# Patient Record
Sex: MALE | Employment: FULL TIME | URBAN - METROPOLITAN AREA
[De-identification: names, ages, dates, MRNs, and addresses within clinical notes are randomized per-mention and may not be internally consistent; named-entity substitution may affect disease eponyms.]

---

## 2017-01-05 ENCOUNTER — TELEPHONE (OUTPATIENT)
Dept: CARDIOLOGY CLINIC | Age: 47
End: 2017-01-05

## 2017-01-05 RX ORDER — FUROSEMIDE 20 MG/1
20 TABLET ORAL DAILY PRN
COMMUNITY
End: 2018-09-14 | Stop reason: SDUPTHER

## 2017-01-05 RX ORDER — METOPROLOL SUCCINATE 50 MG/1
50 TABLET, EXTENDED RELEASE ORAL DAILY
COMMUNITY
End: 2019-04-09 | Stop reason: SDUPTHER

## 2017-01-24 ENCOUNTER — OFFICE VISIT (OUTPATIENT)
Dept: CARDIOLOGY CLINIC | Age: 47
End: 2017-01-24

## 2017-01-24 VITALS
BODY MASS INDEX: 39.34 KG/M2 | HEART RATE: 70 BPM | OXYGEN SATURATION: 98 % | WEIGHT: 281 LBS | DIASTOLIC BLOOD PRESSURE: 74 MMHG | HEIGHT: 71 IN | SYSTOLIC BLOOD PRESSURE: 142 MMHG

## 2017-01-24 DIAGNOSIS — R53.83 OTHER FATIGUE: ICD-10-CM

## 2017-01-24 DIAGNOSIS — I10 ESSENTIAL HYPERTENSION: ICD-10-CM

## 2017-01-24 DIAGNOSIS — R07.9 CHEST PAIN, UNSPECIFIED TYPE: Primary | ICD-10-CM

## 2017-01-24 DIAGNOSIS — E78.5 HYPERLIPIDEMIA LDL GOAL <70: ICD-10-CM

## 2017-01-24 DIAGNOSIS — Z95.1 S/P CABG X 4: ICD-10-CM

## 2017-01-24 DIAGNOSIS — R06.09 DOE (DYSPNEA ON EXERTION): ICD-10-CM

## 2017-01-24 DIAGNOSIS — R60.0 LOCALIZED EDEMA: ICD-10-CM

## 2017-01-24 PROCEDURE — 93000 ELECTROCARDIOGRAM COMPLETE: CPT | Performed by: INTERNAL MEDICINE

## 2017-01-24 PROCEDURE — 99214 OFFICE O/P EST MOD 30 MIN: CPT | Performed by: INTERNAL MEDICINE

## 2017-01-24 RX ORDER — VITAMIN B COMPLEX
1 CAPSULE ORAL DAILY
COMMUNITY

## 2017-01-24 RX ORDER — INSULIN GLARGINE 100 [IU]/ML
118 INJECTION, SOLUTION SUBCUTANEOUS NIGHTLY
COMMUNITY
End: 2017-04-19

## 2017-02-24 ENCOUNTER — TELEPHONE (OUTPATIENT)
Dept: CARDIOLOGY CLINIC | Age: 47
End: 2017-02-24

## 2017-02-27 RX ORDER — ATORVASTATIN CALCIUM 40 MG/1
20 TABLET, FILM COATED ORAL
COMMUNITY
End: 2017-07-24 | Stop reason: ALTCHOICE

## 2017-03-29 ENCOUNTER — OFFICE VISIT (OUTPATIENT)
Dept: INFECTIOUS DISEASES | Age: 47
End: 2017-03-29

## 2017-03-29 VITALS
BODY MASS INDEX: 38.87 KG/M2 | OXYGEN SATURATION: 98 % | DIASTOLIC BLOOD PRESSURE: 80 MMHG | HEART RATE: 72 BPM | WEIGHT: 287 LBS | SYSTOLIC BLOOD PRESSURE: 138 MMHG | TEMPERATURE: 97.7 F | HEIGHT: 72 IN

## 2017-03-29 DIAGNOSIS — R53.83 FATIGUE, UNSPECIFIED TYPE: Primary | ICD-10-CM

## 2017-03-29 PROCEDURE — 99213 OFFICE O/P EST LOW 20 MIN: CPT | Performed by: INTERNAL MEDICINE

## 2017-03-30 ENCOUNTER — TELEPHONE (OUTPATIENT)
Dept: ENDOCRINOLOGY | Age: 47
End: 2017-03-30

## 2017-03-31 ENCOUNTER — HOSPITAL ENCOUNTER (OUTPATIENT)
Dept: OTHER | Age: 47
Discharge: HOME | End: 2017-03-31
Attending: INTERNAL MEDICINE | Admitting: INTERNAL MEDICINE

## 2017-03-31 ENCOUNTER — TELEPHONE (OUTPATIENT)
Dept: ENDOCRINOLOGY | Age: 47
End: 2017-03-31

## 2017-03-31 DIAGNOSIS — R53.83 FATIGUE, UNSPECIFIED TYPE: ICD-10-CM

## 2017-04-01 ENCOUNTER — HOSPITAL ENCOUNTER (OUTPATIENT)
Dept: OTHER | Age: 47
Discharge: HOME | End: 2017-04-01
Attending: INTERNAL MEDICINE | Admitting: INTERNAL MEDICINE

## 2017-04-01 LAB
ANION GAP SERPL CALCULATED.3IONS-SCNC: 17 MMOL/L (ref 3–16)
BASOPHILS ABSOLUTE: 0.1 K/UL (ref 0–0.2)
BASOPHILS RELATIVE PERCENT: 1.1 %
BUN BLDV-MCNC: 15 MG/DL (ref 7–20)
CALCIUM SERPL-MCNC: 9.6 MG/DL (ref 8.3–10.6)
CHLORIDE BLD-SCNC: 103 MMOL/L (ref 99–110)
CO2: 25 MMOL/L (ref 21–32)
CREAT SERPL-MCNC: 0.7 MG/DL (ref 0.9–1.3)
EOSINOPHILS ABSOLUTE: 0.1 K/UL (ref 0–0.6)
EOSINOPHILS RELATIVE PERCENT: 2.7 %
GFR AFRICAN AMERICAN: >60
GFR NON-AFRICAN AMERICAN: >60
GLUCOSE BLD-MCNC: 133 MG/DL (ref 70–99)
HCT VFR BLD CALC: 43.8 % (ref 40.5–52.5)
HEMOGLOBIN: 14.5 G/DL (ref 13.5–17.5)
LYMPHOCYTES ABSOLUTE: 1.8 K/UL (ref 1–5.1)
LYMPHOCYTES RELATIVE PERCENT: 34.2 %
MAGNESIUM: 1.6 MG/DL (ref 1.8–2.4)
MCH RBC QN AUTO: 28 PG (ref 26–34)
MCHC RBC AUTO-ENTMCNC: 33 G/DL (ref 31–36)
MCV RBC AUTO: 84.9 FL (ref 80–100)
MONOCYTES ABSOLUTE: 0.4 K/UL (ref 0–1.3)
MONOCYTES RELATIVE PERCENT: 8.4 %
NEUTROPHILS ABSOLUTE: 2.8 K/UL (ref 1.7–7.7)
NEUTROPHILS RELATIVE PERCENT: 53.6 %
PDW BLD-RTO: 14 % (ref 12.4–15.4)
PLATELET # BLD: 210 K/UL (ref 135–450)
PMV BLD AUTO: 8.4 FL (ref 5–10.5)
POTASSIUM SERPL-SCNC: 4.6 MMOL/L (ref 3.5–5.1)
RBC # BLD: 5.16 M/UL (ref 4.2–5.9)
SODIUM BLD-SCNC: 145 MMOL/L (ref 136–145)
TSH REFLEX: 1.54 UIU/ML (ref 0.27–4.2)
WBC # BLD: 5.3 K/UL (ref 4–11)

## 2017-04-13 ENCOUNTER — TELEPHONE (OUTPATIENT)
Dept: PHARMACY | Facility: CLINIC | Age: 47
End: 2017-04-13

## 2017-04-14 ENCOUNTER — TELEPHONE (OUTPATIENT)
Dept: INFECTIOUS DISEASES | Age: 47
End: 2017-04-14

## 2017-04-14 DIAGNOSIS — L03.313 CELLULITIS OF CHEST WALL: Primary | ICD-10-CM

## 2017-04-19 ENCOUNTER — HOSPITAL ENCOUNTER (OUTPATIENT)
Dept: OTHER | Age: 47
Discharge: HOME | End: 2017-04-19
Attending: NURSE PRACTITIONER | Admitting: NURSE PRACTITIONER

## 2017-04-19 ENCOUNTER — OFFICE VISIT (OUTPATIENT)
Dept: ENDOCRINOLOGY | Age: 47
End: 2017-04-19

## 2017-04-19 ENCOUNTER — TELEPHONE (OUTPATIENT)
Dept: ENDOCRINOLOGY | Age: 47
End: 2017-04-19

## 2017-04-19 ENCOUNTER — HOSPITAL ENCOUNTER (OUTPATIENT)
Dept: OTHER | Age: 47
Discharge: HOME | End: 2017-04-19
Attending: INTERNAL MEDICINE | Admitting: INTERNAL MEDICINE

## 2017-04-19 VITALS
DIASTOLIC BLOOD PRESSURE: 88 MMHG | OXYGEN SATURATION: 97 % | HEART RATE: 71 BPM | HEIGHT: 72 IN | SYSTOLIC BLOOD PRESSURE: 138 MMHG | WEIGHT: 289 LBS | BODY MASS INDEX: 39.14 KG/M2

## 2017-04-19 DIAGNOSIS — G63 POLYNEUROPATHY ASSOCIATED WITH UNDERLYING DISEASE (HCC): ICD-10-CM

## 2017-04-19 DIAGNOSIS — E83.42 HYPOMAGNESEMIA: ICD-10-CM

## 2017-04-19 DIAGNOSIS — E78.2 MIXED HYPERLIPIDEMIA: ICD-10-CM

## 2017-04-19 DIAGNOSIS — I25.10 CORONARY ARTERY DISEASE INVOLVING NATIVE CORONARY ARTERY OF NATIVE HEART WITHOUT ANGINA PECTORIS: ICD-10-CM

## 2017-04-19 DIAGNOSIS — E55.9 VITAMIN D DEFICIENCY: ICD-10-CM

## 2017-04-19 DIAGNOSIS — E66.9 OBESITY (BMI 30-39.9): ICD-10-CM

## 2017-04-19 LAB
A/G RATIO: 1.3 (ref 1.1–2.2)
ALBUMIN SERPL-MCNC: 4.4 G/DL (ref 3.4–5)
ALP BLD-CCNC: 61 U/L (ref 40–129)
ALT SERPL-CCNC: 25 U/L (ref 10–40)
ANION GAP SERPL CALCULATED.3IONS-SCNC: 17 MMOL/L (ref 3–16)
AST SERPL-CCNC: 18 U/L (ref 15–37)
BILIRUB SERPL-MCNC: 0.4 MG/DL (ref 0–1)
BUN BLDV-MCNC: 25 MG/DL (ref 7–20)
CALCIUM SERPL-MCNC: 10 MG/DL (ref 8.3–10.6)
CHLORIDE BLD-SCNC: 100 MMOL/L (ref 99–110)
CHOLESTEROL, TOTAL: 162 MG/DL (ref 0–199)
CO2: 22 MMOL/L (ref 21–32)
CORTISOL - AM: 8.8 UG/DL (ref 4.3–22.4)
CREAT SERPL-MCNC: 0.8 MG/DL (ref 0.9–1.3)
CREATININE URINE: 156.4 MG/DL (ref 39–259)
ESTIMATED AVERAGE GLUCOSE: 194.4 MG/DL
ESTRADIOL LEVEL: 19 PG/ML
FOLATE: 18.47 NG/ML (ref 4.78–24.2)
GFR AFRICAN AMERICAN: >60
GFR NON-AFRICAN AMERICAN: >60
GLOBULIN: 3.3 G/DL
GLUCOSE BLD-MCNC: 188 MG/DL (ref 70–99)
HBA1C MFR BLD: 8.4 %
HDLC SERPL-MCNC: 30 MG/DL (ref 40–60)
HOMOCYSTEINE: 10 UMOL/L (ref 0–10)
LDL CHOLESTEROL CALCULATED: ABNORMAL MG/DL
LDL CHOLESTEROL DIRECT: 56 MG/DL
MICROALBUMIN UR-MCNC: 4 MG/DL
MICROALBUMIN/CREAT UR-RTO: 25.6 MG/G (ref 0–30)
POTASSIUM SERPL-SCNC: 5.1 MMOL/L (ref 3.5–5.1)
SODIUM BLD-SCNC: 139 MMOL/L (ref 136–145)
TOTAL PROTEIN: 7.7 G/DL (ref 6.4–8.2)
TRIGL SERPL-MCNC: 611 MG/DL (ref 0–150)
VITAMIN B-12: 695 PG/ML (ref 211–911)
VITAMIN D 25-HYDROXY: 14.6 NG/ML
VLDLC SERPL CALC-MCNC: ABNORMAL MG/DL

## 2017-04-19 PROCEDURE — 99205 OFFICE O/P NEW HI 60 MIN: CPT | Performed by: NURSE PRACTITIONER

## 2017-04-19 RX ORDER — ICOSAPENT ETHYL 1000 MG/1
2 CAPSULE ORAL 2 TIMES DAILY
Qty: 360 CAPSULE | Refills: 1 | Status: SHIPPED | OUTPATIENT
Start: 2017-04-19 | End: 2017-05-23 | Stop reason: SDUPTHER

## 2017-04-19 RX ORDER — ERGOCALCIFEROL (VITAMIN D2) 1250 MCG
CAPSULE ORAL
Qty: 14 CAPSULE | Refills: 2 | Status: SHIPPED | OUTPATIENT
Start: 2017-04-19 | End: 2017-05-24 | Stop reason: SDUPTHER

## 2017-04-19 RX ORDER — MAGNESIUM OXIDE 400 MG/1
400 TABLET ORAL DAILY
Qty: 60 TABLET | Refills: 5 | Status: SHIPPED | OUTPATIENT
Start: 2017-04-19 | End: 2018-01-19 | Stop reason: ALTCHOICE

## 2017-04-20 ENCOUNTER — TELEPHONE (OUTPATIENT)
Dept: INFECTIOUS DISEASES | Age: 47
End: 2017-04-20

## 2017-04-20 DIAGNOSIS — Z22.322 MRSA (METHICILLIN RESISTANT STAPHYLOCOCCUS AUREUS) CARRIER: Primary | ICD-10-CM

## 2017-04-20 LAB
ADRENOCORTICOTROPIC HORMONE: 30 PG/ML (ref 7–69)
C-PEPTIDE: 3.1 NG/ML (ref 0.8–3.5)
METHYLMALONIC ACID: 0.15 UMOL/L (ref 0–0.4)
MRSA CULTURE ONLY: ABNORMAL
ORGANISM: ABNORMAL
SEX HORMONE BINDING GLOBULIN: 18 NMOL/L (ref 11–80)
TESTOSTERONE FREE PERCENT: 2.3 % (ref 1.6–2.9)
TESTOSTERONE FREE, CALC: 43 PG/ML (ref 47–244)
TESTOSTERONE TOTAL-MALE: 188 NG/DL (ref 300–890)

## 2017-04-21 LAB
INSULIN A: <0.4 U/ML (ref 0–0.4)
ISLET CELL ANTIBODY: NORMAL

## 2017-04-24 LAB — GLUTAMIC ACID DECARB AB: <5 IU/ML (ref 0–5)

## 2017-05-12 ENCOUNTER — EMPLOYEE WELLNESS (OUTPATIENT)
Dept: OTHER | Age: 47
End: 2017-05-12

## 2017-05-12 LAB
CHOLESTEROL, TOTAL: 204 MG/DL (ref 0–199)
GLUCOSE BLD-MCNC: 134 MG/DL (ref 70–99)
HDLC SERPL-MCNC: 22 MG/DL (ref 40–60)
LDL CHOLESTEROL CALCULATED: ABNORMAL MG/DL
LDL CHOLESTEROL DIRECT: 59 MG/DL
TRIGL SERPL-MCNC: 1332 MG/DL (ref 0–150)

## 2017-05-13 LAB
ESTIMATED AVERAGE GLUCOSE: 185.8 MG/DL
HBA1C MFR BLD: 8.1 %

## 2017-05-23 ENCOUNTER — OFFICE VISIT (OUTPATIENT)
Dept: ENDOCRINOLOGY | Age: 47
End: 2017-05-23

## 2017-05-23 VITALS
HEART RATE: 64 BPM | BODY MASS INDEX: 40.12 KG/M2 | DIASTOLIC BLOOD PRESSURE: 84 MMHG | HEIGHT: 72 IN | SYSTOLIC BLOOD PRESSURE: 122 MMHG | WEIGHT: 296.2 LBS | OXYGEN SATURATION: 98 %

## 2017-05-23 DIAGNOSIS — E55.9 VITAMIN D DEFICIENCY: ICD-10-CM

## 2017-05-23 DIAGNOSIS — E83.42 HYPOMAGNESEMIA: ICD-10-CM

## 2017-05-23 DIAGNOSIS — G63 POLYNEUROPATHY ASSOCIATED WITH UNDERLYING DISEASE (HCC): ICD-10-CM

## 2017-05-23 DIAGNOSIS — E78.2 MIXED HYPERLIPIDEMIA: ICD-10-CM

## 2017-05-23 PROCEDURE — 99214 OFFICE O/P EST MOD 30 MIN: CPT | Performed by: NURSE PRACTITIONER

## 2017-05-23 RX ORDER — FENOFIBRATE 145 MG/1
145 TABLET, COATED ORAL DAILY
Qty: 90 TABLET | Refills: 1 | Status: SHIPPED | OUTPATIENT
Start: 2017-05-23 | End: 2017-05-23 | Stop reason: SDUPTHER

## 2017-05-23 RX ORDER — ICOSAPENT ETHYL 1000 MG/1
2 CAPSULE ORAL 2 TIMES DAILY
Qty: 360 CAPSULE | Refills: 1 | Status: SHIPPED | OUTPATIENT
Start: 2017-05-23 | End: 2018-10-25 | Stop reason: SDUPTHER

## 2017-05-23 RX ORDER — FENOFIBRATE 145 MG/1
145 TABLET, COATED ORAL DAILY
Qty: 90 TABLET | Refills: 1 | Status: SHIPPED | OUTPATIENT
Start: 2017-05-23 | End: 2021-01-07

## 2017-05-24 ENCOUNTER — HOSPITAL ENCOUNTER (OUTPATIENT)
Dept: OTHER | Age: 47
Discharge: HOME | End: 2017-05-24
Attending: NURSE PRACTITIONER | Admitting: NURSE PRACTITIONER

## 2017-05-24 LAB
MAGNESIUM: 1.8 MG/DL (ref 1.8–2.4)
VITAMIN D 25-HYDROXY: 15.8 NG/ML

## 2017-05-24 RX ORDER — ERGOCALCIFEROL (VITAMIN D2) 1250 MCG
CAPSULE ORAL
Qty: 24 CAPSULE | Refills: 0 | Status: SHIPPED | OUTPATIENT
Start: 2017-05-24 | End: 2018-11-02 | Stop reason: SDUPTHER

## 2017-05-27 LAB — CHROMOGRANIN A: 103 NG/ML (ref 0–95)

## 2017-06-03 LAB — GLUCAGON LVL: 231 NG/L

## 2017-06-05 ENCOUNTER — TELEPHONE (OUTPATIENT)
Dept: ENDOCRINOLOGY | Age: 47
End: 2017-06-05

## 2017-06-05 DIAGNOSIS — E16.3: Primary | ICD-10-CM

## 2017-06-12 ENCOUNTER — HOSPITAL ENCOUNTER (OUTPATIENT)
Dept: MRI IMAGING | Age: 47
Discharge: HOME | End: 2017-06-12
Attending: INTERNAL MEDICINE | Admitting: INTERNAL MEDICINE

## 2017-06-12 DIAGNOSIS — M54.42 LOW BACK PAIN WITH LEFT-SIDED SCIATICA: ICD-10-CM

## 2017-06-12 DIAGNOSIS — M54.41 ACUTE BACK PAIN WITH SCIATICA, RIGHT: ICD-10-CM

## 2017-06-15 ENCOUNTER — HOSPITAL ENCOUNTER (OUTPATIENT)
Dept: VASCULAR LAB | Age: 47
Discharge: HOME | End: 2017-06-15
Attending: INTERNAL MEDICINE | Admitting: INTERNAL MEDICINE

## 2017-06-15 DIAGNOSIS — M54.42 LOW BACK PAIN WITH LEFT-SIDED SCIATICA: ICD-10-CM

## 2017-06-15 DIAGNOSIS — I73.9 CLAUDICATION (HCC): Primary | ICD-10-CM

## 2017-06-19 ENCOUNTER — HOSPITAL ENCOUNTER (OUTPATIENT)
Dept: NUCLEAR MEDICINE | Age: 47
Discharge: HOME | End: 2017-06-21
Attending: INTERNAL MEDICINE | Admitting: INTERNAL MEDICINE

## 2017-06-19 ENCOUNTER — PATIENT MESSAGE (OUTPATIENT)
Dept: ENDOCRINOLOGY | Age: 47
End: 2017-06-19

## 2017-06-19 ENCOUNTER — HOSPITAL ENCOUNTER (OUTPATIENT)
Dept: CT IMAGING | Age: 47
Discharge: HOME | End: 2017-06-19
Attending: INTERNAL MEDICINE | Admitting: INTERNAL MEDICINE

## 2017-06-19 DIAGNOSIS — D3A.8 NEUROENDOCRINE TUMOR: ICD-10-CM

## 2017-06-19 DIAGNOSIS — D48.9 NEOPLASM OF UNCERTAIN BEHAVIOR, SITE UNSPECIFIED: ICD-10-CM

## 2017-06-19 DIAGNOSIS — D48.9 NEOPLASM OF UNCERTAIN BEHAVIOR: ICD-10-CM

## 2017-06-20 ENCOUNTER — HOSPITAL ENCOUNTER (OUTPATIENT)
Dept: NUCLEAR MEDICINE | Age: 47
Discharge: HOME | End: 2017-06-20
Admitting: INTERNAL MEDICINE

## 2017-06-20 DIAGNOSIS — D48.9 NEOPLASM OF UNCERTAIN BEHAVIOR: ICD-10-CM

## 2017-06-21 ENCOUNTER — TELEPHONE (OUTPATIENT)
Dept: CARDIOLOGY CLINIC | Age: 47
End: 2017-06-21

## 2017-06-21 ENCOUNTER — HOSPITAL ENCOUNTER (OUTPATIENT)
Dept: NUCLEAR MEDICINE | Age: 47
Discharge: HOME | End: 2017-06-21
Admitting: INTERNAL MEDICINE

## 2017-06-21 DIAGNOSIS — D48.9 NEOPLASM OF UNCERTAIN BEHAVIOR: ICD-10-CM

## 2017-07-11 ENCOUNTER — TELEPHONE (OUTPATIENT)
Dept: BARIATRICS/WEIGHT MGMT | Age: 47
End: 2017-07-11

## 2017-07-12 ENCOUNTER — HOSPITAL ENCOUNTER (OUTPATIENT)
Dept: OTHER | Age: 47
Discharge: HOME | End: 2017-07-12
Attending: NEUROLOGICAL SURGERY | Admitting: NEUROLOGICAL SURGERY

## 2017-07-12 DIAGNOSIS — R52 PAIN: ICD-10-CM

## 2017-07-24 ENCOUNTER — OFFICE VISIT (OUTPATIENT)
Dept: CARDIOLOGY CLINIC | Age: 47
End: 2017-07-24

## 2017-07-24 VITALS
HEIGHT: 71 IN | DIASTOLIC BLOOD PRESSURE: 76 MMHG | HEART RATE: 84 BPM | SYSTOLIC BLOOD PRESSURE: 138 MMHG | WEIGHT: 288 LBS | BODY MASS INDEX: 40.32 KG/M2 | OXYGEN SATURATION: 98 %

## 2017-07-24 DIAGNOSIS — R53.83 FATIGUE, UNSPECIFIED TYPE: ICD-10-CM

## 2017-07-24 DIAGNOSIS — R07.2 PRECORDIAL PAIN: ICD-10-CM

## 2017-07-24 DIAGNOSIS — R60.0 LOCALIZED EDEMA: ICD-10-CM

## 2017-07-24 DIAGNOSIS — Z01.810 PREOP CARDIOVASCULAR EXAM: Primary | ICD-10-CM

## 2017-07-24 DIAGNOSIS — E78.2 MIXED HYPERLIPIDEMIA: ICD-10-CM

## 2017-07-24 DIAGNOSIS — I25.119 CORONARY ARTERY DISEASE INVOLVING NATIVE CORONARY ARTERY OF NATIVE HEART WITH ANGINA PECTORIS (HCC): ICD-10-CM

## 2017-07-24 DIAGNOSIS — I10 ESSENTIAL HYPERTENSION: ICD-10-CM

## 2017-07-24 PROCEDURE — 99214 OFFICE O/P EST MOD 30 MIN: CPT | Performed by: INTERNAL MEDICINE

## 2017-07-24 PROCEDURE — 93000 ELECTROCARDIOGRAM COMPLETE: CPT | Performed by: INTERNAL MEDICINE

## 2017-07-24 RX ORDER — ROSUVASTATIN CALCIUM 5 MG/1
5 TABLET, COATED ORAL NIGHTLY
Qty: 30 TABLET | Refills: 11 | Status: SHIPPED | OUTPATIENT
Start: 2017-07-24 | End: 2018-01-19

## 2017-07-24 RX ORDER — LISINOPRIL 20 MG/1
20 TABLET ORAL 2 TIMES DAILY
COMMUNITY
End: 2020-01-20 | Stop reason: SDUPTHER

## 2017-07-31 ENCOUNTER — HOSPITAL ENCOUNTER (OUTPATIENT)
Dept: PREADMISSION TESTING | Age: 47
Discharge: HOME | End: 2017-07-31
Attending: NEUROLOGICAL SURGERY | Admitting: NEUROLOGICAL SURGERY

## 2017-07-31 VITALS — WEIGHT: 278 LBS | BODY MASS INDEX: 38.92 KG/M2 | HEIGHT: 71 IN

## 2017-07-31 LAB
A/G RATIO: 1.6 (ref 1.1–2.2)
ALBUMIN SERPL-MCNC: 4.9 G/DL (ref 3.4–5)
ALP BLD-CCNC: 45 U/L (ref 40–129)
ALT SERPL-CCNC: 24 U/L (ref 10–40)
ANION GAP SERPL CALCULATED.3IONS-SCNC: 13 MMOL/L (ref 3–16)
APTT: 28.9 SEC (ref 24.1–34.9)
AST SERPL-CCNC: 25 U/L (ref 15–37)
BILIRUB SERPL-MCNC: 0.3 MG/DL (ref 0–1)
BUN BLDV-MCNC: 25 MG/DL (ref 7–20)
CALCIUM SERPL-MCNC: 9.2 MG/DL (ref 8.3–10.6)
CHLORIDE BLD-SCNC: 102 MMOL/L (ref 99–110)
CO2: 23 MMOL/L (ref 21–32)
CREAT SERPL-MCNC: 0.9 MG/DL (ref 0.9–1.3)
GFR AFRICAN AMERICAN: >60
GFR NON-AFRICAN AMERICAN: >60
GLOBULIN: 3.1 G/DL
GLUCOSE BLD-MCNC: 211 MG/DL (ref 70–99)
HCT VFR BLD CALC: 45.5 % (ref 40.5–52.5)
HEMOGLOBIN: 15 G/DL (ref 13.5–17.5)
INR BLD: 0.99 (ref 0.85–1.15)
MCH RBC QN AUTO: 27.1 PG (ref 26–34)
MCHC RBC AUTO-ENTMCNC: 33 G/DL (ref 31–36)
MCV RBC AUTO: 82.2 FL (ref 80–100)
PDW BLD-RTO: 13.3 % (ref 12.4–15.4)
PLATELET # BLD: 220 K/UL (ref 135–450)
PMV BLD AUTO: 8.1 FL (ref 5–10.5)
POTASSIUM SERPL-SCNC: 4.9 MMOL/L (ref 3.5–5.1)
PROTHROMBIN TIME: 11.2 SEC (ref 9.6–13)
RBC # BLD: 5.54 M/UL (ref 4.2–5.9)
SODIUM BLD-SCNC: 138 MMOL/L (ref 136–145)
TOTAL PROTEIN: 8 G/DL (ref 6.4–8.2)
WBC # BLD: 5.1 K/UL (ref 4–11)

## 2017-07-31 RX ORDER — LIDOCAINE HYDROCHLORIDE 10 MG/ML
0.5 INJECTION, SOLUTION EPIDURAL; INFILTRATION; INTRACAUDAL; PERINEURAL ONCE
Status: CANCELLED | OUTPATIENT
Start: 2017-08-10

## 2017-07-31 RX ORDER — ALLOPURINOL 100 MG/1
100 TABLET ORAL DAILY
COMMUNITY
End: 2017-10-19 | Stop reason: ALTCHOICE

## 2017-07-31 RX ORDER — CLINDAMYCIN PHOSPHATE 900 MG/50ML
900 INJECTION INTRAVENOUS
Status: CANCELLED | OUTPATIENT
Start: 2017-08-10 | End: 2017-08-10

## 2017-07-31 RX ORDER — SODIUM CHLORIDE 9 MG/ML
INJECTION, SOLUTION INTRAVENOUS CONTINUOUS
Status: CANCELLED | OUTPATIENT
Start: 2017-08-10

## 2017-07-31 ASSESSMENT — ENCOUNTER SYMPTOMS: SHORTNESS OF BREATH: 0

## 2017-08-02 LAB — MRSA CULTURE ONLY: NORMAL

## 2017-08-10 ENCOUNTER — HOSPITAL ENCOUNTER (OUTPATIENT)
Dept: SURGERY | Age: 47
Discharge: HOME | End: 2017-08-10
Attending: NEUROLOGICAL SURGERY | Admitting: NEUROLOGICAL SURGERY

## 2017-08-10 VITALS
HEART RATE: 59 BPM | SYSTOLIC BLOOD PRESSURE: 139 MMHG | BODY MASS INDEX: 38.92 KG/M2 | WEIGHT: 278 LBS | HEIGHT: 71 IN | DIASTOLIC BLOOD PRESSURE: 83 MMHG | OXYGEN SATURATION: 94 % | TEMPERATURE: 96.8 F | RESPIRATION RATE: 14 BRPM

## 2017-08-10 DIAGNOSIS — R52 PAIN: ICD-10-CM

## 2017-08-10 LAB
ANION GAP SERPL CALCULATED.3IONS-SCNC: 14 MMOL/L (ref 3–16)
BUN BLDV-MCNC: 27 MG/DL (ref 7–20)
CALCIUM SERPL-MCNC: 9.7 MG/DL (ref 8.3–10.6)
CHLORIDE BLD-SCNC: 104 MMOL/L (ref 99–110)
CO2: 23 MMOL/L (ref 21–32)
CREAT SERPL-MCNC: 1 MG/DL (ref 0.9–1.3)
GFR AFRICAN AMERICAN: >60
GFR NON-AFRICAN AMERICAN: >60
GLUCOSE BLD-MCNC: 172 MG/DL (ref 70–99)
GLUCOSE BLD-MCNC: 172 MG/DL (ref 70–99)
GLUCOSE BLD-MCNC: 178 MG/DL (ref 70–99)
GLUCOSE BLD-MCNC: 208 MG/DL (ref 70–99)
PERFORMED ON: ABNORMAL
POTASSIUM SERPL-SCNC: 4.8 MMOL/L (ref 3.5–5.1)
SODIUM BLD-SCNC: 141 MMOL/L (ref 136–145)

## 2017-08-10 RX ORDER — SODIUM CHLORIDE 0.9 % (FLUSH) 0.9 %
10 SYRINGE (ML) INJECTION EVERY 12 HOURS SCHEDULED
Status: DISCONTINUED | OUTPATIENT
Start: 2017-08-10 | End: 2017-08-11 | Stop reason: HOSPADM

## 2017-08-10 RX ORDER — FENTANYL CITRATE 50 UG/ML
50 INJECTION, SOLUTION INTRAMUSCULAR; INTRAVENOUS EVERY 5 MIN PRN
Status: DISCONTINUED | OUTPATIENT
Start: 2017-08-10 | End: 2017-08-11 | Stop reason: HOSPADM

## 2017-08-10 RX ORDER — OXYCODONE HYDROCHLORIDE AND ACETAMINOPHEN 5; 325 MG/1; MG/1
1-2 TABLET ORAL EVERY 6 HOURS PRN
Qty: 60 TABLET | Refills: 0 | Status: SHIPPED | OUTPATIENT
Start: 2017-08-10 | End: 2017-08-17

## 2017-08-10 RX ORDER — SODIUM CHLORIDE 0.9 % (FLUSH) 0.9 %
10 SYRINGE (ML) INJECTION PRN
Status: DISCONTINUED | OUTPATIENT
Start: 2017-08-10 | End: 2017-08-11 | Stop reason: HOSPADM

## 2017-08-10 RX ORDER — HYDROMORPHONE HCL 110MG/55ML
0.25 PATIENT CONTROLLED ANALGESIA SYRINGE INTRAVENOUS EVERY 5 MIN PRN
Status: DISCONTINUED | OUTPATIENT
Start: 2017-08-10 | End: 2017-08-11 | Stop reason: HOSPADM

## 2017-08-10 RX ORDER — ONDANSETRON 2 MG/ML
4 INJECTION INTRAMUSCULAR; INTRAVENOUS
Status: COMPLETED | OUTPATIENT
Start: 2017-08-10 | End: 2017-08-10

## 2017-08-10 RX ORDER — HYDROMORPHONE HCL 110MG/55ML
0.5 PATIENT CONTROLLED ANALGESIA SYRINGE INTRAVENOUS EVERY 5 MIN PRN
Status: DISCONTINUED | OUTPATIENT
Start: 2017-08-10 | End: 2017-08-11 | Stop reason: HOSPADM

## 2017-08-10 RX ORDER — FENTANYL CITRATE 50 UG/ML
25 INJECTION, SOLUTION INTRAMUSCULAR; INTRAVENOUS EVERY 5 MIN PRN
Status: DISCONTINUED | OUTPATIENT
Start: 2017-08-10 | End: 2017-08-11 | Stop reason: HOSPADM

## 2017-08-10 RX ORDER — ACETAMINOPHEN 10 MG/ML
1000 INJECTION, SOLUTION INTRAVENOUS ONCE
Status: COMPLETED | OUTPATIENT
Start: 2017-08-10 | End: 2017-08-10

## 2017-08-10 RX ORDER — LIDOCAINE HYDROCHLORIDE 10 MG/ML
0.5 INJECTION, SOLUTION EPIDURAL; INFILTRATION; INTRACAUDAL; PERINEURAL ONCE
Status: DISCONTINUED | OUTPATIENT
Start: 2017-08-10 | End: 2017-08-11 | Stop reason: HOSPADM

## 2017-08-10 RX ORDER — SODIUM CHLORIDE 9 MG/ML
INJECTION, SOLUTION INTRAVENOUS CONTINUOUS
Status: DISCONTINUED | OUTPATIENT
Start: 2017-08-10 | End: 2017-08-10 | Stop reason: SDUPTHER

## 2017-08-10 RX ORDER — OXYCODONE HYDROCHLORIDE 5 MG/1
10 TABLET ORAL PRN
Status: COMPLETED | OUTPATIENT
Start: 2017-08-10 | End: 2017-08-10

## 2017-08-10 RX ORDER — ACETAMINOPHEN 10 MG/ML
INJECTION, SOLUTION INTRAVENOUS
Status: COMPLETED
Start: 2017-08-10 | End: 2017-08-10

## 2017-08-10 RX ORDER — SODIUM CHLORIDE 9 MG/ML
INJECTION, SOLUTION INTRAVENOUS
Status: COMPLETED
Start: 2017-08-10 | End: 2017-08-10

## 2017-08-10 RX ORDER — MEPERIDINE HYDROCHLORIDE 25 MG/ML
12.5 INJECTION INTRAMUSCULAR; INTRAVENOUS; SUBCUTANEOUS EVERY 5 MIN PRN
Status: DISCONTINUED | OUTPATIENT
Start: 2017-08-10 | End: 2017-08-11 | Stop reason: HOSPADM

## 2017-08-10 RX ORDER — ONDANSETRON 2 MG/ML
4 INJECTION INTRAMUSCULAR; INTRAVENOUS PRN
Status: DISCONTINUED | OUTPATIENT
Start: 2017-08-10 | End: 2017-08-11 | Stop reason: HOSPADM

## 2017-08-10 RX ORDER — SODIUM CHLORIDE 9 MG/ML
INJECTION, SOLUTION INTRAVENOUS CONTINUOUS
Status: DISCONTINUED | OUTPATIENT
Start: 2017-08-10 | End: 2017-08-11 | Stop reason: HOSPADM

## 2017-08-10 RX ORDER — LIDOCAINE HYDROCHLORIDE 10 MG/ML
1 INJECTION, SOLUTION EPIDURAL; INFILTRATION; INTRACAUDAL; PERINEURAL
Status: ACTIVE | OUTPATIENT
Start: 2017-08-10 | End: 2017-08-10

## 2017-08-10 RX ORDER — OXYCODONE HYDROCHLORIDE 5 MG/1
5 TABLET ORAL PRN
Status: COMPLETED | OUTPATIENT
Start: 2017-08-10 | End: 2017-08-10

## 2017-08-10 RX ADMIN — ACETAMINOPHEN 1000 MG: 10 INJECTION, SOLUTION INTRAVENOUS at 14:20

## 2017-08-10 RX ADMIN — Medication 0.5 MG: at 14:26

## 2017-08-10 RX ADMIN — SODIUM CHLORIDE: 9 INJECTION, SOLUTION INTRAVENOUS at 11:24

## 2017-08-10 RX ADMIN — Medication 0.5 MG: at 14:15

## 2017-08-10 RX ADMIN — OXYCODONE HYDROCHLORIDE 10 MG: 5 TABLET ORAL at 15:06

## 2017-08-10 RX ADMIN — ONDANSETRON 4 MG: 2 INJECTION INTRAMUSCULAR; INTRAVENOUS at 14:37

## 2017-08-10 RX ADMIN — FENTANYL CITRATE 25 MCG: 50 INJECTION, SOLUTION INTRAMUSCULAR; INTRAVENOUS at 14:03

## 2017-08-10 RX ADMIN — FENTANYL CITRATE 50 MCG: 50 INJECTION, SOLUTION INTRAMUSCULAR; INTRAVENOUS at 14:08

## 2017-08-10 ASSESSMENT — PAIN SCALES - GENERAL
PAINLEVEL_OUTOF10: 8
PAINLEVEL_OUTOF10: 8
PAINLEVEL_OUTOF10: 7
PAINLEVEL_OUTOF10: 6
PAINLEVEL_OUTOF10: 8

## 2017-08-10 ASSESSMENT — PAIN - FUNCTIONAL ASSESSMENT: PAIN_FUNCTIONAL_ASSESSMENT: 0-10

## 2017-08-10 ASSESSMENT — ENCOUNTER SYMPTOMS: SHORTNESS OF BREATH: 0

## 2017-08-15 ENCOUNTER — HOSPITAL ENCOUNTER (OUTPATIENT)
Dept: OTHER | Age: 47
Discharge: HOME | End: 2017-08-15
Attending: NEUROLOGICAL SURGERY | Admitting: NEUROLOGICAL SURGERY

## 2017-08-15 LAB
BASOPHILS ABSOLUTE: 0.1 K/UL (ref 0–0.2)
BASOPHILS RELATIVE PERCENT: 1.1 %
C-REACTIVE PROTEIN: 10.3 MG/L (ref 0–5.1)
EOSINOPHILS ABSOLUTE: 0.2 K/UL (ref 0–0.6)
EOSINOPHILS RELATIVE PERCENT: 4.1 %
HCT VFR BLD CALC: 39.3 % (ref 40.5–52.5)
HEMOGLOBIN: 13 G/DL (ref 13.5–17.5)
LYMPHOCYTES ABSOLUTE: 1.5 K/UL (ref 1–5.1)
LYMPHOCYTES RELATIVE PERCENT: 30.8 %
MCH RBC QN AUTO: 27.3 PG (ref 26–34)
MCHC RBC AUTO-ENTMCNC: 33 G/DL (ref 31–36)
MCV RBC AUTO: 82.7 FL (ref 80–100)
MONOCYTES ABSOLUTE: 0.4 K/UL (ref 0–1.3)
MONOCYTES RELATIVE PERCENT: 8.2 %
NEUTROPHILS ABSOLUTE: 2.7 K/UL (ref 1.7–7.7)
NEUTROPHILS RELATIVE PERCENT: 55.8 %
PDW BLD-RTO: 13.3 % (ref 12.4–15.4)
PLATELET # BLD: 203 K/UL (ref 135–450)
PMV BLD AUTO: 8.2 FL (ref 5–10.5)
RBC # BLD: 4.75 M/UL (ref 4.2–5.9)
SEDIMENTATION RATE, ERYTHROCYTE: 43 MM/HR (ref 0–15)
WBC # BLD: 4.9 K/UL (ref 4–11)

## 2017-08-16 ENCOUNTER — TELEPHONE (OUTPATIENT)
Dept: INFECTIOUS DISEASES | Age: 47
End: 2017-08-16

## 2017-10-03 ENCOUNTER — TELEPHONE (OUTPATIENT)
Dept: ENDOCRINOLOGY | Age: 47
End: 2017-10-03

## 2017-10-03 DIAGNOSIS — E29.1 HYPOGONADISM IN MALE: ICD-10-CM

## 2017-10-03 DIAGNOSIS — E11.51 TYPE 2 DIABETES MELLITUS WITH DIABETIC PERIPHERAL ANGIOPATHY WITHOUT GANGRENE, WITHOUT LONG-TERM CURRENT USE OF INSULIN (HCC): Primary | ICD-10-CM

## 2017-10-03 NOTE — TELEPHONE ENCOUNTER
Patient called asking if he should have his blood work done before his appointment. Patient stated he was started on Clomid by his PCP for low testosterone and he feels like it is not helping.

## 2017-10-09 ENCOUNTER — TELEPHONE (OUTPATIENT)
Dept: ENDOCRINOLOGY | Age: 47
End: 2017-10-09

## 2017-10-18 ENCOUNTER — HOSPITAL ENCOUNTER (OUTPATIENT)
Dept: OTHER | Age: 47
Discharge: HOME | End: 2017-10-18
Attending: NURSE PRACTITIONER | Admitting: NURSE PRACTITIONER

## 2017-10-18 ENCOUNTER — HOSPITAL ENCOUNTER (OUTPATIENT)
Dept: OTHER | Age: 47
Discharge: HOME | End: 2017-10-18
Attending: INTERNAL MEDICINE | Admitting: INTERNAL MEDICINE

## 2017-10-18 DIAGNOSIS — E29.1 HYPOGONADISM IN MALE: ICD-10-CM

## 2017-10-18 DIAGNOSIS — E11.51 TYPE 2 DIABETES MELLITUS WITH DIABETIC PERIPHERAL ANGIOPATHY WITHOUT GANGRENE, WITHOUT LONG-TERM CURRENT USE OF INSULIN (HCC): ICD-10-CM

## 2017-10-18 DIAGNOSIS — E78.2 MIXED HYPERLIPIDEMIA: ICD-10-CM

## 2017-10-18 DIAGNOSIS — R06.09 DOE (DYSPNEA ON EXERTION): ICD-10-CM

## 2017-10-18 DIAGNOSIS — R53.83 FATIGUE, UNSPECIFIED TYPE: ICD-10-CM

## 2017-10-18 LAB
A/G RATIO: 1.3 (ref 1.1–2.2)
ALBUMIN SERPL-MCNC: 4.4 G/DL (ref 3.4–5)
ALP BLD-CCNC: 43 U/L (ref 40–129)
ALT SERPL-CCNC: 26 U/L (ref 10–40)
ANION GAP SERPL CALCULATED.3IONS-SCNC: 16 MMOL/L (ref 3–16)
AST SERPL-CCNC: 28 U/L (ref 15–37)
BILIRUB SERPL-MCNC: <0.2 MG/DL (ref 0–1)
BUN BLDV-MCNC: 21 MG/DL (ref 7–20)
CALCIUM SERPL-MCNC: 9.7 MG/DL (ref 8.3–10.6)
CHLORIDE BLD-SCNC: 100 MMOL/L (ref 99–110)
CHOLESTEROL, TOTAL: 169 MG/DL (ref 0–199)
CO2: 23 MMOL/L (ref 21–32)
CREAT SERPL-MCNC: 1.1 MG/DL (ref 0.9–1.3)
GFR AFRICAN AMERICAN: >60
GFR NON-AFRICAN AMERICAN: >60
GLOBULIN: 3.3 G/DL
GLUCOSE BLD-MCNC: 121 MG/DL (ref 70–99)
HDLC SERPL-MCNC: 22 MG/DL (ref 40–60)
LDL CHOLESTEROL CALCULATED: ABNORMAL MG/DL
LDL CHOLESTEROL DIRECT: 73 MG/DL
POTASSIUM SERPL-SCNC: 5.7 MMOL/L (ref 3.5–5.1)
SODIUM BLD-SCNC: 139 MMOL/L (ref 136–145)
TOTAL PROTEIN: 7.7 G/DL (ref 6.4–8.2)
TRIGL SERPL-MCNC: 473 MG/DL (ref 0–150)
VLDLC SERPL CALC-MCNC: ABNORMAL MG/DL

## 2017-10-19 ENCOUNTER — OFFICE VISIT (OUTPATIENT)
Dept: ENDOCRINOLOGY | Age: 47
End: 2017-10-19

## 2017-10-19 VITALS
WEIGHT: 292.2 LBS | DIASTOLIC BLOOD PRESSURE: 83 MMHG | BODY MASS INDEX: 40.91 KG/M2 | HEIGHT: 71 IN | SYSTOLIC BLOOD PRESSURE: 145 MMHG | HEART RATE: 71 BPM | OXYGEN SATURATION: 97 %

## 2017-10-19 DIAGNOSIS — E78.2 HYPERLIPIDEMIA, MIXED: ICD-10-CM

## 2017-10-19 DIAGNOSIS — E11.51 TYPE 2 DIABETES MELLITUS WITH DIABETIC PERIPHERAL ANGIOPATHY WITHOUT GANGRENE, WITHOUT LONG-TERM CURRENT USE OF INSULIN (HCC): Primary | ICD-10-CM

## 2017-10-19 DIAGNOSIS — R53.82 CHRONIC FATIGUE: ICD-10-CM

## 2017-10-19 LAB — HBA1C MFR BLD: 8.4 %

## 2017-10-19 PROCEDURE — 83036 HEMOGLOBIN GLYCOSYLATED A1C: CPT | Performed by: NURSE PRACTITIONER

## 2017-10-19 PROCEDURE — 99214 OFFICE O/P EST MOD 30 MIN: CPT | Performed by: NURSE PRACTITIONER

## 2017-10-19 RX ORDER — ASPIRIN 325 MG
325 TABLET ORAL DAILY
COMMUNITY
End: 2022-11-01 | Stop reason: ALTCHOICE

## 2017-10-19 ASSESSMENT — PATIENT HEALTH QUESTIONNAIRE - PHQ9
SUM OF ALL RESPONSES TO PHQ9 QUESTIONS 1 & 2: 0
2. FEELING DOWN, DEPRESSED OR HOPELESS: 0
1. LITTLE INTEREST OR PLEASURE IN DOING THINGS: 0
SUM OF ALL RESPONSES TO PHQ QUESTIONS 1-9: 0

## 2017-10-19 ASSESSMENT — ENCOUNTER SYMPTOMS
COLOR CHANGE: 0
EYE PAIN: 0
NAUSEA: 0
SHORTNESS OF BREATH: 0
DIARRHEA: 0
CONSTIPATION: 0

## 2017-10-19 NOTE — PROGRESS NOTES
Endocrinology  [place], [place]  Phone: [phone]   FAX: [phone]    Bong Allen is a 49 y.o. 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 He has been on Insulin-dependent diabetic since 3/2016, the course of his disease is complicated by peripheral neuropathy, retinopathy, associated with hyperlipidemia, CAD (CABG x 4; 3/2016),  hypertension, hypogonadism and morbid obesity.       Current Readings:  · Patient brought his glucometer for download Yes  · Readings per day 2  · Lowest in past 2 weeks 115  · Highest in past 2 weeks 367  · Hypoglycemia awareness and symptoms: yes  In the past was on Byetta - had pancreatitis; was on Glipizide, Januvia. Was on Farxiga - \"did not notice drop in blood sugars. \" and worried about using SGLT2s because of acute injury to kidney in the past. Reports fasting blood sugars 200-300 mg/dL. A1C trends   Lab Results   Component Value Date    LABA1C 8.1 05/12/2017    LABA1C 8.4 04/19/2017    LABA1C 9.4 03/28/2016     Lab Results   Component Value Date    .8 05/12/2017    .4 04/19/2017    .1 03/28/2016     Current Medication regimen: 160 units of toujeo (U200) and 240-260 units of humalog (approximately 60 units AC TID) plus sliding scale    Complications:  Neuropathy  Has vision problems and cataract removal  Nephropathy h/o TELMA,   Lab Results   Component Value Date    LABALBU 4.4 10/18/2017     Lab Results   Component Value Date    CREATININE 1.1 10/18/2017   Retinopathy Has vision problems and cataract removal    Last Eye Exam 2 years ago  Last Foot exam never    Has patient seen a dietitian? Not recently  Recent weight loss/gain: Gained about 10 lbs recently  Current Exercise: None  Smoker: No  ETOH: None    Vitamin D deficiency: Currently is on 50K U QD. Current complaints include fatigue.   Lab Results   Component Value Date    VITD25 15.8 05/24/2017    VITD25 14.6 04/19/2017     Hyperlipidemia: Currently is on Crestor and Feno fibrate and Vascepa. Current control is fine. Lab Results   Component Value Date    CHOL 169 10/18/2017    CHOL 204 05/12/2017    CHOL 162 04/19/2017     Lab Results   Component Value Date    TRIG 473 10/18/2017    TRIG 1,332 05/12/2017    TRIG 611 04/19/2017     Lab Results   Component Value Date    HDL 22 10/18/2017    HDL 22 05/12/2017    HDL 30 04/19/2017    HDL 34 04/28/2011     Lab Results   Component Value Date    LDLCALC see below 10/18/2017    LDLCALC see below 05/12/2017    LDLCALC see below 04/19/2017     Lab Results   Component Value Date    LDLDIRECT 73 10/18/2017    LDLDIRECT 59 05/12/2017    LDLDIRECT 56 04/19/2017     No results found for: CHOLHDLRATIO    Past Medical History:   Diagnosis Date    Abscess 1994    mediastinal, developed after kenalog injections    Anesthesia     AWAKE BUT UNABLE TO MOVE DURING SHOULDER SURGERY.  CAD (coronary artery disease) 3/2016    Diabetes mellitus ([address] 2001    HgA1C 10.6    Gout     HLD (hyperlipidemia)     HTN (hypertension)     Echo 2013 normal.     MRSA (methicillin resistant staph aureus) culture positive 04/19/2017    Obesity     Pancreatitis 2006    high TG or byetta. flank ecchymosis. 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 work related    Type 2 diabetes mellitus without complication ([address]     Wound abscess 1994    groin. after kenalog injections. drained. iv abx. Family History   Problem Relation Age of Onset    High Blood Pressure Mother     Cancer Mother      Review of Systems   Constitutional: Negative for activity change, appetite change, diaphoresis, fever and unexpected weight change. HENT: Negative for dental problem. Eyes: Negative for pain and visual disturbance. Respiratory: Negative for shortness of breath. Cardiovascular: Negative for chest pain, palpitations and leg swelling.    Gastrointestinal: Negative for

## 2017-10-20 DIAGNOSIS — E87.5 HYPERKALEMIA: Primary | ICD-10-CM

## 2017-10-20 LAB
SEX HORMONE BINDING GLOBULIN: 26 NMOL/L (ref 11–80)
TESTOSTERONE FREE-NONMALE: 95.7 PG/ML (ref 47–244)
TESTOSTERONE TOTAL: 411 NG/DL (ref 220–1000)

## 2017-10-25 ENCOUNTER — HOSPITAL ENCOUNTER (OUTPATIENT)
Dept: OTHER | Age: 47
Discharge: HOME | End: 2017-10-25
Attending: INTERNAL MEDICINE | Admitting: INTERNAL MEDICINE

## 2017-10-25 DIAGNOSIS — E87.5 HYPERKALEMIA: ICD-10-CM

## 2017-10-25 LAB
ANION GAP SERPL CALCULATED.3IONS-SCNC: 21 MMOL/L (ref 3–16)
BUN BLDV-MCNC: 23 MG/DL (ref 7–20)
CALCIUM SERPL-MCNC: 9.7 MG/DL (ref 8.3–10.6)
CHLORIDE BLD-SCNC: 99 MMOL/L (ref 99–110)
CO2: 19 MMOL/L (ref 21–32)
CREAT SERPL-MCNC: 1 MG/DL (ref 0.9–1.3)
GFR AFRICAN AMERICAN: >60
GFR NON-AFRICAN AMERICAN: >60
GLUCOSE BLD-MCNC: 171 MG/DL (ref 70–99)
POTASSIUM SERPL-SCNC: 5.1 MMOL/L (ref 3.5–5.1)
SODIUM BLD-SCNC: 139 MMOL/L (ref 136–145)

## 2017-10-30 ENCOUNTER — HOSPITAL ENCOUNTER (OUTPATIENT)
Dept: OTHER | Age: 47
Discharge: HOME | End: 2017-10-30
Attending: NURSE PRACTITIONER | Admitting: NURSE PRACTITIONER

## 2017-10-30 ENCOUNTER — PATIENT MESSAGE (OUTPATIENT)
Dept: ENDOCRINOLOGY | Age: 47
End: 2017-10-30

## 2017-10-30 DIAGNOSIS — R53.82 CHRONIC FATIGUE: ICD-10-CM

## 2017-10-30 DIAGNOSIS — E78.2 HYPERLIPIDEMIA, MIXED: ICD-10-CM

## 2017-10-30 DIAGNOSIS — E11.51 TYPE 2 DIABETES MELLITUS WITH DIABETIC PERIPHERAL ANGIOPATHY WITHOUT GANGRENE, WITHOUT LONG-TERM CURRENT USE OF INSULIN (HCC): ICD-10-CM

## 2017-10-30 LAB
A/G RATIO: 1.4 (ref 1.1–2.2)
ALBUMIN SERPL-MCNC: 4.2 G/DL (ref 3.4–5)
ALP BLD-CCNC: 59 U/L (ref 40–129)
ALT SERPL-CCNC: 22 U/L (ref 10–40)
ANION GAP SERPL CALCULATED.3IONS-SCNC: 16 MMOL/L (ref 3–16)
AST SERPL-CCNC: 21 U/L (ref 15–37)
BILIRUB SERPL-MCNC: <0.2 MG/DL (ref 0–1)
BUN BLDV-MCNC: 25 MG/DL (ref 7–20)
CALCIUM SERPL-MCNC: 9.6 MG/DL (ref 8.3–10.6)
CHLORIDE BLD-SCNC: 101 MMOL/L (ref 99–110)
CHOLESTEROL, TOTAL: 196 MG/DL (ref 0–199)
CO2: 21 MMOL/L (ref 21–32)
CREAT SERPL-MCNC: 1.1 MG/DL (ref 0.9–1.3)
CREATININE URINE: 77.9 MG/DL (ref 39–259)
GFR AFRICAN AMERICAN: >60
GFR NON-AFRICAN AMERICAN: >60
GLOBULIN: 3.1 G/DL
GLUCOSE BLD-MCNC: 231 MG/DL (ref 70–99)
HDLC SERPL-MCNC: 18 MG/DL (ref 40–60)
LDL CHOLESTEROL CALCULATED: ABNORMAL MG/DL
LDL CHOLESTEROL DIRECT: 76 MG/DL
MICROALBUMIN UR-MCNC: 2.8 MG/DL
MICROALBUMIN/CREAT UR-RTO: 35.9 MG/G (ref 0–30)
POTASSIUM SERPL-SCNC: 5.1 MMOL/L (ref 3.5–5.1)
SODIUM BLD-SCNC: 138 MMOL/L (ref 136–145)
TOTAL PROTEIN: 7.3 G/DL (ref 6.4–8.2)
TRIGL SERPL-MCNC: 1042 MG/DL (ref 0–150)
TSH SERPL DL<=0.05 MIU/L-ACNC: 4.06 UIU/ML (ref 0.27–4.2)
VITAMIN D 25-HYDROXY: 15.5 NG/ML
VLDLC SERPL CALC-MCNC: ABNORMAL MG/DL

## 2017-11-01 NOTE — TELEPHONE ENCOUNTER
From: Suzzane Severance  To: Kayode Bass CNP  Sent: 10/30/2017 9:52 PM EDT  Subject: Prescription Question    Hello, hope all is well with you. I need a refill of my insulin pen needles called into [address] [place] [address] @ [place] for the mail order. I use the the 4ML needles. Let me know if you have questions.    Thanks  Ho Bae

## 2017-11-06 ENCOUNTER — PATIENT MESSAGE (OUTPATIENT)
Dept: ENDOCRINOLOGY | Age: 47
End: 2017-11-06

## 2017-11-06 DIAGNOSIS — E11.51 TYPE 2 DIABETES MELLITUS WITH DIABETIC PERIPHERAL ANGIOPATHY WITHOUT GANGRENE, WITHOUT LONG-TERM CURRENT USE OF INSULIN (HCC): Primary | ICD-10-CM

## 2017-11-07 ENCOUNTER — TELEPHONE (OUTPATIENT)
Dept: CARDIOLOGY CLINIC | Age: 47
End: 2017-11-07

## 2017-11-07 RX ORDER — BLOOD-GLUCOSE METER
EACH MISCELLANEOUS
Qty: 1 KIT | Refills: 0 | Status: SHIPPED | OUTPATIENT
Start: 2017-11-07 | End: 2018-12-26

## 2017-11-07 NOTE — TELEPHONE ENCOUNTER
I called pt today to go over normal lab results. While we were talking he wanted me to get a msg to Dr. Vicenta Castelan. He said that he has been having chest pain for the last couple of weeks and is concerned about it. He described it as a \"weird kind of chest pain\". He gets no relief unless he takes 800mg ibuprofen. He said that it does go away when he lays down but otherwise, he has not noticed a specific trigger. The chest pain comes and goes and he isn't sure if it's his heart or if it's musculoskeletal.  He said that it's in the \"dead center of my chest\" and it comes at random times. Pain scale is at about a 7-8 and will often make him feel nauseated. He has not tried nitro but said that nitro didn't work the last time either. He did say that he though it was his crestor causing the pain so he took himself off of Crestor about a week ago. He was told by a friend that it will take about 2 weeks for Crestor to work it's way out of his sytem. Please review. Any further recommendations?

## 2017-12-04 ENCOUNTER — TELEPHONE (OUTPATIENT)
Dept: PHARMACY | Facility: CLINIC | Age: 47
End: 2017-12-04

## 2017-12-04 NOTE — TELEPHONE ENCOUNTER
card.    Pharmacy: [facility]     Allergies: Allergies   Allergen Reactions    Penicillins Anaphylaxis    Morphine Hives and Nausea And Vomiting     Patient states this is not an allergy he just had too much too fast       Vitals/Labs:  BP Readings from Last 3 Encounters:   10/19/17 (!) 145/83   08/10/17 139/83   07/24/17 138/76     Lab Results   Component Value Date    LABMICR 2.80 (H) 10/30/2017     Lab Results   Component Value Date    LABA1C 8.4 10/19/2017    LABA1C 8.1 05/12/2017    LABA1C 8.4 04/19/2017     Lab Results   Component Value Date    CHOL 196 10/30/2017     Lab Results   Component Value Date    TRIG 1,042 (H) 10/30/2017     Lab Results   Component Value Date    HDL 18 (L) 10/30/2017     Lab Results   Component Value Date    LDLCALC see below 10/30/2017    LDLDIRECT 76 10/30/2017     ALT   Date Value Ref Range Status   10/30/2017 22 10 - 40 U/L Final     The ASCVD Risk score (Eddi Roman, et al., 2013) failed to calculate for the following reasons: The valid HDL cholesterol range is 20 to 100 mg/dL       Immunizations:  Immunization History   Administered Date(s) Administered    Influenza Virus Vaccine 10/12/2017      Smoking Status:  History   Smoking Status    Never Smoker   Smokeless Tobacco    Never Used        ASSESSMENT:  Initial Program Requirements (to be completed by 7/1/2018):  [] OV with provider for DM (1st)  [] A1c (1st)  [] On statin or contraindication(s) see lipid below - may or may not resume Crestor.  Plans to discuss with cardiologist Celeste 11/7/17 cardiology telephone encounter)  [x] On ACEi/ARB or contraindication(s) lisinopril     Ongoing Program Requirements (to be completed by 12/15/2018):  [] OV with provider for DM (2nd)  [] ACC/diabetes educator visit (if A1c over 8%)  [] A1c (2nd)  [] Lipid panel  [] Urine protein  [] Pneumococcal vaccination: up to date - appears due per records  [] Influenza vaccination for 9795-8732  [] Medication adherence over 70%    Formulary Medication Review:  - Non-formulary or medications with cost-effective alternatives: none reported/identified.   - Medications/supplies on program formulary (up to $600 in waived copays if filled through [facility] (mail order)):  mg (however pt prefers to continue getting OTC), [employer] supplies, [employer], lisinopril, metformin; rosuvastatin if cardiology resumes; Humalog U-200     Other Considerations:  - Glycemic Goal: <7.0%. Is not at blood glucose goal but states working on lifestyle modifications. .   - Blood Pressure Goal: BP less than 140/90 mmHg due to history of DM: Is not at blood pressure goal but previously at target on current regimen. Recheck at next appt  - Lipids: Patient has a history of ASCVD and is therefore a candidate for high-intensity statin therapy based on updated guidelines. States had ADRs to atorvastatin & rosuvastatin, planning to discuss with cardiologist  - Smoking status: never smoked    PLAN:  - Considerations to provider:  · PPSV23 if has not received prior - pt to discuss at next [address]  - DM program gaps identified:   · Initial requirements: OV with provider for DM (1st), A1c (1st) and On statin or contraindication(s)  · Ongoing requirements: OV with provider for DM (2nd), ACC/diabetes educator visit (if A1c over 8%), A1c (2nd), Lipid panel, Pneumococcal vaccination: up to date, Influenza vaccination for 4730-3208 and Medication adherence over 70%  - Follow up: PCP for identified gaps or as scheduled below  - Upcoming appointments:   Future Appointments  Date Time Provider Reji Alvarez   1/19/2018 10:20 AM Farhan Salmeron, [address]", [facility], PharmD  [address]   Phone: [phone]     CLINICAL PHARMACY CONSULT: MED RECONCILIATION/REVIEW [address] Tracking Only    PHSO: Yes  Total # of Interventions Recommended: 2  - Updated Order #: 1 Updated Order Reason(s):  Other  Total

## 2017-12-06 ENCOUNTER — CLINICAL DOCUMENTATION (OUTPATIENT)
Dept: PHARMACY | Facility: CLINIC | Age: 47
End: 2017-12-06

## 2017-12-06 ENCOUNTER — ENROLLMENT (OUTPATIENT)
Dept: PHARMACY | Facility: CLINIC | Age: 47
End: 2017-12-06

## 2017-12-06 NOTE — LETTER
? Visit with your physician (Second yearly visit)  ? Second A1c  ? Flu vaccination   ? Requirements if A1c is greater than 8 percent:  ? Engage with a [facility]) diabetes educator at one of our hospital locations or an ambulatory care coordinator by phone, if your A1c is greater than 8 percent. We will be reviewing your [employer] account and sending you reminders for needed actions. Please remember if you dont have a [address], you will need to submit documentation to [email or web address] or by fax to [phone]. As a reminder, if programs requirements are not met, your benefit may be terminated and you will not be eligible to participate in the program next year. Thank you for participating in the program and taking steps to improve your health.

## 2018-01-19 ENCOUNTER — OFFICE VISIT (OUTPATIENT)
Dept: ENDOCRINOLOGY | Age: 48
End: 2018-01-19

## 2018-01-19 VITALS
HEIGHT: 71 IN | WEIGHT: 297.6 LBS | BODY MASS INDEX: 41.66 KG/M2 | DIASTOLIC BLOOD PRESSURE: 79 MMHG | HEART RATE: 65 BPM | OXYGEN SATURATION: 98 % | SYSTOLIC BLOOD PRESSURE: 137 MMHG

## 2018-01-19 DIAGNOSIS — E78.2 MIXED HYPERLIPIDEMIA: ICD-10-CM

## 2018-01-19 DIAGNOSIS — Z79.4 TYPE 2 DIABETES MELLITUS WITH OTHER CIRCULATORY COMPLICATION, WITH LONG-TERM CURRENT USE OF INSULIN (HCC): Primary | ICD-10-CM

## 2018-01-19 DIAGNOSIS — E11.59 TYPE 2 DIABETES MELLITUS WITH OTHER CIRCULATORY COMPLICATION, WITH LONG-TERM CURRENT USE OF INSULIN (HCC): Primary | ICD-10-CM

## 2018-01-19 DIAGNOSIS — I10 ESSENTIAL HYPERTENSION: ICD-10-CM

## 2018-01-19 DIAGNOSIS — E55.9 VITAMIN D DEFICIENCY: ICD-10-CM

## 2018-01-19 DIAGNOSIS — E78.1 HIGH TRIGLYCERIDES: ICD-10-CM

## 2018-01-19 PROCEDURE — 99214 OFFICE O/P EST MOD 30 MIN: CPT | Performed by: NURSE PRACTITIONER

## 2018-01-19 ASSESSMENT — ENCOUNTER SYMPTOMS
CONSTIPATION: 0
SHORTNESS OF BREATH: 0
COLOR CHANGE: 0
DIARRHEA: 0
NAUSEA: 0
EYE PAIN: 0

## 2018-01-19 ASSESSMENT — PATIENT HEALTH QUESTIONNAIRE - PHQ9
SUM OF ALL RESPONSES TO PHQ9 QUESTIONS 1 & 2: 0
2. FEELING DOWN, DEPRESSED OR HOPELESS: 0
SUM OF ALL RESPONSES TO PHQ QUESTIONS 1-9: 0
1. LITTLE INTEREST OR PLEASURE IN DOING THINGS: 0

## 2018-01-19 NOTE — PROGRESS NOTES
Endocrinology  [place]  Phone: [phone]   FAX: [phone]    Sakina Arechiga is a 49 y.o. male  with a history of Diabetes Mellitus Type 2 for almost 16 years    HPI Freda Mejía has had DM type 2 for > 16 years and has been on insulin since 2016. DM has been complicated by peripheral neuropathy, retinopathy, associated with hyperlipidemia, CAD (CABG x 4; 3/2016),  hypertension, hypogonadism and morbid obesity.       Current Readings:  · Patient brought his glucometer for download Yes  · Checks upto 3 times per day (1-3 times)  · Lowest in past 2 weeks 131  · Highest in past 2 weeks 288  · Hypoglycemia awareness and symptoms: yes  Reports fasting blood sugars 131 - 288  mg/dL. A1C trends : improved at visit 8.1  Lab Results   Component Value Date    LABA1C 8.4 10/19/2017    LABA1C 8.1 05/12/2017    LABA1C 8.4 04/19/2017     Lab Results   Component Value Date    LABMICR 2.80 (H) 10/30/2017    LDLCALC see below 10/30/2017    CREATININE 1.1 10/30/2017     Current Medication regimen:   160 units of toujeo (U200)   50-60 Units of humalog plus sliding scale as below. Injects up to 4 times per day    In the past was on Byetta - had pancreatitis; was on Glipizide ( hypoglycemia), Januvia ( no effect)  Was on Farxiga - \"did not notice drop in blood sugars. \" and worried about using SGLT2s because of acute injury to kidney in the past.     Blood sugar Humalog    <80 -2 units   80 - 120                   None   121 - 130   1 units   131 - 140 2 units   141 - 150 3 units   151 - 160 4 units   161 - 170 5 units   171 - 180 6 units   181 - 190 7 units   191 - 200 8 units   201 - 210 9 units   211 - 220 10 units   221 - 230 11 units   231 - 240 12 units   241 - 250 13 units   251 - 260 14 units   261 - 270 15 units   271 - 280 16 units   281 - 290 17 units   291 - 300 18 units   301 - 310 19 units   311 - 320 20 units   321 - 330 21 units   331 - 340 22 units   341 - 350 23 units   351 - 360 24 units   361 - 370  Diabetes mellitus ([facility] 2001    HgA1C 10.6    Gout     HLD (hyperlipidemia)     HTN (hypertension)     Echo 2013 normal.     MRSA (methicillin resistant staph aureus) culture positive 04/19/2017    Obesity     Pancreatitis 2006    high TG or byetta. flank ecchymosis. 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 work related    Type 2 diabetes mellitus without complication ([facility]     Wound abscess 1994    groin. after kenalog injections. drained. iv abx. Family History   Problem Relation Age of Onset    High Blood Pressure Mother     Cancer Mother      Review of Systems   Constitutional: Negative for activity change, appetite change, diaphoresis, fever and unexpected weight change. HENT: Negative for dental problem. Eyes: Negative for pain and visual disturbance. Respiratory: Negative for shortness of breath. Cardiovascular: Negative for palpitations and leg swelling. Gastrointestinal: Negative for constipation, diarrhea and nausea. Endocrine: Negative for cold intolerance and heat intolerance. Genitourinary: Negative for frequency and urgency. Musculoskeletal: Negative for arthralgias, joint swelling and myalgias. Skin: Negative for color change. Neurological: Negative for numbness. Psychiatric/Behavioral: Negative for dysphoric mood and sleep disturbance. Vitals:    01/19/18 1039 01/19/18 1143   BP: (!) 141/85 137/79   Site: Left Arm    Position: Sitting    Cuff Size: Large Adult    Pulse: 65    SpO2: 98%    Weight: 297 lb 9.6 oz (135 kg)    Height: 5' 11\" (1.803 m)      Physical Exam   Constitutional: He is oriented to person, place, and time. He appears well-developed and well-nourished. HENT:   Head: Normocephalic. Eyes: Pupils are equal, round, and reactive to light. Neck: Normal range of motion. Neck supple. Cardiovascular: Normal rate, regular rhythm and normal heart sounds.     Pulmonary/Chest: Effort normal and breath sounds normal.   Abdominal: Soft. Bowel sounds are normal.   Musculoskeletal: Normal range of motion. He exhibits no tenderness. Neurological: He is alert and oriented to person, place, and time. He has normal reflexes. Skin: Skin is warm and dry. No skin changes, xanthomas    Psychiatric: He has a normal mood and affect. His behavior is normal. Judgment and thought content normal.     Skeletal foot exam: No skin lesions are noted. Skin is normal. Signs of onychomycosis are not noted on the skin. Calluses are not noted. Ingrown toenails are not noted. Toenails are normal. Pulses are +1 DP and +2 PT bilaterally. Capillary refill is <3 seconds. Skeletal structures are intact upon visual examination. No deformity is noted. Sensory: 10 g monofilament is 8/10 on the right and 8/10 on the left, 128 Hz vibration sense is present bilaterally. Assessment/Plan  Problem List Items Addressed This Visit     Diabetes mellitus ([facility] - Primary     A1c has improved from 8.4  To 8.1  No change in Lantus or humalog dosing protocol : worried about weight gain with insulin; discussed titration of lantus if AM fasting levels drop with concurrent weight los and diet control. Portion control appears to be the biggest concern : Interested in improving diabetic control via weight loss and re-doing a previous LC diet he has done before and lost weight with it  Had acute pancreatitis while on Byetta several years ago. Does not want to do GLP 1  Had TELMA with isopropyl alcohol (work related)  Does not want to do SGLT2   Currently on Metformin  1000 mg BID  Does not want to switch to U 500 at this time  Interested in personal CGM: will initiate pprwork and follow as needed         Relevant Orders    POCT glycosylated hemoglobin (Hb A1C)    Essential hypertension     Slight elevation at visit, compliant with meds         Mixed hyperlipidemia     Stopped Crestor re chest pain : will consult with cardiology ( dr Kelsie Liriano)  Currently on Fibrates and

## 2018-02-02 ENCOUNTER — TELEPHONE (OUTPATIENT)
Dept: ENDOCRINOLOGY | Age: 48
End: 2018-02-02

## 2018-02-02 NOTE — TELEPHONE ENCOUNTER
Remy from [address] called to ask if we could update the pt's chart notes and add in how many times a day he Tests sugar and Injects himself. The insurance needs this info to be added in his chart notes.  Remy also requested that the updated chart notes with this info be faxed to him at [phone], his direct line is [phone]

## 2018-02-05 ENCOUNTER — PATIENT MESSAGE (OUTPATIENT)
Dept: ENDOCRINOLOGY | Age: 48
End: 2018-02-05

## 2018-03-08 ENCOUNTER — OFFICE VISIT (OUTPATIENT)
Dept: CARDIOLOGY CLINIC | Age: 48
End: 2018-03-08

## 2018-03-08 VITALS
OXYGEN SATURATION: 96 % | DIASTOLIC BLOOD PRESSURE: 80 MMHG | WEIGHT: 296 LBS | HEART RATE: 76 BPM | BODY MASS INDEX: 41.44 KG/M2 | HEIGHT: 71 IN | SYSTOLIC BLOOD PRESSURE: 180 MMHG

## 2018-03-08 DIAGNOSIS — R07.9 CHEST PAIN, UNSPECIFIED TYPE: Primary | ICD-10-CM

## 2018-03-08 DIAGNOSIS — R60.0 BILATERAL EDEMA OF LOWER EXTREMITY: ICD-10-CM

## 2018-03-08 DIAGNOSIS — E78.2 MIXED HYPERLIPIDEMIA: ICD-10-CM

## 2018-03-08 DIAGNOSIS — I25.119 CORONARY ARTERY DISEASE INVOLVING NATIVE CORONARY ARTERY OF NATIVE HEART WITH ANGINA PECTORIS (HCC): ICD-10-CM

## 2018-03-08 DIAGNOSIS — R06.02 SOB (SHORTNESS OF BREATH): ICD-10-CM

## 2018-03-08 DIAGNOSIS — I10 ESSENTIAL HYPERTENSION: ICD-10-CM

## 2018-03-08 PROCEDURE — 93000 ELECTROCARDIOGRAM COMPLETE: CPT | Performed by: NURSE PRACTITIONER

## 2018-03-08 PROCEDURE — 99214 OFFICE O/P EST MOD 30 MIN: CPT | Performed by: NURSE PRACTITIONER

## 2018-03-08 NOTE — LETTER
2. SOB (shortness of breath)  -suspect multifactorial  -continue diuretic   -check echo for LV function  -sxs somewhat atypical for CHF    3. Bilateral edema of lower extremity  -not noted on exam today  -continue diuretic  -some features suggestive of neuropathy  -arterial dopplers from 6/2017 reviewed    4. Coronary artery disease involving native coronary artery of native heart with angina pectoris ([address]  -LIMA to LAD, L radial off LIMA to OM1-OM2-PDA in 3/2016  -chest pain since surgery with worsening sxs recently  -continue ASA, BB and tricor  -not on statin d/t intolerance (significant muscle pains)    5. Mixed hyperlipidemia  -on Tricor and vascepa  -intolerant to statins  -? repatha (will d/w Dr. Shivani Kiser)    6. Essential hypertension  -improved on follow up reading  -continue medical management  -has been monitoring at home and has been controlled    7. Diabetes Mellitus    Plan:    Continue ASA, fenofibrate, lasix, lisinopril and Toprol  Given his sxs will ask for GXT-Myoview (pt wants to try) to see if he can provoke the fast HR and chest pain (suspect he may need to be changed to Lexiscan-Myoview)  Check echocardiogram in interest of LV function/valvular disease  Advised follow up with his PCP: his leg sxs suggest neuropathy  Will d/w Dr. Lupe Frias regarding Karen Hernandez given intolerance to statins  Discussed low fat/low sodium diet, wt loss and reinforced regular aerobic exercise. Follow up in 4-6 weeks with Dr. Shivani Kiser or sooner if needed    Return for 4-6 weeks with Dr. Shivani Kiser. Thanks for allowing me to participate in the care of this patient. If you have questions, please do not hesitate to call me. I look forward to following Debra Parham along with you.     Sincerely,        ROXANA Sinclair, CNP

## 2018-03-09 NOTE — COMMUNICATION BODY
3/2016  -chest pain since surgery with worsening sxs recently  -continue ASA, BB and tricor  -not on statin d/t intolerance (significant muscle pains)    5. Mixed hyperlipidemia  -on Tricor and vascepa  -intolerant to statins  -? repatha (will d/w Dr. Grace Bright)    6. Essential hypertension  -improved on follow up reading  -continue medical management  -has been monitoring at home and has been controlled    7. Diabetes Mellitus    Plan:    Continue ASA, fenofibrate, lasix, lisinopril and Toprol  Given his sxs will ask for GXT-Myoview (pt wants to try) to see if he can provoke the fast HR and chest pain (suspect he may need to be changed to Lexiscan-Myoview)  Check echocardiogram in interest of LV function/valvular disease  Advised follow up with his PCP: his leg sxs suggest neuropathy  Will d/w Dr. Truman Hawkins regarding Tish Vicente given intolerance to statins  Discussed low fat/low sodium diet, wt loss and reinforced regular aerobic exercise. Follow up in 4-6 weeks with Dr. Grace Bright or sooner if needed    Return for 4-6 weeks with Dr. Grace Bright. Thanks for allowing me to participate in the care of this patient.

## 2018-03-19 ENCOUNTER — HOSPITAL ENCOUNTER (OUTPATIENT)
Dept: NON INVASIVE DIAGNOSTICS | Age: 48
Discharge: HOME | End: 2018-03-19
Admitting: NURSE PRACTITIONER

## 2018-03-19 DIAGNOSIS — I20.9 ANGINA PECTORIS (HCC): ICD-10-CM

## 2018-03-19 LAB
LV EF: 60 %
LV EF: 62 %
LVEF MODALITY: NORMAL
LVEF MODALITY: NORMAL

## 2018-03-20 VITALS — BODY MASS INDEX: 39.06 KG/M2 | WEIGHT: 288 LBS

## 2018-04-30 ENCOUNTER — TELEPHONE (OUTPATIENT)
Dept: ENDOCRINOLOGY | Age: 48
End: 2018-04-30

## 2018-04-30 DIAGNOSIS — E11.51 TYPE 2 DIABETES MELLITUS WITH DIABETIC PERIPHERAL ANGIOPATHY WITHOUT GANGRENE, WITHOUT LONG-TERM CURRENT USE OF INSULIN (HCC): Primary | ICD-10-CM

## 2018-05-03 ENCOUNTER — TELEPHONE (OUTPATIENT)
Dept: ENDOCRINOLOGY | Age: 48
End: 2018-05-03

## 2018-05-11 ENCOUNTER — EMPLOYEE WELLNESS (OUTPATIENT)
Dept: OTHER | Age: 48
End: 2018-05-11

## 2018-05-11 LAB
CHOLESTEROL, TOTAL: 204 MG/DL (ref 0–199)
GLUCOSE BLD-MCNC: 149 MG/DL (ref 70–99)
HDLC SERPL-MCNC: 26 MG/DL (ref 40–60)
LDL CHOLESTEROL CALCULATED: ABNORMAL MG/DL
LDL CHOLESTEROL DIRECT: 112 MG/DL
TRIGL SERPL-MCNC: 457 MG/DL (ref 0–150)

## 2018-05-12 LAB
ESTIMATED AVERAGE GLUCOSE: 205.9 MG/DL
HBA1C MFR BLD: 8.8 %

## 2018-05-21 VITALS — WEIGHT: 293 LBS | BODY MASS INDEX: 40.87 KG/M2

## 2018-06-01 ENCOUNTER — TELEPHONE (OUTPATIENT)
Dept: ENDOCRINOLOGY | Age: 48
End: 2018-06-01

## 2018-06-22 ENCOUNTER — OFFICE VISIT (OUTPATIENT)
Dept: SURGERY | Age: 48
End: 2018-06-22

## 2018-06-22 VITALS
DIASTOLIC BLOOD PRESSURE: 82 MMHG | HEIGHT: 71 IN | SYSTOLIC BLOOD PRESSURE: 158 MMHG | BODY MASS INDEX: 42.42 KG/M2 | WEIGHT: 303 LBS | HEART RATE: 66 BPM

## 2018-06-22 DIAGNOSIS — I73.9 PVD (PERIPHERAL VASCULAR DISEASE) (HCC): ICD-10-CM

## 2018-06-22 DIAGNOSIS — M51.36 DEGENERATIVE DISC DISEASE, LUMBAR: ICD-10-CM

## 2018-06-22 DIAGNOSIS — I87.323 CHRONIC VENOUS HTN W INFLAMMATION OF BILATERAL LOW EXTRM: ICD-10-CM

## 2018-06-22 DIAGNOSIS — M79.89 LEG SWELLING: Primary | ICD-10-CM

## 2018-06-22 DIAGNOSIS — I87.2 VENOUS INSUFFICIENCY: ICD-10-CM

## 2018-06-22 DIAGNOSIS — M79.605 PAIN IN BOTH LOWER EXTREMITIES: ICD-10-CM

## 2018-06-22 DIAGNOSIS — M79.604 PAIN IN BOTH LOWER EXTREMITIES: ICD-10-CM

## 2018-06-22 PROBLEM — M51.369 DEGENERATIVE DISC DISEASE, LUMBAR: Status: ACTIVE | Noted: 2018-06-22

## 2018-06-22 PROCEDURE — 99204 OFFICE O/P NEW MOD 45 MIN: CPT | Performed by: SURGERY

## 2018-06-22 ASSESSMENT — ENCOUNTER SYMPTOMS
GASTROINTESTINAL NEGATIVE: 1
RESPIRATORY NEGATIVE: 1
EYES NEGATIVE: 1
ALLERGIC/IMMUNOLOGIC NEGATIVE: 1
COLOR CHANGE: 1

## 2018-06-29 ENCOUNTER — HOSPITAL ENCOUNTER (OUTPATIENT)
Dept: VASCULAR LAB | Age: 48
Discharge: HOME | End: 2018-06-29
Attending: SURGERY | Admitting: SURGERY

## 2018-06-29 DIAGNOSIS — M79.89 LEG SWELLING: ICD-10-CM

## 2018-06-29 DIAGNOSIS — I87.2 VENOUS INSUFFICIENCY: ICD-10-CM

## 2018-06-29 DIAGNOSIS — M79.605 PAIN IN BOTH LOWER EXTREMITIES: ICD-10-CM

## 2018-06-29 DIAGNOSIS — M79.604 PAIN IN BOTH LOWER EXTREMITIES: ICD-10-CM

## 2018-06-29 DIAGNOSIS — I87.323 CHRONIC VENOUS HTN W INFLAMMATION OF BILATERAL LOW EXTRM: ICD-10-CM

## 2018-08-21 ENCOUNTER — TELEPHONE (OUTPATIENT)
Dept: ENDOCRINOLOGY | Age: 48
End: 2018-08-21

## 2018-08-21 DIAGNOSIS — I10 ESSENTIAL HYPERTENSION: ICD-10-CM

## 2018-08-21 DIAGNOSIS — E78.2 MIXED HYPERLIPIDEMIA: ICD-10-CM

## 2018-08-21 DIAGNOSIS — E55.9 VITAMIN D DEFICIENCY: ICD-10-CM

## 2018-08-21 DIAGNOSIS — E11.59 TYPE 2 DIABETES MELLITUS WITH OTHER CIRCULATORY COMPLICATION, WITH LONG-TERM CURRENT USE OF INSULIN (HCC): Primary | ICD-10-CM

## 2018-08-21 DIAGNOSIS — Z79.4 TYPE 2 DIABETES MELLITUS WITH OTHER CIRCULATORY COMPLICATION, WITH LONG-TERM CURRENT USE OF INSULIN (HCC): Primary | ICD-10-CM

## 2018-08-21 DIAGNOSIS — E78.1 HIGH TRIGLYCERIDES: ICD-10-CM

## 2018-08-24 ENCOUNTER — CARE COORDINATION (OUTPATIENT)
Dept: CARE COORDINATION | Age: 48
End: 2018-08-24

## 2018-09-03 ENCOUNTER — HOSPITAL ENCOUNTER (OUTPATIENT)
Dept: OTHER | Age: 48
Discharge: HOME | End: 2018-09-03
Attending: NURSE PRACTITIONER | Admitting: NURSE PRACTITIONER

## 2018-09-03 LAB
A/G RATIO: 1.4 (ref 1.1–2.2)
ALBUMIN SERPL-MCNC: 4.4 G/DL (ref 3.4–5)
ALP BLD-CCNC: 63 U/L (ref 40–129)
ALT SERPL-CCNC: 34 U/L (ref 10–40)
ANION GAP SERPL CALCULATED.3IONS-SCNC: 15 MMOL/L (ref 3–16)
AST SERPL-CCNC: 44 U/L (ref 15–37)
BILIRUB SERPL-MCNC: <0.2 MG/DL (ref 0–1)
BUN BLDV-MCNC: 27 MG/DL (ref 7–20)
CALCIUM SERPL-MCNC: 9.4 MG/DL (ref 8.3–10.6)
CHLORIDE BLD-SCNC: 101 MMOL/L (ref 99–110)
CO2: 20 MMOL/L (ref 21–32)
CREAT SERPL-MCNC: 0.9 MG/DL (ref 0.9–1.3)
GFR AFRICAN AMERICAN: >60
GFR NON-AFRICAN AMERICAN: >60
GLOBULIN: 3.2 G/DL
GLUCOSE BLD-MCNC: 191 MG/DL (ref 70–99)
MAGNESIUM: 1.6 MG/DL (ref 1.8–2.4)
POTASSIUM SERPL-SCNC: 5.1 MMOL/L (ref 3.5–5.1)
SODIUM BLD-SCNC: 136 MMOL/L (ref 136–145)
TOTAL PROTEIN: 7.6 G/DL (ref 6.4–8.2)

## 2018-09-04 LAB
CHOLESTEROL, TOTAL: 236 MG/DL (ref 0–199)
ESTIMATED AVERAGE GLUCOSE: 205.9 MG/DL
HBA1C MFR BLD: 8.8 %
HDLC SERPL-MCNC: 19 MG/DL (ref 40–60)
LDL CHOLESTEROL CALCULATED: ABNORMAL MG/DL
LDL CHOLESTEROL DIRECT: 65 MG/DL
TRIGL SERPL-MCNC: 1353 MG/DL (ref 0–150)
VLDLC SERPL CALC-MCNC: ABNORMAL MG/DL

## 2018-09-06 ENCOUNTER — OFFICE VISIT (OUTPATIENT)
Dept: ENDOCRINOLOGY | Age: 48
End: 2018-09-06

## 2018-09-06 VITALS
OXYGEN SATURATION: 99 % | BODY MASS INDEX: 42.73 KG/M2 | HEART RATE: 75 BPM | DIASTOLIC BLOOD PRESSURE: 77 MMHG | WEIGHT: 305.2 LBS | SYSTOLIC BLOOD PRESSURE: 145 MMHG | HEIGHT: 71 IN

## 2018-09-06 DIAGNOSIS — Z79.4 TYPE 2 DIABETES MELLITUS WITH OTHER CIRCULATORY COMPLICATION, WITH LONG-TERM CURRENT USE OF INSULIN (HCC): Primary | ICD-10-CM

## 2018-09-06 DIAGNOSIS — I10 ESSENTIAL HYPERTENSION: ICD-10-CM

## 2018-09-06 DIAGNOSIS — E11.51 TYPE 2 DIABETES MELLITUS WITH DIABETIC PERIPHERAL ANGIOPATHY WITHOUT GANGRENE, WITHOUT LONG-TERM CURRENT USE OF INSULIN (HCC): ICD-10-CM

## 2018-09-06 DIAGNOSIS — E55.9 VITAMIN D DEFICIENCY: ICD-10-CM

## 2018-09-06 DIAGNOSIS — E11.59 TYPE 2 DIABETES MELLITUS WITH OTHER CIRCULATORY COMPLICATION, WITH LONG-TERM CURRENT USE OF INSULIN (HCC): Primary | ICD-10-CM

## 2018-09-06 DIAGNOSIS — E78.2 MIXED HYPERLIPIDEMIA: ICD-10-CM

## 2018-09-06 LAB
SEX HORMONE BINDING GLOBULIN: 33 NMOL/L (ref 11–80)
TESTOSTERONE FREE-NONMALE: 76.6 PG/ML (ref 47–244)
TESTOSTERONE TOTAL: 379 NG/DL (ref 220–1000)

## 2018-09-06 PROCEDURE — 99214 OFFICE O/P EST MOD 30 MIN: CPT | Performed by: NURSE PRACTITIONER

## 2018-09-06 RX ORDER — FLASH GLUCOSE SENSOR
3 KIT MISCELLANEOUS PRN
Qty: 3 EACH | Refills: 2 | Status: SHIPPED | OUTPATIENT
Start: 2018-09-06 | End: 2018-12-26

## 2018-09-06 RX ORDER — FLASH GLUCOSE SENSOR
1 KIT MISCELLANEOUS PRN
Qty: 3 DEVICE | Refills: 2 | Status: SHIPPED | OUTPATIENT
Start: 2018-09-06 | End: 2018-12-26

## 2018-09-06 ASSESSMENT — ENCOUNTER SYMPTOMS
NAUSEA: 0
SHORTNESS OF BREATH: 0
EYE PAIN: 0
CONSTIPATION: 0
COLOR CHANGE: 0
DIARRHEA: 0

## 2018-09-06 ASSESSMENT — PATIENT HEALTH QUESTIONNAIRE - PHQ9
SUM OF ALL RESPONSES TO PHQ QUESTIONS 1-9: 0
SUM OF ALL RESPONSES TO PHQ QUESTIONS 1-9: 0
1. LITTLE INTEREST OR PLEASURE IN DOING THINGS: 0
2. FEELING DOWN, DEPRESSED OR HOPELESS: 0
SUM OF ALL RESPONSES TO PHQ9 QUESTIONS 1 & 2: 0

## 2018-09-06 NOTE — PROGRESS NOTES
Endocrinology  [place], [place]  Phone: [phone]   FAX: [phone]    Dede Jurado is a 51 y.o. male  with a history of Diabetes Mellitus Type 2 for almost 16 years    Last A1C:   Lab Results   Component Value Date    LABA1C 8.8 09/03/2018    LABA1C 8.8 05/11/2018    LABA1C 8.4 10/19/2017     Last BP Readings:   BP Readings from Last 3 Encounters:   09/06/18 (!) 145/77   06/22/18 (!) 158/82   03/08/18 (!) 180/80     Last LDL:   Lab Results   Component Value Date    LDLCALC see below 09/03/2018    LDLDIRECT 65 09/03/2018     Aspirin Use: 325 mg    Tobacco History:    Smoking status: Never Smoker    Smokeless tobacco: Never Used    Alcohol use Yes      Comment: rare     HPI Rigoberto Cranker has had DM type 2 for > 16 years and has been on insulin since 2016. DM has been complicated by peripheral neuropathy, retinopathy, associated with hyperlipidemia, CAD (CABG x 4; 3/2016),  hypertension, hypogonadism and morbid obesity.       Current Readings:  · Patient brought his glucometer for download Yes  · Checks upto 3 times per day (1-3 times)  · Lowest in past 2 weeks 131  · Highest in past 2 weeks 288  · Hypoglycemia awareness and symptoms: yes  Reports fasting blood sugars 131 - 288  mg/dL. Lab Results   Component Value Date    LABMICR 2.80 (H) 10/30/2017    LDLCALC see below 09/03/2018    CREATININE 0.9 09/03/2018     Current Medication regimen:   160 units of toujeo (U200)   50-60 Units of humalog plus sliding scale as below. Injects up to 4 times per day    In the past was on Byetta - had pancreatitis; was on Glipizide ( hypoglycemia), Januvia ( no effect)  Was on Farxiga - \"did not notice drop in blood sugars. \" and worried about using SGLT2s because of acute injury to kidney in the past.     Blood sugar Humalog    <80 -2 units   80 - 120                   None   121 - 130   1 units   131 - 140 2 units   141 - 150 3 units   151 - 160 4 units   161 - 170 5 units   171 - 180 6 units   181 - 190 7 units   191 - 200 8 units   201 - 210 9 units   211 - 220 10 units   221 - 230 11 units   231 - 240 12 units   241 - 250 13 units   251 - 260 14 units   261 - 270 15 units   271 - 280 16 units   281 - 290 17 units   291 - 300 18 units   301 - 310 19 units   311 - 320 20 units   321 - 330 21 units   331 - 340 22 units   341 - 350 23 units   351 - 360 24 units   361 - 370 25 units   371 - 380 26 units   381 - 390 27 units   391 - 400 28 units     Complications:  Neuropathy  Tingling and numbness of hands and feet  Retinopathy Has vision problems and cataract removal  Nephropathy h/o TELMA,   Component    Latest Ref Rng & Units 10/30/2017   Microalbumin, Random Urine    <2.0 mg/dL 2.80 (H)   Creatinine, Ur    39.0 - 259.0 mg/dL 77.9   Microalbumin Creatinine Ratio   0.0 - 30.0 mg/g 35.9 (H)     Last Eye Exam 2 years ago  Last Foot exam never    Has patient seen a dietitian? Not recently  Recent weight loss/gain: No interim weight loss/gain  Current Exercise: None  Smoker: No  ETOH: None  ACE/ARB:Lisinopril 20 mg QD  Statins: cannot tolerate. Chest pain and myalgias     Risk factors:  Smoker: no   Alcohol intake: none  CAD: significant    Vitamin D deficiency: Currently is on 100K U weekly. Current complaints include fatigue. Lab Results   Component Value Date    VITD25 15.5 10/30/2017    VITD25 15.8 05/24/2017    VITD25 14.6 04/19/2017     Hyperlipidemia: Currently is on Feno fibrate and Vascepa. Current control is fine. Has stopped taking crestor because of persistent chest pain with it. Will review with cardiology.   Lab Results   Component Value Date    CHOL 236 09/03/2018    CHOL 204 05/11/2018    CHOL 196 10/30/2017     Lab Results   Component Value Date    TRIG 1,353 09/03/2018    TRIG 457 05/11/2018    TRIG 1,042 10/30/2017     Lab Results   Component Value Date    HDL 19 09/03/2018    HDL 26 05/11/2018    HDL 18 10/30/2017    HDL 34 04/28/2011     Lab Results   Component Value Date    LDLCALC see below 09/03/2018    [address] [employer] kg)   Height: 5' 11\" (1.803 m)     Physical Exam   Constitutional: He is oriented to person, place, and time. He appears well-developed and well-nourished. HENT:   Head: Normocephalic. Eyes: Pupils are equal, round, and reactive to light. Neck: Normal range of motion. Neck supple. Cardiovascular: Normal rate, regular rhythm and normal heart sounds. Pulmonary/Chest: Effort normal and breath sounds normal.   Abdominal: Soft. Bowel sounds are normal.   Musculoskeletal: Normal range of motion. He exhibits no tenderness. Neurological: He is alert and oriented to person, place, and time. He has normal reflexes. Skin: Skin is warm and dry. No skin changes, xanthomas    Psychiatric: He has a normal mood and affect. His behavior is normal. Judgment and thought content normal.     Skeletal foot exam: No skin lesions are noted. Skin is normal. Signs of onychomycosis are not noted on the skin. Calluses are not noted. Ingrown toenails are not noted. Toenails are normal. Pulses are +1 DP and +2 PT bilaterally. Capillary refill is <3 seconds. Skeletal structures are intact upon visual examination. No deformity is noted. Sensory: 10 g monofilament is 8/10 on the right and 8/10 on the left, 128 Hz vibration sense is present bilaterally.     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

## 2018-09-09 NOTE — ASSESSMENT & PLAN NOTE
LDL goal  < 70; recent at 65  TG elevated at 1353! Discussed risk of pancreatitis: patient stated he will monitor for symptoms  Also starting GLP1 at low dose  Reiterated need for decreasing carb load in diet: not carb counting estimates 8090 per meal twice a day.

## 2018-09-09 NOTE — ASSESSMENT & PLAN NOTE
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

## 2018-09-14 ENCOUNTER — OFFICE VISIT (OUTPATIENT)
Dept: PRIMARY CARE CLINIC | Age: 48
End: 2018-09-14

## 2018-09-14 ENCOUNTER — NURSE TRIAGE (OUTPATIENT)
Dept: OTHER | Facility: CLINIC | Age: 48
End: 2018-09-14

## 2018-09-14 VITALS
HEIGHT: 71 IN | DIASTOLIC BLOOD PRESSURE: 76 MMHG | SYSTOLIC BLOOD PRESSURE: 148 MMHG | WEIGHT: 304 LBS | OXYGEN SATURATION: 98 % | HEART RATE: 75 BPM | TEMPERATURE: 98.5 F | BODY MASS INDEX: 42.56 KG/M2

## 2018-09-14 DIAGNOSIS — E11.51 TYPE 2 DIABETES MELLITUS WITH DIABETIC PERIPHERAL ANGIOPATHY WITHOUT GANGRENE, WITHOUT LONG-TERM CURRENT USE OF INSULIN (HCC): ICD-10-CM

## 2018-09-14 DIAGNOSIS — I10 ESSENTIAL HYPERTENSION: ICD-10-CM

## 2018-09-14 DIAGNOSIS — I25.119 CORONARY ARTERY DISEASE INVOLVING NATIVE CORONARY ARTERY OF NATIVE HEART WITH ANGINA PECTORIS (HCC): ICD-10-CM

## 2018-09-14 DIAGNOSIS — M79.89 LEG SWELLING: Primary | ICD-10-CM

## 2018-09-14 DIAGNOSIS — I73.9 PVD (PERIPHERAL VASCULAR DISEASE) (HCC): ICD-10-CM

## 2018-09-14 PROCEDURE — 99203 OFFICE O/P NEW LOW 30 MIN: CPT | Performed by: NURSE PRACTITIONER

## 2018-09-14 RX ORDER — FUROSEMIDE 20 MG/1
20 TABLET ORAL DAILY PRN
Qty: 30 TABLET | Refills: 2 | Status: SHIPPED | OUTPATIENT
Start: 2018-09-14 | End: 2020-07-08 | Stop reason: SDUPTHER

## 2018-09-14 ASSESSMENT — ENCOUNTER SYMPTOMS
RESPIRATORY NEGATIVE: 1
GASTROINTESTINAL NEGATIVE: 1
BLOOD IN STOOL: 0
SORE THROAT: 0
SHORTNESS OF BREATH: 0
ABDOMINAL PAIN: 0
CHEST TIGHTNESS: 0
BACK PAIN: 1

## 2018-09-14 NOTE — PROGRESS NOTES
tissue mass removal from pubic bone, corrective surgery after d/t infection and dehiscence   -mediastinoscopy-from mediastinal abscess  -lumber laminectomy L4-5  -L meniscus repair  -CABG x5 in 2016 (54 yr)  -adenoidectomy  -R elbow reconstruction    Fam Hx: Mother (HTN, glioblastoma) Father (CKD on dialysis, pacemaker d/t bradycardia) PGM (CVA) PGF (unsure) MGM (CVA in [de-identified]) MGF (CABG at 80, aortic valve replacement, HTN)    Allergies: PCN    Alcohol: rarely, about 2-3x monthly, have a beer when at a restaurant  Smoking: never smoker  Drugs: none    Occupation: RN at [employer]  Family Life: lives with wife and daughter           Patient Active Problem List    Diagnosis Date Noted    Unstable angina ([address] 03/05/2016     Priority: High    Angina pectoris ([address] 03/02/2016     Priority: High    Abnormal stress test      Priority: High    HLD (hyperlipidemia)      Priority: High    Diabetes mellitus ([address] 05/17/2010     Priority: High    HTN (hypertension) 05/17/2010     Priority: High    Depressive disorder, not elsewhere classified 05/17/2010     Priority: High    Leg swelling 06/22/2018    Pain in both lower extremities 06/22/2018    PVD (peripheral vascular disease) ([address] 06/22/2018    Chronic venous htn w inflammation of bilateral low extrm 06/22/2018    Degenerative disc disease, lumbar 06/22/2018    High triglycerides 01/19/2018    Vitamin D deficiency 01/19/2018    Klebsiella infection     Cellulitis of chest wall     Type 2 diabetes mellitus with circulatory disorder ([address]     Pure hypercholesterolemia     Fever     HCAP (healthcare-associated pneumonia) 03/27/2016    Cellulitis 03/27/2016    Coronary artery disease involving native coronary artery of native heart with angina pectoris ([address]     Essential hypertension     Mixed hyperlipidemia     Pharyngitis 09/23/2011    Other and unspecified hyperlipidemia 05/17/2010       Past Medical History:   Diagnosis Date    Abscess 1994 mediastinal, developed after kenalog injections    Anesthesia     AWAKE BUT UNABLE TO MOVE DURING SHOULDER SURGERY.  CAD (coronary artery disease) 3/2016    Diabetes mellitus ([facility] 2001    HgA1C 10.6    Gout     HLD (hyperlipidemia)     HTN (hypertension)     Echo 2013 normal.     MRSA (methicillin resistant staph aureus) culture positive 04/19/2017    Obesity     Pancreatitis 2006    high TG or byetta. flank ecchymosis. 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 work related    Type 2 diabetes mellitus without complication ([address]     Wound abscess 1994    groin. after kenalog injections. drained. iv abx. Past Surgical History:   Procedure Laterality Date    ADENOIDECTOMY  1970    CORONARY ARTERY BYPASS GRAFT  3/8/16    cabgx5 Gage Peer)    ELBOW SURGERY      elbow reconstruction    KNEE SURGERY Left 1985    menicus tear    LUMBAR LAMINECTOMY  08/10/2017    L4-5 microhemilaminectomy; excision of cyst    MEDIASTINOSCOPY  1994    bronchoscopy prior to medistinal mass. 45 ml off lymph node, IV antibiotics for 6 months    PENIS SURGERY  1993    L rectus muscle flap, infected, kenalog injections    SHOULDER SURGERY Left 2002    Rotator cuff repair, [place] [facility] Outpatient Visit on 09/03/2018   Component Date Value Ref Range Status    Hemoglobin A1C 09/03/2018 8.8  See comment % Final    Comment: Comment:  Diagnosis of Diabetes: > or = 6.5%  Increased risk of diabetes (Prediabetes): 5.7-6.4%  Glycemic Control: Nonpregnant Adults: <7.0%                    Pregnant: <6.0%        eAG 09/03/2018 205.9  mg/dL Final    Sodium 09/03/2018 136  136 - 145 mmol/L Final    Potassium 09/03/2018 5.1  3.5 - 5.1 mmol/L Final    Chloride 09/03/2018 101  99 - 110 mmol/L Final    CO2 09/03/2018 20* 21 - 32 mmol/L Final    Anion Gap 09/03/2018 15  3 - 16 Final    Glucose 09/03/2018 191* 70 - 99 mg/dL Final    BUN 09/03/2018 27* 7 - 20 mg/dL Final    CREATININE 09/03/2018 0.9  0.9 - 1.3 mg/dL Final    GFR Non- 09/03/2018 >60  >60 Final    Comment: >60 mL/min/1.73m2 EGFR, calc. for ages 25 and older using the  MDRD formula (not corrected for weight), is valid for stable  renal function.  GFR  09/03/2018 >60  >60 Final    Comment: Chronic Kidney Disease: less than 60 ml/min/1.73 sq.m. Kidney Failure: less than 15 ml/min/1.73 sq.m. Results valid for patients 18 years and older.  Calcium 09/03/2018 9.4  8.3 - 10.6 mg/dL Final    Total Protein 09/03/2018 7.6  6.4 - 8.2 g/dL Final    Alb 09/03/2018 4.4  3.4 - 5.0 g/dL Final    Albumin/Globulin Ratio 09/03/2018 1.4  1.1 - 2.2 Final    Total Bilirubin 09/03/2018 <0.2  0.0 - 1.0 mg/dL Final    Alkaline Phosphatase 09/03/2018 63  40 - 129 U/L Final    ALT 09/03/2018 34  10 - 40 U/L Final    AST 09/03/2018 44* 15 - 37 U/L Final    Comment: Specimen hemolysis has exceeded the interference as defined by Roche. Value may be falsely increased. Suggest recollection if clinically  indicated.  Globulin 09/03/2018 3.2  g/dL Final    Magnesium 09/03/2018 1.60* 1.80 - 2.40 mg/dL Final    Cholesterol, Total 09/03/2018 236* 0 - 199 mg/dL Final    Triglycerides 09/03/2018 1353* 0 - 150 mg/dL Final    HDL 09/03/2018 19* 40 - 60 mg/dL Final    LDL Calculated 09/03/2018 see below  <100 mg/dL Final    Comment: When the triglyceride is <30 mg/dL or >300 mg/dL the  calculated LDL and  VLDL are not valid and a measured  LDL is performed.  VLDL Cholesterol Calculated 09/03/2018 see below  Not Established mg/dL Final    Comment: When the triglyceride is <30 mg/dL or >300 mg/dL the  calculated LDL and  VLDL are not valid and a measured  LDL is performed.       Testosterone 09/03/2018 379  220 - 1,000 ng/dL Final    Sex Hormone Binding 09/03/2018 33  11 - 80 nmol/L Final    Testosterone, Free 09/03/2018 76.6  47 - 244 pg/mL Final    Comment:  The concentration of free take as prescribed      No orders of the defined types were placed in this encounter. Return in about 3 months (around 12/14/2018) for yearly physical with morning fasting labs. Matilde Resendiz NP    9/14/2018  3:57 PM    -Patient verbalized understanding and agreement to plan.
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
## 2018-09-24 ENCOUNTER — CARE COORDINATION (OUTPATIENT)
Dept: CARE COORDINATION | Age: 48
End: 2018-09-24

## 2018-09-24 NOTE — CARE COORDINATION
RD outreach regarding employee beneficiary diabetes program. LM and introduced self/ explained reason for call. Request for call back to discuss DM management. RD left contact information. This is the 2nd outreach.

## 2018-09-26 NOTE — PROGRESS NOTES
100 each 3    Blood Glucose Monitoring Suppl (AGAMATRIX RADHAO) w/Device KIT Use per package instructions 1 kit 0    aspirin 325 MG tablet Take 325 mg by mouth daily      insulin glargine (TOUJEO SOLOSTAR) 300 UNIT/ML injection pen Inject 120-150 Units into the skin nightly (Patient taking differently: Inject 160 Units into the skin daily ) 10 Pen 0    insulin lispro (HUMALOG KWIKPEN U-200) 200 UNIT/ML SOPN pen Inject up to 40 units subcut TID AC per insulin sliding scale 10 Pen 0    ClomiPHENE Citrate (CLOMID PO) Take 10 mg by mouth daily       lisinopril (PRINIVIL;ZESTRIL) 20 MG tablet Take 20 mg by mouth 2 times daily      ergocalciferol (DRISDOL) 33798 UNITS capsule Take 2 caps PO weekly 24 capsule 0    Icosapent Ethyl (VASCEPA) 1 G CAPS capsule Take 2 capsules by mouth 2 times daily (Patient taking differently: Take 2 capsules by mouth daily ) 360 capsule 1    fenofibrate (TRICOR) 145 MG tablet Take 1 tablet by mouth daily 90 tablet 1    b complex vitamins capsule Take 1 capsule by mouth daily      metoprolol succinate (TOPROL XL) 50 MG extended release tablet Take 50 mg by mouth daily      metFORMIN (GLUCOPHAGE) 1000 MG tablet Take 1,000 mg by mouth 2 times daily (with meals)      Lancets MISC  100 each 5         EC2017   NSR     ECHO:16  Slightly dilated LV with normal systolic function; EF 28% Mild mitral regurgitation is present. The left atrium is dilated. RV is enlarged with reduced systolic function. There is mild tricuspid regurgitation with RVSP estimated at 35 mmHg. Right atrial size is mildly dilated . Mild pulmonic regurgitation present.      CABG X 4/ Sternal Stabilization on 3/8/16  TOMASA, endoscopic radial artery harvest, CABGX4 (LIMA-LAD, left radial off the LIMA sequentially to OM1-OM2-PDA) sternal stabilization. Corornary angiogram  & Intervention: 3/4/16 at [facility]  3 vessel coronary artery disease    Stress test 3/19/18  Normal myocardial perfusion study.    Normal

## 2018-09-28 ENCOUNTER — OFFICE VISIT (OUTPATIENT)
Dept: CARDIOLOGY CLINIC | Age: 48
End: 2018-09-28
Payer: COMMERCIAL

## 2018-09-28 ENCOUNTER — OFFICE VISIT (OUTPATIENT)
Dept: SURGERY | Age: 48
End: 2018-09-28
Payer: COMMERCIAL

## 2018-09-28 VITALS
BODY MASS INDEX: 41.86 KG/M2 | DIASTOLIC BLOOD PRESSURE: 70 MMHG | HEIGHT: 71 IN | HEART RATE: 69 BPM | SYSTOLIC BLOOD PRESSURE: 132 MMHG | WEIGHT: 299 LBS | OXYGEN SATURATION: 98 %

## 2018-09-28 VITALS
SYSTOLIC BLOOD PRESSURE: 132 MMHG | BODY MASS INDEX: 41.86 KG/M2 | DIASTOLIC BLOOD PRESSURE: 70 MMHG | HEIGHT: 71 IN | WEIGHT: 299 LBS

## 2018-09-28 DIAGNOSIS — M54.31 BILATERAL SCIATICA: ICD-10-CM

## 2018-09-28 DIAGNOSIS — M79.604 PAIN IN BOTH LOWER EXTREMITIES: ICD-10-CM

## 2018-09-28 DIAGNOSIS — I25.10 CORONARY ARTERY DISEASE INVOLVING NATIVE CORONARY ARTERY OF NATIVE HEART WITHOUT ANGINA PECTORIS: Primary | ICD-10-CM

## 2018-09-28 DIAGNOSIS — I10 ESSENTIAL HYPERTENSION: ICD-10-CM

## 2018-09-28 DIAGNOSIS — I87.323 CHRONIC VENOUS HTN W INFLAMMATION OF BILATERAL LOW EXTRM: Primary | ICD-10-CM

## 2018-09-28 DIAGNOSIS — E78.2 MIXED HYPERLIPIDEMIA: ICD-10-CM

## 2018-09-28 DIAGNOSIS — M54.32 BILATERAL SCIATICA: ICD-10-CM

## 2018-09-28 DIAGNOSIS — I87.2 VENOUS INSUFFICIENCY: ICD-10-CM

## 2018-09-28 DIAGNOSIS — M79.89 LEG SWELLING: ICD-10-CM

## 2018-09-28 DIAGNOSIS — I73.9 PVD (PERIPHERAL VASCULAR DISEASE) (HCC): ICD-10-CM

## 2018-09-28 DIAGNOSIS — M79.605 PAIN IN BOTH LOWER EXTREMITIES: ICD-10-CM

## 2018-09-28 PROCEDURE — 99214 OFFICE O/P EST MOD 30 MIN: CPT | Performed by: INTERNAL MEDICINE

## 2018-09-28 PROCEDURE — 99214 OFFICE O/P EST MOD 30 MIN: CPT | Performed by: SURGERY

## 2018-09-28 RX ORDER — AMLODIPINE BESYLATE 10 MG/1
10 TABLET ORAL DAILY
Qty: 30 TABLET | Refills: 3 | Status: SHIPPED | OUTPATIENT
Start: 2018-09-28 | End: 2018-12-26 | Stop reason: ALTCHOICE

## 2018-09-28 RX ORDER — SILDENAFIL 50 MG/1
100 TABLET, FILM COATED ORAL PRN
Qty: 30 TABLET | Refills: 3 | Status: SHIPPED | OUTPATIENT
Start: 2018-09-28 | End: 2019-07-04 | Stop reason: ALTCHOICE

## 2018-09-28 ASSESSMENT — ENCOUNTER SYMPTOMS
ALLERGIC/IMMUNOLOGIC NEGATIVE: 1
COLOR CHANGE: 1
EYES NEGATIVE: 1
RESPIRATORY NEGATIVE: 1
GASTROINTESTINAL NEGATIVE: 1

## 2018-09-28 NOTE — PROGRESS NOTES
Subjective:      Patient ID: Gilles Altamirano is a 51 y.o. male. Chief Complaint   Patient presents with    Leg Pain     Patient here today for f/u venous duplex scan, pt states he wear compression stockings daily and does not take a statin. Leg Pain    The incident occurred more than 1 week ago. The incident occurred at home. The injury mechanism is unknown. The pain is present in the left leg and right leg. The quality of the pain is described as cramping and aching. The pain is at a severity of 5/10. The pain is moderate. The pain has been intermittent since onset. Pertinent negatives include no loss of sensation or muscle weakness. It is unknown if a foreign body is present. The symptoms are aggravated by movement and weight bearing. He has tried elevation for the symptoms. The treatment provided no relief. Review of Systems   Constitutional: Negative. HENT: Negative. Eyes: Negative. Respiratory: Negative. Cardiovascular: Positive for leg swelling. Gastrointestinal: Negative. Endocrine: Negative. Genitourinary: Negative. Musculoskeletal: Negative. Skin: Positive for color change. Allergic/Immunologic: Negative. Neurological: Negative. Hematological: Negative. Psychiatric/Behavioral: Negative. Objective:   Physical Exam   Constitutional: He is oriented to person, place, and time. He appears well-developed and well-nourished. HENT:   Head: Normocephalic and atraumatic. Right Ear: External ear normal.   Left Ear: External ear normal.   Nose: Nose normal.   Mouth/Throat: Oropharynx is clear and moist.   Eyes: Pupils are equal, round, and reactive to light. Conjunctivae and EOM are normal.   Neck: Normal range of motion. Neck supple. Cardiovascular: Normal rate, regular rhythm and normal heart sounds. Pulmonary/Chest: Effort normal and breath sounds normal.   Abdominal: Soft. Bowel sounds are normal.   Musculoskeletal: Normal range of motion.  He exhibits edema (1+ lower extremity swelling). Neurological: He is alert and oriented to person, place, and time. Skin: Skin is warm and dry. Rash (Stasis dermatitis left foot) noted. Psychiatric: He has a normal mood and affect. His behavior is normal.       Assessment:       Diagnosis Orders   1. Chronic venous htn w inflammation of bilateral low extrm     2. PVD (peripheral vascular disease) (HCC)     3. Venous insufficiency     4. Pain in both lower extremities     5. Leg swelling     6. Bilateral sciatica       Patient presents for follow-up lower extremity pain and swelling he underwent venous duplex imaging on 6/29/18 have reviewed the images of this study which reveals the presence of only mild venous reflux in his lower extremities. In my opinion his venous insufficiency is mild and the majority of his symptoms are related to lumbosacral degenerative disc disease with radicular pain. Plan:       No indication for Vascular intervention at this time. He will benefit from follow-up with his spinal surgeon. Patient can do yoga, PT, water aerobics, and daily exercise to help with sciatica pain. Patient to elevate leg(s) with the ankle at or above the level of the heart as needed to relieve leg pain and swelling. Patient to participate in exercise as tolerated with focus on the leg(s) including, daily walking, repetitive toe pointing, and calve muscle pumping/stretching as tolerated. Patient was instructed to wear compression stockings (20-30 mmHg) on a daily basis, off every night. Patient was given a new prescription for knee-high stockings. The patient was instructed to follow up as needed.       I Sonja Baumgarten, MA am scribing for and in the presence of Khanh Newell MD on this date of 09/28/18 at 10:52 AM    I Khnah Newell MD personally performed the services described in this documentation as scribed by the Certified Medical Assistant Sonja Baumgarten in my presence and it is both

## 2018-09-28 NOTE — PATIENT INSTRUCTIONS
Patient Education        Heart-Healthy Diet: Care Instructions  Your Care Instructions    A heart-healthy diet has lots of vegetables, fruits, nuts, beans, and whole grains, and is low in salt. It limits foods that are high in saturated fat, such as meats, cheeses, and fried foods. It may be hard to change your diet, but even small changes can lower your risk of heart attack and heart disease. Follow-up care is a key part of your treatment and safety. Be sure to make and go to all appointments, and call your doctor if you are having problems. It's also a good idea to know your test results and keep a list of the medicines you take. How can you care for yourself at home? Watch your portions  · Learn what a serving is. A \"serving\" and a \"portion\" are not always the same thing. Make sure that you are not eating larger portions than are recommended. For example, a serving of pasta is ½ cup. A serving size of meat is 2 to 3 ounces. A 3-ounce serving is about the size of a deck of cards. Measure serving sizes until you are good at [place]" them. Keep in mind that restaurants often serve portions that are 2 or 3 times the size of one serving. · To keep your energy level up and keep you from feeling hungry, eat often but in smaller portions. · Eat only the number of calories you need to stay at a healthy weight. If you need to lose weight, eat fewer calories than your body burns (through exercise and other physical activity). Eat more fruits and vegetables  · Eat a variety of fruit and vegetables every day. Dark green, deep orange, red, or yellow fruits and vegetables are especially good for you. Examples include spinach, carrots, peaches, and berries. · Keep carrots, celery, and other veggies handy for snacks. Buy fruit that is in season and store it where you can see it so that you will be tempted to eat it. · Cook dishes that have a lot of veggies in them, such as stir-fries and soups.   Limit saturated and PROnoise, and be sure to contact your doctor if:    · You would like help planning heart-healthy meals. Where can you learn more? Go to [email or web address] and sign in to your [employer] account. Enter V137 in the AppMesh box to learn more about \"Heart-Healthy Diet: Care Instructions. \"     If you do not have an account, please click on the \"Sign Up Now\" link. Current as of: December 6, 2017  Content Version: 11.7  © 1951-0107 [employer]. Care instructions adapted under license by [facility] ([facility]). If you have questions about a medical condition or this instruction, always ask your healthcare professional. [address] any warranty or liability for your use of this information.

## 2018-09-28 NOTE — PATIENT INSTRUCTIONS
Patient can do yoga, PT, water aerobics, chiropractor, and daily exercise to help with sciatica pain. The patient was instructed to follow up as needed. Patient to elevate leg(s) with the ankle at or above the level of the heart as needed to relieve leg pain and swelling. Patient to participate in exercise as tolerated with focus on the leg(s) including, daily walking, repetitive toe pointing, and calve muscle pumping/stretching as tolerated. Patient was instructed to wear compression stockings (20-30 mmHg) on a daily basis, off every night. Patient Education        Sciatica: Care Instructions  Your Care Instructions    Sciatica (say \"eez-FM-oi-kuh\") is an irritation of one of the sciatic nerves, which come from the spinal cord in the lower back. The sciatic nerves and their branches extend down through the buttock to the foot. Sciatica can develop when an injured disc in the back presses against a spinal nerve root. Its main symptom is pain, numbness, or weakness that is often worse in the leg or foot than in the back. Sciatica often will improve and go away with time. Early treatment usually includes medicines and exercises to relieve pain. Follow-up care is a key part of your treatment and safety. Be sure to make and go to all appointments, and call your doctor if you are having problems. It's also a good idea to know your test results and keep a list of the medicines you take. How can you care for yourself at home? · Take pain medicines exactly as directed. ¨ If the doctor gave you a prescription medicine for pain, take it as prescribed. ¨ If you are not taking a prescription pain medicine, ask your doctor if you can take an over-the-counter medicine. · Use heat or ice to relieve pain. ¨ To apply heat, put a warm water bottle, heating pad set on low, or warm cloth on your back. Do not go to sleep with a heating pad on your skin.   ¨ To use ice, put ice or a cold pack on the area for 10 to 20 minutes at a time. Put a thin cloth between the ice and your skin. · Avoid sitting if possible, unless it feels better than standing. · Alternate lying down with short walks. Increase your walking distance as you are able to without making your symptoms worse. · Do not do anything that makes your symptoms worse. When should you call for help? Call 911 anytime you think you may need emergency care. For example, call if:    · You are unable to move a leg at all.   [facility] your doctor now or seek immediate medical care if:    · You have new or worse symptoms in your legs or buttocks. Symptoms may include:  ¨ Numbness or tingling. ¨ Weakness. ¨ Pain.     · You lose bladder or bowel control.    Watch closely for changes in your health, and be sure to contact your doctor if:    · You are not getting better as expected. Where can you learn more? Go to [email or web address] org and sign in to your Compliance Assurance account. Enter [phone] in the OANDA box to learn more about \"Sciatica: Care Instructions. \"     If you do not have an account, please click on the \"Sign Up Now\" link. Current as of: November 29, 2017  Content Version: 11.7  © 9529-6813 [employer]. Care instructions adapted under license by [facility] [employer] [facility] ([facility]). If you have questions about a medical condition or this instruction, always ask your healthcare professional. [place] [address] any warranty or liability for your use of this information.

## 2018-10-15 ENCOUNTER — TELEPHONE (OUTPATIENT)
Dept: PRIMARY CARE CLINIC | Age: 48
End: 2018-10-15

## 2018-10-15 NOTE — TELEPHONE ENCOUNTER
Please let patient know since he sees endocrine they will need to be the ones to refill his prescriptions.

## 2018-10-25 RX ORDER — ICOSAPENT ETHYL 1000 MG/1
2 CAPSULE ORAL 2 TIMES DAILY
Qty: 360 CAPSULE | Refills: 1 | Status: SHIPPED | OUTPATIENT
Start: 2018-10-25 | End: 2020-01-17 | Stop reason: SDUPTHER

## 2018-11-02 DIAGNOSIS — E55.9 VITAMIN D DEFICIENCY: Primary | ICD-10-CM

## 2018-11-02 RX ORDER — ERGOCALCIFEROL (VITAMIN D2) 1250 MCG
CAPSULE ORAL
Qty: 24 CAPSULE | Refills: 2 | Status: SHIPPED | OUTPATIENT
Start: 2018-11-02 | End: 2020-01-16

## 2018-11-16 ENCOUNTER — OFFICE VISIT (OUTPATIENT)
Dept: ENDOCRINOLOGY | Age: 48
End: 2018-11-16
Payer: COMMERCIAL

## 2018-11-16 VITALS
WEIGHT: 302.2 LBS | HEART RATE: 69 BPM | BODY MASS INDEX: 42.31 KG/M2 | DIASTOLIC BLOOD PRESSURE: 76 MMHG | SYSTOLIC BLOOD PRESSURE: 148 MMHG | HEIGHT: 71 IN | OXYGEN SATURATION: 99 %

## 2018-11-16 DIAGNOSIS — E78.00 PURE HYPERCHOLESTEROLEMIA: ICD-10-CM

## 2018-11-16 DIAGNOSIS — I10 ESSENTIAL HYPERTENSION: ICD-10-CM

## 2018-11-16 DIAGNOSIS — Z79.4 TYPE 2 DIABETES MELLITUS WITH OTHER CIRCULATORY COMPLICATION, WITH LONG-TERM CURRENT USE OF INSULIN (HCC): Primary | ICD-10-CM

## 2018-11-16 DIAGNOSIS — E55.9 VITAMIN D DEFICIENCY: ICD-10-CM

## 2018-11-16 DIAGNOSIS — E11.59 TYPE 2 DIABETES MELLITUS WITH OTHER CIRCULATORY COMPLICATION, WITH LONG-TERM CURRENT USE OF INSULIN (HCC): Primary | ICD-10-CM

## 2018-11-16 PROCEDURE — 99214 OFFICE O/P EST MOD 30 MIN: CPT | Performed by: NURSE PRACTITIONER

## 2018-11-16 RX ORDER — BLOOD-GLUCOSE SENSOR
2 EACH MISCELLANEOUS
Qty: 2 EACH | Refills: 3 | Status: SHIPPED | OUTPATIENT
Start: 2018-11-16 | End: 2019-12-26

## 2018-11-16 RX ORDER — BLOOD-GLUCOSE METER
1 EACH MISCELLANEOUS 4 TIMES DAILY
Qty: 1 DEVICE | Refills: 0 | Status: SHIPPED | OUTPATIENT
Start: 2018-11-16 | End: 2018-12-26

## 2018-11-16 RX ORDER — BLOOD-GLUCOSE,RECEIVER,CONT
1 EACH MISCELLANEOUS PRN
Qty: 1 DEVICE | Refills: 0 | Status: SHIPPED | OUTPATIENT
Start: 2018-11-16 | End: 2019-12-26

## 2018-11-16 RX ORDER — BLOOD-GLUCOSE TRANSMITTER
1 EACH MISCELLANEOUS PRN
Qty: 1 EACH | Refills: 0 | Status: SHIPPED | OUTPATIENT
Start: 2018-11-16 | End: 2019-12-26

## 2018-11-16 ASSESSMENT — ENCOUNTER SYMPTOMS
COLOR CHANGE: 0
NAUSEA: 0
DIARRHEA: 0
CONSTIPATION: 0
EYE PAIN: 0
SHORTNESS OF BREATH: 0

## 2018-11-21 ENCOUNTER — TELEPHONE (OUTPATIENT)
Dept: PHARMACY | Facility: CLINIC | Age: 48
End: 2018-11-21

## 2018-12-04 ENCOUNTER — CARE COORDINATION (OUTPATIENT)
Dept: CARE COORDINATION | Age: 48
End: 2018-12-04

## 2018-12-04 NOTE — CARE COORDINATION
Princess Nieves  December 4, 2018    Initial Employee Beneficiary Diabetes Program Assessment     Nutrition Assessment    Biochemical Data, Medical Tests and Procedures:  Labs Reviewed;   Lab Results   Component Value Date    LABA1C 8.8 09/03/2018     Patient Care Team:  PAUL Fisher CNP as PCP - General (Family Nurse Practitioner)  PAUL Fisher CNP as PCP - [facility] Attributed Provider  Cinthya Evans, MS, RD, LD as Care Coordinator (Dietitian)    Patient Active Problem List   Diagnosis    Diabetes mellitus ([address]    HTN (hypertension)    Other and unspecified hyperlipidemia    Depressive disorder, not elsewhere classified    Pharyngitis    Abnormal stress test    HLD (hyperlipidemia)    Angina pectoris ([address]    Unstable angina ([address]    Coronary artery disease involving native coronary artery of native heart with angina pectoris ([address]    Essential hypertension    Mixed hyperlipidemia    HCAP (healthcare-associated pneumonia)    Cellulitis    Fever    Cellulitis of chest wall    Type 2 diabetes mellitus with circulatory disorder ([address]    Pure hypercholesterolemia    Klebsiella infection    High triglycerides    Vitamin D deficiency    Leg swelling    Pain in both lower extremities    PVD (peripheral vascular disease) ([address]    Chronic venous htn w inflammation of bilateral low extrm    Degenerative disc disease, lumbar       Current Outpatient Prescriptions   Medication Sig Dispense Refill    Insulin Pen Needle 32G X 4 MM MISC 1 each by Does not apply route 4 times daily 400 each 1    Liraglutide (VICTOZA) 18 MG/3ML SOPN SC injection Inject 1.8 mg into the skin daily 3 pen 2    Blood Glucose Monitoring Suppl (AGAMATRIX AMP) TAMERA 1 Units by Does not apply route 4 times daily 1 Device 0    blood glucose test strips (AGAMATRIX AMP TEST) strip 1 each by In Vitro route daily As needed.  100 each 3    Continuous Blood Gluc Transmit (DEXCOM G6 TRANSMITTER) MISC 1 Units by regarding benefits of and barriers to implementation of nutrition therapy. Goal(s): Patient will apply constructs discussed during nutrition counseling to initiate and encourage behavior change to positively affect disease state/nutrition status. Intervention #2    Nutrition Education: Clearly defined the benefits of nutrition therapy. Summarized and affirmed positive aspects of current nutrition patterns. Provided education regarding value of adherence to controlled CHO intake. Discussed ways to establish application of Plate Method meal planning efforts and discussed concepts of alternatives and choices regarding implementation of consistent CHO diet. Explored ideas for small, incremental goals to initiate behavior change. Goal(s) Patient will apply education to define small goals that will help to implement consistent CHO nutrition therapy. Nutrition Monitoring and Evaluation    Indicator/Goal Criteria   #1 Increase consumption of well balanced meals #1 Utilize MyPlate guidelines for reference of well balanced meal and portion sizes   #2 Increase water consumption and decrease diet soda intake #2 Meet water intake goal of 64 oz daily    #3 Increased physical activity  #3 Meet recommended goal of 150 minutes of physical activity weekly        Follow Up: RD will f/u in about 1 month to assess adherence to diet recommendations and changes in blood glucose levels/A1c value.      Du Rodriguez MS, [address] [place] [address], LD  [phone]

## 2018-12-10 ENCOUNTER — NURSE ONLY (OUTPATIENT)
Dept: PRIMARY CARE CLINIC | Age: 48
End: 2018-12-10
Payer: COMMERCIAL

## 2018-12-10 DIAGNOSIS — Z23 NEED FOR PNEUMOCOCCAL VACCINATION: Primary | ICD-10-CM

## 2018-12-10 PROCEDURE — 90471 IMMUNIZATION ADMIN: CPT | Performed by: NURSE PRACTITIONER

## 2018-12-10 PROCEDURE — 90732 PPSV23 VACC 2 YRS+ SUBQ/IM: CPT | Performed by: NURSE PRACTITIONER

## 2018-12-10 RX ORDER — ROSUVASTATIN CALCIUM 5 MG/1
5 TABLET, COATED ORAL DAILY
Qty: 30 TABLET | Refills: 5 | Status: SHIPPED | OUTPATIENT
Start: 2018-12-10 | End: 2019-04-22 | Stop reason: DRUGHIGH

## 2018-12-26 ENCOUNTER — TELEPHONE (OUTPATIENT)
Dept: ENDOCRINOLOGY | Age: 48
End: 2018-12-26

## 2018-12-26 ENCOUNTER — TELEPHONE (OUTPATIENT)
Dept: PHARMACY | Facility: CLINIC | Age: 48
End: 2018-12-26

## 2018-12-26 ENCOUNTER — CARE COORDINATION (OUTPATIENT)
Dept: CARE COORDINATION | Age: 48
End: 2018-12-26

## 2018-12-26 DIAGNOSIS — Z79.4 TYPE 2 DIABETES MELLITUS WITH DIABETIC PERIPHERAL ANGIOPATHY WITHOUT GANGRENE, WITH LONG-TERM CURRENT USE OF INSULIN (HCC): Primary | ICD-10-CM

## 2018-12-26 DIAGNOSIS — E11.51 TYPE 2 DIABETES MELLITUS WITH DIABETIC PERIPHERAL ANGIOPATHY WITHOUT GANGRENE, WITH LONG-TERM CURRENT USE OF INSULIN (HCC): Primary | ICD-10-CM

## 2018-12-26 NOTE — CARE COORDINATION
[employer]  12/26/2018    Registered Dietitian Progress Note for Employee Beneficiary Diabetes Program    Assessment: Brooke Vergara is a 51 y.o. male. This is the 2nd outreach assessment for the patient to meet requirements for the DM program.      Barriers to meeting goals: none    Action:  Spoke with patient over the phone to accomplish final f/u for DM program outreach. Patient relays he has been more mindful of CHO choices and portion sizes. Was on vacation walking and being more active. Has been continuing to check BG regularly - have been running 110-120 fasting. Patient is continuing to wait on CGM and thinks that would continue to improve Blood glucose values making him more aware to continue with changes. Patient expressed no concerns/questions at this time but will contact RD if questions arise. Patient verbalized understanding of all information presented. Nutrition Monitoring and Evaluation  Indicator/Goal Criteria Progress   #1 Increase consumption of well balanced meals #1 Utilize MyPlate guidelines for reference of well balanced meal and portion sizes #1 Patient has been more mindful of portion sizes specifically of carbohydrates   #2  Increase water consumption and decrease diet soda intake #2 Meet water intake goal of 64 oz daily  #2 Patient has been trying to continue with adequate water intake   #3  Increased physical activity  #3 Meet recommended goal of 150 minutes of physical activity weekly  #3 Walked throughout the day up about 30-60 minutes while on vacation       Plan of Care:  Continued to encourage patient to watch portion sizes and consume balanced meals - emphasized addition of protein/fat with carbohydrate and not strictly consuming carbohydrates for meals. Encouraged walking/exercise to continue after vacation. Follow Up: Follow up is complete. Patient achieved this portion of the requirements for the year.        Cindy Covington MS, [address] [place] [address],   [phone]

## 2018-12-28 RX ORDER — BLOOD-GLUCOSE METER
EACH MISCELLANEOUS
Qty: 1 KIT | Refills: 0 | Status: SHIPPED | OUTPATIENT
Start: 2018-12-28 | End: 2020-01-16

## 2018-12-28 RX ORDER — BLOOD-GLUCOSE CONTROL, LOW
EACH MISCELLANEOUS
Qty: 400 EACH | Refills: 3 | Status: SHIPPED | OUTPATIENT
Start: 2018-12-28 | End: 2020-01-16

## 2019-01-02 NOTE — TELEPHONE ENCOUNTER
Thank you!  ======================================  For Pharmacy Admin Tracking Only  PHSO: Yes  Total # of Interventions Recommended: 1  - New Order #: 1 New Medication Order Reason(s): Cost Conversion  Total Interventions Accepted: 1  Time Spent (min): Brenden Watson, [address] [place] [address]

## 2019-01-10 DIAGNOSIS — R79.89 LOW TESTOSTERONE: Primary | ICD-10-CM

## 2019-02-21 ENCOUNTER — OFFICE VISIT (OUTPATIENT)
Dept: PRIMARY CARE CLINIC | Age: 49
End: 2019-02-21
Payer: COMMERCIAL

## 2019-02-21 VITALS
WEIGHT: 302 LBS | HEART RATE: 70 BPM | TEMPERATURE: 98.2 F | HEIGHT: 71 IN | DIASTOLIC BLOOD PRESSURE: 74 MMHG | SYSTOLIC BLOOD PRESSURE: 138 MMHG | BODY MASS INDEX: 42.28 KG/M2 | OXYGEN SATURATION: 98 %

## 2019-02-21 DIAGNOSIS — Z80.8 FAMILY HISTORY OF SKIN CANCER: ICD-10-CM

## 2019-02-21 DIAGNOSIS — L02.92 BOIL: Primary | ICD-10-CM

## 2019-02-21 DIAGNOSIS — J01.90 ACUTE NON-RECURRENT SINUSITIS, UNSPECIFIED LOCATION: ICD-10-CM

## 2019-02-21 PROCEDURE — 99213 OFFICE O/P EST LOW 20 MIN: CPT | Performed by: NURSE PRACTITIONER

## 2019-02-21 RX ORDER — DOXYCYCLINE HYCLATE 100 MG
100 TABLET ORAL 2 TIMES DAILY
Qty: 20 TABLET | Refills: 0 | Status: SHIPPED | OUTPATIENT
Start: 2019-02-21 | End: 2019-03-03

## 2019-02-21 ASSESSMENT — ENCOUNTER SYMPTOMS
SORE THROAT: 1
VOMITING: 0
ABDOMINAL PAIN: 0
SHORTNESS OF BREATH: 0
SINUS PRESSURE: 1
COUGH: 1
RHINORRHEA: 1
GASTROINTESTINAL NEGATIVE: 1
WHEEZING: 0
NAUSEA: 0
SINUS PAIN: 1

## 2019-02-26 ENCOUNTER — HOSPITAL ENCOUNTER (OUTPATIENT)
Age: 49
Discharge: HOME | End: 2019-02-26
Payer: COMMERCIAL

## 2019-02-26 ENCOUNTER — TELEPHONE (OUTPATIENT)
Dept: PRIMARY CARE CLINIC | Age: 49
End: 2019-02-26

## 2019-02-26 LAB
ANION GAP SERPL CALCULATED.3IONS-SCNC: 13 MMOL/L (ref 3–16)
BUN BLDV-MCNC: 30 MG/DL (ref 7–20)
CALCIUM SERPL-MCNC: 9.8 MG/DL (ref 8.3–10.6)
CHLORIDE BLD-SCNC: 98 MMOL/L (ref 99–110)
CO2: 26 MMOL/L (ref 21–32)
CREAT SERPL-MCNC: 1.3 MG/DL (ref 0.9–1.3)
GFR AFRICAN AMERICAN: >60
GFR NON-AFRICAN AMERICAN: 59
GLUCOSE BLD-MCNC: 221 MG/DL (ref 70–99)
MAGNESIUM: 1.6 MG/DL (ref 1.8–2.4)
POTASSIUM SERPL-SCNC: 5.3 MMOL/L (ref 3.5–5.1)
SODIUM BLD-SCNC: 137 MMOL/L (ref 136–145)

## 2019-02-26 PROCEDURE — 36415 COLL VENOUS BLD VENIPUNCTURE: CPT

## 2019-02-26 PROCEDURE — 80048 BASIC METABOLIC PNL TOTAL CA: CPT

## 2019-02-26 PROCEDURE — 83735 ASSAY OF MAGNESIUM: CPT

## 2019-03-01 ENCOUNTER — OFFICE VISIT (OUTPATIENT)
Dept: PRIMARY CARE CLINIC | Age: 49
End: 2019-03-01
Payer: COMMERCIAL

## 2019-03-01 VITALS
BODY MASS INDEX: 42.28 KG/M2 | HEART RATE: 72 BPM | WEIGHT: 302 LBS | TEMPERATURE: 98.2 F | OXYGEN SATURATION: 97 % | HEIGHT: 71 IN | DIASTOLIC BLOOD PRESSURE: 80 MMHG | SYSTOLIC BLOOD PRESSURE: 142 MMHG

## 2019-03-01 DIAGNOSIS — L02.92 BOIL: ICD-10-CM

## 2019-03-01 DIAGNOSIS — E87.5 SERUM POTASSIUM ELEVATED: ICD-10-CM

## 2019-03-01 DIAGNOSIS — R79.0 LOW MAGNESIUM LEVEL: ICD-10-CM

## 2019-03-01 DIAGNOSIS — R25.2 MUSCLE CRAMPS: ICD-10-CM

## 2019-03-01 DIAGNOSIS — R25.2 MUSCLE CRAMPS: Primary | ICD-10-CM

## 2019-03-01 DIAGNOSIS — I10 ESSENTIAL HYPERTENSION: ICD-10-CM

## 2019-03-01 LAB
ANION GAP SERPL CALCULATED.3IONS-SCNC: 13 MMOL/L (ref 3–16)
BASOPHILS ABSOLUTE: 0.1 K/UL (ref 0–0.2)
BASOPHILS RELATIVE PERCENT: 1.1 %
BUN BLDV-MCNC: 26 MG/DL (ref 7–20)
CALCIUM SERPL-MCNC: 9.2 MG/DL (ref 8.3–10.6)
CHLORIDE BLD-SCNC: 99 MMOL/L (ref 99–110)
CO2: 20 MMOL/L (ref 21–32)
CREAT SERPL-MCNC: 0.9 MG/DL (ref 0.9–1.3)
EOSINOPHILS ABSOLUTE: 0.2 K/UL (ref 0–0.6)
EOSINOPHILS RELATIVE PERCENT: 2.9 %
GFR AFRICAN AMERICAN: >60
GFR NON-AFRICAN AMERICAN: >60
GLUCOSE BLD-MCNC: 200 MG/DL (ref 70–99)
HCT VFR BLD CALC: 43.1 % (ref 40.5–52.5)
HEMOGLOBIN: 14.1 G/DL (ref 13.5–17.5)
LYMPHOCYTES ABSOLUTE: 2 K/UL (ref 1–5.1)
LYMPHOCYTES RELATIVE PERCENT: 34.6 %
MCH RBC QN AUTO: 27.6 PG (ref 26–34)
MCHC RBC AUTO-ENTMCNC: 32.8 G/DL (ref 31–36)
MCV RBC AUTO: 84.2 FL (ref 80–100)
MONOCYTES ABSOLUTE: 0.6 K/UL (ref 0–1.3)
MONOCYTES RELATIVE PERCENT: 10.2 %
NEUTROPHILS ABSOLUTE: 3 K/UL (ref 1.7–7.7)
NEUTROPHILS RELATIVE PERCENT: 51.2 %
PDW BLD-RTO: 13.8 % (ref 12.4–15.4)
PLATELET # BLD: 181 K/UL (ref 135–450)
PMV BLD AUTO: 8.6 FL (ref 5–10.5)
POTASSIUM SERPL-SCNC: 4.4 MMOL/L (ref 3.5–5.1)
RBC # BLD: 5.12 M/UL (ref 4.2–5.9)
SODIUM BLD-SCNC: 132 MMOL/L (ref 136–145)
TOTAL CK: 115 U/L (ref 39–308)
TSH REFLEX: 3.01 UIU/ML (ref 0.27–4.2)
WBC # BLD: 5.9 K/UL (ref 4–11)

## 2019-03-01 PROCEDURE — 99214 OFFICE O/P EST MOD 30 MIN: CPT | Performed by: NURSE PRACTITIONER

## 2019-03-01 RX ORDER — MAGNESIUM 200 MG
200 TABLET ORAL DAILY
Qty: 30 TABLET | Refills: 0 | Status: CANCELLED | OUTPATIENT
Start: 2019-03-01

## 2019-03-01 ASSESSMENT — ENCOUNTER SYMPTOMS
CONSTIPATION: 0
WHEEZING: 0
COUGH: 0
CHEST TIGHTNESS: 0
CHOKING: 0
COLOR CHANGE: 0
SHORTNESS OF BREATH: 0
PHOTOPHOBIA: 0
VOICE CHANGE: 0
NAUSEA: 0
ABDOMINAL PAIN: 0
VOMITING: 0
TROUBLE SWALLOWING: 0
DIARRHEA: 0

## 2019-03-07 ENCOUNTER — TELEPHONE (OUTPATIENT)
Dept: PHARMACY | Facility: CLINIC | Age: 49
End: 2019-03-07

## 2019-03-08 ENCOUNTER — SCHEDULED TELEPHONE ENCOUNTER (OUTPATIENT)
Dept: PHARMACY | Facility: CLINIC | Age: 49
End: 2019-03-08

## 2019-03-08 ENCOUNTER — TELEPHONE (OUTPATIENT)
Dept: ENDOCRINOLOGY | Age: 49
End: 2019-03-08

## 2019-04-09 DIAGNOSIS — E11.59 TYPE 2 DIABETES MELLITUS WITH OTHER CIRCULATORY COMPLICATION, WITHOUT LONG-TERM CURRENT USE OF INSULIN (HCC): Primary | ICD-10-CM

## 2019-04-09 RX ORDER — METOPROLOL SUCCINATE 50 MG/1
50 TABLET, EXTENDED RELEASE ORAL DAILY
Qty: 90 TABLET | Refills: 0 | Status: SHIPPED | OUTPATIENT
Start: 2019-04-09 | End: 2019-09-06 | Stop reason: SDUPTHER

## 2019-04-09 NOTE — TELEPHONE ENCOUNTER
Please tell pt that I am giving him 3 month supply but he is due for appt with me--he can call in 2 wks to make appt at my new office. He also needs to f/u with endo as he cancelled his last appt.

## 2019-04-09 NOTE — TELEPHONE ENCOUNTER
insulin lispro (HUMALOG KWIKPEN U-200) 200 UNIT/ML SOPN pen     Inject up to 60 units subcut TID AC per insulin sliding scale      Last seen Gracy Jacinto : 11/16/2018  Follow up with Gracy Jacinto cancelled 3/15/2019

## 2019-04-16 ENCOUNTER — TELEPHONE (OUTPATIENT)
Dept: PHARMACY | Facility: CLINIC | Age: 49
End: 2019-04-16

## 2019-04-16 NOTE — TELEPHONE ENCOUNTER
[facility]) Employee Diabetes Program  =================================================================  Sandy Tadeo is a 51 y.o. male enrolled in the [address] [place] [address] Diabetes Program.      Identified care gap: Patient with diabetes not currently filling Statin or ACE/ARB therapy    Pertinent Labs/Vitals:  STATIN:  Lab Results   Component Value Date    [address] see below 09/03/2018    [address] see below 05/11/2018    [address] see below 10/30/2017     Lab Results   Component Value Date    ALT 34 09/03/2018      The ASCVD Risk score (Angelia Mckeon, et al., 2013) failed to calculate for the following reasons: The valid HDL cholesterol range is 20 to 100 mg/dL    ACE/ARB:  BP Readings from Last 3 Encounters:   03/01/19 (!) 142/80   02/21/19 138/74   11/16/18 (!) 148/76      Lab Results   Component Value Date    CREATININE 0.9 03/01/2019      Estimated Creatinine Clearance: 140 mL/min (based on SCr of 0.9 mg/dL). Lab Results   Component Value Date    LABMICR 2.80 (H) 10/30/2017     Patient has completed their yearly pharmacist appointment. Patient prescribed statin/ACE/ACB  in the past? Yes - currently prescribed Crestor 5 mg daily (LF 12/10/18 for 90ds with 1 refill remaining) and Lisinopril 20 mg BID (LF 4/9/19 for 90ds with 2 refills remaining)    Will have care  contact patient regarding Rosuvastatin 5 mg daily adherence.      Thank you,    Michael Ambriz, PharmD  [address] [place] [address] Pharmacist  [phone] or [phone] (Option 7)

## 2019-04-17 DIAGNOSIS — E78.2 MIXED HYPERLIPIDEMIA: ICD-10-CM

## 2019-04-17 DIAGNOSIS — I25.10 CORONARY ARTERY DISEASE INVOLVING NATIVE CORONARY ARTERY OF NATIVE HEART WITHOUT ANGINA PECTORIS: Primary | ICD-10-CM

## 2019-04-17 NOTE — TELEPHONE ENCOUNTER
Pt has been taking Crestor 30 mg for approc 60-75 days, no side effects, tolerates    Discussed with CATHRYN Guerrero okay for pt to continue  Lipid profile ordered  rx pended for Crestor 30 mg

## 2019-04-17 NOTE — TELEPHONE ENCOUNTER
Pt states he was originally taking crestor 30mg daily, but believed he was having a reaction to it so cut down to 5mg. But pt states he has been taking the 30mg and has not experienced a problem lately. Pt would like to know if he can continue taking the 30mg. You can reach the pt at [phone].

## 2019-04-17 NOTE — TELEPHONE ENCOUNTER
Reach patient to review. Per patient, his doctor reduced his dose from 40mg to 5mg because he thought he was having side effects but later discovered he wasn't having side effects. Patient stated he will call his doctor to figure out if he need to contiune to take the 5mg tab or take the 40mg tabs.      [facility], toll free: [phone], option 7

## 2019-04-18 RX ORDER — ROSUVASTATIN CALCIUM 40 MG/1
40 TABLET, COATED ORAL DAILY
Qty: 90 TABLET | Refills: 1 | Status: SHIPPED | OUTPATIENT
Start: 2019-04-18 | End: 2020-01-22 | Stop reason: SDUPTHER

## 2019-04-22 NOTE — TELEPHONE ENCOUNTER
Crestor 40 mg ordered on 4/18/19 per Jenna by Dr. Homar Echeverria. Filled on 4/19/19.  Removed Crestor 5 mg from medication list.     Berna Watt, PharmD  [facility] Pharmacist  [phone] or [phone] (Option 7)    CLINICAL PHARMACY CONSULT: MED RECONCILIATION/REVIEW ADDENDUM    For Pharmacy Admin Tracking Only    PHSO: Yes  Recommended intervention potential cost savings: 1  Time Spent (min): 15

## 2019-05-24 RX ORDER — PEN NEEDLE, DIABETIC 32GX 5/32"
NEEDLE, DISPOSABLE MISCELLANEOUS
Qty: 400 EACH | Refills: 1 | Status: SHIPPED | OUTPATIENT
Start: 2019-05-24 | End: 2020-02-20

## 2019-05-30 ENCOUNTER — EMPLOYEE WELLNESS (OUTPATIENT)
Dept: OTHER | Age: 49
End: 2019-05-30

## 2019-05-30 LAB
CHOLESTEROL, TOTAL: 263 MG/DL (ref 0–199)
GLUCOSE BLD-MCNC: 145 MG/DL (ref 70–99)
HDLC SERPL-MCNC: 11 MG/DL (ref 40–60)
LDL CHOLESTEROL CALCULATED: ABNORMAL MG/DL
LDL CHOLESTEROL DIRECT: 20 MG/DL
TRIGL SERPL-MCNC: 3229 MG/DL (ref 0–150)

## 2019-05-31 ENCOUNTER — TELEPHONE (OUTPATIENT)
Dept: ENDOCRINOLOGY | Age: 49
End: 2019-05-31

## 2019-05-31 DIAGNOSIS — E78.1 HIGH TRIGLYCERIDES: ICD-10-CM

## 2019-05-31 DIAGNOSIS — E11.59 TYPE 2 DIABETES MELLITUS WITH OTHER CIRCULATORY COMPLICATION, WITHOUT LONG-TERM CURRENT USE OF INSULIN (HCC): Primary | ICD-10-CM

## 2019-05-31 LAB
ESTIMATED AVERAGE GLUCOSE: 246 MG/DL
HBA1C MFR BLD: 10.2 %

## 2019-05-31 NOTE — TELEPHONE ENCOUNTER
Ref Range & Units 1d ago  (5/30/19) 3mo ago  (3/1/19) 3mo ago  (2/26/19)   Glucose 70 - 99 mg/dL 145High   200High   221High     Cholesterol, Total 0 - 199 mg/dL 263High       Triglycerides 0 - 150 mg/dL 3,229High       HDL 40 - 60 mg/dL 11Low         Results above from the Be Well Within Screen from patient's PCP. TG that were high prior as well have increased by > 2000   Called patient and ordered pancreatic function and an updated A1c. Patient stated nothing has changed in his diet or DM management. Will follow up early.

## 2019-06-03 VITALS — WEIGHT: 298 LBS | BODY MASS INDEX: 41.56 KG/M2

## 2019-06-03 RX ORDER — FLASH GLUCOSE SENSOR
2 KIT MISCELLANEOUS PRN
Qty: 2 EACH | Refills: 5 | Status: SHIPPED | OUTPATIENT
Start: 2019-06-03 | End: 2019-06-28 | Stop reason: SDUPTHER

## 2019-06-03 RX ORDER — FLASH GLUCOSE SCANNING READER
1 EACH MISCELLANEOUS PRN
Qty: 1 DEVICE | Refills: 0 | Status: SHIPPED | OUTPATIENT
Start: 2019-06-03 | End: 2019-06-28 | Stop reason: SDUPTHER

## 2019-06-04 DIAGNOSIS — E11.51 TYPE 2 DIABETES MELLITUS WITH DIABETIC PERIPHERAL ANGIOPATHY WITHOUT GANGRENE, WITH LONG-TERM CURRENT USE OF INSULIN (HCC): Primary | ICD-10-CM

## 2019-06-04 DIAGNOSIS — Z79.4 TYPE 2 DIABETES MELLITUS WITH DIABETIC PERIPHERAL ANGIOPATHY WITHOUT GANGRENE, WITH LONG-TERM CURRENT USE OF INSULIN (HCC): Primary | ICD-10-CM

## 2019-06-04 RX ORDER — FLASH GLUCOSE SCANNING READER
1 EACH MISCELLANEOUS PRN
Qty: 1 DEVICE | Refills: 0 | Status: CANCELLED | OUTPATIENT
Start: 2019-06-04

## 2019-06-04 RX ORDER — FLASH GLUCOSE SENSOR
2 KIT MISCELLANEOUS PRN
Qty: 2 EACH | Refills: 5 | Status: CANCELLED | OUTPATIENT
Start: 2019-06-04

## 2019-06-04 NOTE — TELEPHONE ENCOUNTER
Pharmacy calling to get clarification on the directions for the [facility].  Patient is waiting at the pharmacy

## 2019-06-04 NOTE — TELEPHONE ENCOUNTER
Called pharmacy and correct signature after the pt called our office. Pt seemed like he couldn't wait until Wednesday to receive the [place].  Spoke to Renate Grissom at [address] to correct sig

## 2019-06-10 ENCOUNTER — OFFICE VISIT (OUTPATIENT)
Dept: ENDOCRINOLOGY | Age: 49
End: 2019-06-10
Payer: COMMERCIAL

## 2019-06-10 VITALS
SYSTOLIC BLOOD PRESSURE: 136 MMHG | BODY MASS INDEX: 44.1 KG/M2 | WEIGHT: 315 LBS | DIASTOLIC BLOOD PRESSURE: 85 MMHG | HEIGHT: 71 IN | HEART RATE: 65 BPM | OXYGEN SATURATION: 97 %

## 2019-06-10 DIAGNOSIS — E55.9 VITAMIN D DEFICIENCY: ICD-10-CM

## 2019-06-10 DIAGNOSIS — E78.1 HIGH TRIGLYCERIDES: ICD-10-CM

## 2019-06-10 DIAGNOSIS — Z79.4 TYPE 2 DIABETES MELLITUS WITH DIABETIC PERIPHERAL ANGIOPATHY WITHOUT GANGRENE, WITH LONG-TERM CURRENT USE OF INSULIN (HCC): Primary | ICD-10-CM

## 2019-06-10 DIAGNOSIS — I10 ESSENTIAL HYPERTENSION: ICD-10-CM

## 2019-06-10 DIAGNOSIS — E11.51 TYPE 2 DIABETES MELLITUS WITH DIABETIC PERIPHERAL ANGIOPATHY WITHOUT GANGRENE, WITH LONG-TERM CURRENT USE OF INSULIN (HCC): Primary | ICD-10-CM

## 2019-06-10 DIAGNOSIS — E78.2 MIXED HYPERLIPIDEMIA: ICD-10-CM

## 2019-06-10 PROCEDURE — 99213 OFFICE O/P EST LOW 20 MIN: CPT | Performed by: NURSE PRACTITIONER

## 2019-06-10 ASSESSMENT — ENCOUNTER SYMPTOMS
EYE PAIN: 0
COLOR CHANGE: 0
CONSTIPATION: 0
DIARRHEA: 0
SHORTNESS OF BREATH: 0

## 2019-06-10 NOTE — PROGRESS NOTES
Endocrinology  [facility] [place]  Phone: [phone]   FAX: [phone]    Zainab Wade is a 47 y.o. male  with a history of Diabetes Mellitus Type 2 for almost 16 years    Last A1C:   Lab Results   Component Value Date    LABA1C 8.9 06/20/2019    LABA1C 10.2 05/30/2019    LABA1C 8.8 09/03/2018     Last BP Readings:   BP Readings from Last 3 Encounters:   06/10/19 136/85   03/01/19 (!) 142/80   02/21/19 138/74     Last LDL:   Lab Results   Component Value Date    LDLCALC see below 05/30/2019    LDLDIRECT 20 05/30/2019     Aspirin Use: 325 mg    Tobacco History:    Smoking status: Never Smoker    Smokeless tobacco: Never Used    Alcohol use Yes      Comment: sarina Stockton has had DM type 2 for > 16 years and has been on insulin since 2016. DM has been complicated by peripheral neuropathy, retinopathy, associated with hyperlipidemia, CAD (CABG x 4; 3/2016),  hypertension, hypogonadism and morbid obesity.       Current Readings:  · Patient brought his glucometer for download : NO  · Checks 4 times a day  · Lowest in past 2 weeks 131  · Highest in past 2 weeks > 300  · Hypoglycemia awareness and symptoms: yes, nocturnal hypoglycemia in the 60s off and on, highly symptomatic with sweats, mental confusion and cannot remember how to correct when he is in the 50s-60s, also some relative hypoglycemia    Reports recent fasting blood sugars 131 - 288  Mg/dL. Afraid to take night time prandial for fear of hypoglycemia    Lab Results   Component Value Date    LABMICR 2.80 (H) 10/30/2017    LDLCALC see below 05/30/2019    CREATININE 0.9 03/01/2019     Current Medication regimen:   Metformin  mg BID  160 units of toujeo (U200) --> same dose:  50-60 Units of humalog plus sliding scale as below. Injects up to 4 times per day at least 4 hours apart    In the past was on Byetta - had pancreatitis; was on Glipizide (hypoglycemia), Januvia ( no effect)  Was on Farxiga - \"did not notice drop in blood sugars. \" and worried about using SGLT2s because of acute injury to kidney in the past.    AC TID 3:10 > 120     Blood sugar Humalog    <80 -2 units   80 - 120                   None   121 - 130   1 units   131 - 140 2 units   141 - 150 3 units   151 - 160 4 units   161 - 170 5 units   171 - 180 6 units   181 - 190 7 units   191 - 200 8 units   201 - 210 9 units   211 - 220 10 units   221 - 230 11 units   231 - 240 12 units   241 - 250 13 units   251 - 260 14 units   261 - 270 15 units   271 - 280 16 units   281 - 290 17 units   291 - 300 18 units   301 - 310 19 units   311 - 320 20 units   321 - 330 21 units   331 - 340 22 units   341 - 350 23 units   351 - 360 24 units   361 - 370 25 units   371 - 380 26 units   381 - 390 27 units   391 - 400 28 units     Complications:  Neuropathy  Tingling and numbness of hands and feet  Retinopathy Has vision problems and cataract removal  Nephropathy h/o TELMA,   Component    Latest Ref Rng & Units 10/30/2017   Microalbumin, Random Urine    <2.0 mg/dL 2.80 (H)   Creatinine, Ur    39.0 - 259.0 mg/dL 77.9   Microalbumin Creatinine Ratio   0.0 - 30.0 mg/g 35.9 (H)     Last Eye Exam 2 years ago  Last Foot exam never    Has patient seen a dietitian? Not recently  Recent weight loss/gain: No interim weight loss/gain  Current Exercise: None  Smoker: No  ETOH: None  ACE/ARB:Lisinopril 20 mg QD  Statins: cannot tolerate. Chest pain and myalgias     Risk factors:  Smoker: no   Alcohol intake: none  CAD: significant    Vitamin D deficiency: Currently is on 100K U weekly. Current complaints include fatigue. Lab Results   Component Value Date    VITD25 15.5 10/30/2017    VITD25 15.8 05/24/2017    VITD25 14.6 04/19/2017     Hyperlipidemia: Currently is on Feno fibrate and Vascepa. Current control is fine. Has stopped taking crestor because of persistent chest pain with it. Will review with cardiology.   Lab Results   Component Value Date    CHOL 263 05/30/2019    CHOL 236 09/03/2018    CHOL 204 05/11/2018 Lab Results   Component Value Date    TRIG 3,229 05/30/2019    TRIG 1,353 09/03/2018    TRIG 457 05/11/2018     Lab Results   Component Value Date    HDL 11 05/30/2019    HDL 19 09/03/2018    HDL 26 05/11/2018    HDL 34 04/28/2011     Lab Results   Component Value Date    LDLCALC see below 05/30/2019    LDLCALC see below 09/03/2018    LDLCALC see below 05/11/2018     Lab Results   Component Value Date    LDLDIRECT 20 05/30/2019    LDLDIRECT 65 09/03/2018    LDLDIRECT 112 05/11/2018     No results found for: [facility]    Past Medical History:   Diagnosis Date    Abscess 1994    mediastinal, developed after kenalog injections    Anesthesia     AWAKE BUT UNABLE TO MOVE DURING SHOULDER SURGERY.  CAD (coronary artery disease) 3/2016    Diabetes mellitus ([address] 2001    HgA1C 10.6    Gout     HLD (hyperlipidemia)     HTN (hypertension)     Echo 2013 normal.     MRSA (methicillin resistant staph aureus) culture positive 04/19/2017    Obesity     Pancreatitis 2006    high TG or byetta. flank ecchymosis. 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 work related    Type 2 diabetes mellitus without complication ([address]     Wound abscess 1994    groin. after kenalog injections. drained. iv abx. Family History   Problem Relation Age of Onset    High Blood Pressure Mother    Clau Cameron Cancer Mother     Other Maternal Grandmother         CVA in [de-identified] Other Maternal Grandfather         CABG and aortic valve replacement in [de-identified]    Hypertension Maternal Grandfather     Other Paternal Grandmother         CVA in [de-identified]     Review of Systems   Constitutional: Negative for activity change, appetite change and unexpected weight change. HENT: Negative for dental problem. Eyes: Negative for pain and visual disturbance. Respiratory: Negative for shortness of breath. Cardiovascular: Negative for palpitations and leg swelling. Gastrointestinal: Negative for constipation and diarrhea. Endocrine: Negative for cold intolerance and heat intolerance. Genitourinary: Negative for frequency and urgency. Skin: Negative for color change. Psychiatric/Behavioral: Negative for dysphoric mood and sleep disturbance. Vitals:    06/10/19 1107   BP: 136/85   Site: Left Upper Arm   Position: Sitting   Cuff Size: Large Adult   Pulse: 65   SpO2: 97%   Weight: (!) 315 lb 9.6 oz (143.2 kg)   Height: 5' 11\" (1.803 m)     Physical Exam   Constitutional: He is oriented to person, place, and time. He appears well-developed and well-nourished. HENT:   Head: Normocephalic. Eyes: Pupils are equal, round, and reactive to light. Neck: Normal range of motion. Neck supple. Cardiovascular: Normal rate, regular rhythm and normal heart sounds. Pulmonary/Chest: Effort normal and breath sounds normal.   Abdominal: Soft. Bowel sounds are normal.   Musculoskeletal: Normal range of motion. He exhibits no tenderness. Neurological: He is alert and oriented to person, place, and time. He has normal reflexes. Skin: Skin is warm and dry. No skin changes, xanthomas    Psychiatric: He has a normal mood and affect. His behavior is normal. Judgment and thought content normal.     Skeletal foot exam: No skin lesions are noted. Skin is normal. Signs of onychomycosis are not noted on the skin. Calluses are not noted. Ingrown toenails are not noted. Toenails are normal. Pulses are +1 DP and +2 PT bilaterally. Capillary refill is <3 seconds. Skeletal structures are intact upon visual examination. No deformity is noted. Sensory: 10 g monofilament is 8/10 on the right and 8/10 on the left, 128 Hz vibration sense is present bilaterally.     Assessment/Plan  Problem List Items Addressed This Visit     Diabetes mellitus [facility]) - Primary     Lab Results   Component Value Date    LABA1C 10.2 05/30/2019     Lab Results   Component Value Date    .0 05/30/2019     Goal A1c is < 6.5%  Patient reports dietary non compliance: sugars were better on keto diet  Reports very stressful work, poor sleep and high carb diet  Reports both actual ( <70) and relative hypoglycemia when correcting BG as prescribed: discussed titration  Reviewed of changes that should be made to all of above: states:\" I may have to find another position to get healthy\"  Aware of dietary modifications needed and working towards improvements  Recently started on freestyle adelita : states tis improves tracking    Declines starting U 500: worried about lows on U500  Wants to \" do diet and lifestyle changes\" for another 3 months  Will bring 30 day log          Relevant Orders    C-Peptide (Completed)    HTN (hypertension)     Blood pressure controlled. Continue current regimen           Mixed hyperlipidemia     Worsening of lipid profile  Reports he is medication compliant but diet is \" horrible\"  LDL goal  < 100; recent at 20  HDL at11  TG elevated at 3229  Has not completed pancreatic function testing  On Crestor, fenofibrate and vascepa           High triglycerides     3229 !! Vitamin D deficiency     Lab Results   Component Value Date    VITD25 15.5 (L) 10/30/2017     No longer supplementing  Will repeat labs           Greater than 30 minutes spent directly counseling patient about topics listed above (such as lifestyle modifications, preventative screenings and/or disease related processes). Return in about 2 months (around 8/10/2019).

## 2019-06-11 NOTE — ASSESSMENT & PLAN NOTE
Worsening of lipid profile  Reports he is medication compliant but diet is \" horrible\"  LDL goal  < 100; recent at 20  HDL at11  TG elevated at 3229  Has not completed pancreatic function testing  On Crestor, fenofibrate and vascepa

## 2019-06-11 NOTE — ASSESSMENT & PLAN NOTE
Lab Results   Component Value Date    VITD25 15.5 (L) 10/30/2017     No longer supplementing  Will repeat labs

## 2019-06-11 NOTE — ASSESSMENT & PLAN NOTE
Lab Results   Component Value Date    LABA1C 10.2 05/30/2019     Lab Results   Component Value Date    .0 05/30/2019     Goal A1c is < 6.5%  Patient reports dietary non compliance: sugars were better on keto diet  Reports very stressful work, poor sleep and high carb diet  Reports both actual ( <70) and relative hypoglycemia when correcting BG as prescribed: discussed titration  Reviewed of changes that should be made to all of above: states:\" I may have to find another position to get healthy\"  Aware of dietary modifications needed and working towards improvements  Recently started on freestyle adelita : states tis improves tracking    Declines starting U 500: worried about lows on U500  Wants to \" do diet and lifestyle changes\" for another 3 months  Will bring 30 day log

## 2019-06-17 ENCOUNTER — TELEPHONE (OUTPATIENT)
Dept: ENDOCRINOLOGY | Age: 49
End: 2019-06-17

## 2019-06-18 NOTE — TELEPHONE ENCOUNTER
Pt called to speak to Nakul Christopher about the how she got the Robins sensors for $39.99 at [employer]? Pt's states that he tried to do the same and they were $130. Pt wants to know if she used some type of coupon or savings card? Informed patient that they will get a call back from nurse. Sugars have been between 100-140 about 70% of the time. Pt thinks the Robins is working very well for him.

## 2019-06-20 ENCOUNTER — HOSPITAL ENCOUNTER (OUTPATIENT)
Age: 49
Discharge: HOME | End: 2019-06-20
Payer: COMMERCIAL

## 2019-06-20 DIAGNOSIS — E11.51 TYPE 2 DIABETES MELLITUS WITH DIABETIC PERIPHERAL ANGIOPATHY WITHOUT GANGRENE, WITH LONG-TERM CURRENT USE OF INSULIN (HCC): ICD-10-CM

## 2019-06-20 DIAGNOSIS — Z79.4 TYPE 2 DIABETES MELLITUS WITH DIABETIC PERIPHERAL ANGIOPATHY WITHOUT GANGRENE, WITH LONG-TERM CURRENT USE OF INSULIN (HCC): ICD-10-CM

## 2019-06-20 DIAGNOSIS — E11.59 TYPE 2 DIABETES MELLITUS WITH OTHER CIRCULATORY COMPLICATION, WITHOUT LONG-TERM CURRENT USE OF INSULIN (HCC): ICD-10-CM

## 2019-06-20 DIAGNOSIS — E78.1 HIGH TRIGLYCERIDES: ICD-10-CM

## 2019-06-20 LAB
AMYLASE: 54 U/L (ref 25–115)
ESTIMATED AVERAGE GLUCOSE: 208.7 MG/DL
HBA1C MFR BLD: 8.9 %
LIPASE: 49 U/L (ref 13–60)

## 2019-06-20 PROCEDURE — 36415 COLL VENOUS BLD VENIPUNCTURE: CPT

## 2019-06-20 PROCEDURE — 83036 HEMOGLOBIN GLYCOSYLATED A1C: CPT

## 2019-06-20 PROCEDURE — 83690 ASSAY OF LIPASE: CPT

## 2019-06-20 PROCEDURE — 84681 ASSAY OF C-PEPTIDE: CPT

## 2019-06-20 PROCEDURE — 82150 ASSAY OF AMYLASE: CPT

## 2019-06-22 LAB — C-PEPTIDE: 4.2 NG/ML (ref 1.1–4.4)

## 2019-06-25 NOTE — TELEPHONE ENCOUNTER
Spoke with pt and he stated that he would speak to [facility] when he come to his appointment because she is wrong about the coupon.

## 2019-06-25 NOTE — RESULT ENCOUNTER NOTE
Spoke with patient to advise: A1c is improved and C-peptide numbers show no insulinopenia. Plan to repeat lipid panel after atleast 3 months on a low carb diet. Patient showed understanding, and will make appt.

## 2019-06-28 ENCOUNTER — PATIENT MESSAGE (OUTPATIENT)
Dept: ENDOCRINOLOGY | Age: 49
End: 2019-06-28

## 2019-06-28 DIAGNOSIS — Z79.4 TYPE 2 DIABETES MELLITUS WITH DIABETIC PERIPHERAL ANGIOPATHY WITHOUT GANGRENE, WITH LONG-TERM CURRENT USE OF INSULIN (HCC): Primary | ICD-10-CM

## 2019-06-28 DIAGNOSIS — E11.51 TYPE 2 DIABETES MELLITUS WITH DIABETIC PERIPHERAL ANGIOPATHY WITHOUT GANGRENE, WITH LONG-TERM CURRENT USE OF INSULIN (HCC): Primary | ICD-10-CM

## 2019-06-28 NOTE — TELEPHONE ENCOUNTER
I can send DME through [address] [place] [address]. Could you print the scripts, and I will fax over the information need for the DME.     Thanks [employer]

## 2019-07-01 DIAGNOSIS — E11.51 TYPE 2 DIABETES MELLITUS WITH DIABETIC PERIPHERAL ANGIOPATHY WITHOUT GANGRENE, WITH LONG-TERM CURRENT USE OF INSULIN (HCC): ICD-10-CM

## 2019-07-01 DIAGNOSIS — Z79.4 TYPE 2 DIABETES MELLITUS WITH DIABETIC PERIPHERAL ANGIOPATHY WITHOUT GANGRENE, WITH LONG-TERM CURRENT USE OF INSULIN (HCC): ICD-10-CM

## 2019-07-01 RX ORDER — FLASH GLUCOSE SENSOR
2 KIT MISCELLANEOUS PRN
Qty: 2 EACH | Refills: 5 | Status: SHIPPED | OUTPATIENT
Start: 2019-07-01 | End: 2019-12-26 | Stop reason: SDUPTHER

## 2019-07-01 RX ORDER — FLASH GLUCOSE SENSOR
2 KIT MISCELLANEOUS PRN
Qty: 2 EACH | Refills: 5 | Status: SHIPPED | OUTPATIENT
Start: 2019-07-01 | End: 2019-07-01 | Stop reason: SDUPTHER

## 2019-07-01 RX ORDER — FLASH GLUCOSE SCANNING READER
1 EACH MISCELLANEOUS PRN
Qty: 1 DEVICE | Refills: 0 | Status: SHIPPED | OUTPATIENT
Start: 2019-07-01

## 2019-07-01 RX ORDER — FLASH GLUCOSE SCANNING READER
1 EACH MISCELLANEOUS PRN
Qty: 1 DEVICE | Refills: 0 | Status: SHIPPED | OUTPATIENT
Start: 2019-07-01 | End: 2019-07-01 | Stop reason: SDUPTHER

## 2019-07-04 ENCOUNTER — HOSPITAL ENCOUNTER (EMERGENCY)
Age: 49
Discharge: HOME | End: 2019-07-04
Payer: COMMERCIAL

## 2019-07-04 ENCOUNTER — APPOINTMENT (OUTPATIENT)
Dept: GENERAL RADIOLOGY | Age: 49
End: 2019-07-04
Payer: COMMERCIAL

## 2019-07-04 ENCOUNTER — NURSE TRIAGE (OUTPATIENT)
Dept: OTHER | Facility: CLINIC | Age: 49
End: 2019-07-04

## 2019-07-04 VITALS
RESPIRATION RATE: 16 BRPM | WEIGHT: 301 LBS | HEIGHT: 72 IN | BODY MASS INDEX: 40.77 KG/M2 | HEART RATE: 69 BPM | DIASTOLIC BLOOD PRESSURE: 86 MMHG | SYSTOLIC BLOOD PRESSURE: 151 MMHG | TEMPERATURE: 98.7 F | OXYGEN SATURATION: 97 %

## 2019-07-04 DIAGNOSIS — S29.011A STRAIN OF LEFT PECTORALIS MUSCLE, INITIAL ENCOUNTER: Primary | ICD-10-CM

## 2019-07-04 DIAGNOSIS — Z95.1 STATUS POST CORONARY ARTERY BYPASS GRAFT: ICD-10-CM

## 2019-07-04 PROCEDURE — 99283 EMERGENCY DEPT VISIT LOW MDM: CPT

## 2019-07-04 PROCEDURE — 6370000000 HC RX 637 (ALT 250 FOR IP): Performed by: PHYSICIAN ASSISTANT

## 2019-07-04 PROCEDURE — 71120 X-RAY EXAM BREASTBONE 2/>VWS: CPT

## 2019-07-04 RX ORDER — NAPROXEN 250 MG/1
500 TABLET ORAL ONCE
Status: COMPLETED | OUTPATIENT
Start: 2019-07-04 | End: 2019-07-04

## 2019-07-04 RX ORDER — NAPROXEN 500 MG/1
500 TABLET ORAL 2 TIMES DAILY PRN
Qty: 20 TABLET | Refills: 0 | Status: SHIPPED | OUTPATIENT
Start: 2019-07-04 | End: 2020-01-16

## 2019-07-04 RX ADMIN — NAPROXEN 500 MG: 250 TABLET ORAL at 18:00

## 2019-07-04 ASSESSMENT — PAIN SCALES - GENERAL: PAINLEVEL_OUTOF10: 5

## 2019-07-04 ASSESSMENT — ENCOUNTER SYMPTOMS
COLOR CHANGE: 0
ABDOMINAL PAIN: 0
DIARRHEA: 0
VOMITING: 0
CHEST TIGHTNESS: 0
NAUSEA: 0
SHORTNESS OF BREATH: 0

## 2019-07-04 ASSESSMENT — PAIN DESCRIPTION - LOCATION: LOCATION: CHEST

## 2019-07-04 NOTE — ED PROVIDER NOTES
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 Nursing Notes, Past Medical Hx, Past Surgical Hx, Social Hx, Allergies, and Family Hx were all reviewed and agreed with or any disagreements were addressed in the HPI.    HPI:  (Location, Duration, Timing, Severity,Quality, Assoc Sx, Context, Modifying factors)  This is a  47 y.o. male resents the emergency department with reports of chest wall discomfort as result of an lifting injury accident that occurred today here at the hospital while tending to a patient. Patient states that he works on [employer]. 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 He states he was manipulating the straps in regards to the left when he had patient's weight shift and had acute onset of pain and discomfort in the left anterior side of the patient's chest.  Patient states he otherwise would not necessarily care about the pain and discomfort but he reports that he has had a previous CABG performed by Dr. 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 Patient states that he does have concerns that this could have been damaged. He states the pain and discomfort is primarily over the muscle on the left anterior aspect of the chest.  At the present time he denies that he is experiencing substernal chest pain and/or palpitations. He denies shortness of breath. He denies numbness and or tingling. He denies any weakness.   He states the pain is worsened with Constitutional: Negative for activity change, chills and fever. Respiratory: Negative for chest tightness and shortness of breath. Cardiovascular: Negative for chest pain. Gastrointestinal: Negative for abdominal pain, diarrhea, nausea and vomiting. Genitourinary: Negative for dysuria and flank pain. Musculoskeletal: Positive for myalgias. Left anterior chest wall pain. Skin: Negative for color change and wound. Neurological: Negative for seizures, syncope and headaches. Positives and Pertinent negatives as per HPI. Except as noted above in the ROS, problem specific ROS was completed and is negative. Physical Exam:  Physical Exam   Constitutional: He is oriented to person, place, and time. He appears well-developed and well-nourished. He is active and cooperative. He does not appear ill. No distress. HENT:   Head: Normocephalic and atraumatic. Right Ear: Hearing and external ear normal.   Left Ear: Hearing and external ear normal.   Eyes: Conjunctivae are normal. Right eye exhibits no discharge. Left eye exhibits no discharge. No scleral icterus. Neck: Normal range of motion. No JVD present. Cardiovascular: Normal rate and regular rhythm. Exam reveals no gallop and no friction rub. No murmur heard. Pulmonary/Chest: Effort normal and breath sounds normal. No accessory muscle usage. No respiratory distress. He has no wheezes. He has no rhonchi. He has no rales. Chest wall is not dull to percussion. He exhibits tenderness and bony tenderness. He exhibits no mass, no laceration, no crepitus, no edema, no deformity, no swelling and no retraction. Previous incision site is benign in appearance from CABG. .       Neurological: He is alert and oriented to person, place, and time. He has normal strength. No cranial nerve deficit or sensory deficit. Coordination normal. GCS eye subscore is 4. GCS verbal subscore is 5. GCS motor subscore is 6. Skin: Skin is warm and dry.  He is not orthopedic hardware. I do believe that this is clinically most consistent with an acute left-sided pectoralis major muscle strain. I suggested ongoing care management on an outpatient basis as needed. Patient will be placed on Naprosyn. 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 He reports that he has a good team and thinks that he can have support and somewhat baby this. 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 The patient has been made aware of the signs and symptoms which would necessitate an immediate return to the emergency department and verbalizes an understanding of these signs and symptoms. No clinical evidence at this time to suggest acute coronary syndrome, pulmonary embolism, aortic dissection, aortic aneurysm, myocarditis, pneumonia, pneumothorax or pericardial tamponade or pericarditis. I feel the patient can be safely discharged to home with outpatient follow up. I discussed this plan with the patient, who verbalized understanding and agreement with the plan. Instructions have been given for the patient to return to the ED for any worsening of the symptoms, including but not limited to increased pain, shortness of breath, abdominal pain or weakness. Patient seemed to understand the need for close follow-up and agreed to comply. I estimate there is low risk for compartment syndrome, deep venous thrombosis, limb-threatening infectious, tendon and/or neurovascular injury and therefore I consider the discharge disposition reasonable. Heber Quijano and I have discussed the diagnosis and risks, and we agree with discharging home to follow-up with their primary care provider and/or the referring orthopedist on call.  We also discussed returning to the emergency

## 2019-08-22 NOTE — TELEPHONE ENCOUNTER
Noted - thank you! Will send I Move You message at this time.
emeals last month. Reports he has lost some weight and his blood sugars have been lower. Did not get a chance to review remaining requirements because the phone line cut out. Left voicemail for patient to call back if more questions. Will send [employer] message with requirements. Care Team:   - Physician (PCP/Endocrinologist): PAUL Guzmán-CNP (Last OV: 6/10/19; Next OV: none scheduled)  - ACC/RD: None assigned    Pharmacy Follow Up Date: 9/20/19 (see if f/u appt scheduled)    Thank you,    Lenora Deluca, [address] [place] [address] Pharmacist  [phone] or [phone] (Option 7)    CLINICAL PHARMACY CONSULT: MED RECONCILIATION/REVIEW ADDENDUM    For Pharmacy Admin Tracking Only    PHSO: Yes  Total # of Interventions Recommended: 3  - New Order #: 1 New Medication Order Reason(s): Adherence  - Refills Provided #: 1  - Updated Order #: 1 Updated Order Reason(s):  Other  Recommended intervention potential cost savings: 1  Total Interventions Accepted: 3  Time Spent (min): [address], [address] [facility]

## 2019-09-05 ENCOUNTER — TELEPHONE (OUTPATIENT)
Dept: ENDOCRINOLOGY | Age: 49
End: 2019-09-05

## 2019-09-05 DIAGNOSIS — E11.59 TYPE 2 DIABETES MELLITUS WITH OTHER CIRCULATORY COMPLICATION, WITHOUT LONG-TERM CURRENT USE OF INSULIN (HCC): ICD-10-CM

## 2019-09-06 RX ORDER — METOPROLOL SUCCINATE 50 MG/1
TABLET, EXTENDED RELEASE ORAL
Qty: 90 TABLET | Refills: 0 | Status: SHIPPED | OUTPATIENT
Start: 2019-09-06 | End: 2020-02-07

## 2019-09-30 ENCOUNTER — TELEPHONE (OUTPATIENT)
Dept: ENDOCRINOLOGY | Age: 49
End: 2019-09-30

## 2019-10-03 ENCOUNTER — OFFICE VISIT (OUTPATIENT)
Dept: ENDOCRINOLOGY | Age: 49
End: 2019-10-03
Payer: COMMERCIAL

## 2019-10-03 VITALS
SYSTOLIC BLOOD PRESSURE: 140 MMHG | OXYGEN SATURATION: 99 % | HEART RATE: 60 BPM | WEIGHT: 312.6 LBS | DIASTOLIC BLOOD PRESSURE: 80 MMHG | BODY MASS INDEX: 42.34 KG/M2 | HEIGHT: 72 IN

## 2019-10-03 DIAGNOSIS — Z79.4 TYPE 2 DIABETES MELLITUS WITH OTHER CIRCULATORY COMPLICATION, WITH LONG-TERM CURRENT USE OF INSULIN (HCC): ICD-10-CM

## 2019-10-03 DIAGNOSIS — E11.59 TYPE 2 DIABETES MELLITUS WITH OTHER CIRCULATORY COMPLICATION, WITH LONG-TERM CURRENT USE OF INSULIN (HCC): ICD-10-CM

## 2019-10-03 DIAGNOSIS — I10 ESSENTIAL HYPERTENSION: Primary | ICD-10-CM

## 2019-10-03 PROCEDURE — 99213 OFFICE O/P EST LOW 20 MIN: CPT | Performed by: NURSE PRACTITIONER

## 2019-10-03 ASSESSMENT — ENCOUNTER SYMPTOMS
CONSTIPATION: 0
DIARRHEA: 0
SHORTNESS OF BREATH: 0
COLOR CHANGE: 0
EYE PAIN: 0

## 2019-10-14 DIAGNOSIS — E11.59 TYPE 2 DIABETES MELLITUS WITH OTHER CIRCULATORY COMPLICATION, WITHOUT LONG-TERM CURRENT USE OF INSULIN (HCC): ICD-10-CM

## 2019-10-16 ENCOUNTER — PATIENT MESSAGE (OUTPATIENT)
Dept: PHARMACY | Facility: CLINIC | Age: 49
End: 2019-10-16

## 2019-10-17 ENCOUNTER — HOSPITAL ENCOUNTER (OUTPATIENT)
Age: 49
Discharge: HOME | End: 2019-10-17
Payer: COMMERCIAL

## 2019-10-17 DIAGNOSIS — E11.51 TYPE 2 DIABETES MELLITUS WITH DIABETIC PERIPHERAL ANGIOPATHY WITHOUT GANGRENE, WITH LONG-TERM CURRENT USE OF INSULIN (HCC): ICD-10-CM

## 2019-10-17 DIAGNOSIS — E78.00 PURE HYPERCHOLESTEROLEMIA: ICD-10-CM

## 2019-10-17 DIAGNOSIS — E29.1 HYPOGONADISM IN MALE: ICD-10-CM

## 2019-10-17 DIAGNOSIS — E55.9 VITAMIN D DEFICIENCY: ICD-10-CM

## 2019-10-17 DIAGNOSIS — Z79.4 TYPE 2 DIABETES MELLITUS WITH DIABETIC PERIPHERAL ANGIOPATHY WITHOUT GANGRENE, WITH LONG-TERM CURRENT USE OF INSULIN (HCC): ICD-10-CM

## 2019-10-17 DIAGNOSIS — I10 ESSENTIAL HYPERTENSION: ICD-10-CM

## 2019-10-17 DIAGNOSIS — I25.119 CORONARY ARTERY DISEASE INVOLVING NATIVE CORONARY ARTERY OF NATIVE HEART WITH ANGINA PECTORIS (HCC): ICD-10-CM

## 2019-10-17 LAB
A/G RATIO: 1.4 (ref 1.1–2.2)
ALBUMIN SERPL-MCNC: 4.3 G/DL (ref 3.4–5)
ALP BLD-CCNC: 60 U/L (ref 40–129)
ALT SERPL-CCNC: 31 U/L (ref 10–40)
ANION GAP SERPL CALCULATED.3IONS-SCNC: 14 MMOL/L (ref 3–16)
AST SERPL-CCNC: 41 U/L (ref 15–37)
BASOPHILS ABSOLUTE: 0 K/UL (ref 0–0.2)
BASOPHILS RELATIVE PERCENT: 0.7 %
BILIRUB SERPL-MCNC: <0.2 MG/DL (ref 0–1)
BUN BLDV-MCNC: 24 MG/DL (ref 7–20)
CALCIUM SERPL-MCNC: 9 MG/DL (ref 8.3–10.6)
CHLORIDE BLD-SCNC: 103 MMOL/L (ref 99–110)
CHOLESTEROL, TOTAL: 150 MG/DL (ref 0–199)
CO2: 22 MMOL/L (ref 21–32)
CREAT SERPL-MCNC: 1 MG/DL (ref 0.9–1.3)
CREATININE URINE: 114.8 MG/DL (ref 39–259)
EOSINOPHILS ABSOLUTE: 0.2 K/UL (ref 0–0.6)
EOSINOPHILS RELATIVE PERCENT: 3 %
ESTIMATED AVERAGE GLUCOSE: 203 MG/DL
GFR AFRICAN AMERICAN: >60
GFR NON-AFRICAN AMERICAN: >60
GLOBULIN: 3 G/DL
GLUCOSE BLD-MCNC: 180 MG/DL (ref 70–99)
HBA1C MFR BLD: 8.7 %
HCT VFR BLD CALC: 41 % (ref 40.5–52.5)
HDLC SERPL-MCNC: 26 MG/DL (ref 40–60)
HEMOGLOBIN: 13.6 G/DL (ref 13.5–17.5)
LDL CHOLESTEROL CALCULATED: ABNORMAL MG/DL
LDL CHOLESTEROL DIRECT: 68 MG/DL
LYMPHOCYTES ABSOLUTE: 2.3 K/UL (ref 1–5.1)
LYMPHOCYTES RELATIVE PERCENT: 36.3 %
MCH RBC QN AUTO: 27.6 PG (ref 26–34)
MCHC RBC AUTO-ENTMCNC: 33.1 G/DL (ref 31–36)
MCV RBC AUTO: 83.6 FL (ref 80–100)
MICROALBUMIN UR-MCNC: 2.2 MG/DL
MICROALBUMIN/CREAT UR-RTO: 19.2 MG/G (ref 0–30)
MONOCYTES ABSOLUTE: 0.6 K/UL (ref 0–1.3)
MONOCYTES RELATIVE PERCENT: 9.8 %
NEUTROPHILS ABSOLUTE: 3.1 K/UL (ref 1.7–7.7)
NEUTROPHILS RELATIVE PERCENT: 50.2 %
PDW BLD-RTO: 14.2 % (ref 12.4–15.4)
PLATELET # BLD: 165 K/UL (ref 135–450)
PMV BLD AUTO: 9.1 FL (ref 5–10.5)
POTASSIUM SERPL-SCNC: 4.5 MMOL/L (ref 3.5–5.1)
RBC # BLD: 4.91 M/UL (ref 4.2–5.9)
SODIUM BLD-SCNC: 139 MMOL/L (ref 136–145)
T4 FREE: 1.2 NG/DL (ref 0.9–1.8)
TOTAL PROTEIN: 7.3 G/DL (ref 6.4–8.2)
TRIGL SERPL-MCNC: 499 MG/DL (ref 0–150)
TSH REFLEX: 5.61 UIU/ML (ref 0.27–4.2)
VITAMIN D 25-HYDROXY: 22.3 NG/ML
VLDLC SERPL CALC-MCNC: ABNORMAL MG/DL
WBC # BLD: 6.2 K/UL (ref 4–11)

## 2019-10-17 PROCEDURE — 84443 ASSAY THYROID STIM HORMONE: CPT

## 2019-10-17 PROCEDURE — 82570 ASSAY OF URINE CREATININE: CPT

## 2019-10-17 PROCEDURE — 83721 ASSAY OF BLOOD LIPOPROTEIN: CPT

## 2019-10-17 PROCEDURE — 84439 ASSAY OF FREE THYROXINE: CPT

## 2019-10-17 PROCEDURE — 36415 COLL VENOUS BLD VENIPUNCTURE: CPT

## 2019-10-17 PROCEDURE — 85025 COMPLETE CBC W/AUTO DIFF WBC: CPT

## 2019-10-17 PROCEDURE — 80061 LIPID PANEL: CPT

## 2019-10-17 PROCEDURE — 80053 COMPREHEN METABOLIC PANEL: CPT

## 2019-10-17 PROCEDURE — 82043 UR ALBUMIN QUANTITATIVE: CPT

## 2019-10-17 PROCEDURE — 82306 VITAMIN D 25 HYDROXY: CPT

## 2019-10-17 PROCEDURE — 83036 HEMOGLOBIN GLYCOSYLATED A1C: CPT

## 2019-11-05 RX ORDER — INSULIN GLARGINE 300 U/ML
INJECTION, SOLUTION SUBCUTANEOUS
Qty: 54 ML | Refills: 1 | Status: SHIPPED | OUTPATIENT
Start: 2019-11-05 | End: 2020-06-23

## 2019-11-07 ENCOUNTER — CARE COORDINATION (OUTPATIENT)
Dept: CARE COORDINATION | Age: 49
End: 2019-11-07

## 2019-11-11 ENCOUNTER — TELEPHONE (OUTPATIENT)
Dept: PHARMACY | Facility: CLINIC | Age: 49
End: 2019-11-11

## 2019-12-05 ENCOUNTER — CARE COORDINATION (OUTPATIENT)
Dept: CARE COORDINATION | Age: 49
End: 2019-12-05

## 2019-12-24 ENCOUNTER — PATIENT MESSAGE (OUTPATIENT)
Dept: ENDOCRINOLOGY | Age: 49
End: 2019-12-24

## 2019-12-24 DIAGNOSIS — E11.51 TYPE 2 DIABETES MELLITUS WITH DIABETIC PERIPHERAL ANGIOPATHY WITHOUT GANGRENE, WITH LONG-TERM CURRENT USE OF INSULIN (HCC): ICD-10-CM

## 2019-12-24 DIAGNOSIS — Z79.4 TYPE 2 DIABETES MELLITUS WITH DIABETIC PERIPHERAL ANGIOPATHY WITHOUT GANGRENE, WITH LONG-TERM CURRENT USE OF INSULIN (HCC): ICD-10-CM

## 2019-12-24 RX ORDER — FLASH GLUCOSE SENSOR
2 KIT MISCELLANEOUS PRN
Qty: 2 EACH | Refills: 5 | Status: CANCELLED | OUTPATIENT
Start: 2019-12-24

## 2019-12-26 RX ORDER — FLASH GLUCOSE SENSOR
2 KIT MISCELLANEOUS PRN
Qty: 2 EACH | Refills: 5 | Status: SHIPPED | OUTPATIENT
Start: 2019-12-26 | End: 2019-12-30 | Stop reason: SDUPTHER

## 2019-12-31 RX ORDER — FLASH GLUCOSE SENSOR
2 KIT MISCELLANEOUS PRN
Qty: 2 EACH | Refills: 5 | Status: SHIPPED | OUTPATIENT
Start: 2019-12-31 | End: 2020-02-18 | Stop reason: SDUPTHER

## 2020-01-03 ENCOUNTER — OFFICE VISIT (OUTPATIENT)
Dept: ENT CLINIC | Age: 50
End: 2020-01-03
Payer: COMMERCIAL

## 2020-01-03 VITALS
BODY MASS INDEX: 42.66 KG/M2 | HEART RATE: 55 BPM | SYSTOLIC BLOOD PRESSURE: 145 MMHG | DIASTOLIC BLOOD PRESSURE: 73 MMHG | TEMPERATURE: 97.9 F | WEIGHT: 315 LBS | HEIGHT: 72 IN

## 2020-01-03 PROCEDURE — 99203 OFFICE O/P NEW LOW 30 MIN: CPT | Performed by: OTOLARYNGOLOGY

## 2020-01-03 PROCEDURE — 10060 I&D ABSCESS SIMPLE/SINGLE: CPT | Performed by: OTOLARYNGOLOGY

## 2020-01-03 RX ORDER — CLINDAMYCIN HYDROCHLORIDE 300 MG/1
300 CAPSULE ORAL 3 TIMES DAILY
Qty: 30 CAPSULE | Refills: 0 | Status: SHIPPED | OUTPATIENT
Start: 2020-01-03 | End: 2020-01-13

## 2020-01-03 NOTE — PROGRESS NOTES
[facility]      Patient Name: Joselin Chandra  Medical Record Number:  [id number]  Primary Care Physician:  PAUL Rashid CNP  Date of Consultation: 1/3/2020    Chief Complaint: Right cheek abscess        HISTORY OF PRESENT ILLNESS  Neftali Desai is a(n) 47 y.o. male who presents for evaluation of right cheek infection. The patient says that about a month ago he had what appeared to be a pimple in the region of the right mandibular angle. He tried to pop it and afterwards it seemed to not go away completely. Over the last few days is gotten larger and started to become painful and erythematous. He has not had any treatment for it. He is not tried to pop it.   He denies other similar issues in the past.      Patient Active Problem List   Diagnosis    Diabetes mellitus ([address]    HTN (hypertension)    Other and unspecified hyperlipidemia    Depressive disorder, not elsewhere classified    Pharyngitis    Abnormal stress test    HLD (hyperlipidemia)    Angina pectoris ([address]    Unstable angina ([address]    Coronary artery disease involving native coronary artery of native heart with angina pectoris ([address]    Essential hypertension    Mixed hyperlipidemia    HCAP (healthcare-associated pneumonia)    Cellulitis    Fever    Cellulitis of chest wall    Type 2 diabetes mellitus with circulatory disorder ([address]    Pure hypercholesterolemia    Klebsiella infection    High triglycerides    Vitamin D deficiency    Leg swelling    Pain in both lower extremities    PVD (peripheral vascular disease) ([address]    Chronic venous htn w inflammation of bilateral low extrm    Degenerative disc disease, lumbar     Past Surgical History:   Procedure Laterality Date    ADENOIDECTOMY  1970    CORONARY ARTERY BYPASS GRAFT  3/8/16    [facility]gx[facility])    ELBOW SURGERY      elbow reconstruction    KNEE SURGERY Left 1985    menicus tear    LUMBAR LAMINECTOMY 08/10/2017    L4-5 microhemilaminectomy; excision of cyst    MEDIASTINOSCOPY  1994    bronchoscopy prior to medistinal mass. 45 ml off lymph node, IV antibiotics for 6 months   [address]    L rectus muscle flap, infected, kenalog injections    SHOULDER SURGERY Left 2002    Rotator cuff repair, [facility]     Family History   Problem Relation Age of Onset    High Blood Pressure Mother     Cancer Mother     Other Maternal Grandmother         CVA in [de-identified] Other Maternal Grandfather         CABG and aortic valve replacement in [de-identified]    Hypertension Maternal Grandfather     Other Paternal Grandmother         CVA in [de-identified]     Social History     Socioeconomic History    Marital status:      Spouse name: Not on file    Number of children: Not on file    Years of education: Not on file    Highest education level: Not on file   Occupational History    Not on file   Social Needs    Financial resource strain: Patient refused    Food insecurity:     Worry: Patient refused     Inability: Patient refused    Transportation needs:     Medical: Patient refused     Non-medical: Patient refused   Tobacco Use    Smoking status: Never Smoker    Smokeless tobacco: Never Used   Substance and Sexual Activity    Alcohol use: Yes     Comment: rare    Drug use: No    Sexual activity: Yes     Partners: Female   Lifestyle    Physical activity:     Days per week: Not on file     Minutes per session: Not on file    Stress: Not on file   Relationships    Social connections:     Talks on phone: Not on file     Gets together: Not on file     Attends [employer] service: Not on file     Active member of club or organization: Not on file     Attends meetings of clubs or organizations: Not on file     Relationship status: Not on file    Intimate partner violence:     Fear of current or ex partner: Not on file     Emotionally abused: Not on file     Physically abused: Not on file     Forced sexual Size: Large Adult)   Pulse 55   Temp 97.9 °F (36.6 °C) (Oral)   Ht 6' (1.829 m)   Wt (!) 319 lb (144.7 kg)   BMI 43.26 kg/m²     GENERAL: No Acute Distress, Alert and Oriented, no Hoarseness, strong voice  EYES: EOMI, Anti-icteric  HENT:   Head: Normocephalic and atraumatic. Face: Facial nerve is intact. The patient has an erythematous raised lesion around the right mandibular angle. It appears that there is been some drainage already. Right Ear: Normal external ear, normal external auditory canal, intact tympanic membrane with normal mobility and aerated middle ear  Left Ear: Normal external ear, normal external auditory canal, intact tympanic membrane with normal mobility and aerated middle ear  Mouth/Oral Cavity:  normal lips, Uvula is midline, no mucosal lesions, no trismus, normal dentition, normal salivary quality/flow  Oropharynx/Larynx:  normal oropharynx, normal tonsils; normal larynx/nasopharynx on mirror exam  Nose:Normal external nasal appearance. Anterior rhinoscopy shows a lung septum. Normal turbinates. Normal mucosa   NECK: Normal range of motion, no thyromegaly, trachea is midline, no lymphadenopathy, no neck masses, no crepitus  CHEST: Normal respiratory effort, no retractions, breathing comfortably  SKIN: No rashes, normal appearing skin, no evidence of skin lesions/tumors  Neuro:  cranial nerve II-XII intact; normal gait  Cardio:  no edema            PROCEDURE  Incision and drainage right facial abscess  Informed consent obtained  1 mL 1% lidocaine with epinephrine was injected in the skin around the lesion. An 11 blade was used to make a small incision. Very thick white debris was evacuated. some of this was sent for culture. Small amount of iodoform packing was placed. ASSESSMENT/PLAN  1. Facial abscess  Open today and clinic. I think that this is likely an epidermal inclusion cyst.  I would like for him to take out the packing himself on Sunday.   I will see him on Monday. I think that after a few weeks he will need to have excision of this lesion as he likely will have recurrence without it. I have started him on clindamycin. I have performed a head and neck physical exam personally or was physically present during the key or critical portions of the service. Medical Decision Making:   The following items were considered in medical decision making:  Independent review of images  Review / order clinical lab tests  Review / order radiology tests  Decision to obtain old records

## 2020-01-06 ENCOUNTER — OFFICE VISIT (OUTPATIENT)
Dept: ENT CLINIC | Age: 50
End: 2020-01-06

## 2020-01-06 VITALS
TEMPERATURE: 98.7 F | SYSTOLIC BLOOD PRESSURE: 137 MMHG | BODY MASS INDEX: 43.81 KG/M2 | WEIGHT: 315 LBS | DIASTOLIC BLOOD PRESSURE: 71 MMHG | HEART RATE: 64 BPM

## 2020-01-06 LAB
ANAEROBIC CULTURE: ABNORMAL
GRAM STAIN RESULT: ABNORMAL
ORGANISM: ABNORMAL
WOUND/ABSCESS: ABNORMAL

## 2020-01-06 PROCEDURE — 99024 POSTOP FOLLOW-UP VISIT: CPT | Performed by: OTOLARYNGOLOGY

## 2020-01-06 NOTE — PROGRESS NOTES
[facility]      Patient Name: Karen Simpson  Medical Record Number:  [id number]  Primary Care Physician:  Maribel Rai, PAUL - CNP  Date of Consultation: 1/6/2020          BRIEF HISTORY OF PRESENT ILLNESS  Rafael Castillo is a(n) 47 y.o. male who presents for follow-up of a right facial abscess. I performed incision and drainage of a right facial abscess on January 3. I started him on clindamycin antibiotics. He says that overall he thinks that it is less uncomfortable. He has a little bit of drainage that is mostly serosanguineous. Patient Active Problem List   Diagnosis    Diabetes mellitus ([address]    HTN (hypertension)    Other and unspecified hyperlipidemia    Depressive disorder, not elsewhere classified    Pharyngitis    Abnormal stress test    HLD (hyperlipidemia)    Angina pectoris ([address]    Unstable angina ([address]    Coronary artery disease involving native coronary artery of native heart with angina pectoris ([address]    Essential hypertension    Mixed hyperlipidemia    HCAP (healthcare-associated pneumonia)    Cellulitis    Fever    Cellulitis of chest wall    Type 2 diabetes mellitus with circulatory disorder ([address]    Pure hypercholesterolemia    Klebsiella infection    High triglycerides    Vitamin D deficiency    Leg swelling    Pain in both lower extremities    PVD (peripheral vascular disease) ([address]    Chronic venous htn w inflammation of bilateral low extrm    Degenerative disc disease, lumbar     Past Surgical History:   Procedure Laterality Date    ADENOIDECTOMY  1970    CORONARY ARTERY BYPASS GRAFT  3/8/16    [facility]abgx5 [facility])    ELBOW SURGERY      elbow reconstruction    KNEE SURGERY Left 1985    menicus tear    LUMBAR LAMINECTOMY  08/10/2017    L4-5 microhemilaminectomy; excision of cyst    MEDIASTINOSCOPY  1994    bronchoscopy prior to medistinal mass. 45 ml off lymph node, IV antibiotics for 6 months Admission medications    Medication Sig Start Date End Date Taking?  Authorizing Provider   clindamycin (CLEOCIN) 300 MG capsule Take 1 capsule by mouth 3 times daily for 10 days 1/3/20 1/13/20  Lisseth Welsh MD   Continuous Blood Gluc Sensor (FREESTYLE NATHALIE 14 DAY SENSOR) MISC 2 Units by Does not apply route as needed (Use one sensor for 14 days) 12/31/19   PAUL Mckinney CNP   TOUJEO SOLOSTAR 300 UNIT/ML SOPN INJECT 180 UNITS INTO THE SKIN NIGHTLY 11/5/19   PAUL Mckinney CNP   insulin lispro (HUMALOG KWIKPEN) 200 UNIT/ML SOPN pen INJECT UP TO 60 UNITS INTO THE SKIN THREE TIMES A DAY BEFORE MEALS PER SLIDING SCALE 10/15/19   PAUL Mckinney CNP   metoprolol succinate (TOPROL XL) 50 MG extended release tablet TAKE 1 TABLET BY MOUTH ONE TIME A DAY 9/6/19   Merlin Emmer, APRN - CNP   metFORMIN (GLUCOPHAGE) 1000 MG tablet Take 1 tablet by mouth 2 times daily (with meals) 8/20/19   PAUL Mckinney CNP   naproxen (NAPROSYN) 500 MG tablet Take 1 tablet by mouth 2 times daily as needed for Pain 7/4/19 7/14/19  Shannan Darnell PA-C   Continuous Blood Gluc  (FREESTYLE NATHALIE 14 DAY READER) TAMERA 1 Units by Does not apply route as needed (as needed) 7/1/19   PAUL Mckinney CNP   TRUEPLUS PEN NEEDLES 32G X 4 MM MISC USE FOUR TIMES A DAY 5/24/19   PAUL Mckinney CNP   rosuvastatin (CRESTOR) 40 MG tablet Take 1 tablet by mouth daily 4/18/19   Brii Nieves MD   clomiPHENE (CLOMID) 50 MG tablet Take 1 tablet by mouth daily 1/10/19   Merlin Emmer, APRN - CNP   Blood Glucose Monitoring Suppl (PRODIGY AUTOCODE BLOOD GLUCOSE) w/Device KIT Use as directed to check blood glucose 4 times daily 12/28/18   PAUL Mckinney CNPY LANCETS 28G MISC Use to test blood glucose 4 times daily as directed 12/28/18   PAUL Mckinney CNP   blood glucose test strips (PRODIGY NO CODING BLOOD GLUC) strip Use to test blood glucose 4 times daily as directed 12/28/18   Sen Aguirre, APRN - CNP ergocalciferol (DRISDOL) 65207 units capsule Take 2 caps PO weekly 11/2/18   Rubia Shell, APRN - CNP   Icosapent Ethyl (VASCEPA) 1 g CAPS capsule Take 2 capsules by mouth 2 times daily  Patient taking differently: Take 2 capsules by mouth daily  10/25/18   PAUL Grant CNP   insulin glargine (TOUJEO SOLOSTAR) 300 UNIT/ML injection pen Inject 180 Units into the skin nightly 10/16/18   PAUL Grant CNP   furosemide (LASIX) 20 MG tablet Take 1 tablet by mouth daily as needed (swelling) 9/14/18   PAUL Root CNP   aspirin 325 MG tablet Take 325 mg by mouth daily    Historical Provider, MD   lisinopril (PRINIVIL;ZESTRIL) 20 MG tablet Take 20 mg by mouth 2 times daily    Historical Provider, MD   fenofibrate (TRICOR) 145 MG tablet Take 1 tablet by mouth daily 5/23/17   PAUL Dobbins CNP   b complex vitamins capsule Take 1 capsule by mouth daily    Historical Provider, MD   Lancets [facility]  5/2/11   Juan José Massey MD       REVIEW OF SYSTEMS  The following systems were reviewed and revealed the following in addition to any already discussed in the HPI:    CONSTITUTIONAL: no weight loss, no fever, no night sweats, no chills  EYES: no vision changes, no blurry vision  EARS: no changes in hearing, no otalgia  NOSE: no epistaxis, no rhinorrhea  RESPIRATORY: no  Difficulty breathing, no shortness of breath  CV: no chest pain, no Peripheral vascular disease  HEME: No coagulation disorder, no Bleeding disorder  NEURO: no TIA or stroke-like symptoms    Face: Facial abscess  SKIN: No new rashes in the head and neck, no recent skin cancers  MOUTH: No new ulcers, no recent teeth infections  GASTROINTESTINAL: No diarrhea, stomach pain  PSYCH: No anxiety, no depression      PHYSICAL EXAM  /71 (Site: Left Upper Arm, Position: Sitting, Cuff Size: Medium Adult)   Pulse 64   Temp 98.7 °F (37.1 °C) (Oral)   Wt (!) 323 lb (146.5 kg)   BMI 43.81 kg/m²     GENERAL: No Acute Distress, Alert and Oriented, no Hoarseness, strong voice  EYES: EOMI, Anti-icteric  HENT:   Head: Normocephalic and atraumatic. Face: I removed the iodoform packing from the incision. There is minimal drainage. There is still some inflammation around the incision itself without any evidence of abscess. Right Ear: Normal external ear, normal external auditory canal, intact tympanic membrane with normal mobility and aerated middle ear  Left Ear: Normal external ear, normal external auditory canal, intact tympanic membrane with normal mobility and aerated middle ear  Mouth/Oral Cavity:  normal lips, Uvula is midline, no mucosal lesions, no trismus, normal dentition, normal salivary quality/flow  Oropharynx/Larynx:  normal oropharynx, normal tonsils; normal larynx/nasopharynx on mirror exam  Nose:Normal external nasal appearance. Anterior rhinoscopy shows a normal septum. Normal turbinates. Normal mucosa   NECK: Normal range of motion, no thyromegaly, trachea is midline, no lymphadenopathy, no neck masses, no crepitus  CHEST: Normal respiratory effort, no retractions, breathing comfortably  SKIN: No rashes, normal appearing skin, no evidence of skin lesions/tumors  Neuro:  cranial nerve II-XII intact; normal gait  Cardio:  no edema          ASSESSMENT/PLAN  1. Facial abscess  Significant provement in the right facial abscess and cellulitis. This was MRSA, but was sensitive to clindamycin. He should finish the course of clindamycin. I would like to see him in about 6 weeks to see what this looks like. I feel like there is likely something like an epidermal inclusion cyst that will need to be removed after this infection has resolved. I have performed a head and neck physical exam personally or was physically present during the key or critical portions of the service. Medical Decision Making:   The following items were considered in medical decision making:  Independent review of images  Review / order clinical lab tests  Review / order radiology tests  Decision to obtain old records

## 2020-01-08 ENCOUNTER — TELEPHONE (OUTPATIENT)
Dept: ENT CLINIC | Age: 50
End: 2020-01-08

## 2020-01-08 NOTE — LETTER
[facility]  [address] [address]  Phone: [phone]  Fax: [phone] [place], [place]        January 8, 2020     Patient: Ninfa Cabrera   YOB: 1970   Date of Visit: 1/8/2020       To Whom it May Concern:    Ninfa Cabrera was seen in my clinic on 1/8/2020. He may return to work 01/06/2020. If you have any questions or concerns, please don't hesitate to call.     Sincerely,         Haven Box MD

## 2020-01-15 ENCOUNTER — TELEPHONE (OUTPATIENT)
Dept: PHARMACY | Facility: CLINIC | Age: 50
End: 2020-01-15

## 2020-01-15 NOTE — TELEPHONE ENCOUNTER
Called patient and scheduled pharmacist appointment to discuss medications for Diabetes Management Program.         Shala Ireland CPhT.   Pharmacy [facility]  Department, toll free: [phone], option 7

## 2020-01-16 ENCOUNTER — SCHEDULED TELEPHONE ENCOUNTER (OUTPATIENT)
Dept: PHARMACY | Facility: CLINIC | Age: 50
End: 2020-01-16

## 2020-01-16 RX ORDER — ACETAMINOPHEN 160 MG
8 TABLET,DISINTEGRATING ORAL
COMMUNITY

## 2020-01-16 NOTE — TELEPHONE ENCOUNTER
[address] [place] [address] Employee Diabetes Program  =================================================================  Karen Simpson is a 47 y.o. male enrolled in the [address] [place] [address] Employee Diabetes Program. Patient provided writer with verbal consent to remain in the program for this year. Medications:  Medication Sig Comments    Cholecalciferol (VITAMIN D3) 50 MCG (2000 UT) CAPS Take 8 capsules by mouth three times a week Updated per patient. Was prescribed 51714 units per week, but believes it is cheaper to take 8 tablets three times weekly of OTC strength    Continuous Blood Gluc Sensor (FREESTYLE NATHALIE 14 DAY SENSOR) MISC 2 Units by Does not apply route as needed (Use one sensor for 14 days) Taking as prescribed        TOUJEO SOLOSTAR 300 UNIT/ML SOPN INJECT 180 UNITS INTO THE SKIN NIGHTLY Taking as prescribed    Removed duplicate  Patient requests 90ds refill    insulin lispro (HUMALOG KWIKPEN) 200 UNIT/ML SOPN pen INJECT UP TO 60 UNITS INTO THE SKIN THREE TIMES A DAY BEFORE MEALS PER SLIDING SCALE Taking as prescribed        metoprolol succinate (TOPROL XL) 50 MG extended release tablet TAKE 1 TABLET BY MOUTH ONE TIME A DAY Taking as prescribed        metFORMIN (GLUCOPHAGE) 1000 MG tablet Take 1 tablet by mouth 2 times daily (with meals) Taking as prescribed    Patient requests refills    Continuous Blood Gluc  (FREESTYLE NATHALIE 14 DAY READER) TAMERA 1 Units by Does not apply route as needed (as needed) Taking as prescribed        TRUEPLUS PEN NEEDLES 32G X 4 MM MISC USE FOUR TIMES A DAY Taking as prescribed        rosuvastatin (CRESTOR) 40 MG tablet Take 1 tablet by mouth daily Taking as prescribed    Patient reports he had a stockpile. Is currently taking 40 mg daily. Would like refill.      clomiPHENE (CLOMID) 50 MG tablet Take 1 tablet by mouth daily Taking as prescribed        furosemide (LASIX) 20 MG tablet Take 1 tablet by mouth daily as needed (swelling) Taking Male      Is Non- : No      Diabetic: Yes      Tobacco smoker: No      Systolic Blood Pressure: 842 mmHg      Is BP treated: Yes      HDL Cholesterol: 26 mg/dL      Total Cholesterol: 150 mg/dL     Lab Results   Component Value Date    CREATININE 1.1 12/12/2019     CrCl cannot be calculated (Unknown ideal weight. ). eGFR: >60 mL/min/1.73 m^2    Immunizations:  Immunization History   Administered Date(s) Administered    Influenza Virus Vaccine 10/12/2017, 10/01/2018, 10/08/2018, 10/04/2019    Pneumococcal Polysaccharide ([id number]) 12/10/2018      Social History:  Social History     Tobacco Use    Smoking status: Never Smoker    Smokeless tobacco: Never Used   Substance Use Topics    Alcohol use: Yes     Comment: rare     ASSESSMENT:  Initial Program Requirements (Y indicates has completed for the year, N indicates needs to be completed by 7/1/20): No - OV with provider for DM (1st) - endo OV scheduled for 1/23/20  No - A1c (1st)     Ongoing Program Requirements (Y indicates has completed for the year, N indicates needs to be completed by 12/31/20): No - OV with provider for DM (2nd)  No - ACC/diabetes educator visit (if A1c over 8%) - patient worked with Alexander Vargas, [address] in 2019  No - A1c (2nd)  No - Lipid panel  No - Urine microalbumin  Yes - Pneumococcal vaccination: [id number] received 2018  No - Influenza vaccination for Fall 2020  Yes - Medication adherence over 70% - patient is on insulin  Yes - On statin or contraindication(s) Intolerance: must have had trial of at least 2 different statin medications and Rosuvastatin (LF 8/21/19 for 90ds). Yes - On ACEi/ARB or contraindication(s) Normal blood pressure, urinary albumin-to-creatinine ratio, and eGFR and Lisinopril (LF 4/9/19 for 90ds)     Formulary Medication Review:  Non-formulary or medications with cost-effective alternatives: N/A.      Current medications eligible for copay waiver, up to $600, through [employer]  43.

## 2020-01-17 RX ORDER — ICOSAPENT ETHYL 1000 MG/1
2 CAPSULE ORAL 2 TIMES DAILY
Qty: 360 CAPSULE | Refills: 1 | Status: SHIPPED | OUTPATIENT
Start: 2020-01-17

## 2020-01-22 RX ORDER — ROSUVASTATIN CALCIUM 40 MG/1
40 TABLET, COATED ORAL DAILY
Qty: 90 TABLET | Refills: 1 | Status: SHIPPED | OUTPATIENT
Start: 2020-01-22

## 2020-01-23 ENCOUNTER — OFFICE VISIT (OUTPATIENT)
Dept: ENDOCRINOLOGY | Age: 50
End: 2020-01-23
Payer: COMMERCIAL

## 2020-01-23 VITALS
HEART RATE: 62 BPM | SYSTOLIC BLOOD PRESSURE: 138 MMHG | BODY MASS INDEX: 44.1 KG/M2 | WEIGHT: 315 LBS | OXYGEN SATURATION: 97 % | RESPIRATION RATE: 18 BRPM | HEIGHT: 71 IN | DIASTOLIC BLOOD PRESSURE: 76 MMHG

## 2020-01-23 LAB — HBA1C MFR BLD: 9 %

## 2020-01-23 PROCEDURE — 99214 OFFICE O/P EST MOD 30 MIN: CPT | Performed by: NURSE PRACTITIONER

## 2020-01-23 PROCEDURE — 83036 HEMOGLOBIN GLYCOSYLATED A1C: CPT | Performed by: NURSE PRACTITIONER

## 2020-01-23 ASSESSMENT — ENCOUNTER SYMPTOMS
EYE PAIN: 0
COLOR CHANGE: 0
DIARRHEA: 0
CONSTIPATION: 0
SHORTNESS OF BREATH: 0

## 2020-01-23 NOTE — ASSESSMENT & PLAN NOTE
Lab Results   Component Value Date    VITD25 22.3 (L) 10/17/2019   Continue to supplement  Recheck in 3 months

## 2020-01-23 NOTE — PROGRESS NOTES
loss/gain  Current Exercise: None  Smoker: No  ETOH: None  ACE/ARB:Lisinopril 20 mg QD  Statins: cannot tolerate. Chest pain and myalgias     Risk factors:  Smoker: no   Alcohol intake: none  CAD: significant    Vitamin D deficiency: Currently is on 100K U weekly. Current complaints include fatigue. Lab Results   Component Value Date    VITD25 22.3 10/17/2019    VITD25 15.5 10/30/2017    VITD25 15.8 05/24/2017     Hyperlipidemia: Currently is on Feno fibrate and Vascepa. Current control is fine. Has stopped taking crestor because of persistent chest pain with it. Will review with cardiology. Lab Results   Component Value Date    CHOL 150 10/17/2019    CHOL 263 05/30/2019    CHOL 236 09/03/2018     Lab Results   Component Value Date    TRIG 499 10/17/2019    TRIG 3,229 05/30/2019    TRIG 1,353 09/03/2018     Lab Results   Component Value Date    HDL 26 10/17/2019    HDL 11 05/30/2019    HDL 19 09/03/2018    HDL 34 04/28/2011     Lab Results   Component Value Date    LDLCALC see below 10/17/2019    LDLCALC see below 05/30/2019    LDLCALC see below 09/03/2018     Lab Results   Component Value Date    LDLDIRECT 68 10/17/2019    LDLDIRECT 20 05/30/2019    LDLDIRECT 65 09/03/2018     No results found for: CHOLHDLRATIO    Past Medical History:   Diagnosis Date    Abscess 1994    mediastinal, developed after kenalog injections    Anesthesia     AWAKE BUT UNABLE TO MOVE DURING SHOULDER SURGERY.  CAD (coronary artery disease) 3/2016    Diabetes mellitus ([address] 2001    HgA1C 10.6    Gout     HLD (hyperlipidemia)     HTN (hypertension)     Echo 2013 normal.     MRSA (methicillin resistant staph aureus) culture positive 04/19/2017    Obesity     Pancreatitis 2006    high TG or byetta. flank ecchymosis. 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 work related    Type 2 diabetes mellitus without complication ([address]     Wound abscess 1994    groin. after kenalog injections. drained.  iv are noted. Skin is normal. Signs of onychomycosis are not noted on the skin. Calluses are not noted. Ingrown toenails are not noted. Toenails are normal. Pulses are +1 DP and +2 PT bilaterally. Capillary refill is <3 seconds. Skeletal structures are intact upon visual examination. No deformity is noted. Sensory: 10 g monofilament is 8/10 on the right and 8/10 on the left, 128 Hz vibration sense is present bilaterally. Assessment/Plan  Problem List Items Addressed This Visit     Diabetes mellitus ([facility] - Primary     A1c; 9%  Goal < 7%    Continue to titrate Arturo Hogan: start at 1/2 tab: discussed side effects  Continue other meds  Reviewed diet and diet changes         Relevant Medications    Ertugliflozin L-PyroglutamicAc (STEGLATRO) 15 MG TABS    Other Relevant Orders    POCT glycosylated hemoglobin (Hb A1C)    Essential hypertension     Blood pressure controlled. Continue current regimen         Mixed hyperlipidemia    High triglycerides     Lower than previous  Still high @ 449         Vitamin D deficiency     Lab Results   Component Value Date    VITD25 22.3 (L) 10/17/2019   Continue to supplement  Recheck in 3 months           Greater than 30 minutes spent directly counseling patient about topics listed above (such as lifestyle modifications, preventative screenings and/or disease related processes). Return in about 6 months (around 7/23/2020).

## 2020-01-23 NOTE — PATIENT INSTRUCTIONS
A1c; 9%  Goal < 7%    Continue to titrate Arturo Hogan: start at 1/2 tab: discussed side effects  Continue other meds

## 2020-02-07 RX ORDER — METOPROLOL SUCCINATE 50 MG/1
TABLET, EXTENDED RELEASE ORAL
Qty: 90 TABLET | Refills: 0 | Status: SHIPPED | OUTPATIENT
Start: 2020-02-07 | End: 2020-07-20 | Stop reason: SDUPTHER

## 2020-02-10 NOTE — TELEPHONE ENCOUNTER
[facility]  =============================================  Wandy Beltran is a 47 y.o. male enrolled in the [facility] Diabetes Program.      Identified care gap: A1c > 9%    Diabetes Program Medications:  Medication Sig    Ertugliflozin L-PyroglutamicAc (STEGLATRO) 15 MG TABS Take 1 tablet by mouth daily  - see 1/23/2020 endo visit - was to start with 1/2 tab  - Rx was printed  - no fill history noted in [facility] - per claims appears filled at [facility] (copay listed as $85.97 - unable to see if he was able to use coupon?)     rosuvastatin (CRESTOR) 40 MG tablet Take 1 tablet by mouth daily  - last filled 1/27/2020 x 90 ds (previous fill 8/21/2019 x 90 ds)    lisinopril (PRINIVIL;ZESTRIL) 20 MG tablet Take 1 tablet by mouth 2 times daily  - last filled 1/23/2020 (previous fill 4/9/2019 x 90 ds)    metFORMIN (GLUCOPHAGE) 1000 MG tablet TAKE 1 TABLET BY MOUTH 2 TIMES A DAY WITH MEALS  - refill history: 4/9/19 x 30 ds, 8/21/19 x 90 ds, 1/23/2020 x 90 ds    Continuous Blood Gluc Sensor (FREESTYLE NATHALIE 14 DAY SENSOR) MISC 2 Units by Does not apply route as needed (Use one sensor for 14 days)  **not covered by DM program**  - is not filling through pharmacy benefit? No Rx noted in [employer]    TOUJEO SOLOSTAR 300 UNIT/ML SOPN INJECT 180 UNITS INTO THE SKIN NIGHTLY    insulin lispro (HUMALOG KWIKPEN) 200 UNIT/ML SOPN pen INJECT UP TO 60 UNITS INTO THE SKIN THREE TIMES A DAY BEFORE MEALS PER SLIDING SCALE    Continuous Blood Gluc  (FREESTYLE NATHALIE 14 DAY READER) TAMERA 1 Units by Does not apply route as needed (as needed)  **not covered by DM program**    TRUEPLUS PEN NEEDLES 32G X 4 MM MISC USE FOUR TIMES A DAY    aspirin 325 MG tablet Take 325 mg by mouth daily     Allergies:   Allergies   Allergen Reactions    Penicillins Anaphylaxis        Labs:  Lab Results   Component Value Date    LABA1C 9.0 01/23/2020    LABA1C 8.7 10/17/2019    LABA1C 8.9 06/20/2019 Estimated Creatinine Clearance: 116 mL/min (based on SCr of 1.1 mg/dL). Component      Latest Ref Rng & Units 12/12/2019 10/17/2019 3/1/2019           8:18 AM  7:15 AM  9:27 AM   GFR Non-      >60 >60 >60 >60     Care Team:   - Physician (PCP/Endocrinologist): Marixa KOCH (Last OV: 1/23/2020; Next OV: 6/18/2020)    Diabetes Care:  - Glycemic Goal: <7.0%. Is not at blood glucose goal. Current regimen Toujeo 180 units, Humalog U200 SSI TID AC, metformin 1000 mg BID, Steglatro 7.5 mg (15 mg x 1/2) daily. Steglatro recently started @1/23/2020; pt has been on SGLT2 in the past - \"did not notice drop in blood sugars. \" and worried about using SGLT2s because of acute injury to kidney in the past. Pancreatitis on Byetta, Januvia no effect. Using Freestyle Shilo to monitor blood sugars, appears he is getting through medical benefit or through outside pharmacy? Per a previous discussion pt had been interested in Dexcom CGM; new for 2020 this is covered under DM program if he is interested, although will use up program money quickly. · Medication Adherence Assessment: monitor rosuvastatin, lisinopril, metformin adherence  · Pt can get Steglatro covered under DM program - will need Rx at [facility]      Plan:  Attempt made to reach patient to review. Left message asking patient to return call. Will attempt again as appropriate. Would like to review:  · Steglatro use? Would he like Rx sent to [facility]?  · SMBGs/monitoring system - confirm to make sure he does not want Freestyle Shilo from [facility]?  Can discuss Dexcom coverage as well if desired    Delia Knapp, PharmD, [facility] Pharmacist  Dept: [phone] (toll free [phone], option 7)

## 2020-02-18 NOTE — TELEPHONE ENCOUNTER
copays of all DM program medications).  Pt expressed understanding and stated he would think about, will f/u wit endo if needed

## 2020-02-20 RX ORDER — PEN NEEDLE, DIABETIC 32GX 5/32"
NEEDLE, DISPOSABLE MISCELLANEOUS
Qty: 120 EACH | Refills: 5 | Status: SHIPPED | OUTPATIENT
Start: 2020-02-20

## 2020-02-21 RX ORDER — FLASH GLUCOSE SENSOR
KIT MISCELLANEOUS
Qty: 6 EACH | Refills: 3 | Status: SHIPPED | OUTPATIENT
Start: 2020-02-21 | End: 2021-01-15

## 2020-02-21 NOTE — TELEPHONE ENCOUNTER
Noted Rxs signed by provider - thank you!     For Pharmacy Admin Tracking Only    PHSO: Yes  Total # of Interventions Recommended: 1  - Refills Provided #: 1  Recommended intervention potential cost savings: 1  Total Interventions Accepted: 1  Time Spent (min): [address] [facility], PharmD  [address]

## 2020-02-24 NOTE — TELEPHONE ENCOUNTER
Per NAHEED Medina and [facility] sensors currently in process. Appears Rx for Victoria sensors had been denied at [facility] due to one fill rule. Did confirm Malissa brownpon is entered and processing for $0 copay (however currently only able to navdeep for 30 days due to copay limitations). Called to patient to discuss - pt stated he did have issues getting Freestyle Shilo sensors but that he got override and got 1 month of sensors. Agreeable for $130 copay on Freestyle Shilo sensors now through home delivery. Also advised Malissa in process for 30 day supply, $0 copay. Pt appreciated f/u. No further outreach at this time, pt aware to call back if any further questions/issues.

## 2020-04-07 ENCOUNTER — TELEPHONE (OUTPATIENT)
Dept: PHARMACY | Facility: CLINIC | Age: 50
End: 2020-04-07

## 2020-04-07 NOTE — TELEPHONE ENCOUNTER
[facility]  =============================================  Ladarius Romano is a 47 y.o. male enrolled in the [facility] Diabetes Program.      Identified care gap: A1c > 8%    DM Program Prescriptions:  Medication Sig    Ertugliflozin L-PyroglutamicAc (STEGLATRO) 15 MG TABS Take 1 tablet by mouth daily  - last filled 3/17/2020 x 30 ds    Continuous Blood Gluc Sensor (FREESTYLE NATHALIE 14 DAY SENSOR) MISC Use 1 sensor every 14 days to monitor blood sugars  **not covered by DM program**  - last filled 2/21/2020 at [facility] - $130  - noted denied claim at [facility] on 3/18/2020 (dispensed too soon)    TRUEPLUS PEN NEEDLES 32G X 4 MM MISC USE FOUR TIMES A DAY  - last filled 2/21/2020 x 400 needles    rosuvastatin (CRESTOR) 40 MG tablet Take 1 tablet by mouth daily  - last filled 1/23/20 x 90 ds    lisinopril (PRINIVIL;ZESTRIL) 20 MG tablet Take 1 tablet by mouth 2 times daily  - last filled 1/21/20 x 90 ds    metFORMIN (GLUCOPHAGE) 1000 MG tablet TAKE 1 TABLET BY MOUTH 2 TIMES A DAY WITH MEALS  - last filled 1/21/20 x 90 ds    TOUJEO SOLOSTAR 300 UNIT/ML SOPN INJECT 180 UNITS INTO THE SKIN NIGHTLY  - last filled 2/20/2020 x 45 mL (75 ds)    insulin lispro (HUMALOG KWIKPEN) 200 UNIT/ML SOPN pen INJECT UP TO 60 UNITS INTO THE SKIN THREE TIMES A DAY BEFORE MEALS PER SLIDING SCALE  - last filled 2/20/2020 x 78 mL (86 ds)    aspirin 325 MG tablet Take 325 mg by mouth daily  - does not have Rx     Allergies: Allergies   Allergen Reactions    Penicillins Anaphylaxis        Labs:  Lab Results   Component Value Date    LABA1C 9.0 01/23/2020    LABA1C 8.7 10/17/2019    LABA1C 8.9 06/20/2019     Estimated Creatinine Clearance: 116 mL/min (based on SCr of 1.1 mg/dL). Care Team:   - Physician (PCP/Endocrinologist): Dr. Ge Blood (Last OV: 1/23/2020; Next OV: 6/18/2020)    Diabetes Care:  - Glycemic Goal: <7.0%.  Is not at blood glucose goal. Current regimen Toujeo 180 units, Humalog U200 SSI TID AC, metformin 1000 mg BID, Steglatro 15 mg daily. Steglatro recently started @1/23/2020; pt has been on SGLT2 in the past - \"did not notice drop in blood sugars. \" and worried about using SGLT2s because of acute injury to kidney in the past however per last discussion he was doing very well. Pancreatitis on Byetta, Januvia no effect. Using Freestyle Shilo to monitor blood sugars, switched to getting from [facility]. Have discussed Dexcom G6 coverage with him as well. Have previously outreached to provider regarding change to Jewels Patterson - see 3/8/2019 OV, pt to discuss at [address] Reached patient to review. 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 He has been watching blood sugars closely though. Is about due for refill of Steglatro. States he has been using up wife's lisinopril 5 mg tabs (reports taking his dose as prescribed). Says he is currently OK on rosuvastatin and metformin. Will assist with refill of Steglatro at [facility] - 90 day supply if possible. Encouraged patient to continue monitoring sugars closely and follow up as advised, sooner if needed via telehealth options.     Xochilt Fry, PharmD, [place] Pharmacist  Dept: [phone] (toll free [phone], option 7)     For Pharmacy Admin Tracking Only    PHSO: Yes  Recommended intervention potential cost savings: 1  Time Spent (min): 15

## 2020-04-24 ENCOUNTER — TELEPHONE (OUTPATIENT)
Dept: PRIMARY CARE CLINIC | Age: 50
End: 2020-04-24

## 2020-04-24 ENCOUNTER — OFFICE VISIT (OUTPATIENT)
Dept: PRIMARY CARE CLINIC | Age: 50
End: 2020-04-24
Payer: COMMERCIAL

## 2020-04-24 VITALS — TEMPERATURE: 98.7 F | HEART RATE: 69 BPM | OXYGEN SATURATION: 98 %

## 2020-04-24 PROCEDURE — 99212 OFFICE O/P EST SF 10 MIN: CPT | Performed by: FAMILY MEDICINE

## 2020-04-24 NOTE — TELEPHONE ENCOUNTER
I wont be doing virtual visits until Wednesday, pt probably needs fu sooner than that. Can he make a VV w/ a different provider Monday or Tuesday? If he would like to wait till wed he can but probably best to fu sooner.

## 2020-04-24 NOTE — PATIENT INSTRUCTIONS
Advance Care Planning  People with COVID-19 may have no symptoms, mild symptoms, such as fever, cough, and shortness of breath or they may have more severe illness, developing severe and fatal pneumonia. As a result, Advance Care Planning with attention to naming a health care decision maker (someone you trust to make healthcare decisions for you if you could not speak for yourself) and sharing other health care preferences is important BEFORE a possible health crisis. Please contact your Primary Care Provider to discuss Advance Care Planning. Preventing the Spread of Coronavirus Disease 2019 in Homes and Residential Communities  For the most recent information go to [email or web address]    Prevention steps for People with confirmed or suspected COVID-19 (including persons under investigation) who do not need to be hospitalized  and   People with confirmed COVID-19 who were hospitalized and determined to be medically stable to go home    Your healthcare provider and public health staff will evaluate whether you can be cared for at home. If it is determined that you do not need to be hospitalized and can be isolated at home, you will be monitored by staff from your local or state health department. You should follow the prevention steps below until a healthcare provider or local or state health department says you can return to your normal activities. Stay home except to get medical care  People who are mildly ill with COVID-19 are able to isolate at home during their illness. You should restrict activities outside your home, except for getting medical care. Do not go to work, school, or public areas. Avoid using public transportation, ride-sharing, or taxis. Separate yourself from other people and animals in your home  People: As much as possible, you should stay in a specific room and away from other people in your home.  Also, you should use a separate bathroom, if available. Animals: You should restrict contact with pets and other animals while you are sick with COVID-19, just like you would around other people. Although there have not been reports of pets or other animals becoming sick with COVID-19, it is still recommended that people sick with COVID-19 limit contact with animals until more information is known about the virus. When possible, have another member of your household care for your animals while you are sick. If you are sick with COVID-19, avoid contact with your pet, including petting, snuggling, being kissed or licked, and sharing food. If you must care for your pet or be around animals while you are sick, wash your hands before and after you interact with pets and wear a facemask. Call ahead before visiting your doctor  If you have a medical appointment, call the healthcare provider and tell them that you have or may have COVID-19. This will help the healthcare providers office take steps to keep other people from getting infected or exposed. Wear a facemask  You should wear a facemask when you are around other people (e.g., sharing a room or vehicle) or pets and before you enter a healthcare providers office. If you are not able to wear a facemask (for example, because it causes trouble breathing), then people who live with you should not stay in the same room with you, or they should wear a facemask if they enter your room. Cover your coughs and sneezes  Cover your mouth and nose with a tissue when you cough or sneeze. Throw used tissues in a lined trash can. Immediately wash your hands with soap and water for at least 20 seconds or, if soap and water are not available, clean your hands with an alcohol-based hand  that contains at least 60% alcohol.   Clean your hands often  Wash your hands often with soap and water for at least 20 seconds, especially after blowing your nose, coughing, or sneezing; going to the bathroom; and

## 2020-04-26 LAB — SARS-COV-2, PCR: NOT DETECTED

## 2020-04-27 ENCOUNTER — NURSE TRIAGE (OUTPATIENT)
Dept: OTHER | Facility: CLINIC | Age: 50
End: 2020-04-27

## 2020-04-27 NOTE — TELEPHONE ENCOUNTER
Reason for Disposition   General information question, no triage required and triager able to answer question    Protocols used: INFORMATION ONLY CALL-ADULT-    Employee calling to discuss ill symptoms in regards to working during the [facility] emergency.     Number given, then transferred to [address] [facility] [phone]

## 2020-05-29 ENCOUNTER — HOSPITAL ENCOUNTER (OUTPATIENT)
Dept: GENERAL RADIOLOGY | Age: 50
Discharge: HOME | End: 2020-05-29
Payer: COMMERCIAL

## 2020-05-29 ENCOUNTER — HOSPITAL ENCOUNTER (OUTPATIENT)
Age: 50
Discharge: HOME | End: 2020-05-29
Payer: COMMERCIAL

## 2020-05-29 PROCEDURE — 73110 X-RAY EXAM OF WRIST: CPT

## 2020-05-29 PROCEDURE — 72110 X-RAY EXAM L-2 SPINE 4/>VWS: CPT

## 2020-05-29 PROCEDURE — 72050 X-RAY EXAM NECK SPINE 4/5VWS: CPT

## 2020-06-19 ENCOUNTER — OFFICE VISIT (OUTPATIENT)
Dept: ORTHOPEDIC SURGERY | Age: 50
End: 2020-06-19
Payer: COMMERCIAL

## 2020-06-19 VITALS — TEMPERATURE: 97.9 F | RESPIRATION RATE: 16 BRPM | WEIGHT: 310 LBS | BODY MASS INDEX: 43.4 KG/M2 | HEIGHT: 71 IN

## 2020-06-19 PROCEDURE — 99243 OFF/OP CNSLTJ NEW/EST LOW 30: CPT | Performed by: ORTHOPAEDIC SURGERY

## 2020-06-19 NOTE — LETTER
[address] [facility] - HAND SURGERY  Surgery Scheduling Form:      DEMOGRAPHICS:                                                                                                              .    Patient Name:  Zackary Frederick  Patient :  1970   Patient SS#:  [id number]    Patient Phone:  [phone] (home)     Patient Address:  [place] [id number]    PCP:  PAUL Bashir CNP  Insurance:  Payor/Plan Subscr  Sex Relation Sub. Ins. ID Effective Group Num   1. 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       DIAGNOSIS & PROCEDURE:                                                                                            .  Diagnosis:   left DeQuervain's Tendonitis  (727.04)   M65.4  Operation:  left 1st Dorsal Compartment (DeQuervain's) Release  [CPT: 77042]  Location:  [facility]  Surgeon:  Scott Nelson    SCHEDULING INFORMATION:                                                                                         .    Surgeon's Scheduling Instruction:  elective    RN Post-op Appt:  [x] Yes   [] No  Preferred Thursday:   [] Yes   [] No    Requested Date:    OR Time:  11:30 Patient Arrival Time:  10:00  OR Time Required:  15  Minutes  Anesthesia:  MAC/TIVA  Equipment:  None  Mini C-Arm:  No   Standard C-Arm:  No  Status:  outpatient  PAT Required: Yes                        COVID   7-1  Comments:                      Timo Parisi MD  20     BILLING INFORMATION:                                                                                                    .    Procedure:       CPT Code Modifier  left 1st Dorsal Compartment (DeQuervain's) Release      .  61006          Pre Operative Physician Prophylaxis Orders - SCIP Protocols
to work as you are scheduled to, Dr. Danielle Mack will not provide an 'excuse' to explain your absence. A doctors note, or official forms (BWC, FMLA, etc.) will be filled out, upon request, to indicate your date of surgery and your restrictions as stated above. Dr. Danielle Mack' Narcotic Policy  Patients will only be prescribed narcotics after surgical procedures or significant injury. Not all procedures cause pain great enough to require Narcotics and thus, not all patients will receive prescriptions after surgical procedures or injuries. Narcotics are never prescribed for chronic conditions. Narcotics are never prescribed for use longer than one week at a time. Refills are only granted in unusual circumstances and only at Dr. Bel Corona discretion. Patients who are receiving narcotic medication from another physician or who are under pain management contracts will not be given a prescription for narcotics for any reason. I have read the above policies and understand that by agreeing to proceed with treatment by Dr. Marcela Goodman and his team, that I am agreeing to abide by these policies.     Patient Name:  Rivas Dobbs    Patient Signature:  _____________________________    Benjamin Plants Date:   6/19/20

## 2020-06-19 NOTE — PROGRESS NOTES
Mr. Kody Etienne is a 47 y.o. right handed man  who is seen today in Hand Surgical Consultation at the request of PAUL Butts CNP. He presents today regarding left wrist symptoms which have been present for approximately 3 months. A history of antecedent trauma or injury is Absent. He reports symptoms to include moderate pain along the  left  Radial wrist and distal forearm. Wrist symptoms are worse with certain twisting or gripping activities. Symptoms are Frequently worse with use. He reports moderate pain with thumb extension or pinch. Symptoms are worsening over time. Previous treatment has included no prior treatments used. He does not claim relation of his symptoms to his required work activities. He has not undergone any form of testing. The patient's , past medical history, medications, allergies,  family history, social history, and review of systems have been updated, reviewed and have been scanned into the chart today. Physical Exam:  Mr. Jorge Godwin most recent vitals:  Vitals  Temp: 97.9 °F (36.6 °C)  Resp: 16  Height: 5' 11\" (180.3 cm)  Weight: (!) 310 lb (140.6 kg)    He is well nourished, oriented to person, place & time. He demonstrates appropriate mood and affect as well as normal gait and station. Skin: Normal in appearance, Normal Color and Free of Lesions Bilaterally   Digital range of motion is without significant limitation bilaterally. Pain accompanies the flexion and extension of the left Thumb. There is not triggering associated with active thumb motion. There is a palpable mass overlying the site of maximal tenderness left radial wrist.  Wrist range of motion is limited by pain and is accompanied by moderate pain in dorsiflexion greater than palmar flexion. There is no evidence of gross joint instability bilaterally.   Sensation is normal in the Median, Ulnar and Radial Sensory distribution bilaterally  Vascular examination reveals normal and good capillary refill bilaterally   Swelling is moderate along the radial margin of the wrist on the Left, normal on the Right  Muscular strength is clinically appropriate bilaterally. Examination of the left first dorsal extensor compartment of the wrist demonstrates moderate thickening and fullness. There is moderate tenderness to palpation over the extensor tendons. There is moderate pain with resisted thumb extension, and Finklestein's maneuver is positive left side. Wrist range of motion is accompanied by moderate pain in dorsiflexion greater than palmar flexion. Impression:  Mr. Prashant Brandon is showing clinical evidence of DeQuervain's Tendonitis and presents requesting further treatment. Plan:      I have had a thorough discussion with Mr. Prashant Brandon regarding the treatment options available for his new onset  DeQuervain's Tenosynovitis, which is causing him significant  limitations. I have outlined for Mr. Prashant Brandon the benefits and consequences of the various treatment modalities, including the fact that surgical treatment is the only modality which is reasonably expected to provide long lasting or permanent resolution of his symptoms. Based upon our current discussion and a reasonable understating of the options available to him, Mr. Prashant Brandon has selected to proceed with surgical DeQuervain's Tendonitis release. I have discussed the details of the surgical procedure, the pre, pete and postoperative concerns and the appropriate expectations after surgery with Mr. Prashant Brandon today. He was given the opportunity to ask questions, voiced an understanding of the procedure, and he did wish to proceed with Left 1st Dorsal Extensor Compartment tendon release. I had an extensive discussion with Mr. Prashant Brandon (and any family members present with him today) regarding the natural history, etiology, and long term consequences of this problem. We have mutually selected a surgical treatment plan  and, in my opinion, surgical intervention is indicated at this time. I have discussed with him the potential complications, limitations, expectations, alternatives, and risks of DeQuervain's Tendonitis release. He has had full opportunity to ask his questions. I have answered them all to his satisfaction. I feel that Mr. Rivas Dobbs (and any family members present with him today) do understand our discussion today and he has provided informed consent for Left 1st Dorsal Extensor Compartment tendon release. I have also discussed with Mr. Rivas Dobbs the other treatment options available to him for this condition. We have today selected to proceed with Surgical treatment. He has voiced and  understanding that there are other less aggressive treatment options which are available in this situation, albeit possibly less efficacious or durable, and he is comfortable with the plan that he has chosen. I have explained to Mr. Rivas Dobbs that despite successful treatment (surgical or otherwise) of his current presenting condition, that due to his coexistent conditions (both diagnosed and undiagnosed), that he is likely to have some permanent residual symptoms related to these conditions that do not improve long term. I have also explained that maximal recovery of function & symptom improvement may take a full year or longer to realize. He voiced a clear understanding of this. We have discussed the specific limitations and risks of hospital and/or office based treatment at this time due to the COVID-19 pandemic      Mr. Rivas Dobbs has been given a full verbal list of instructions and precautions related to his present condition. I have asked him to followup with me in the office at the prescribed time.  He is also specifically requested to call or return to the office sooner if his symptoms change or worsen prior to the next scheduled appointment.

## 2020-06-22 ENCOUNTER — TELEPHONE (OUTPATIENT)
Dept: ORTHOPEDIC SURGERY | Age: 50
End: 2020-06-22

## 2020-06-22 NOTE — TELEPHONE ENCOUNTER
Medication:   Requested Prescriptions     Pending Prescriptions Disp Refills    TOUJEO SOLOSTAR 300 UNIT/ML SOPN [Pharmacy Med Name: Xavier Cabrera 300UNIT/ML (4.5ML=1BOX)] 54 mL 1     Sig: INJECT 180 UNITS INTO THE SKIN NIGHTLY         Last appt: 1/23/2020   Next appt: Visit date not found    Last OARRS: No flowsheet data found.

## 2020-06-23 RX ORDER — INSULIN GLARGINE 300 U/ML
INJECTION, SOLUTION SUBCUTANEOUS
Qty: 54 ML | Refills: 1 | Status: SHIPPED | OUTPATIENT
Start: 2020-06-23

## 2020-06-25 ENCOUNTER — OFFICE VISIT (OUTPATIENT)
Dept: CARDIOLOGY CLINIC | Age: 50
End: 2020-06-25
Payer: COMMERCIAL

## 2020-06-25 VITALS
HEART RATE: 63 BPM | TEMPERATURE: 97.8 F | OXYGEN SATURATION: 98 % | WEIGHT: 312.4 LBS | HEIGHT: 71 IN | BODY MASS INDEX: 43.73 KG/M2 | SYSTOLIC BLOOD PRESSURE: 102 MMHG | DIASTOLIC BLOOD PRESSURE: 68 MMHG

## 2020-06-25 PROCEDURE — 99214 OFFICE O/P EST MOD 30 MIN: CPT | Performed by: INTERNAL MEDICINE

## 2020-06-25 PROCEDURE — 93000 ELECTROCARDIOGRAM COMPLETE: CPT | Performed by: INTERNAL MEDICINE

## 2020-06-25 NOTE — PROGRESS NOTES
MOUTH 2 TIMES A DAY WITH MEALS 180 tablet 0    Icosapent Ethyl (VASCEPA) 1 g CAPS capsule Take 2 capsules by mouth 2 times daily 360 capsule 1    Cholecalciferol (VITAMIN D3) 50 MCG ( UT) CAPS Take 8 capsules by mouth three times a week      insulin lispro (HUMALOG KWIKPEN) 200 UNIT/ML SOPN pen INJECT UP TO 60 UNITS INTO THE SKIN THREE TIMES A DAY BEFORE MEALS PER SLIDING SCALE 108 mL 3    Continuous Blood Gluc  (FREESTYLE NATHALIE 14 DAY READER) TAMERA 1 Units by Does not apply route as needed (as needed) 1 Device 0    furosemide (LASIX) 20 MG tablet Take 1 tablet by mouth daily as needed (swelling) 30 tablet 2    aspirin 325 MG tablet Take 325 mg by mouth daily      fenofibrate (TRICOR) 145 MG tablet Take 1 tablet by mouth daily 90 tablet 1    b complex vitamins capsule Take 1 capsule by mouth daily       EC20 NSR      ECHO:16  Slightly dilated LV with normal systolic function; EF 91% Mild mitral regurgitation is present. The left atrium is dilated. RV is enlarged with reduced systolic function. There is mild tricuspid regurgitation with RVSP estimated at 35 mmHg. Right atrial size is mildly dilated . Mild pulmonic regurgitation present.      CABG X 4/ Sternal Stabilization on 3/8/16  TOMASA, endoscopic radial artery harvest, CABGX4 (LIMA-LAD, left radial off the LIMA sequentially to OM1-OM2-PDA) sternal stabilization. Corornary angiogram  & Intervention: 3/4/16 at [facility]  3 vessel coronary artery disease    Stress test 3/19/18  Normal myocardial perfusion study. Normal LV size and systolic function with septal hypokinesis possibly from   reported CABG. Overall findings represent a low risk study. ECHO 3/19/18  Mild concentric LVH with normal systolic function. EF is 60%  Trivial mitral & tricuspid regurgitation. Left atrium is of normal size. Normal right ventricular size and function.     Assessment/Plan:    Erectile dysfunction  Could be from medications, but also could be

## 2020-06-25 NOTE — PATIENT INSTRUCTIONS
Patient Education        DASH Diet: Care Instructions  Your Care Instructions     The DASH diet is an eating plan that can help lower your blood pressure. DASH stands for Dietary Approaches to Stop Hypertension. Hypertension is high blood pressure. The DASH diet focuses on eating foods that are high in calcium, potassium, and magnesium. These nutrients can lower blood pressure. The foods that are highest in these nutrients are fruits, vegetables, low-fat dairy products, nuts, seeds, and legumes. But taking calcium, potassium, and magnesium supplements instead of eating foods that are high in those nutrients does not have the same effect. The DASH diet also includes whole grains, fish, and poultry. The DASH diet is one of several lifestyle changes your doctor may recommend to lower your high blood pressure. Your doctor may also want you to decrease the amount of sodium in your diet. Lowering sodium while following the DASH diet can lower blood pressure even further than just the DASH diet alone. Follow-up care is a key part of your treatment and safety. Be sure to make and go to all appointments, and call your doctor if you are having problems. It's also a good idea to know your test results and keep a list of the medicines you take. How can you care for yourself at home? Following the DASH diet  · Eat 4 to 5 servings of fruit each day. A serving is 1 medium-sized piece of fruit, ½ cup chopped or canned fruit, 1/4 cup dried fruit, or 4 ounces (½ cup) of fruit juice. Choose fruit more often than fruit juice. · Eat 4 to 5 servings of vegetables each day. A serving is 1 cup of lettuce or raw leafy vegetables, ½ cup of chopped or cooked vegetables, or 4 ounces (½ cup) of vegetable juice. Choose vegetables more often than vegetable juice. · Get 2 to 3 servings of low-fat and fat-free dairy each day. A serving is 8 ounces of milk, 1 cup of yogurt, or 1 ½ ounces of cheese. · Eat 6 to 8 servings of grains each day. meals using beans and peas. Add garbanzo or kidney beans to salads. Make burritos and tacos with mashed marroquin beans or black beans. Where can you learn more? Go to https://chtrenaeb.[email or web address]. org and sign in to your [employer] account. Enter I684 in the FX Aligned box to learn more about \"DASH Diet: Care Instructions. \"     If you do not have an account, please click on the \"Sign Up Now\" link. Current as of: December 16, 2019               Content Version: 12.5  © 8275-7871 [employer]wise, [employer]orporated. Care instructions adapted under license by [facility] ([facility]). If you have questions about a medical condition or this instruction, always ask your healthcare professional. [address] any warranty or liability for your use of this information.

## 2020-07-02 ENCOUNTER — TELEPHONE (OUTPATIENT)
Dept: CARDIOLOGY CLINIC | Age: 50
End: 2020-07-02

## 2020-07-02 ENCOUNTER — OFFICE VISIT (OUTPATIENT)
Dept: PRIMARY CARE CLINIC | Age: 50
End: 2020-07-02
Payer: COMMERCIAL

## 2020-07-02 PROCEDURE — 99211 OFF/OP EST MAY X REQ PHY/QHP: CPT | Performed by: NURSE PRACTITIONER

## 2020-07-02 NOTE — TELEPHONE ENCOUNTER
Returned call to patient [phone]. Patient is a nurse at [employer] and his having same symptoms now prior to his last CABG. He would like another angiogram prior to having his surgery. Last OV 6/25/20   CAD s/p CABG x 4, HTN, HLD, DM.

## 2020-07-02 NOTE — TELEPHONE ENCOUNTER
Spoke to Radha Martell and let him know of Dr Lily Cason recommendations. He is requesting that he have the cath soon since he is getting Covid tested today.

## 2020-07-02 NOTE — TELEPHONE ENCOUNTER
Michael Way called in this morning wanting to talk to Dr. Sirena Rocha or his RN. He states he is having some concerns regarding his upcoming surgeries and would like to discuss them with Dr. Sirena Rocha or his RN.       You can reach Michael Way at #[phone]

## 2020-07-02 NOTE — PATIENT INSTRUCTIONS

## 2020-07-02 NOTE — TELEPHONE ENCOUNTER
Kalia Baker and he says that after he eats he his having chest discomfort that radiates to left bicep to elbow. He is more concerned about having a cath before his back surgery on 7/22. He states that he will only have twilight sedation for his upcoming wrist surgery. He is requesting to have angiogram prior to his back surgery. Please advise on recommendations in Dr Paul Mcleod absence.

## 2020-07-02 NOTE — PROGRESS NOTES
Preoperative Screening for Elective Surgery/Invasive Procedures While COVID-19 present in the community     Have you tested positive or have been told to self-isolate for COVID-19 like symptoms within the past 28 days? no   Do you currently have any of the following symptoms?no  o Fever >100.0 F or 99.9 F in immunocompromised patients? o New onset cough, shortness of breath or difficulty breathing?  o New onset sore throat, myalgia (muscle aches and pains), headache, loss of taste/smell or diarrhea?  Have you had a potential exposure to COVID-19 within the past 14 days by:  o Close contact with a confirmed case? no  o Close contact with a healthcare worker,  or essential infrastructure worker (grocery store, [employer], gas station, public utilities or transportation)? RN on COVID floor   o Do you reside in a congregate setting such as; skilled nursing facility, adult home, correctional facility, homeless shelter or other institutional setting? no  o Have you had recent travel to a known COVID-19 hotspot? No     Indicate if the patient has a positive screen by answering yes to one or more of the above questions. Patients who test positive or screen positive prior to surgery or on the day of surgery should be evaluated in conjunction with the surgeon/proceduralist/anesthesiologist to determine the urgency of the procedure.

## 2020-07-02 NOTE — PROGRESS NOTES
C-Difficile admission screening and protocol:     * Admitted with diarrhea? no     *Prior history of C-Diff. In last 3 months? no     *Antibiotic use in the past 6-8 weeks? no     *Prior hospitalization or nursing home in the last month? No      [address] time_1000 per pt___________        Surgery time____1130________    Take the following medications with a sip of water: Follow your MD/Surgeons pre-procedure instructions regarding your medications    Do not eat or drink anything after 12:00 midnight prior to your surgery. This includes water chewing gum, mints and ice chips. You may brush your teeth and gargle the morning of your surgery, but do not swallow the water     Please see your family doctor/pediatrician for a history and physical and/or concerning medications. Bring any test results/reports from your physicians office. If you are under the care of a heart doctor or specialist doctor, please be aware that you may be asked to them for clearance    You may be asked to stop blood thinners such as Coumadin, Plavix, Fragmin, Lovenox, etc., or any anti-inflammatories such as:  Aspirin, Ibuprofen, Advil, Naproxen prior to your surgery. We also ask that you stop any OTC medications such as fish oil, vitamin E, glucosamine, garlic, Multivitamins, COQ 10, etc.    We ask that you do not smoke 24 hours prior to surgery  We ask that you do not  drink any alcoholic beverages 24 hours prior to surgery     You must make arrangements for a responsible adult to take you home after your surgery. For your safety you will not be allowed to leave alone or drive yourself home. Your surgery will be cancelled if you do not have a ride home. Also for your safety, it is strongly suggested that someone stay with you the first 24 hours after your surgery.      A parent or legal guardian must accompany a child scheduled for surgery and plan to stay at the hospital until the child is discharged. Please do not bring other children with you. For your comfort, please wear simple loose fitting clothing to the hospital.  Please do not bring valuables. Do not wear any make-up or nail polish on your fingers or toes      For your safety, please do not wear any jewelry or body piercing's on the day of surgery. All jewelry must be removed. If you have dentures, they will be removed before going to operating room. For your convenience, we will provide you with a container. If you wear contact lenses or glasses, they will be removed, please bring a case for them. If you have a living will and a durable power of  for healthcare, please bring in a copy. As part of our patient safety program to minimize surgical site infections, we ask you to do the following:    · Please notify your surgeon if you develop any illness between         now and the  day of your surgery. · This includes a cough, cold, fever, sore throat, nausea,         or vomiting, and diarrhea, etc.  ·  Please notify your surgeon if you experience dizziness, shortness         of breath or blurred vision between now and the time of your surgery. Do not shave your operative site 96 hours prior to surgery. For face and neck surgery, men may use an electric razor 48 hours   prior to surgery. You may shower the night before surgery or the morning of   your surgery with an antibacterial soap. You will need to bring a photo ID and insurance card    [facility] has an onsite pharmacy, would you like to utilize our pharmacy     If you will be staying overnight and use a C-pap machine, please bring   your C-pap to hospital     Our goal is to provide you with excellent care, therefore, visitors will be limited to two(2) in the room at a time so that we may focus on providing this care for you. Please contact pre-admission testing if you have any further questions. [facility] phone number:  [phone] PAT fax number:  [phone]  Please note these are generalized instructions for all surgical cases, you may be provided with more specific instructions according to your surgery.

## 2020-07-02 NOTE — TELEPHONE ENCOUNTER
If he is having his typical angina it is reasonable to perform a left heart catheterization in my opinion. That being said, Dr. Janese Lesch may feel differently. From my perspective, he could be set up for an angiogram next week with someone.

## 2020-07-04 LAB
SARS-COV-2: NOT DETECTED
SOURCE: NORMAL

## 2020-07-06 ENCOUNTER — ANESTHESIA EVENT (OUTPATIENT)
Dept: OPERATING ROOM | Age: 50
End: 2020-07-06
Payer: COMMERCIAL

## 2020-07-07 ENCOUNTER — HOSPITAL ENCOUNTER (OUTPATIENT)
Age: 50
Setting detail: OUTPATIENT SURGERY
Discharge: HOME | End: 2020-07-07
Attending: ORTHOPAEDIC SURGERY | Admitting: ORTHOPAEDIC SURGERY
Payer: COMMERCIAL

## 2020-07-07 ENCOUNTER — ANESTHESIA (OUTPATIENT)
Dept: OPERATING ROOM | Age: 50
End: 2020-07-07
Payer: COMMERCIAL

## 2020-07-07 VITALS
HEIGHT: 71 IN | RESPIRATION RATE: 16 BRPM | WEIGHT: 308.09 LBS | TEMPERATURE: 98.7 F | HEART RATE: 70 BPM | SYSTOLIC BLOOD PRESSURE: 153 MMHG | BODY MASS INDEX: 43.13 KG/M2 | OXYGEN SATURATION: 98 % | DIASTOLIC BLOOD PRESSURE: 78 MMHG

## 2020-07-07 VITALS
SYSTOLIC BLOOD PRESSURE: 130 MMHG | DIASTOLIC BLOOD PRESSURE: 68 MMHG | RESPIRATION RATE: 24 BRPM | OXYGEN SATURATION: 94 %

## 2020-07-07 LAB
GLUCOSE BLD-MCNC: 125 MG/DL (ref 70–99)
GLUCOSE BLD-MCNC: 142 MG/DL (ref 70–99)
PERFORMED ON: ABNORMAL
PERFORMED ON: ABNORMAL

## 2020-07-07 PROCEDURE — 6360000002 HC RX W HCPCS: Performed by: NURSE ANESTHETIST, CERTIFIED REGISTERED

## 2020-07-07 PROCEDURE — 6370000000 HC RX 637 (ALT 250 FOR IP): Performed by: ANESTHESIOLOGY

## 2020-07-07 PROCEDURE — 2580000003 HC RX 258: Performed by: ANESTHESIOLOGY

## 2020-07-07 PROCEDURE — 7100000001 HC PACU RECOVERY - ADDTL 15 MIN: Performed by: ORTHOPAEDIC SURGERY

## 2020-07-07 PROCEDURE — 7100000011 HC PHASE II RECOVERY - ADDTL 15 MIN: Performed by: ORTHOPAEDIC SURGERY

## 2020-07-07 PROCEDURE — 3600000005 HC SURGERY LEVEL 5 BASE: Performed by: ORTHOPAEDIC SURGERY

## 2020-07-07 PROCEDURE — 2500000003 HC RX 250 WO HCPCS: Performed by: NURSE ANESTHETIST, CERTIFIED REGISTERED

## 2020-07-07 PROCEDURE — 3700000000 HC ANESTHESIA ATTENDED CARE: Performed by: ORTHOPAEDIC SURGERY

## 2020-07-07 PROCEDURE — 7100000000 HC PACU RECOVERY - FIRST 15 MIN: Performed by: ORTHOPAEDIC SURGERY

## 2020-07-07 PROCEDURE — 2500000003 HC RX 250 WO HCPCS: Performed by: ORTHOPAEDIC SURGERY

## 2020-07-07 PROCEDURE — 3700000001 HC ADD 15 MINUTES (ANESTHESIA): Performed by: ORTHOPAEDIC SURGERY

## 2020-07-07 PROCEDURE — 2709999900 HC NON-CHARGEABLE SUPPLY: Performed by: ORTHOPAEDIC SURGERY

## 2020-07-07 PROCEDURE — 7100000010 HC PHASE II RECOVERY - FIRST 15 MIN: Performed by: ORTHOPAEDIC SURGERY

## 2020-07-07 PROCEDURE — 3600000015 HC SURGERY LEVEL 5 ADDTL 15MIN: Performed by: ORTHOPAEDIC SURGERY

## 2020-07-07 RX ORDER — MIDAZOLAM HYDROCHLORIDE 1 MG/ML
INJECTION INTRAMUSCULAR; INTRAVENOUS PRN
Status: DISCONTINUED | OUTPATIENT
Start: 2020-07-07 | End: 2020-07-07 | Stop reason: SDUPTHER

## 2020-07-07 RX ORDER — FENTANYL CITRATE 50 UG/ML
25 INJECTION, SOLUTION INTRAMUSCULAR; INTRAVENOUS EVERY 5 MIN PRN
Status: DISCONTINUED | OUTPATIENT
Start: 2020-07-07 | End: 2020-07-07 | Stop reason: HOSPADM

## 2020-07-07 RX ORDER — PROPOFOL 10 MG/ML
INJECTION, EMULSION INTRAVENOUS CONTINUOUS PRN
Status: DISCONTINUED | OUTPATIENT
Start: 2020-07-07 | End: 2020-07-07 | Stop reason: SDUPTHER

## 2020-07-07 RX ORDER — SODIUM CHLORIDE 0.9 % (FLUSH) 0.9 %
10 SYRINGE (ML) INJECTION EVERY 12 HOURS SCHEDULED
Status: DISCONTINUED | OUTPATIENT
Start: 2020-07-07 | End: 2020-07-07 | Stop reason: HOSPADM

## 2020-07-07 RX ORDER — LIDOCAINE HYDROCHLORIDE 20 MG/ML
INJECTION, SOLUTION EPIDURAL; INFILTRATION; INTRACAUDAL; PERINEURAL PRN
Status: DISCONTINUED | OUTPATIENT
Start: 2020-07-07 | End: 2020-07-07 | Stop reason: SDUPTHER

## 2020-07-07 RX ORDER — SODIUM CHLORIDE 9 MG/ML
INJECTION, SOLUTION INTRAVENOUS CONTINUOUS
Status: DISCONTINUED | OUTPATIENT
Start: 2020-07-07 | End: 2020-07-07 | Stop reason: HOSPADM

## 2020-07-07 RX ORDER — OXYCODONE HYDROCHLORIDE AND ACETAMINOPHEN 5; 325 MG/1; MG/1
1 TABLET ORAL PRN
Status: COMPLETED | OUTPATIENT
Start: 2020-07-07 | End: 2020-07-07

## 2020-07-07 RX ORDER — PROPOFOL 10 MG/ML
INJECTION, EMULSION INTRAVENOUS PRN
Status: DISCONTINUED | OUTPATIENT
Start: 2020-07-07 | End: 2020-07-07 | Stop reason: SDUPTHER

## 2020-07-07 RX ORDER — ONDANSETRON 2 MG/ML
4 INJECTION INTRAMUSCULAR; INTRAVENOUS
Status: DISCONTINUED | OUTPATIENT
Start: 2020-07-07 | End: 2020-07-07 | Stop reason: HOSPADM

## 2020-07-07 RX ORDER — OXYCODONE HYDROCHLORIDE AND ACETAMINOPHEN 5; 325 MG/1; MG/1
2 TABLET ORAL PRN
Status: COMPLETED | OUTPATIENT
Start: 2020-07-07 | End: 2020-07-07

## 2020-07-07 RX ORDER — SODIUM CHLORIDE 0.9 % (FLUSH) 0.9 %
10 SYRINGE (ML) INJECTION PRN
Status: DISCONTINUED | OUTPATIENT
Start: 2020-07-07 | End: 2020-07-07 | Stop reason: HOSPADM

## 2020-07-07 RX ADMIN — MIDAZOLAM 2 MG: 1 INJECTION INTRAMUSCULAR; INTRAVENOUS at 10:51

## 2020-07-07 RX ADMIN — LIDOCAINE HYDROCHLORIDE 50 MG: 20 INJECTION, SOLUTION EPIDURAL; INFILTRATION; INTRACAUDAL; PERINEURAL at 10:54

## 2020-07-07 RX ADMIN — PROPOFOL 150 MG: 10 INJECTION, EMULSION INTRAVENOUS at 10:54

## 2020-07-07 RX ADMIN — SODIUM CHLORIDE: 9 INJECTION, SOLUTION INTRAVENOUS at 10:51

## 2020-07-07 RX ADMIN — OXYCODONE HYDROCHLORIDE AND ACETAMINOPHEN 1 TABLET: 5; 325 TABLET ORAL at 12:38

## 2020-07-07 RX ADMIN — PROPOFOL 200 MCG/KG/MIN: 10 INJECTION, EMULSION INTRAVENOUS at 10:54

## 2020-07-07 ASSESSMENT — PAIN - FUNCTIONAL ASSESSMENT
PAIN_FUNCTIONAL_ASSESSMENT: ACTIVITIES ARE NOT PREVENTED
PAIN_FUNCTIONAL_ASSESSMENT: 0-10

## 2020-07-07 ASSESSMENT — PAIN DESCRIPTION - LOCATION
LOCATION: WRIST

## 2020-07-07 ASSESSMENT — PAIN DESCRIPTION - PROGRESSION
CLINICAL_PROGRESSION: NOT CHANGED
CLINICAL_PROGRESSION: NOT CHANGED

## 2020-07-07 ASSESSMENT — PULMONARY FUNCTION TESTS
PIF_VALUE: 0

## 2020-07-07 ASSESSMENT — PAIN DESCRIPTION - ORIENTATION
ORIENTATION: LEFT

## 2020-07-07 ASSESSMENT — PAIN SCALES - GENERAL
PAINLEVEL_OUTOF10: 6

## 2020-07-07 ASSESSMENT — PAIN DESCRIPTION - ONSET
ONSET: GRADUAL
ONSET: ON-GOING
ONSET: ON-GOING

## 2020-07-07 ASSESSMENT — PAIN DESCRIPTION - FREQUENCY
FREQUENCY: CONTINUOUS

## 2020-07-07 ASSESSMENT — PAIN DESCRIPTION - PAIN TYPE
TYPE: SURGICAL PAIN
TYPE: SURGICAL PAIN
TYPE: ACUTE PAIN

## 2020-07-07 ASSESSMENT — PAIN DESCRIPTION - DESCRIPTORS
DESCRIPTORS: ACHING;DULL
DESCRIPTORS: ACHING;DULL;THROBBING
DESCRIPTORS: ACHING;DULL;THROBBING
DESCRIPTORS: ACHING

## 2020-07-07 NOTE — H&P
Pre-operative Update of H&P:    I  have seen & examined Mr. Ivette Berrios related solely to his hand and upper extremity conditions, prior to the scheduled procedure on the date of his surgery. The indications for the planned surgical procedure & and his upper-extremity condition are unchanged.

## 2020-07-07 NOTE — OP NOTE
OPERATIVE REPORT          Patient:  Siomara Callaway    YOB: 1970  Date of Service:  7/7/2020    Location:  [facility]      Preoperative Diagnosis:   Left First dorsal extensor compartment (DeQuervain's) tenosynovitis. Postoperative Diagnosis:   Same    Procedure:    Left First dorsal extensor compartment tendon release. Surgeon:    Moiz Andrew. Bob Rebollar MD    Surgical Assistant:    SOPHIA Ricci Assistant    Anesthesia:  Local with sedation    Blood Loss:  Minimal.    Complications:  None. Tourniquet Time:  6 minutes. Indications:  Mr. Siomara Callaway  is a 45y.o. year old male with recalcitrant Left DeQuervain's tenosynovitis. He  has failed conservative treatment measures and at this point has requested surgical treatment. I have discussed preoperatively with him  the complications, limitations, expectations, alternatives and risks of the planned surgical care. All of his questions have been answered fully to his satisfaction. Mr. Siomara Callaway  has provided written informed consent to proceed. The patient was counseled at length about the risks of dulce Covid-19 during their perioperative period and any recovery window from their procedure. The patient was made aware that dulce Covid-19  may worsen their prognosis for recovering from their procedure  and lend to a higher morbidity and/or mortality risk. All material risks, benefits, and reasonable alternatives including postponing the procedure were discussed. The patient does wish to proceed with the procedure at this time. Procedure:  After written consent was obtained and the proper operative was identified and marked, Mr. Siomara Callaway was brought to the operating room and placed in the supine position on the operating table with the Left arm extended upon the hand table.   Under an appropriate level of sedation, local anesthetic was instilled in the planned surgical field.  The Left upper extremity was prepped and draped in the usual sterile fashion. After Esmarch exsanguination, the pneumatic tourniquet was inflated to 250 mmHg. A transverse incision was fashioned 1 cm proximal to the radial styloid over the palpable first dorsal compartment tendon sheath. Great care was taken in the subcutaneous dissection to avoid and protect the radial sensory nerve branches in the region. The soft tissue was cleared from the first dorsal compartment sheath. The sheath was incised longitudinally from it's distal to proximal extent and the tendons were visualized. Careful inspection did reveal a, accessory subcompartment for the extensor pollicis brevis tendon which did require separate release. With multiple slips of the abductor pollicis longus and the extensor pollicis brevis, all fully released, the tendons were gently partially withdrawn, inspected, and found to be free of visible abnormality. They were returned to their appropriate anatomic location. The wound was irrigated copiously with sterile for irrigation and the pneumo-tourniquet was deflated after a period of 6 minutes elevation. Hemostasis was obtained with direct pressure electrocautery, and the fingers were immediately pink and well perfused. The skin was closed with interrupted sutures. The wound was dressed with Adaptic, dry sterile dressings, and a bulky wrist based dressing was applied. Mr. Kb Jade was awakened from anesthesia without apparent complication and was returned to the recovery room in stable condition. At the conclusion of the procedure, needle, instrument and sponge counts were correct. Kenia Salas MD   7/7/2020 , 11:11 AM

## 2020-07-07 NOTE — ANESTHESIA PRE PROCEDURE
Toxicity, chemicals 2013    isopropol alcohol toxicity: SOB/anasarca. work related    Type 2 diabetes mellitus without complication ([facility] [address]     Wound abscess 1994    groin. after kenalog injections. drained. iv abx. Past Surgical History:   Procedure Laterality Date    ADENOIDECTOMY  1970    CORONARY ARTERY BYPASS GRAFT  3/8/16    cabgx5 [place])    ELBOW SURGERY Right     elbow reconstruction    ENDOSCOPY, COLON, DIAGNOSTIC      KNEE SURGERY Left 1985    menicus tear    LUMBAR LAMINECTOMY  08/10/2017    L4-5 microhemilaminectomy; excision of cyst    MEDIASTINOSCOPY  1994    bronchoscopy prior to medistinal mass. 45 ml off lymph node, IV antibiotics [facility] 6 [facility]    L rectus muscle flap, infected, kenalog injections    SHOULDER SURGERY Left 2002    Rotator cuff repair, [facility] at [address] [place] [address]     Social History     Tobacco Use    Smoking status: Never Smoker    Smokeless tobacco: Never Used   Substance Use Topics    Alcohol use: Yes     Comment: rare    Drug use: No     Medications  No current facility-administered medications on file prior to encounter. Current Outpatient Medications on File Prior to Encounter   Medication Sig Dispense Refill    Phentermine-Topiramate (QSYMIA) 3.75-23 MG CP24 Take by mouth.       TESTOSTERONE PROPIONATE IM Inject into the muscle Every 10 days      TOUJEO SOLOSTAR 300 UNIT/ML SOPN INJECT 180 UNITS INTO THE SKIN NIGHTLY 54 mL 1    Ertugliflozin L-PyroglutamicAc (STEGLATRO) 15 MG TABS Take 1 tablet by mouth daily 90 tablet 3    metoprolol succinate (TOPROL XL) 50 MG extended release tablet TAKE 1 TABLET BY MOUTH ONE TIME A DAY 90 tablet 0    rosuvastatin (CRESTOR) 40 MG tablet Take 1 tablet by mouth daily 90 tablet 1    lisinopril (PRINIVIL;ZESTRIL) 20 MG tablet Take 1 tablet by mouth 2 times daily 180 tablet 1    metFORMIN (GLUCOPHAGE) 1000 MG tablet TAKE 1 TABLET BY MOUTH 2 TIMES A DAY WITH MEALS 180 tablet 0    Icosapent Ethyl (VASCEPA) 1 g CAPS capsule Take 2 capsules by mouth 2 times daily 360 capsule 1    Cholecalciferol (VITAMIN D3) 50 MCG (2000 UT) CAPS Take 8 capsules by mouth three times a week      insulin lispro (HUMALOG KWIKPEN) 200 UNIT/ML SOPN pen INJECT UP TO 60 UNITS INTO THE SKIN THREE TIMES A DAY BEFORE MEALS PER SLIDING SCALE 108 mL 3    furosemide (LASIX) 20 MG tablet Take 1 tablet by mouth daily as needed (swelling) 30 tablet 2    aspirin 325 MG tablet Take 325 mg by mouth daily      fenofibrate (TRICOR) 145 MG tablet Take 1 tablet by mouth daily 90 tablet 1    b complex vitamins capsule Take 1 capsule by mouth daily      Continuous Blood Gluc Sensor (FREESTYLE NATHALIE 14 DAY SENSOR) MISC Use 1 sensor every 14 days to monitor blood sugars 6 each 3    TRUEPLUS PEN NEEDLES 32G X 4 MM MISC USE FOUR TIMES A  each 5    Continuous Blood Gluc  (FREESTYLE NATHALIE 14 DAY READER) TAMERA 1 Units by Does not apply route as needed (as needed) 1 Device 0     Current Facility-Administered Medications   Medication Dose Route Frequency Provider Last Rate Last Dose    0.9 % sodium chloride infusion   Intravenous Continuous Dmitriy Mcneil MD        sodium chloride flush 0.9 % injection 10 mL  10 mL Intravenous 2 times per day Dmitriy Mcneil MD        sodium chloride flush 0.9 % injection 10 mL  10 mL Intravenous PRN Dmitriy Mcneil MD         Vital Signs (Current)   Vitals:    07/02/20 1249   Weight: (!) 312 lb (141.5 kg)   Height: 5' 11\" (1.803 m)                                          BP Readings from Last 3 Encounters:   06/29/20 136/82   06/25/20 102/68   01/23/20 138/76     Vital Signs Statistics (for past 48 hrs)     No data recorded  BP Readings from Last 3 Encounters:   06/29/20 136/82   06/25/20 102/68   01/23/20 138/76       BMI  Body mass index is 43.52 kg/m². Estimated body mass index is 43.52 kg/m² as calculated from the following:    Height as of this encounter: 5' 11\" (1.803 m).     Weight as of this encounter: 312 lb (141.5 kg). CBC   Lab Results   Component Value Date    WBC 5.4 06/30/2020    RBC 5.67 06/30/2020    HGB 15.5 06/30/2020    HCT 47.3 06/30/2020    MCV 83.3 06/30/2020    RDW 15.0 06/30/2020     06/30/2020     CMP    Lab Results   Component Value Date     06/30/2020    K 4.7 06/30/2020     06/30/2020    CO2 23 06/30/2020    BUN 19 06/30/2020    CREATININE 1.3 06/30/2020    GFRAA >60 06/30/2020    GFRAA >60 04/28/2011    AGRATIO 1.5 12/12/2019    LABGLOM 58 06/30/2020    GLUCOSE 155 06/30/2020    PROT 7.0 12/12/2019    PROT 8.1 04/28/2011    CALCIUM 9.3 06/30/2020    BILITOT <0.2 12/12/2019    ALKPHOS 71 12/12/2019    AST 28 12/12/2019    ALT 24 12/12/2019     BMP    Lab Results   Component Value Date     06/30/2020    K 4.7 06/30/2020     06/30/2020    CO2 23 06/30/2020    BUN 19 06/30/2020    CREATININE 1.3 06/30/2020    CALCIUM 9.3 06/30/2020    GFRAA >60 06/30/2020    GFRAA >60 04/28/2011    LABGLOM 58 06/30/2020    GLUCOSE 155 06/30/2020     POCGlucose  No results for input(s): GLUCOSE in the last 72 hours.    Coags    Lab Results   Component Value Date    PROTIME 12.5 06/30/2020    INR 1.08 06/30/2020    APTT 31.5 02/08/1943     HCG (If Applicable) No results found for: Simon Spauldingn, HCG, HCGQUANT   ABGs   Lab Results   Component Value Date    PHART 7.334 03/08/2016    PO2ART 95 03/08/2016    ZJD1CWM 47 03/08/2016    CKH2BOE 25.3 03/08/2016    BEART -1 03/08/2016    L8RJFVNF 97 03/08/2016      Type & Screen (If Applicable)  No results found for: LABABO, LABRH                         BMI: Wt Readings from Last 3 Encounters:       NPO Status:                          Anesthesia Evaluation  Patient summary reviewed  Airway: Mallampati: III        Dental:          Pulmonary:   (+) pneumonia:                             Cardiovascular:    (+) hypertension:, angina:, CAD:, hyperlipidemia              Stress test reviewed                Neuro/Psych:   (+) neuromuscular disease:, psychiatric history:            GI/Hepatic/Renal:   (+) morbid obesity     (-) no renal disease      ROS comment: Pancreatitis . Endo/Other:    (+) DiabetesType II DM, , .    (-) hypothyroidism, hyperthyroidism               Abdominal:   (+) obese,         Vascular:   + PVD, aortic or cerebral, . Anesthesia Plan      MAC     ASA 3       Induction: intravenous. MIPS: Prophylactic antiemetics administered. Anesthetic plan and risks discussed with patient. Plan discussed with CRNA. Attending anesthesiologist reviewed and agrees with Pre Eval content              This pre-anesthesia assessment may be used as a history and physical.    DOS STAFF ADDENDUM:    Pt seen and examined, chart reviewed (including anesthesia, drug and allergy history). No interval changes to history and physical examination. Anesthetic plan, risks, benefits, alternatives, and personnel involved discussed with patient. Patient verbalized an understanding and agrees to proceed.       Shahla Sr MD  July 7, 2020  10:31 AM

## 2020-07-07 NOTE — ANESTHESIA POSTPROCEDURE EVALUATION
Department of Anesthesiology  Postprocedure Note    Patient: Rikki Dobson  MRN: [id number]  YOB: 1970  Date of evaluation: 7/7/2020  Time:  1:12 PM     Procedure Summary     Date:  07/07/20 Room / Location:  [address]    Anesthesia Start:  3270 Anesthesia Stop:  1113    Procedure:  LEFT FIRST DORSAL COMPARTMENT (DEQUERVAIN'S) RELEASE (Left ) Diagnosis:  (LEFT DEQUERVAIN'S TENDONITIS)    Surgeon:  Niranjan Gleason MD Responsible Provider:  Laurence Oshea MD    Anesthesia Type:  MAC ASA Status:  3          Anesthesia Type: MAC    Te Phase I: Te Score: 10    Te Phase II: Te Score: 10    Last vitals: Reviewed and per EMR flowsheets.        Anesthesia Post Evaluation    Patient location during evaluation: bedside  Patient participation: complete - patient participated  Level of consciousness: awake  Pain score: 1  Airway patency: patent  Nausea & Vomiting: no nausea and no vomiting  Complications: no  Cardiovascular status: blood pressure returned to baseline  Respiratory status: acceptable  Hydration status: euvolemic

## 2020-07-08 ENCOUNTER — HOSPITAL ENCOUNTER (OUTPATIENT)
Dept: CARDIAC CATH/INVASIVE PROCEDURES | Age: 50
Discharge: HOME | End: 2020-07-08
Payer: COMMERCIAL

## 2020-07-08 VITALS
RESPIRATION RATE: 16 BRPM | TEMPERATURE: 98.2 F | DIASTOLIC BLOOD PRESSURE: 89 MMHG | SYSTOLIC BLOOD PRESSURE: 145 MMHG | OXYGEN SATURATION: 99 % | HEART RATE: 73 BPM

## 2020-07-08 PROCEDURE — 99152 MOD SED SAME PHYS/QHP 5/>YRS: CPT | Performed by: INTERNAL MEDICINE

## 2020-07-08 PROCEDURE — C1894 INTRO/SHEATH, NON-LASER: HCPCS

## 2020-07-08 PROCEDURE — 99220 PR INITIAL OBSERVATION CARE/DAY 70 MINUTES: CPT | Performed by: INTERNAL MEDICINE

## 2020-07-08 PROCEDURE — 99152 MOD SED SAME PHYS/QHP 5/>YRS: CPT

## 2020-07-08 PROCEDURE — 6360000004 HC RX CONTRAST MEDICATION: Performed by: INTERNAL MEDICINE

## 2020-07-08 PROCEDURE — 93459 L HRT ART/GRFT ANGIO: CPT

## 2020-07-08 PROCEDURE — 2500000003 HC RX 250 WO HCPCS

## 2020-07-08 PROCEDURE — C1769 GUIDE WIRE: HCPCS

## 2020-07-08 PROCEDURE — 99153 MOD SED SAME PHYS/QHP EA: CPT

## 2020-07-08 PROCEDURE — 6370000000 HC RX 637 (ALT 250 FOR IP)

## 2020-07-08 PROCEDURE — 93459 L HRT ART/GRFT ANGIO: CPT | Performed by: INTERNAL MEDICINE

## 2020-07-08 PROCEDURE — 2580000003 HC RX 258

## 2020-07-08 PROCEDURE — C1760 CLOSURE DEV, VASC: HCPCS

## 2020-07-08 PROCEDURE — 6360000002 HC RX W HCPCS

## 2020-07-08 PROCEDURE — 2709999900 HC NON-CHARGEABLE SUPPLY

## 2020-07-08 RX ORDER — SODIUM CHLORIDE 9 MG/ML
INJECTION, SOLUTION INTRAVENOUS CONTINUOUS
Status: DISCONTINUED | OUTPATIENT
Start: 2020-07-08 | End: 2020-07-09 | Stop reason: HOSPADM

## 2020-07-08 RX ORDER — FENTANYL CITRATE 50 UG/ML
25 INJECTION, SOLUTION INTRAMUSCULAR; INTRAVENOUS
Status: ACTIVE | OUTPATIENT
Start: 2020-07-08 | End: 2020-07-08

## 2020-07-08 RX ORDER — ACETAMINOPHEN 325 MG/1
650 TABLET ORAL EVERY 4 HOURS PRN
Status: DISCONTINUED | OUTPATIENT
Start: 2020-07-08 | End: 2020-07-09 | Stop reason: HOSPADM

## 2020-07-08 RX ORDER — ATROPINE SULFATE 0.4 MG/ML
0.5 AMPUL (ML) INJECTION
Status: ACTIVE | OUTPATIENT
Start: 2020-07-08 | End: 2020-07-08

## 2020-07-08 RX ORDER — SODIUM CHLORIDE 9 MG/ML
INJECTION, SOLUTION INTRAVENOUS CONTINUOUS
Status: DISCONTINUED | OUTPATIENT
Start: 2020-07-08 | End: 2020-07-08 | Stop reason: SDUPTHER

## 2020-07-08 RX ORDER — SODIUM CHLORIDE 0.9 % (FLUSH) 0.9 %
10 SYRINGE (ML) INJECTION EVERY 12 HOURS SCHEDULED
Status: DISCONTINUED | OUTPATIENT
Start: 2020-07-08 | End: 2020-07-09 | Stop reason: HOSPADM

## 2020-07-08 RX ORDER — FUROSEMIDE 20 MG/1
40 TABLET ORAL DAILY
Qty: 30 TABLET | Refills: 5 | Status: SHIPPED | OUTPATIENT
Start: 2020-07-08 | End: 2022-08-23

## 2020-07-08 RX ORDER — 0.9 % SODIUM CHLORIDE 0.9 %
500 INTRAVENOUS SOLUTION INTRAVENOUS PRN
Status: DISCONTINUED | OUTPATIENT
Start: 2020-07-08 | End: 2020-07-09 | Stop reason: HOSPADM

## 2020-07-08 RX ORDER — MORPHINE SULFATE 2 MG/ML
2 INJECTION, SOLUTION INTRAMUSCULAR; INTRAVENOUS
Status: ACTIVE | OUTPATIENT
Start: 2020-07-08 | End: 2020-07-08

## 2020-07-08 RX ORDER — SODIUM CHLORIDE 0.9 % (FLUSH) 0.9 %
10 SYRINGE (ML) INJECTION EVERY 12 HOURS SCHEDULED
Status: DISCONTINUED | OUTPATIENT
Start: 2020-07-08 | End: 2020-07-08 | Stop reason: SDUPTHER

## 2020-07-08 RX ORDER — MIDAZOLAM HYDROCHLORIDE 1 MG/ML
2 INJECTION INTRAMUSCULAR; INTRAVENOUS
Status: ACTIVE | OUTPATIENT
Start: 2020-07-08 | End: 2020-07-08

## 2020-07-08 RX ORDER — ONDANSETRON 2 MG/ML
4 INJECTION INTRAMUSCULAR; INTRAVENOUS EVERY 6 HOURS PRN
Status: DISCONTINUED | OUTPATIENT
Start: 2020-07-08 | End: 2020-07-09 | Stop reason: HOSPADM

## 2020-07-08 RX ORDER — SODIUM CHLORIDE 0.9 % (FLUSH) 0.9 %
10 SYRINGE (ML) INJECTION PRN
Status: DISCONTINUED | OUTPATIENT
Start: 2020-07-08 | End: 2020-07-09 | Stop reason: HOSPADM

## 2020-07-08 RX ORDER — SODIUM CHLORIDE 0.9 % (FLUSH) 0.9 %
10 SYRINGE (ML) INJECTION PRN
Status: DISCONTINUED | OUTPATIENT
Start: 2020-07-08 | End: 2020-07-08 | Stop reason: SDUPTHER

## 2020-07-08 RX ADMIN — IOPAMIDOL 100 ML: 755 INJECTION, SOLUTION INTRAVENOUS at 14:47

## 2020-07-08 NOTE — PROGRESS NOTES
Name_______________________________________Printed:____________________  Date and time of surgery___7/21 0715_____________________Arrival Time:___0545_____________   1. The instructions given regarding when and if a patient needs to stop oral intake prior to surgery varies. Follow the specific instructions you were given                  _x__Nothing to eat or to drink after Midnight the night before.                             ____Endoscopy patient follow your DRS instructions-generally you will be doing a part of the prep after Midnight                   ____Carbo loading or ERAS instructions will be given to select patients-if you have been given those instructions -please do the following                           The evening before your surgery after dinner before midnight drink 40 ounces of gatorade. If you are diabetic use sugar free. The morning of surgery drink 40 ounces of water. This needs to be finished 3 hours prior to your surgery start time. 2. Take the following pills with a small sip of water on the morning of surgery____metoprolol_______________________________________________                  Do not take blood pressure medications ending in pril or sartan the alexx prior to surgery or the morning of surgery_LISINOPRIL   3. Aspirin, Ibuprofen, Advil, Naproxen, Vitamin E and other Anti-inflammatory products and supplements should be stopped for 5 -7days before surgery or as directed by your physician. 4. Check with your Doctor regarding stopping Plavix, Coumadin,Eliquis, Lovenox,Effient,Pradaxa,Xarelto, Fragmin or other blood thinners and follow their instructions. 5. Do not smoke, and do not drink any alcoholic beverages 24 hours prior to surgery. This includes NA Beer. Refrain from the usage of any recreational drugs. 6. You may brush your teeth and gargle the morning of surgery. DO NOT SWALLOW WATER   7.  You MUST make arrangements for a responsible adult to stay on site while you are here and take you home after your surgery. You will not be allowed to leave alone or drive yourself home. It is strongly suggested someone stay with you the first 24 hrs. Your surgery will be cancelled if you do not have a ride home. 8. A parent/legal guardian must accompany a child scheduled for surgery and plan to stay at the hospital until the child is discharged. Please do not bring other children with you. 9. Please wear simple, loose fitting clothing to the hospital.  Estelle Sky not bring valuables (money, credit cards, checkbooks, etc.) Do not wear any makeup (including no eye makeup) or nail polish on your fingers or toes. 10. DO NOT wear any jewelry or piercings on day of surgery. All body piercing jewelry must be removed. 11. If you have ___dentures, they will be removed before going to the OR; we will provide you a container. If you wear ___contact lenses or ___glasses, they will be removed; please bring a case for them. 12. Please see your family doctor/pediatrician for a history & physical and/or concerning medications. Bring any test results/reports from your physician's office. PCP__________________Phone___________H&P Appt. Date________             13 If you  have a Living Will and Durable Power of  for Healthcare, please bring in a copy. 15. Notify your Surgeon if you develop any illness between now and surgery  time, cough, cold, fever, sore throat, nausea, vomiting, etc.  Please notify your surgeon if you experience dizziness, shortness of breath or blurred vision between now & the time of your surgery             15. DO NOT shave your operative site 96 hours prior to surgery. For face & neck surgery, men may use an electric razor 48 hours prior to surgery. 16. Shower the night before or morning of surgery using an antibacterial soap or as you have been instructed.              17. To provide excellent care visitors will be limited to

## 2020-07-08 NOTE — H&P
[facility]  Admission H & P    CC: Post prandial chest pain               Parul Lujan, APRN - CNP:         HPI:   This is a 45 y.o. male with history of three-vessel bypass in 2016 who has been having postprandial chest pain similar to 2 presentation he had prior to his bypass        Past Medical History:   Diagnosis Date    Abscess 1994    mediastinal, developed after kenalog injections    Anesthesia     AWAKE BUT UNABLE TO MOVE DURING SHOULDER SURGERY.  CAD (coronary artery disease) 3/2016    Diabetes mellitus ([facility] [address] 2001    HgA1C 10.6    Gout     HLD (hyperlipidemia)     HTN (hypertension)     Echo 2013 normal.     MDRO (multiple drug resistant organisms) resistance     MRSA (methicillin resistant staph aureus) culture positive 04/19/2017    Obesity     Pancreatitis 2006    high TG or byetta. flank ecchymosis.  PONV (postoperative nausea and vomiting)     Toxicity, chemicals 2013    isopropol alcohol toxicity: SOB/anasarca. work related    Type 2 diabetes mellitus without complication ([address]     Wound abscess 1994    groin. after kenalog injections. drained. iv abx. Past Surgical History:   Procedure Laterality Date    ADENOIDECTOMY  1970    CORONARY ARTERY BYPASS GRAFT  3/8/16    cabgx5 [place])    ELBOW SURGERY Right     elbow reconstruction    ENDOSCOPY, COLON, DIAGNOSTIC      KNEE SURGERY Left 1985    menicus tear    LUMBAR LAMINECTOMY  08/10/2017    L4-5 microhemilaminectomy; excision of cyst    MEDIASTINOSCOPY  1994    bronchoscopy prior to medistinal mass. 45 ml off lymph node, IV antibiotics for 6 months    PENIS SURGERY  1993    L rectus muscle flap, infected, kenalog injections    SHOULDER SURGERY Left 2002    Rotator cuff repair, [facility] at [address] Left 7/7/2020    LEFT FIRST DORSAL COMPARTMENT (DEQUERVAIN'S) RELEASE performed by Britany Shipley MD at [address] [place] [address] History   Problem Relation Age of Onset    High Blood non-tender, Normal bowel sounds,  No organomegaly  Extremities: No Cyanosis or Clubbing  Neurological: Oriented to time, place, and person, Non-anxious  Psychiatric: Normal mood and affect  Skin: Warm and dry,  No rash seen    Lab Results   Component Value Date    HDL 26 10/17/2019    HDL 34 04/28/2011    LDLDIRECT 68 10/17/2019    LDLCALC see below 10/17/2019    TRIG 499 10/17/2019       ECHO:   Mild concentric LVH with normal systolic function. EF is  60%   Trivial mitral & tricuspid regurgitation. Left atrium is of normal size. Normal right ventricular size and function. Coronary angiogram : 3/2016  LAD: Proximal 40-50%; mid LAD 70 to 80%. LCx: Proximal 80% stenosis. RCA: Occluded proximally with left-to-right collaterals. LVEF 60%      CABG:   CABGX4 (LIMA-LAD, left radial off the LIMA sequentially to OM1-OM2-PDA) sternal stabilization on 3/8/16.        ASSESSMENT AND PLAN:      65-year-old patient with diabetes presenting with chest pain similar to his pre-CABG presentation  Has postprandial chest pain  Will proceed with diagnostic angiography and possible PCI  Procedure and risks explained to the patient who understands and wishes to proceed        Oren Lynch M.D  7/8/2020

## 2020-07-08 NOTE — PRE SEDATION
Brief Pre-Op Note/Sedation Assessment      Ronny Bains  1970  Room/bed info not found      [id number]  1:42 PM    Planned Procedure: Cardiac Catheterization Procedure    Post Procedure Plan: Return to same level of care    Consent: I have discussed with the patient and/or the patient representative the indication, alternatives, and the possible risks and/or complications of the planned procedure and the anesthesia methods. The patient and/or patient representative appear to understand and agree to proceed. Chief Complaint: Chest Pain/Pressure      Indications for Cath Procedure: Worsening Angina  Anginal Classification within 2 weeks:  No symptoms  NYHA Heart Failure Class within 2 weeks: No symptoms  Is Cath Lab Visit Valve-related?: No  Surgical Risk: Low  Functional Type: >= 4 METS without symptoms      Stress or Imaging Studies Performed:  None    Vital Signs: There were no vitals taken for this visit. Allergies: Allergies   Allergen Reactions    Penicillins Anaphylaxis       Past Medical History:  Past Medical History:   Diagnosis Date    Abscess 1994    mediastinal, developed after kenalog injections    Anesthesia     AWAKE BUT UNABLE TO MOVE DURING SHOULDER SURGERY.  CAD (coronary artery disease) 3/2016    Diabetes mellitus ([address] 2001    HgA1C 10.6    Gout     HLD (hyperlipidemia)     HTN (hypertension)     Echo 2013 normal.     MDRO (multiple drug resistant organisms) resistance     MRSA (methicillin resistant staph aureus) culture positive 04/19/2017    Obesity     Pancreatitis 2006    high TG or byetta. flank ecchymosis.  PONV (postoperative nausea and vomiting)     Toxicity, chemicals 2013    isopropol alcohol toxicity: SOB/anasarca. work related    Type 2 diabetes mellitus without complication ([address]     Wound abscess 1994    groin. after kenalog injections. drained. iv abx.          Surgical History:  Past Surgical History:   Procedure Laterality Date    ADENOIDECTOMY  1970    CORONARY ARTERY BYPASS GRAFT  3/8/16    cabgx5 [facility])    ELBOW SURGERY Right     elbow reconstruction    ENDOSCOPY, COLON, DIAGNOSTIC      KNEE SURGERY Left 1985    menicus tear    LUMBAR LAMINECTOMY  08/10/2017    L4-5 microhemilaminectomy; excision of cyst    MEDIASTINOSCOPY  1994    bronchoscopy prior to medistinal mass. 45 ml off lymph node, IV antibiotics for 6 months    PENIS SURGERY  1993    L rectus muscle flap, infected, kenalog injections    SHOULDER SURGERY Left 2002    Rotator cuff repair, [facility] at [address] Left 7/7/2020    LEFT FIRST DORSAL COMPARTMENT ([facility]QUERVAIN'S) RELEASE [facility]         Medications:  Current Outpatient Medications   Medication Sig Dispense Refill    Phentermine-Topiramate (QSYMIA) 3.75-23 MG CP24 Take by mouth.       TESTOSTERONE PROPIONATE IM Inject into the muscle Every 10 days      TOUJEO SOLOSTAR 300 UNIT/ML SOPN INJECT 180 UNITS INTO THE SKIN NIGHTLY 54 mL 1    Ertugliflozin L-PyroglutamicAc (STEGLATRO) 15 MG TABS Take 1 tablet by mouth daily 90 tablet 3    Continuous Blood Gluc Sensor (StatAceYLE NATHALIE 14 DAY SENSOR) MISC Use 1 sensor every 14 days to monitor blood sugars 6 each 3    TRUEPLUS PEN NEEDLES 32G X 4 MM MISC USE FOUR TIMES A  each 5    metoprolol succinate (TOPROL XL) 50 MG extended release tablet TAKE 1 TABLET BY MOUTH ONE TIME A DAY 90 tablet 0    rosuvastatin (CRESTOR) 40 MG tablet Take 1 tablet by mouth daily 90 tablet 1    lisinopril (PRINIVIL;ZESTRIL) 20 MG tablet Take 1 tablet by mouth 2 times daily 180 tablet 1    metFORMIN (GLUCOPHAGE) 1000 MG tablet TAKE 1 TABLET BY MOUTH 2 TIMES A DAY WITH MEALS 180 tablet 0    Icosapent Ethyl (VASCEPA) 1 g CAPS capsule Take 2 capsules by mouth 2 times daily 360 capsule 1    Cholecalciferol (VITAMIN D3) 50 MCG (2000 UT) CAPS Take 8 capsules by mouth three times a week      insulin lispro (HUMALOG KWIKPEN) 200 UNIT/ML SOPN pen INJECT UP TO 60 UNITS INTO THE SKIN THREE TIMES A DAY BEFORE MEALS PER SLIDING SCALE 108 mL 3    Continuous Blood Gluc  (FREESTYLE NATHALIE 14 DAY READER) TAMERA 1 Units by Does not apply route as needed (as needed) 1 Device 0    furosemide (LASIX) 20 MG tablet Take 1 tablet by mouth daily as needed (swelling) 30 tablet 2    aspirin 325 MG tablet Take 325 mg by mouth daily      fenofibrate (TRICOR) 145 MG tablet Take 1 tablet by mouth daily 90 tablet 1    b complex vitamins capsule Take 1 capsule by mouth daily       No current facility-administered medications for this encounter. Pre-Sedation:    Pre-Sedation Documentation and Exam:  I have personally completed a history, physical exam & review of systems for this patient (see notes). Prior History of Anesthesia Complications:   none    Modified Mallampati:  I (soft palate, uvula, fauces, tonsillar pillars visible)    ASA Classification:  Class 2 - A normal healthy patient with mild systemic disease      Te Scale: Activity:  2 - Able to move 4 extremities voluntarily on command  Respiration:  2 - Able to breathe deeply and cough freely  Circulation:  2 - BP+/- 20mmHg of normal  Consciousness:  2 - Fully awake  Oxygen Saturation (color):  2 - Able to maintain oxygen saturation >92% on room air    Sedation/Anesthesia Plan:  Guard the patient's safety and welfare. Minimize physical discomfort and pain. Minimize negative psychological responses to treatment by providing sedation and analgesia and maximize the potential amnesia. Patient to meet pre-procedure discharge plan.     Medication Planned:  midazolam intravenously and fentanyl intravenously    Patient is an appropriate candidate for plan of sedation: yes      Electronically signed by Erin Wills MD on 7/8/2020 at 1:42 PM

## 2020-07-08 NOTE — PROCEDURES
[address]                            CARDIAC CATHETERIZATION    PATIENT NAME: Thalia Franco                :        1970  MED REC NO:   [id number]                          ROOM:  ACCOUNT NO:   [de-identified]                           ADMIT DATE: 2020  PROVIDER:     Kadeem Chapman MD    DATE OF PROCEDURE:  2020    REFERRING PROVIDER:  LEVI Victor    BRIEF HISTORY:  The patient is a 58-year-old gentleman with diabetes and  coronary artery bypass surgery four years ago. Prior to his CABG, he  had postprandial chest pain. He is having similar pains now. PROCEDURES PERFORMED:  Left heart catheterization, coronary angiogram,  LV angiogram performed to the right femoral artery. CATHETERS USED:  6-Irish JL4, JR4, pigtail catheter, FRANKO graft  catheter. HEMODYNAMICS:  Aortic pressure was 160/90. Left ventricular pressure is  160/20 mmHg. There was no gradient across the aortic valve. LV ANGIOGRAM:  Performed in the GIBBONS 30 degrees projection shows normalleft ventricular size and systolic function with EF 55%. CORONARY ANGIOGRAM:  1. Native anatomy. 2.  The LAD has competitive flow and is anastomosed in the mid distal segment. 3.  The circumflex proximally has high-grade lesion involving OM branch in two segments. 4.  Proximal RCA is occluded. 5.  FRANKO graft of the LAD is widely patent, has competitive flow. Isra [place]ounds itself is fine until the mid distal segment where it was anastomosed. 6.  Small ramus branch:  Free of disease. 7.  Left circumflex:  first OM has two high-grade lesions. It is occluded. Radial artery graft from the LIMA is touching the OM branch      and is patent. 8.  The same graft touches the PDA branch of the RCA which is also patent at the anastomosis and rest of the body. The distal RCA is being      fed byretrograde flow. CONCLUSIONS:  1.   All the bypass grafts are patent. 2.  LIMA is anastomosed in the mid LAD and the mid segment has widely open anastomosis, has a competitive flow. The radial artery graft to      the OM and the JACINDA/PDA branch of the RCA is widely patent. 3.  Elevated left heart filling pressure. 4.  Normal left ventricular size and systolic function.       EBL: Less than 20 cc    Kenyon Davis MD    D: 07/08/2020 15:35:02       T: 07/08/2020 16:49:50     [email or web address]  Job#: [id number]     Doc#: [id number]    CC:

## 2020-07-13 ENCOUNTER — OFFICE VISIT (OUTPATIENT)
Dept: ORTHOPEDIC SURGERY | Age: 50
End: 2020-07-13

## 2020-07-13 VITALS — WEIGHT: 306 LBS | HEIGHT: 71 IN | BODY MASS INDEX: 42.84 KG/M2 | RESPIRATION RATE: 14 BRPM | TEMPERATURE: 97.9 F

## 2020-07-13 PROCEDURE — 99024 POSTOP FOLLOW-UP VISIT: CPT | Performed by: ORTHOPAEDIC SURGERY

## 2020-07-13 NOTE — PROGRESS NOTES
Mr. Mukul Jiménez returns today in follow-up of his recent left First Dorsal Compartment (DeQuarvain's) Release done approximately 1 week ago. He has done well noting mild discomfort and no other reported complications. He notes pre-operative symptoms to be significantly improved at this time. Physical Exam:  Skin incision is healing well, without erythema, drainage or sign of infection. Digital range of motion is without significant limitation in thumb, full and equal in all other digits. Wrist range of motion is without significant limitation. Sensation is normal in the Whole Hand. Vascular examination reveals normal, good capillary refill, good color and warm. Swelling is mild. There is little residual discomfort at the First Dorsal Compartment. Impression:  Mr. Mukul Jiménez is doing well after recent left DeQuarvain's Release. Plan:  Mr. Mukul Jiménez is instructed in work on Active & Passive range of motion of the digits, wrist, & elbow. These modalities were specifically demonstrated to him today and he return demonstrated proper understanding. We discussed the appropriateness of gradual resumption of use of the operated hand and the return to normal use as comfort allows. He is given instructions regarding management of the fresh surgical incision and progressive use of desensitization and tissue massage techniques. We discussed the appropriate expectations and timeline for symptom improvement. He is provided a written patient instruction sheet titled: Postoperative Instructions After DeQuarvain's Release. I have asked Mr. Mukul Jiménez to follow-up with me or contact me by telephone over the next 2-4 weeks if his symptoms have not fully resolved or if he has not regained full & painless return of function.        He is also specifically instructed to return to the office or call for an appointment sooner if his symptoms are changing or worsening prior to that time.

## 2020-07-13 NOTE — PATIENT INSTRUCTIONS
Postoperative Instructions After Tendonitis Release    Dr. Gualberto Carnes. Livan          1. After bandages are removed one week from surgery, you may chose to wear a small bandage over the incision if you wish, though you do not need to. 2. Keep incision dry until sutures have fully dissolved  or it has been 14 days since your surgery. Thereafter, you may wash with mild soap and water and shower normally. 3. Once your stiches have fully disappeared & skin appears normal, you should begin gently massaging the incision with Vitamin E (may use Vitamin E lotion or contents of Vitamin E capsule). 4. Work hard on motion of the fingers and wrist, straightening each finger fully and bending each finger fully, bending wrist forward and bending wrist backwards. Do not be concerned if you experience discomfort. This will not damage the surgery. 5. You may begin using the hand as it feels comfortable beginning 12-14 days from the day of surgery. You may not feel entirely comfortable gripping or lifting heavy objects for several weeks. 6. You may expect to see some skin peel off around the incision. You may be left with a small area of pink baby skin. This is quite normal.    Thank you for choosing [facility]) Physicians for your Hand and Upper Extremity needs. If we can be of any further assistance to you, please do not hesitate to contact us.     Office Phone Number:  [phone]  or  [phone]

## 2020-07-13 NOTE — Clinical Note
Dear  Deyvi Guevara, APRN - CNP,    Thank you very much for your referral or Mr. Caridad Smith to me for evaluation and treatment of his Hand & Wrist condition. I appreciate your confidence in me and thank you for allowing me the opportunity to care for your patients. If I can be of any further assistance to you on this or any other patient, please do not hesitate to contact me. Sincerely,    Marah Valdovinos.  Izaiah Coles MD

## 2020-07-15 ENCOUNTER — OFFICE VISIT (OUTPATIENT)
Dept: PRIMARY CARE CLINIC | Age: 50
End: 2020-07-15
Payer: COMMERCIAL

## 2020-07-15 PROCEDURE — 99211 OFF/OP EST MAY X REQ PHY/QHP: CPT | Performed by: NURSE PRACTITIONER

## 2020-07-20 ENCOUNTER — TELEPHONE (OUTPATIENT)
Dept: CARDIOLOGY CLINIC | Age: 50
End: 2020-07-20

## 2020-07-20 ENCOUNTER — TELEPHONE (OUTPATIENT)
Dept: ORTHOPEDIC SURGERY | Age: 50
End: 2020-07-20

## 2020-07-20 LAB
SARS-COV-2: NOT DETECTED
SOURCE: NORMAL

## 2020-07-20 NOTE — TELEPHONE ENCOUNTER
Received forms for medial [facility]ER[facility]. I will begin filling out forms today and try to have them completed by end of business tomorrow.

## 2020-07-21 ENCOUNTER — APPOINTMENT (OUTPATIENT)
Dept: GENERAL RADIOLOGY | Age: 50
End: 2020-07-21
Attending: NEUROLOGICAL SURGERY
Payer: COMMERCIAL

## 2020-07-21 ENCOUNTER — ANESTHESIA (OUTPATIENT)
Dept: OPERATING ROOM | Age: 50
End: 2020-07-21
Payer: COMMERCIAL

## 2020-07-21 ENCOUNTER — HOSPITAL ENCOUNTER (OUTPATIENT)
Age: 50
Setting detail: OUTPATIENT SURGERY
Discharge: HOME | End: 2020-07-21
Attending: NEUROLOGICAL SURGERY | Admitting: NEUROLOGICAL SURGERY
Payer: COMMERCIAL

## 2020-07-21 ENCOUNTER — ANESTHESIA EVENT (OUTPATIENT)
Dept: OPERATING ROOM | Age: 50
End: 2020-07-21
Payer: COMMERCIAL

## 2020-07-21 VITALS
WEIGHT: 305 LBS | HEIGHT: 71 IN | OXYGEN SATURATION: 95 % | TEMPERATURE: 97.2 F | HEART RATE: 76 BPM | BODY MASS INDEX: 42.7 KG/M2 | SYSTOLIC BLOOD PRESSURE: 136 MMHG | RESPIRATION RATE: 16 BRPM | DIASTOLIC BLOOD PRESSURE: 79 MMHG

## 2020-07-21 VITALS
RESPIRATION RATE: 45 BRPM | OXYGEN SATURATION: 99 % | DIASTOLIC BLOOD PRESSURE: 53 MMHG | SYSTOLIC BLOOD PRESSURE: 114 MMHG | TEMPERATURE: 99.2 F

## 2020-07-21 LAB
ANION GAP SERPL CALCULATED.3IONS-SCNC: 13 MMOL/L (ref 3–16)
BUN BLDV-MCNC: 31 MG/DL (ref 7–20)
CALCIUM SERPL-MCNC: 9.3 MG/DL (ref 8.3–10.6)
CHLORIDE BLD-SCNC: 101 MMOL/L (ref 99–110)
CO2: 23 MMOL/L (ref 21–32)
CREAT SERPL-MCNC: 1.5 MG/DL (ref 0.9–1.3)
GFR AFRICAN AMERICAN: 60
GFR NON-AFRICAN AMERICAN: 49
GLUCOSE BLD-MCNC: 166 MG/DL (ref 70–99)
GLUCOSE BLD-MCNC: 175 MG/DL (ref 70–99)
GLUCOSE BLD-MCNC: 198 MG/DL (ref 70–99)
PERFORMED ON: ABNORMAL
PERFORMED ON: ABNORMAL
POTASSIUM SERPL-SCNC: 4.5 MMOL/L (ref 3.5–5.1)
SODIUM BLD-SCNC: 137 MMOL/L (ref 136–145)

## 2020-07-21 PROCEDURE — 6370000000 HC RX 637 (ALT 250 FOR IP)

## 2020-07-21 PROCEDURE — 72020 X-RAY EXAM OF SPINE 1 VIEW: CPT

## 2020-07-21 PROCEDURE — 6360000002 HC RX W HCPCS

## 2020-07-21 PROCEDURE — 2580000003 HC RX 258: Performed by: NEUROLOGICAL SURGERY

## 2020-07-21 PROCEDURE — 80048 BASIC METABOLIC PNL TOTAL CA: CPT

## 2020-07-21 PROCEDURE — 6360000002 HC RX W HCPCS: Performed by: NURSE ANESTHETIST, CERTIFIED REGISTERED

## 2020-07-21 PROCEDURE — 3600000014 HC SURGERY LEVEL 4 ADDTL 15MIN: Performed by: NEUROLOGICAL SURGERY

## 2020-07-21 PROCEDURE — 36415 COLL VENOUS BLD VENIPUNCTURE: CPT

## 2020-07-21 PROCEDURE — 3700000000 HC ANESTHESIA ATTENDED CARE: Performed by: NEUROLOGICAL SURGERY

## 2020-07-21 PROCEDURE — 2500000003 HC RX 250 WO HCPCS: Performed by: NEUROLOGICAL SURGERY

## 2020-07-21 PROCEDURE — 3700000001 HC ADD 15 MINUTES (ANESTHESIA): Performed by: NEUROLOGICAL SURGERY

## 2020-07-21 PROCEDURE — 7100000011 HC PHASE II RECOVERY - ADDTL 15 MIN: Performed by: NEUROLOGICAL SURGERY

## 2020-07-21 PROCEDURE — 6360000002 HC RX W HCPCS: Performed by: NEUROLOGICAL SURGERY

## 2020-07-21 PROCEDURE — 7100000010 HC PHASE II RECOVERY - FIRST 15 MIN: Performed by: NEUROLOGICAL SURGERY

## 2020-07-21 PROCEDURE — 3600000004 HC SURGERY LEVEL 4 BASE: Performed by: NEUROLOGICAL SURGERY

## 2020-07-21 PROCEDURE — 6360000002 HC RX W HCPCS: Performed by: ANESTHESIOLOGY

## 2020-07-21 PROCEDURE — 3209999900 FLUORO FOR SURGICAL PROCEDURES

## 2020-07-21 PROCEDURE — 2720000010 HC SURG SUPPLY STERILE: Performed by: NEUROLOGICAL SURGERY

## 2020-07-21 PROCEDURE — 7100000000 HC PACU RECOVERY - FIRST 15 MIN: Performed by: NEUROLOGICAL SURGERY

## 2020-07-21 PROCEDURE — 2500000003 HC RX 250 WO HCPCS: Performed by: NURSE ANESTHETIST, CERTIFIED REGISTERED

## 2020-07-21 PROCEDURE — 2709999900 HC NON-CHARGEABLE SUPPLY: Performed by: NEUROLOGICAL SURGERY

## 2020-07-21 PROCEDURE — 7100000001 HC PACU RECOVERY - ADDTL 15 MIN: Performed by: NEUROLOGICAL SURGERY

## 2020-07-21 PROCEDURE — 6370000000 HC RX 637 (ALT 250 FOR IP): Performed by: ANESTHESIOLOGY

## 2020-07-21 RX ORDER — SODIUM CHLORIDE 0.9 % (FLUSH) 0.9 %
10 SYRINGE (ML) INJECTION PRN
Status: DISCONTINUED | OUTPATIENT
Start: 2020-07-21 | End: 2020-07-21 | Stop reason: HOSPADM

## 2020-07-21 RX ORDER — FENTANYL CITRATE 50 UG/ML
50 INJECTION, SOLUTION INTRAMUSCULAR; INTRAVENOUS EVERY 5 MIN PRN
Status: DISCONTINUED | OUTPATIENT
Start: 2020-07-21 | End: 2020-07-21 | Stop reason: HOSPADM

## 2020-07-21 RX ORDER — EPHEDRINE SULFATE/0.9% NACL/PF 50 MG/5 ML
SYRINGE (ML) INTRAVENOUS PRN
Status: DISCONTINUED | OUTPATIENT
Start: 2020-07-21 | End: 2020-07-21 | Stop reason: SDUPTHER

## 2020-07-21 RX ORDER — MAGNESIUM HYDROXIDE 1200 MG/15ML
LIQUID ORAL CONTINUOUS PRN
Status: COMPLETED | OUTPATIENT
Start: 2020-07-21 | End: 2020-07-21

## 2020-07-21 RX ORDER — SODIUM CHLORIDE 9 MG/ML
INJECTION, SOLUTION INTRAVENOUS CONTINUOUS
Status: DISCONTINUED | OUTPATIENT
Start: 2020-07-21 | End: 2020-07-21 | Stop reason: HOSPADM

## 2020-07-21 RX ORDER — HYDROMORPHONE HCL 110MG/55ML
0.25 PATIENT CONTROLLED ANALGESIA SYRINGE INTRAVENOUS EVERY 5 MIN PRN
Status: DISCONTINUED | OUTPATIENT
Start: 2020-07-21 | End: 2020-07-21 | Stop reason: HOSPADM

## 2020-07-21 RX ORDER — CLINDAMYCIN PHOSPHATE 900 MG/50ML
900 INJECTION INTRAVENOUS ONCE
Status: COMPLETED | OUTPATIENT
Start: 2020-07-21 | End: 2020-07-21

## 2020-07-21 RX ORDER — MEPERIDINE HYDROCHLORIDE 25 MG/ML
12.5 INJECTION INTRAMUSCULAR; INTRAVENOUS; SUBCUTANEOUS EVERY 5 MIN PRN
Status: DISCONTINUED | OUTPATIENT
Start: 2020-07-21 | End: 2020-07-21 | Stop reason: HOSPADM

## 2020-07-21 RX ORDER — SODIUM CHLORIDE 0.9 % (FLUSH) 0.9 %
10 SYRINGE (ML) INJECTION EVERY 12 HOURS SCHEDULED
Status: DISCONTINUED | OUTPATIENT
Start: 2020-07-21 | End: 2020-07-21 | Stop reason: HOSPADM

## 2020-07-21 RX ORDER — HYDRALAZINE HYDROCHLORIDE 20 MG/ML
INJECTION INTRAMUSCULAR; INTRAVENOUS
Status: COMPLETED
Start: 2020-07-21 | End: 2020-07-21

## 2020-07-21 RX ORDER — OXYCODONE HYDROCHLORIDE 5 MG/1
TABLET ORAL
Status: COMPLETED
Start: 2020-07-21 | End: 2020-07-21

## 2020-07-21 RX ORDER — LIDOCAINE HYDROCHLORIDE 10 MG/ML
0.5 INJECTION, SOLUTION EPIDURAL; INFILTRATION; INTRACAUDAL; PERINEURAL ONCE
Status: DISCONTINUED | OUTPATIENT
Start: 2020-07-21 | End: 2020-07-21 | Stop reason: HOSPADM

## 2020-07-21 RX ORDER — DEXAMETHASONE SODIUM PHOSPHATE 4 MG/ML
INJECTION, SOLUTION INTRA-ARTICULAR; INTRALESIONAL; INTRAMUSCULAR; INTRAVENOUS; SOFT TISSUE PRN
Status: DISCONTINUED | OUTPATIENT
Start: 2020-07-21 | End: 2020-07-21 | Stop reason: SDUPTHER

## 2020-07-21 RX ORDER — KETAMINE HCL IN NACL, ISO-OSM 100MG/10ML
SYRINGE (ML) INJECTION PRN
Status: DISCONTINUED | OUTPATIENT
Start: 2020-07-21 | End: 2020-07-21 | Stop reason: SDUPTHER

## 2020-07-21 RX ORDER — HYDROCODONE BITARTRATE AND ACETAMINOPHEN 5; 325 MG/1; MG/1
2 TABLET ORAL PRN
Status: DISCONTINUED | OUTPATIENT
Start: 2020-07-21 | End: 2020-07-21 | Stop reason: HOSPADM

## 2020-07-21 RX ORDER — LIDOCAINE HYDROCHLORIDE 10 MG/ML
1 INJECTION, SOLUTION EPIDURAL; INFILTRATION; INTRACAUDAL; PERINEURAL
Status: DISCONTINUED | OUTPATIENT
Start: 2020-07-21 | End: 2020-07-21 | Stop reason: HOSPADM

## 2020-07-21 RX ORDER — BUPIVACAINE HYDROCHLORIDE 5 MG/ML
INJECTION, SOLUTION EPIDURAL; INTRACAUDAL
Status: COMPLETED | OUTPATIENT
Start: 2020-07-21 | End: 2020-07-21

## 2020-07-21 RX ORDER — VECURONIUM BROMIDE 1 MG/ML
INJECTION, POWDER, LYOPHILIZED, FOR SOLUTION INTRAVENOUS PRN
Status: DISCONTINUED | OUTPATIENT
Start: 2020-07-21 | End: 2020-07-21 | Stop reason: SDUPTHER

## 2020-07-21 RX ORDER — HYDROMORPHONE HCL 110MG/55ML
0.5 PATIENT CONTROLLED ANALGESIA SYRINGE INTRAVENOUS EVERY 5 MIN PRN
Status: COMPLETED | OUTPATIENT
Start: 2020-07-21 | End: 2020-07-21

## 2020-07-21 RX ORDER — DIPHENHYDRAMINE HYDROCHLORIDE 50 MG/ML
12.5 INJECTION INTRAMUSCULAR; INTRAVENOUS
Status: DISCONTINUED | OUTPATIENT
Start: 2020-07-21 | End: 2020-07-21 | Stop reason: HOSPADM

## 2020-07-21 RX ORDER — HYDRALAZINE HYDROCHLORIDE 20 MG/ML
10 INJECTION INTRAMUSCULAR; INTRAVENOUS ONCE
Status: COMPLETED | OUTPATIENT
Start: 2020-07-21 | End: 2020-07-21

## 2020-07-21 RX ORDER — PROMETHAZINE HYDROCHLORIDE 25 MG/ML
6.25 INJECTION, SOLUTION INTRAMUSCULAR; INTRAVENOUS EVERY 30 MIN PRN
Status: DISCONTINUED | OUTPATIENT
Start: 2020-07-21 | End: 2020-07-21 | Stop reason: HOSPADM

## 2020-07-21 RX ORDER — LIDOCAINE HYDROCHLORIDE 20 MG/ML
INJECTION, SOLUTION EPIDURAL; INFILTRATION; INTRACAUDAL; PERINEURAL PRN
Status: DISCONTINUED | OUTPATIENT
Start: 2020-07-21 | End: 2020-07-21 | Stop reason: SDUPTHER

## 2020-07-21 RX ORDER — OXYCODONE HYDROCHLORIDE 5 MG/1
5 TABLET ORAL EVERY 4 HOURS PRN
Qty: 30 TABLET | Refills: 0 | Status: SHIPPED | OUTPATIENT
Start: 2020-07-21 | End: 2020-07-28

## 2020-07-21 RX ORDER — FENTANYL CITRATE 50 UG/ML
INJECTION, SOLUTION INTRAMUSCULAR; INTRAVENOUS PRN
Status: DISCONTINUED | OUTPATIENT
Start: 2020-07-21 | End: 2020-07-21 | Stop reason: SDUPTHER

## 2020-07-21 RX ORDER — HYDROCODONE BITARTRATE AND ACETAMINOPHEN 5; 325 MG/1; MG/1
1 TABLET ORAL PRN
Status: DISCONTINUED | OUTPATIENT
Start: 2020-07-21 | End: 2020-07-21 | Stop reason: HOSPADM

## 2020-07-21 RX ORDER — FENTANYL CITRATE 50 UG/ML
25 INJECTION, SOLUTION INTRAMUSCULAR; INTRAVENOUS EVERY 5 MIN PRN
Status: DISCONTINUED | OUTPATIENT
Start: 2020-07-21 | End: 2020-07-21 | Stop reason: HOSPADM

## 2020-07-21 RX ORDER — OXYCODONE HYDROCHLORIDE 5 MG/1
5 TABLET ORAL ONCE
Status: COMPLETED | OUTPATIENT
Start: 2020-07-21 | End: 2020-07-21

## 2020-07-21 RX ORDER — PROPOFOL 10 MG/ML
INJECTION, EMULSION INTRAVENOUS PRN
Status: DISCONTINUED | OUTPATIENT
Start: 2020-07-21 | End: 2020-07-21 | Stop reason: SDUPTHER

## 2020-07-21 RX ORDER — MIDAZOLAM HYDROCHLORIDE 1 MG/ML
INJECTION INTRAMUSCULAR; INTRAVENOUS PRN
Status: DISCONTINUED | OUTPATIENT
Start: 2020-07-21 | End: 2020-07-21 | Stop reason: SDUPTHER

## 2020-07-21 RX ORDER — ONDANSETRON 2 MG/ML
INJECTION INTRAMUSCULAR; INTRAVENOUS PRN
Status: DISCONTINUED | OUTPATIENT
Start: 2020-07-21 | End: 2020-07-21 | Stop reason: SDUPTHER

## 2020-07-21 RX ORDER — CIPROFLOXACIN 2 MG/ML
400 INJECTION, SOLUTION INTRAVENOUS ONCE
Status: DISCONTINUED | OUTPATIENT
Start: 2020-07-21 | End: 2020-07-21

## 2020-07-21 RX ADMIN — ONDANSETRON 4 MG: 2 INJECTION INTRAMUSCULAR; INTRAVENOUS at 08:30

## 2020-07-21 RX ADMIN — FENTANYL CITRATE 100 MCG: 50 INJECTION, SOLUTION INTRAMUSCULAR; INTRAVENOUS at 08:21

## 2020-07-21 RX ADMIN — SODIUM CHLORIDE: 9 INJECTION, SOLUTION INTRAVENOUS at 08:18

## 2020-07-21 RX ADMIN — MIDAZOLAM 2 MG: 1 INJECTION INTRAMUSCULAR; INTRAVENOUS at 08:15

## 2020-07-21 RX ADMIN — PROPOFOL 100 MG: 10 INJECTION, EMULSION INTRAVENOUS at 08:22

## 2020-07-21 RX ADMIN — HYDROMORPHONE HYDROCHLORIDE 0.5 MG: 2 INJECTION, SOLUTION INTRAMUSCULAR; INTRAVENOUS; SUBCUTANEOUS at 10:26

## 2020-07-21 RX ADMIN — VECURONIUM BROMIDE 4 MG: 1 INJECTION, POWDER, LYOPHILIZED, FOR SOLUTION INTRAVENOUS at 08:40

## 2020-07-21 RX ADMIN — Medication 10 MG: at 09:30

## 2020-07-21 RX ADMIN — OXYCODONE HYDROCHLORIDE 5 MG: 5 TABLET ORAL at 12:14

## 2020-07-21 RX ADMIN — DEXAMETHASONE SODIUM PHOSPHATE 8 MG: 4 INJECTION, SOLUTION INTRAMUSCULAR; INTRAVENOUS at 08:30

## 2020-07-21 RX ADMIN — Medication 10 MG: at 08:48

## 2020-07-21 RX ADMIN — Medication 20 MG: at 09:00

## 2020-07-21 RX ADMIN — Medication 5 MG: at 08:45

## 2020-07-21 RX ADMIN — Medication 5 MG: at 08:40

## 2020-07-21 RX ADMIN — Medication 10 MG: at 09:08

## 2020-07-21 RX ADMIN — SUGAMMADEX 300 MG: 100 INJECTION, SOLUTION INTRAVENOUS at 10:01

## 2020-07-21 RX ADMIN — Medication 10 MG: at 09:05

## 2020-07-21 RX ADMIN — HYDRALAZINE HYDROCHLORIDE 10 MG: 20 INJECTION INTRAMUSCULAR; INTRAVENOUS at 11:07

## 2020-07-21 RX ADMIN — Medication 10 MG: at 09:29

## 2020-07-21 RX ADMIN — PROPOFOL 200 MG: 10 INJECTION, EMULSION INTRAVENOUS at 08:21

## 2020-07-21 RX ADMIN — Medication 10 MG: at 09:33

## 2020-07-21 RX ADMIN — HYDROMORPHONE HYDROCHLORIDE 0.5 MG: 2 INJECTION, SOLUTION INTRAMUSCULAR; INTRAVENOUS; SUBCUTANEOUS at 10:15

## 2020-07-21 RX ADMIN — METOPROLOL TARTRATE 25 MG: 25 TABLET, FILM COATED ORAL at 07:31

## 2020-07-21 RX ADMIN — FENTANYL CITRATE 25 MCG: 50 INJECTION, SOLUTION INTRAMUSCULAR; INTRAVENOUS at 10:49

## 2020-07-21 RX ADMIN — PROMETHAZINE HYDROCHLORIDE 6.25 MG: 25 INJECTION INTRAMUSCULAR; INTRAVENOUS at 10:45

## 2020-07-21 RX ADMIN — Medication 10 MG: at 08:37

## 2020-07-21 RX ADMIN — CLINDAMYCIN PHOSPHATE 900 MG: 900 INJECTION, SOLUTION INTRAVENOUS at 08:15

## 2020-07-21 RX ADMIN — Medication 10 MG: at 09:12

## 2020-07-21 RX ADMIN — LIDOCAINE HYDROCHLORIDE 100 MG: 20 INJECTION, SOLUTION EPIDURAL; INFILTRATION; INTRACAUDAL; PERINEURAL at 08:21

## 2020-07-21 RX ADMIN — HYDROMORPHONE HYDROCHLORIDE 0.5 MG: 2 INJECTION, SOLUTION INTRAMUSCULAR; INTRAVENOUS; SUBCUTANEOUS at 10:33

## 2020-07-21 RX ADMIN — HYDROMORPHONE HYDROCHLORIDE 0.5 MG: 2 INJECTION, SOLUTION INTRAMUSCULAR; INTRAVENOUS; SUBCUTANEOUS at 10:38

## 2020-07-21 ASSESSMENT — PULMONARY FUNCTION TESTS
PIF_VALUE: 22
PIF_VALUE: 25
PIF_VALUE: 22
PIF_VALUE: 22
PIF_VALUE: 23
PIF_VALUE: 22
PIF_VALUE: 21
PIF_VALUE: 23
PIF_VALUE: 21
PIF_VALUE: 22
PIF_VALUE: 23
PIF_VALUE: 24
PIF_VALUE: 3
PIF_VALUE: 23
PIF_VALUE: 22
PIF_VALUE: 23
PIF_VALUE: 22
PIF_VALUE: 22
PIF_VALUE: 23
PIF_VALUE: 22
PIF_VALUE: 23
PIF_VALUE: 23
PIF_VALUE: 26
PIF_VALUE: 4
PIF_VALUE: 22
PIF_VALUE: 2
PIF_VALUE: 22
PIF_VALUE: 22
PIF_VALUE: 23
PIF_VALUE: 22
PIF_VALUE: 23
PIF_VALUE: 24
PIF_VALUE: 23
PIF_VALUE: 22
PIF_VALUE: 22
PIF_VALUE: 23
PIF_VALUE: 23
PIF_VALUE: 22
PIF_VALUE: 25
PIF_VALUE: 24
PIF_VALUE: 5
PIF_VALUE: 22
PIF_VALUE: 23
PIF_VALUE: 23
PIF_VALUE: 22
PIF_VALUE: 22
PIF_VALUE: 23
PIF_VALUE: 22
PIF_VALUE: 23
PIF_VALUE: 18
PIF_VALUE: 23
PIF_VALUE: 22
PIF_VALUE: 23
PIF_VALUE: 22
PIF_VALUE: 21
PIF_VALUE: 22
PIF_VALUE: 23
PIF_VALUE: 23
PIF_VALUE: 22
PIF_VALUE: 5
PIF_VALUE: 25
PIF_VALUE: 2
PIF_VALUE: 0
PIF_VALUE: 23
PIF_VALUE: 26
PIF_VALUE: 22
PIF_VALUE: 23
PIF_VALUE: 22
PIF_VALUE: 22
PIF_VALUE: 25
PIF_VALUE: 23
PIF_VALUE: 23
PIF_VALUE: 22
PIF_VALUE: 25
PIF_VALUE: 22
PIF_VALUE: 1
PIF_VALUE: 25
PIF_VALUE: 22
PIF_VALUE: 23
PIF_VALUE: 23
PIF_VALUE: 1
PIF_VALUE: 0
PIF_VALUE: 24
PIF_VALUE: 22
PIF_VALUE: 23
PIF_VALUE: 25
PIF_VALUE: 22

## 2020-07-21 ASSESSMENT — PAIN DESCRIPTION - LOCATION
LOCATION: BACK

## 2020-07-21 ASSESSMENT — PAIN SCALES - GENERAL
PAINLEVEL_OUTOF10: 8
PAINLEVEL_OUTOF10: 4
PAINLEVEL_OUTOF10: 8

## 2020-07-21 ASSESSMENT — PAIN DESCRIPTION - PAIN TYPE
TYPE: SURGICAL PAIN

## 2020-07-21 NOTE — PROGRESS NOTES
Patient states pain is 3/10, states he is ready for d/c    Discharge instructions reviewed and understanding verbalized per pt/family with copy given. All home medications/new prescriptions have been reviewed, questions answered and patient/family state understanding.  Medication information sheet provided for new prescriptions received when applicable

## 2020-07-21 NOTE — ANESTHESIA PRE PROCEDURE
Pre-Anesthesia Evaluation/Consultation       Name:  Helga Singh  : 1970  Age:  45 y.o. MRN:  [id number]  Date: 2020           Surgeon: Surgeon(s):  Ruben Joy MD    Procedure: Procedure(s):  LEFT FIRST DORSAL COMPARTMENT (DEQUERVAIN'S) RELEASE     Allergies   Allergen Reactions    Penicillins Anaphylaxis     Patient Active Problem List   Diagnosis    Diabetes mellitus ([facility]    HTN (hypertension)    Other and unspecified hyperlipidemia    Depressive disorder, not elsewhere classified    Pharyngitis    Abnormal stress test    HLD (hyperlipidemia)    Chest pain    Unstable angina ([address]    Coronary artery disease involving native coronary artery of native heart with angina pectoris ([address]    Essential hypertension    Mixed hyperlipidemia    HCAP (healthcare-associated pneumonia)    Cellulitis    Fever    Cellulitis of chest wall    Type 2 diabetes mellitus with circulatory disorder ([address]    Pure hypercholesterolemia    Klebsiella infection    High triglycerides    Vitamin D deficiency    Leg swelling    Pain in both lower extremities    PVD (peripheral vascular disease) ([address]    Chronic venous htn w inflammation of bilateral low extrm    Degenerative disc disease, lumbar     Past Medical History:   Diagnosis Date    Abscess     mediastinal, developed after kenalog injections    Anesthesia     AWAKE BUT UNABLE TO MOVE DURING SHOULDER SURGERY.  CAD (coronary artery disease) 3/2016    Diabetes mellitus ([address]     HgA1C 10.6    Gout     HLD (hyperlipidemia)     HTN (hypertension)     Echo  normal.     MDRO (multiple drug resistant organisms) resistance     MRSA (methicillin resistant staph aureus) culture positive 2017    Obesity     Pancreatitis 2006    high TG or byetta. flank ecchymosis.     PONV (postoperative nausea and vomiting)     Toxicity, chemicals 2013    isopropol alcohol toxicity: SOB/anasarca. work related    Type 2 diabetes mellitus without complication ([address]     Wound abscess 1994    groin. after kenalog injections. drained. iv abx. Past Surgical History:   Procedure Laterality Date    ADENOIDECTOMY  1970    CORONARY ARTERY BYPASS GRAFT  3/8/16    cabgx5 Vernard Dry)    ELBOW SURGERY Right     elbow reconstruction    ENDOSCOPY, COLON, DIAGNOSTIC      KNEE SURGERY Left 1985    menicus tear    LUMBAR LAMINECTOMY  08/10/2017    L4-5 microhemilaminectomy; excision of cyst    MEDIASTINOSCOPY  1994    bronchoscopy prior to medistinal mass. 45 ml off lymph node, IV antibiotics for 6 months   [facility]    L rectus muscle flap, infected, kenalog injections    SHOULDER SURGERY Left 2002    Rotator cuff repair, [place] [facility] at [address] Left 7/7/2020    LEFT FIRST DORSAL COMPARTMENT (DEQUERVAIN'S) RELEASE performed by Ponce Tejada MD at . [facility] History     Tobacco Use    Smoking status: Never Smoker    Smokeless tobacco: Never Used   Substance Use Topics    Alcohol use: Yes     Comment: rare    Drug use: No     Medications  Current Facility-Administered Medications on File Prior to Visit   Medication Dose Route Frequency Provider Last Rate Last Dose    0.9 % sodium chloride infusion   Intravenous Continuous Luisito Harrington MD        lidocaine PF 1 % injection 0.5 mL  0.5 mL Intradermal Once Luisito Harrington MD        ceFAZolin (ANCEF) 3 g in dextrose 5 % 100 mL IVPB  3 g Intravenous On Call to [facility], MD         Current Outpatient Medications on File Prior to Visit   Medication Sig Dispense Refill    metFORMIN (GLUCOPHAGE) 1000 MG tablet TAKE 1 TABLET BY MOUTH 2 TIMES A DAY WITH MEALS 180 tablet 0    metoprolol succinate (TOPROL XL) 50 MG extended release tablet TAKE 1 TABLET BY MOUTH ONE TIME A DAY 90 tablet 0    furosemide (LASIX) 20 MG tablet Take 2 tablets by mouth daily 30 tablet 5    Phentermine-Topiramate (QSYMIA) 3.75-23 MG BP Readings from Last 3 Encounters:   07/08/20 (!) 145/89   07/07/20 130/68   07/07/20 (!) 153/78     Vital Signs Statistics (for past 48 hrs)     No data recorded  BP Readings from Last 3 Encounters:   07/08/20 (!) 145/89   07/07/20 130/68   07/07/20 (!) 153/78       BMI  There is no height or weight on file to calculate BMI. Estimated body mass index is 42.68 kg/m² as calculated from the following:    Height as of 7/13/20: 5' 11\" (1.803 m). Weight as of 7/13/20: 306 lb (138.8 kg). CBC   Lab Results   Component Value Date    WBC 5.4 06/30/2020    RBC 5.67 06/30/2020    HGB 15.5 06/30/2020    HCT 47.3 06/30/2020    MCV 83.3 06/30/2020    RDW 15.0 06/30/2020     06/30/2020     CMP    Lab Results   Component Value Date     06/30/2020    K 4.7 06/30/2020     06/30/2020    CO2 23 06/30/2020    BUN 19 06/30/2020    CREATININE 1.3 06/30/2020    GFRAA >60 06/30/2020    GFRAA >60 04/28/2011    AGRATIO 1.5 12/12/2019    LABGLOM 58 06/30/2020    GLUCOSE 155 06/30/2020    PROT 7.0 12/12/2019    PROT 8.1 04/28/2011    CALCIUM 9.3 06/30/2020    BILITOT <0.2 12/12/2019    ALKPHOS 71 12/12/2019    AST 28 12/12/2019    ALT 24 12/12/2019     BMP    Lab Results   Component Value Date     06/30/2020    K 4.7 06/30/2020     06/30/2020    CO2 23 06/30/2020    BUN 19 06/30/2020    CREATININE 1.3 06/30/2020    CALCIUM 9.3 06/30/2020    GFRAA >60 06/30/2020    GFRAA >60 04/28/2011    LABGLOM 58 06/30/2020    GLUCOSE 155 06/30/2020     POCGlucose  No results for input(s): GLUCOSE in the last 72 hours.    Coags    Lab Results   Component Value Date    PROTIME 12.5 06/30/2020    INR 1.08 06/30/2020    APTT 31.5 07/61/0390     HCG (If Applicable) No results found for: PREGTESTUR, PREGSERUM, HCG, HCGQUANT   ABGs   Lab Results   Component Value Date    PHART 7.334 03/08/2016    PO2ART 95 03/08/2016    ZSU2CEE 47 03/08/2016    UXE9YXB 25.3 03/08/2016    BEART -1 03/08/2016    R1PCTNWM 97 03/08/2016 Type & Screen (If Applicable)  No results found for: LABABO, LABRH                         BMI: Wt Readings from Last 3 Encounters:       NPO Status:                          Anesthesia Evaluation  Patient summary reviewed and Nursing notes reviewed   history of anesthetic complications (HX OF TOTAL RECALL AFTER ANESTHESIA): PONV. Airway: Mallampati: III  TM distance: >3 FB   Neck ROM: full  Mouth opening: > = 3 FB Dental:          Pulmonary:   (+) pneumonia:                             Cardiovascular:  Exercise tolerance: good (>4 METS),   (+) hypertension: moderate, angina:, CAD: non-obstructive, hyperlipidemia              Stress test reviewed                Neuro/Psych:   (+) neuromuscular disease:, psychiatric history:            GI/Hepatic/Renal:   (+) morbid obesity     (-) no renal disease      ROS comment: Pancreatitis . Endo/Other:    (+) DiabetesType II DM, , .    (-) hypothyroidism, hyperthyroidism               Abdominal:   (+) obese,         Vascular:   + PVD, aortic or cerebral, . Anesthesia Plan      general     ASA 3       Induction: intravenous and rapid sequence. MIPS: Prophylactic antiemetics administered and Postoperative trial extubation. Anesthetic plan and risks discussed with patient. Plan discussed with CRNA.     Attending anesthesiologist reviewed and agrees with Pre Eval content              This pre-anesthesia assessment may be used as a history and physical.          Lester King MD  July 21, 2020  7:12 AM

## 2020-07-21 NOTE — ANESTHESIA POSTPROCEDURE EVALUATION
Department of Anesthesiology  Postprocedure Note    Patient: Rikki Dobson  MRN: [id number]  YOB: 1970  Date of evaluation: 7/21/2020  Time:  10:14 AM     Procedure Summary     Date:  07/21/20 [facility]    Anesthesia Start:  0818 Anesthesia Stop:      Procedure:  RIGHT LUMBAR4-LUMBAR5 MICRO HEMILAMINECTOMY AND CYSTECTOMY (Right ) Diagnosis:       (M48.06  LUMBAR STENOSIS)      (M71.38  SYNOVIAL CYST)    Surgeon:  Su Fajardo MD Responsible Provider:  Silva Mosley MD    Anesthesia Type:  general ASA Status:  3          Anesthesia Type: general    Te Phase I: Te Score: 10    Te Phase II:      Last vitals: Reviewed and per EMR flowsheets.        Anesthesia Post Evaluation    Patient location during evaluation: PACU  Patient participation: complete - patient participated  Level of consciousness: awake and alert  Pain score: 2  Airway patency: patent  Nausea & Vomiting: no vomiting  Complications: no  Cardiovascular status: blood pressure returned to baseline  Respiratory status: acceptable  Hydration status: euvolemic

## 2020-07-21 NOTE — OP NOTE
[facility] OR  7/21/2020 9:47 AM    PATIENT NAME:         Yumi Whittaker  YOB: 1970   MEDICAL RECORD#         [id number]  SURGERY DATE:         7/21/2020  SURGEON:                 Heike Watters                                                      OPERATIVE REPORT     PREOPERATIVE DIAGNOSIS: synovial cyst  L4-5 on the right            POSTOPERATIVE DIAGNOSIS:    same    PROCEDURES PERFORMED:  1. Right L4-5 hemilaminotomy and diskectomy  2. Microdissection using the operating room microscope. ANESTHESIA:  General    ESTIMATED BLOOD LOSS:  cc    INDICATIONS FOR SURGERY:   Yumi Whittaker is a 45 y.o. male with back and leg pain. The symptoms failed to respond to conservative intervention. An MRI scan was performed and this showed evidence of a synovial cyst at the L4-5 level on the right. Having failed conservative management and experiencing persistent symptoms, the patient elected to proceed ahead with the surgical option of cystectomy at L4-5. DETAILS OF PROCEDURE:  The patient was brought to the operating room and placed under general anesthesia. The patient was then placed prone on a Pepe frame. All bony prominences were inspected and padded prior to sterile draping. Using a #15 blade knife, the skin was incised in the midline and monopolar cautery was used to dissect through the subcutaneous tissue to open the fascia and reflect the paraspinal muscles laterally exposing the L4-5 interspace on the right side. The microscope was then brought into the field and used to assist with performing a microsurgical cystectomy at this level. Using the Midas Adrian drill, I burred down the hemilamina of  L4, a  portion of the inferior L5  Lamina, and did a medial facetectomy and foraminotomy . I exposed beyond the pedicle of L4 where the cyst  was located.   The underlying ligamentum flavum was freed with the micronerve hook from the underlying dura and removed with the 2 mm punch laterally, exposing the lateral dural tube and takeoff of the L5 nerve root. The L5  nerve root was noted to be  Displaced by a cyst which was adherent to the dura. .  The cyst was removed piecemeal with meticulous microdissection. The wound was copiously irrigated with antibiotic solution. Duramorph paste was placed in the epidural space and 0.5% Marcaine in subcutaneous tissues for analgesia. The fascia was then reapproximated using interrupted 0 Vicryl sutures and interrupted 3-0 Vicryl sutures were used to reapproximate the subcuticular layer. A sterile dressing was then applied. The patient was extubated in the operating room and transferred to the recovery room in stable condition. There were no complications. In accordance with CMS guidelines, I attest that I was present for the entire procedure from the creation of the skin incision to the closure.       Carlton Rodriguez MD

## 2020-07-21 NOTE — PROGRESS NOTES
CLINICAL PHARMACY NOTE: MEDS TO [address] Select Patient?: No  Total # of Prescriptions Filled: 3   The following medications were delivered to the patient:  · Oxycodone 5mg  · Lasix 20  · Metoprolol ER 50   Total # of Interventions Completed: 0  Time Spent (min): 30    Additional Documentation:    Delivered to Patient spouse    [facility]

## 2020-07-21 NOTE — BRIEF OP NOTE
Brief Postoperative Note      Patient: Hank Hester  YOB: 1970  MRN: [id number]    Date of Procedure: 7/21/2020    Pre-Op Diagnosis: M48.06  LUMBAR STENOSIS  M71.38  SYNOVIAL CYST    Post-Op Diagnosis: Same       Procedure(s):  RIGHT LUMBAR4-LUMBAR5 MICRO HEMILAMINECTOMY AND CYSTECTOMY    Surgeon(s):  Kina England MD    Assistant:  First Assistant: Bia Servin RN    Anesthesia: General    Estimated Blood Loss (mL): Minimal    Complications: None    Specimens:   * No specimens in log *    Implants:  * No implants in log *      Drains: * No LDAs found *    Findings: Synovial cyst    Electronically signed by Kina England MD on 7/21/2020 at 9:44 AM

## 2020-07-21 NOTE — PROGRESS NOTES
Pt arrived from OR to PACU, awakens to voice, c/o pain in back. VSS, O2 sats 100% on 6 L simple mask. Dressing on lower back dry and intact, neuro assessment WDL. Will continue to monitor and treat pain per MAR.

## 2020-07-29 ENCOUNTER — HOSPITAL ENCOUNTER (OUTPATIENT)
Age: 50
Discharge: HOME | End: 2020-07-29
Payer: COMMERCIAL

## 2020-07-29 LAB
BASOPHILS ABSOLUTE: 0.1 K/UL (ref 0–0.2)
BASOPHILS RELATIVE PERCENT: 1.4 %
C-REACTIVE PROTEIN: 20.1 MG/L (ref 0–5.1)
EOSINOPHILS ABSOLUTE: 0.2 K/UL (ref 0–0.6)
EOSINOPHILS RELATIVE PERCENT: 2.4 %
HCT VFR BLD CALC: 45.8 % (ref 40.5–52.5)
HEMOGLOBIN: 14.8 G/DL (ref 13.5–17.5)
LYMPHOCYTES ABSOLUTE: 2.1 K/UL (ref 1–5.1)
LYMPHOCYTES RELATIVE PERCENT: 26 %
MCH RBC QN AUTO: 26.9 PG (ref 26–34)
MCHC RBC AUTO-ENTMCNC: 32.3 G/DL (ref 31–36)
MCV RBC AUTO: 83.3 FL (ref 80–100)
MONOCYTES ABSOLUTE: 0.8 K/UL (ref 0–1.3)
MONOCYTES RELATIVE PERCENT: 9.8 %
NEUTROPHILS ABSOLUTE: 5 K/UL (ref 1.7–7.7)
NEUTROPHILS RELATIVE PERCENT: 60.4 %
PDW BLD-RTO: 14.6 % (ref 12.4–15.4)
PLATELET # BLD: 257 K/UL (ref 135–450)
PMV BLD AUTO: 8.1 FL (ref 5–10.5)
RBC # BLD: 5.5 M/UL (ref 4.2–5.9)
SEDIMENTATION RATE, ERYTHROCYTE: 52 MM/HR (ref 0–20)
WBC # BLD: 8.2 K/UL (ref 4–11)

## 2020-07-29 PROCEDURE — 36415 COLL VENOUS BLD VENIPUNCTURE: CPT

## 2020-07-29 PROCEDURE — 87070 CULTURE OTHR SPECIMN AEROBIC: CPT

## 2020-07-29 PROCEDURE — 87075 CULTR BACTERIA EXCEPT BLOOD: CPT

## 2020-07-29 PROCEDURE — 87205 SMEAR GRAM STAIN: CPT

## 2020-07-29 PROCEDURE — 85652 RBC SED RATE AUTOMATED: CPT

## 2020-07-29 PROCEDURE — 87186 SC STD MICRODIL/AGAR DIL: CPT

## 2020-07-29 PROCEDURE — 85025 COMPLETE CBC W/AUTO DIFF WBC: CPT

## 2020-07-29 PROCEDURE — 86140 C-REACTIVE PROTEIN: CPT

## 2020-07-29 PROCEDURE — 87077 CULTURE AEROBIC IDENTIFY: CPT

## 2020-07-30 ENCOUNTER — HOSPITAL ENCOUNTER (OUTPATIENT)
Dept: MRI IMAGING | Age: 50
Discharge: HOME | End: 2020-07-30
Payer: COMMERCIAL

## 2020-07-30 PROCEDURE — 6360000004 HC RX CONTRAST MEDICATION: Performed by: NURSE PRACTITIONER

## 2020-07-30 PROCEDURE — 2580000003 HC RX 258: Performed by: NURSE PRACTITIONER

## 2020-07-30 PROCEDURE — A9579 GAD-BASE MR CONTRAST NOS,1ML: HCPCS | Performed by: NURSE PRACTITIONER

## 2020-07-30 PROCEDURE — 72158 MRI LUMBAR SPINE W/O & W/DYE: CPT

## 2020-07-30 RX ORDER — SODIUM CHLORIDE 0.9 % (FLUSH) 0.9 %
10 SYRINGE (ML) INJECTION PRN
Status: DISCONTINUED | OUTPATIENT
Start: 2020-07-30 | End: 2020-07-31 | Stop reason: HOSPADM

## 2020-07-30 RX ADMIN — GADOTERIDOL 27 ML: 279.3 INJECTION, SOLUTION INTRAVENOUS at 21:00

## 2020-07-30 RX ADMIN — Medication 10 ML: at 21:00

## 2020-07-31 ENCOUNTER — HOSPITAL ENCOUNTER (OUTPATIENT)
Dept: INTERVENTIONAL RADIOLOGY/VASCULAR | Age: 50
Discharge: HOME | End: 2020-07-31
Payer: COMMERCIAL

## 2020-07-31 VITALS
BODY MASS INDEX: 42.28 KG/M2 | HEIGHT: 71 IN | SYSTOLIC BLOOD PRESSURE: 167 MMHG | RESPIRATION RATE: 16 BRPM | TEMPERATURE: 99 F | WEIGHT: 302 LBS | DIASTOLIC BLOOD PRESSURE: 103 MMHG | HEART RATE: 75 BPM | OXYGEN SATURATION: 99 %

## 2020-07-31 PROCEDURE — 87070 CULTURE OTHR SPECIMN AEROBIC: CPT

## 2020-07-31 PROCEDURE — 87116 MYCOBACTERIA CULTURE: CPT

## 2020-07-31 PROCEDURE — 87102 FUNGUS ISOLATION CULTURE: CPT

## 2020-07-31 PROCEDURE — 87206 SMEAR FLUORESCENT/ACID STAI: CPT

## 2020-07-31 PROCEDURE — 87205 SMEAR GRAM STAIN: CPT

## 2020-07-31 PROCEDURE — 10160 PNXR ASPIR ABSC HMTMA BULLA: CPT

## 2020-07-31 PROCEDURE — 76942 ECHO GUIDE FOR BIOPSY: CPT

## 2020-07-31 PROCEDURE — 87015 SPECIMEN INFECT AGNT CONCNTJ: CPT

## 2020-07-31 NOTE — BRIEF OP NOTE
Brief Postoperative Note    Caridad Smith  YOB: 1970  [id number]    Pre-operative Diagnosis: soft tissue collection    Post-operative Diagnosis: Same    Procedure: aspiration    Anesthesia: Local    Surgeons: Clark Islas MD    Estimated Blood Loss: Less than 5 mL    Complications: None    Specimens: Was Obtained: 5cc serous red fluid aspirated and sent for culture. Findings: Successful aspiration of the lower back soft tissue collection. Of note only 5 cc was aspirated and the collection seen on the MRI was not visualized well under ultrasound.      Electronically signed by Clark Islas MD on 7/31/2020 at 2:17 PM

## 2020-08-02 LAB
GRAM STAIN RESULT: NORMAL
WOUND/ABSCESS: NORMAL

## 2020-08-03 LAB
ANAEROBIC CULTURE: ABNORMAL
GRAM STAIN RESULT: ABNORMAL
ORGANISM: ABNORMAL
WOUND/ABSCESS: ABNORMAL

## 2020-08-06 ENCOUNTER — VIRTUAL VISIT (OUTPATIENT)
Dept: INFECTIOUS DISEASES | Age: 50
End: 2020-08-06
Payer: COMMERCIAL

## 2020-08-06 PROCEDURE — 3051F HG A1C>EQUAL 7.0%<8.0%: CPT | Performed by: INTERNAL MEDICINE

## 2020-08-06 PROCEDURE — 99204 OFFICE O/P NEW MOD 45 MIN: CPT | Performed by: INTERNAL MEDICINE

## 2020-08-06 ASSESSMENT — ENCOUNTER SYMPTOMS
SORE THROAT: 0
COUGH: 0
CONSTIPATION: 0
ABDOMINAL PAIN: 0
RHINORRHEA: 0
NAUSEA: 0
TROUBLE SWALLOWING: 0
EYE DISCHARGE: 0
DIARRHEA: 0
BACK PAIN: 1
EYE REDNESS: 0
SHORTNESS OF BREATH: 0
WHEEZING: 0

## 2020-08-06 NOTE — PROGRESS NOTES
Infectious Diseases Clinic NewPatient Note    Chitra Farah is a 45 y.o. male being evaluated by a Virtual Visit  encounter to address concerns as mentioned above. A caregiver was present when appropriate. Due to this being a TeleHealth encounter (During JFRDS-96 public health emergency), evaluation of the following organ systems was limited: Vitals/Constitutional/EENT/Resp/CV/GI//MS/Neuro/Skin/Heme-Lymph-Imm. Pursuant to the emergency declaration under the [address] [place] [address] and the [employer] Act, this Virtual Visit was conducted with patient's (and/or legal guardian's) consent, to reduce the patient's risk of exposure to COVID-19 and provide necessary medical care. The patient (and/or legal guardian) has also been advised to contact this office for worsening conditions or problems, and seek emergency medical treatment and/or call 911 if deemed necessary. Services were provided through an audio and/or video synchronous discussion virtually to substitute for in-person clinic visit. Patient and provider were located at their individual homes. --Richard Freitas MD on 8/6/2020 at 12:56 PM    An electronic signature was used to authenticate this note. Reason for Visit:               Lumbar laminectomy surgical site infection  Primary Care Physician:  PAUL Hart - CNP  History Obtained From:   Patient , Medical Records     CHIEF COMPLAINT:    Chief Complaint   Patient presents with    New Patient     Staph infection post op back surgery       HISTORY OF PRESENT ILLNESS: Chitra Farah is a 45 y.o. pleasant male patient, who had a virtual visit with me today as a new patient. History was obtained from chart review and the patient. The patient had a virtual visit with me today for above mentioned complaints.     The patient underwent right lumbar L4-L5 micro hemilaminectomy and cystectomy surgery on 7/21/2020 for chronic low back pain and lumbar spinal stenosis. The patient initially did well but then developed increasing pain in the surgical site area and started having serosanguineous drainage from the back wound about a week later. He had an MRI of the lumbar spine done on 7/30/2020, which showed a 7.4 x 3.1 x 1.6 cm loculated fluid collection in the subcutaneous soft tissue at the surgical site. The patient was sent to IR for diagnostic drainage of that collection and had an ultrasound-guided aspiration of the fluid collection done on 7/31/2020 by interventional radiology at around 5 mL of serous red-colored fluid was aspirated and sent for culture. The culture result from a wound culture from 7/29/2020 was positive for Staphylococcus epidermidis. The fluid aspirate culture from 7/31/2020 showed 2+ WBCs on Gram stain but no organism and the culture was negative    Past Medical History:   Past medical  history wasreviewed by me during this visit in detail. Past Medical History:   Diagnosis Date    Abscess 1994    mediastinal, developed after kenalog injections    Anesthesia     AWAKE BUT UNABLE TO MOVE DURING SHOULDER SURGERY.  CAD (coronary artery disease) 3/2016    Diabetes mellitus ([address] 2001    HgA1C 10.6    Gout     HLD (hyperlipidemia)     HTN (hypertension)     Echo 2013 normal.     MDRO (multiple drug resistant organisms) resistance     MRSA (methicillin resistant staph aureus) culture positive 04/19/2017    Obesity     Pancreatitis 2006    high TG or byetta. flank ecchymosis.  PONV (postoperative nausea and vomiting)     Toxicity, chemicals 2013    isopropol alcohol toxicity: SOB/anasarca. work related    Type 2 diabetes mellitus without complication ([address]     Wound abscess 1994    groin. after kenalog injections. drained. iv abx. Past Surgical History:  Past surgical history was reviewed by me during this visit in detail.      Past Surgical History:   Procedure Laterality Date    ADENOIDECTOMY  1970    CORONARY ARTERY BYPASS GRAFT  3/8/16    [id number] Lashonda Howell)    ELBOW SURGERY Right     elbow reconstruction    ENDOSCOPY, COLON, DIAGNOSTIC      IR AIR ABS HEMATOMA BULLA CYST  7/31/2020    IR AIR ABS HEMATOMA BULLA CYST 7/31/2020 [facility] PROCEDURES    KNEE SURGERY Left 1985    menicus tear    LUMBAR LAMINECTOMY  08/10/2017    L4-5 microhemilaminectomy; excision of cyst    LUMBAR SPINE SURGERY Right 7/21/2020    RIGHT LUMBAR4-LUMBAR5 MICRO HEMILAMINECTOMY AND CYSTECTOMY performed by [facility]ael [facility]ols [facility] at [facility]    bronchoscopy prior to medistinal mass. 45 ml off lymph node, IV antibiotics for 6 months    PENIS SURGERY  1993    L rectus muscle flap, infected, kenalog injections    SHOULDER SURGERY Left 2002    Rotator cuff repair, [facility] at [address] Left 7/7/2020    LEFT FIRST DORSAL COMPARTMENT (DEQUERVAIN'S) RELEASE performed by Mikala Loaiza MD at [place] [address]       Current Medications: All medicationswere reviewed by me during this visit  Current Outpatient Medications   Medication Sig Dispense Refill    metFORMIN (GLUCOPHAGE) 1000 MG tablet TAKE 1 TABLET BY MOUTH 2 TIMES A DAY WITH MEALS 180 tablet 0    metoprolol succinate (TOPROL XL) 50 MG extended release tablet TAKE 1 TABLET BY MOUTH ONE TIME A DAY 90 tablet 0    Phentermine-Topiramate (QSYMIA) 3.75-23 MG CP24 Take by mouth.       TESTOSTERONE PROPIONATE IM Inject into the muscle Every 10 days      TOUJEO SOLOSTAR 300 UNIT/ML SOPN INJECT 180 UNITS INTO THE SKIN NIGHTLY 54 mL 1    Ertugliflozin L-PyroglutamicAc (STEGLATRO) 15 MG TABS Take 1 tablet by mouth daily 90 tablet 3    Continuous Blood Gluc Sensor (FREESTYLE NATHALIE 14 DAY SENSOR) MISC Use 1 sensor every 14 days to monitor blood sugars 6 each 3    TRUEPLUS PEN NEEDLES 32G X 4 MM MISC USE FOUR TIMES A  each 5    rosuvastatin (CRESTOR) 40 MG tablet Take 1 tablet by mouth daily 90 tablet 1    lisinopril (PRINIVIL;ZESTRIL) 20 MG tablet Take 1 tablet by mouth 2 times daily 180 tablet 1    Icosapent Ethyl (VASCEPA) 1 g CAPS capsule Take 2 capsules by mouth 2 times daily 360 capsule 1    Cholecalciferol (VITAMIN D3) 50 MCG (2000 UT) CAPS Take 8 capsules by mouth three times a week      insulin lispro (HUMALOG KWIKPEN) 200 UNIT/ML SOPN pen INJECT UP TO 60 UNITS INTO THE SKIN THREE TIMES A DAY BEFORE MEALS PER SLIDING SCALE 108 mL 3    Continuous Blood Gluc  (FREESTHaiku Deck NATHALIE 14 DAY READER) TAMERA 1 Units by Does not apply route as needed (as needed) 1 Device 0    aspirin 325 MG tablet Take 325 mg by mouth daily      fenofibrate (TRICOR) 145 MG tablet Take 1 tablet by mouth daily 90 tablet 1    b complex vitamins capsule Take 1 capsule by mouth daily      furosemide (LASIX) 20 MG tablet Take 2 tablets by mouth daily (Patient not taking: Reported on 8/6/2020) 30 tablet 5     No current facility-administered medications for this visit. Family history: All family history was reviewed by me today    Family History   Problem Relation Age of Onset    High Blood Pressure Mother     Cancer Mother     Pacemaker Father     Other Maternal Grandmother         CVA in [de-identified] Other Maternal Grandfather         CABG and aortic valve replacement in [de-identified]    Hypertension Maternal Grandfather     Other Paternal Grandmother         CVA in [de-identified]       No family history of immunocompromising or autoimmune conditions. No h/o TBin in family or contacts      REVIEW OF SYSTEMS:      Review of Systems   Constitutional: Negative for chills, diaphoresis and fever. HENT: Negative for ear discharge, ear pain, rhinorrhea, sore throat and trouble swallowing. Eyes: Negative for discharge and redness. Respiratory: Negative for cough, shortness of breath and wheezing. Cardiovascular: Negative for chest pain and leg swelling.    Gastrointestinal: Negative for abdominal pain, Component Value Date    WBC 8.2 07/29/2020    HGB 14.8 07/29/2020    HCT 45.8 07/29/2020    MCV 83.3 07/29/2020     07/29/2020    LYMPHOPCT 26.0 07/29/2020    RBC 5.50 07/29/2020    MCH 26.9 07/29/2020    MCHC 32.3 07/29/2020    RDW 14.6 07/29/2020       Last BMP:  Lab Results   Component Value Date     07/21/2020    K 4.5 07/21/2020     07/21/2020    CO2 23 07/21/2020    BUN 31 07/21/2020    CREATININE 1.5 07/21/2020    GLUCOSE 175 07/21/2020    CALCIUM 9.3 07/21/2020        Hepatic Function Panel:   Lab Results   Component Value Date    ALKPHOS 71 12/12/2019    ALT 24 12/12/2019    AST 28 12/12/2019    PROT 7.0 12/12/2019    PROT 8.1 04/28/2011    BILITOT <0.2 12/12/2019    BILIDIR <0.2 03/05/2016    IBILI see below 03/05/2016    LABALBU 4.2 12/12/2019       Last CK:  Lab Results   Component Value Date    CKTOTAL 115 03/01/2019       Last ESR:  Lab Results   Component Value Date    SEDRATE 52 (H) 07/29/2020       Last CRP:  Lab Results   Component Value Date    CRP 20.1 (H) 07/29/2020       1. Imaging: All pertinent images and reports for the current visit were reviewed by me during this visit. Outside records:    Labs, Microbiology, Radiology and pertinentresults from Care everywhere, if available, were reviewed as a part of the consultation.     Problem list:       Patient Active Problem List   Diagnosis Code    Diabetes mellitus ([facility] E11.9    HTN (hypertension) I10    Other and unspecified hyperlipidemia E78.5    Depressive disorder, not elsewhere classified F32.9    Pharyngitis J02.9    Abnormal stress test R94.39    HLD (hyperlipidemia) E78.5    Chest pain R07.9    Unstable angina ([facility] I20.0    Coronary artery disease involving native coronary artery of native heart with angina pectoris ([facility] I25.119    Essential hypertension I10    Mixed hyperlipidemia E78.2    HCAP (healthcare-associated pneumonia) J18.9    Cellulitis L03.90    Fever R50.9    Cellulitis of chest wall L03.313    Type 2 diabetes mellitus with circulatory disorder ([facility]) E11.59    Pure hypercholesterolemia E78.00    Klebsiella infection A49.8    High triglycerides E78.1    Vitamin D deficiency E55.9    Leg swelling M79.89    Pain in both lower extremities M79.604, M79.605    PVD (peripheral vascular disease) ([facility]) I73.9    Chronic venous htn w inflammation of bilateral low extrm L18.762    Degenerative disc disease, lumbar M51.36       Microbiology:       All available micro data was reviewed by me during this visit    · Back wound culture  - collected on 7/29/2020: Staph epidermidis      Staphylococcus epidermidis (1)     Antibiotic  Interpretation  MARK  Status     clindamycin  Resistant  >=8  mcg/mL      erythromycin  Resistant  >=8  mcg/mL      oxacillin  Resistant  >=4  mcg/mL      tetracycline  Sensitive  2  mcg/mL      trimethoprim-sulfamethoxazole  Resistant  160  mcg/mL      vancomycin  Sensitive  1  mcg/mL       · Lumbar laminectomy fluid aspirate culture: Collected on 7/31/2020: Gram stain showed 2+ WBCs, no organisms, culture was negative. AFB and fungal stains and cultures also negative    Allergies and immunizations:       Known drug Allergies: All allergies data was reviewed by me during this visit  Penicillins    Immunizations: All immunizations were reviewed byme in detail. Immunization History   Administered Date(s) Administered    Influenza Virus Vaccine 10/12/2017, 10/01/2018, 10/08/2018, 10/04/2019    Pneumococcal Polysaccharide ([id number]) 12/10/2018       Social History:  Allsocial and epidemiologic history was reviewed and updated be me in detail today.     Social History     Socioeconomic History    Marital status:      Spouse name: Not on file    Number of children: Not on file    Years of education: Not on file    Highest education level: Not on file   Occupational History    Not on file   Social Needs    Financial resource strain: Patient refused  Food insecurity     Worry: Patient refused     Inability: Patient refused    Transportation needs     Medical: Patient refused     Non-medical: Patient refused   Tobacco Use    Smoking status: Never Smoker    Smokeless tobacco: Never Used   Substance and Sexual Activity    Alcohol use: Yes     Comment: rare    Drug use: No    Sexual activity: Yes     Partners: Female   Lifestyle    Physical activity     Days per week: Not on file     Minutes per session: Not on file    Stress: Not on file   Relationships    Social connections     Talks on phone: Not on file     Gets together: Not on file     Attends [employer] service: Not on file     Active member of club or organization: Not on file     Attends meetings of clubs or organizations: Not on file     Relationship status: Not on file    Intimate partner violence     Fear of current or ex partner: Not on file     Emotionally abused: Not on file     Physically abused: Not on file     Forced sexual activity: Not on file   Other Topics Concern    Not on file   Social History Narrative    Not on file       IMPRESSION:    The patient is a 45 y. o.old male who  has a past medical history of Abscess (1994), Anesthesia, CAD (coronary artery disease) (3/2016), Diabetes mellitus ([facility] (2001), Gout, HLD (hyperlipidemia), HTN (hypertension), MDRO (multiple drug resistant organisms) resistance, MRSA (methicillin resistant staph aureus) culture positive (04/19/2017), Obesity, Pancreatitis (2006), PONV (postoperative nausea and vomiting), Toxicity, chemicals (2013), Type 2 diabetes mellitus without complication ([facility] and Wound abscess (1994). was seen today for following problems:    1. Infection of deep incisional surgical site after procedure, initial encounter    2. Staphylococcus epidermidis infection    3. Elevated sed rate    4. Elevated C-reactive protein (CRP)    5.  Type 2 diabetes mellitus with other circulatory complication, with long-term current use of frequent and proper hand hygiene when outside and using 3 layered mouth and nose coverings/facemasks when outside their home. Labs ordered today inEPIC:    Orders Placed This Encounter   Procedures    SEDIMENTATION RATE     Standing Status:   Future     Standing Expiration Date:   8/6/2021    C-REACTIVE PROTEIN     Standing Status:   Future     Standing Expiration Date:   8/6/2021       Diabetes mellitus education and counseling:    Patient was educated in detail about the importance of keeping diabetes under control to improve the cure rate of infection and prevent future infections. Patient was advised to check blood glucose level regularly and to stay compliant with the diabetes medications. Patient was advised to the trim the toe nails carefully, wear shoes or slippers at all times and check both feet everyday before going to bed to look for any cuts, blisters, swelling or redness. Importance of washing the feet everyday with soap and water and keeping them dry, and seeking prompt medical attention in case of a non-healing wound or ulcer on the feet was also highlighted. Weight loss counseling:    Extensive weight loss counseling was done. It is important to set a realistic weight loss goal. First goal should be to avoid gaining more weight and staying at current weight (or within 5 percent). People at high risk of developing diabetes who are able to lose 5 percent of their body weight and maintain this weight will reduce their risk of developing diabetes by about 50 percent and reduce their blood pressure. Losing more than 15 percent of  body weight and staying at this weight is an extremely good result, even if you never reach your \"dream\" or \"ideal\" weight.     Lifestyle changes including changing eating habits, substituting excess carbohydrates with proteins, stress reduction, using self-help programs like Weight Watchers®, Overeaters Anonymous®, and Take Off Pounds Sensibly (TOPS)© , following DASH diet and increasing exercise or walking briskly daily for half hour to and hour 5-7 days a week was suggested among other measures. Information was given about various weight loss education programs and their websites like www.cdc.gov/healthyweight, www.choosemyplate.gov and www.health.gov/dietaryguidelines/      Patient education and counseling:    · The patient was educated in detail about the side-effects of variousantibiotics and things to watch for like new rashes, lip swelling, severe reaction, worsening diarrhea, break through fever etc.  · Patient was instructed to stop antibiotics and callour office if these problems were to occur, or go to nearest ER if experiencing severe symptoms. · Discussed patient's condition and what to expect. All of the patient's questions wereaddressed in a satisfactory manner and patient verbalized understanding all instructions. Follow-up:    · Follow-up with me in ID clinic or through video visit  as needed    Please note that this chart was generated using Dragon dictation software. Although every effort was made to ensure the accuracy of this automated transcription, some errors in transcription may have occurred inadvertently. If you may need any clarification, please do not hesitate to contact me through EPIC or at the phone number provided below with my electronic signature. Any pictures or media included in this note were obtained after taking informed verbal consent from the patient and with their approval to include those in the patient's medical record. Thankyou for involving me in the care of your patient. If you have any additional questions,please do not hesitate to contact me.     Jayshree Colon MD, MPH  8/6/20 , 12:38 PM EDT   [facility] Infectious Disease   Office: [phone]  Fax: [phone]  Tuesday AM clinic: Margret Mckenzie [facility]  Thursday AM clinic: [address] [place] [address]

## 2020-08-14 ENCOUNTER — TELEPHONE (OUTPATIENT)
Dept: ORTHOPEDIC SURGERY | Age: 50
End: 2020-08-14

## 2020-08-20 ENCOUNTER — TELEPHONE (OUTPATIENT)
Dept: INFECTIOUS DISEASES | Age: 50
End: 2020-08-20

## 2020-08-20 RX ORDER — DOXYCYCLINE HYCLATE 100 MG
100 TABLET ORAL 2 TIMES DAILY
Qty: 30 TABLET | Refills: 0 | Status: SHIPPED | OUTPATIENT
Start: 2020-08-20 | End: 2020-09-04

## 2020-08-20 NOTE — TELEPHONE ENCOUNTER
Patient called in to report that wound is healed over but is turning purple and skin is stretched and appears thin. He finished his course of zyvox last week. Would like to have it looked at, he is aware you are only doing virtual visits at this time. Asked if you could call him back at [address] [place] [address] please.

## 2020-08-21 ENCOUNTER — HOSPITAL ENCOUNTER (OUTPATIENT)
Age: 50
Discharge: HOME | End: 2020-08-21
Payer: COMMERCIAL

## 2020-08-21 LAB
C-REACTIVE PROTEIN: 2.2 MG/L (ref 0–5.1)
SEDIMENTATION RATE, ERYTHROCYTE: 11 MM/HR (ref 0–20)

## 2020-08-21 PROCEDURE — 86140 C-REACTIVE PROTEIN: CPT

## 2020-08-21 PROCEDURE — 85652 RBC SED RATE AUTOMATED: CPT

## 2020-08-21 PROCEDURE — 36415 COLL VENOUS BLD VENIPUNCTURE: CPT

## 2020-08-31 LAB
FUNGUS (MYCOLOGY) CULTURE: NORMAL
FUNGUS STAIN: NORMAL

## 2020-09-15 LAB
AFB CULTURE (MYCOBACTERIA): NORMAL
AFB SMEAR: NORMAL

## 2020-09-22 ENCOUNTER — EMPLOYEE WELLNESS (OUTPATIENT)
Dept: OTHER | Age: 50
End: 2020-09-22

## 2020-09-22 LAB
CHOLESTEROL, TOTAL: 172 MG/DL (ref 0–199)
GLUCOSE BLD-MCNC: 130 MG/DL (ref 70–99)
HDLC SERPL-MCNC: 32 MG/DL (ref 40–60)
LDL CHOLESTEROL CALCULATED: 87 MG/DL
TRIGL SERPL-MCNC: 266 MG/DL (ref 0–150)

## 2020-09-23 LAB
ESTIMATED AVERAGE GLUCOSE: 171.4 MG/DL
HBA1C MFR BLD: 7.6 %

## 2020-10-14 ENCOUNTER — HOSPITAL ENCOUNTER (OUTPATIENT)
Dept: GENERAL RADIOLOGY | Age: 50
Discharge: HOME | End: 2020-10-14
Payer: COMMERCIAL

## 2020-10-14 ENCOUNTER — HOSPITAL ENCOUNTER (OUTPATIENT)
Age: 50
Discharge: HOME | End: 2020-10-14
Payer: COMMERCIAL

## 2020-10-14 PROCEDURE — 72110 X-RAY EXAM L-2 SPINE 4/>VWS: CPT

## 2020-10-19 VITALS — WEIGHT: 287 LBS | BODY MASS INDEX: 40.03 KG/M2

## 2020-10-23 ENCOUNTER — OFFICE VISIT (OUTPATIENT)
Dept: PRIMARY CARE CLINIC | Age: 50
End: 2020-10-23
Payer: COMMERCIAL

## 2020-10-23 PROCEDURE — 99211 OFF/OP EST MAY X REQ PHY/QHP: CPT | Performed by: NURSE PRACTITIONER

## 2020-10-23 NOTE — PROGRESS NOTES
Georgiana Lee received a viral test for COVID-19. They were educated on isolation and quarantine as appropriate. For any symptoms, they were directed to seek care from their PCP, given contact information to establish with a doctor, directed to an urgent care or the emergency room.

## 2020-10-26 LAB — SARS-COV-2, NAA: NOT DETECTED

## 2020-10-26 NOTE — RESULT ENCOUNTER NOTE

## 2020-11-02 PROBLEM — M10.9 ACUTE GOUT OF RIGHT FOOT: Status: ACTIVE | Noted: 2020-11-02

## 2020-11-28 ENCOUNTER — TELEPHONE (OUTPATIENT)
Dept: INTERNAL MEDICINE CLINIC | Age: 50
End: 2020-11-28

## 2020-11-28 RX ORDER — DOXYCYCLINE HYCLATE 100 MG
200 TABLET ORAL ONCE
Qty: 2 TABLET | Refills: 0 | Status: SHIPPED | OUTPATIENT
Start: 2020-11-28 | End: 2020-11-28

## 2020-11-28 NOTE — TELEPHONE ENCOUNTER
Pt called to report he found a tick on the side of his body. He estimates it was there for over 24 hours but unsure if >72h. He removed the tick and there is a red \"bullseye\" rash around the area where the tick was. He is otherwise asymptomatic. We dicussed prophylactic treatment with doxycycline and he was agreeable. Call with any worsening symptoms.

## 2020-11-30 ENCOUNTER — HOSPITAL ENCOUNTER (OUTPATIENT)
Dept: MRI IMAGING | Age: 50
Discharge: HOME | End: 2020-11-30
Payer: COMMERCIAL

## 2020-11-30 PROCEDURE — 72158 MRI LUMBAR SPINE W/O & W/DYE: CPT

## 2020-11-30 PROCEDURE — 6360000004 HC RX CONTRAST MEDICATION: Performed by: NURSE PRACTITIONER

## 2020-11-30 PROCEDURE — A9577 INJ MULTIHANCE: HCPCS | Performed by: NURSE PRACTITIONER

## 2020-11-30 PROCEDURE — 2580000003 HC RX 258: Performed by: NURSE PRACTITIONER

## 2020-11-30 RX ORDER — SODIUM CHLORIDE 0.9 % (FLUSH) 0.9 %
10 SYRINGE (ML) INJECTION ONCE
Status: COMPLETED | OUTPATIENT
Start: 2020-11-30 | End: 2020-11-30

## 2020-11-30 RX ADMIN — Medication 10 ML: at 15:10

## 2020-11-30 RX ADMIN — GADOBENATE DIMEGLUMINE 25 ML: 529 INJECTION, SOLUTION INTRAVENOUS at 15:10

## 2020-12-04 ENCOUNTER — OFFICE VISIT (OUTPATIENT)
Dept: PRIMARY CARE CLINIC | Age: 50
End: 2020-12-04
Payer: COMMERCIAL

## 2020-12-04 PROCEDURE — 99211 OFF/OP EST MAY X REQ PHY/QHP: CPT | Performed by: NURSE PRACTITIONER

## 2020-12-04 NOTE — PROGRESS NOTES
Robert De La Rosa received a viral test for COVID-19. They were educated on isolation and quarantine as appropriate. For any symptoms, they were directed to seek care from their PCP, given contact information to establish with a doctor, directed to an urgent care or the emergency room.

## 2020-12-05 LAB — SARS-COV-2, PCR: DETECTED

## 2020-12-21 RX ORDER — BLOOD-GLUCOSE METER
EACH MISCELLANEOUS
Qty: 1 DEVICE | Refills: 0 | OUTPATIENT
Start: 2020-12-21

## 2020-12-22 NOTE — TELEPHONE ENCOUNTER
Pharmacy called and said a new script for a prodigy meter needs sent in. . the patients other one broke       [address] [facility] [phone] - F [phone]

## 2020-12-30 ENCOUNTER — TELEPHONE (OUTPATIENT)
Dept: PHARMACY | Facility: CLINIC | Age: 50
End: 2020-12-30

## 2021-01-07 ENCOUNTER — SCHEDULED TELEPHONE ENCOUNTER (OUTPATIENT)
Dept: PHARMACY | Facility: CLINIC | Age: 51
End: 2021-01-07

## 2021-01-07 RX ORDER — SEMAGLUTIDE 1.34 MG/ML
0.5 INJECTION, SOLUTION SUBCUTANEOUS
COMMUNITY

## 2021-01-07 NOTE — TELEPHONE ENCOUNTER
[facility] AT [place] Employee Diabetes Program - Be Well With Diabetes  =================================================================  Rodolfo Mallory is a 45 y.o. male enrolled in the [address] [place] [address] Diabetes Program. Patient provided Marybethsa Griselda with verbal consent to remain in the program for this year. Patient enrolled 12/6/17. Medications:  Current Outpatient Medications   Medication Sig Dispense Refill    blood glucose monitor kit and supplies Dispense sufficient amount for indicated testing frequency plus additional to accommodate PRN testing needs. Dispense all needed supplies to include: monitor, strips, lancing device, lancets, control solutions, alcohol swabs. Use to test blood glucose levels 4 times daily 1 kit 0    metoprolol succinate (TOPROL XL) 50 MG extended release tablet TAKE 1 TABLET BY MOUTH ONE TIME A DAY 90 tablet 0    tadalafil (CIALIS) 20 MG tablet Take 1 tablet by mouth daily as needed for Erectile Dysfunction 10 tablet 0    Prodigy Lancets 28G MISC 1 Units by Does not apply route 3 times daily as needed (blood sugar) 100 each 1    blood glucose test strips (EXACTECH TEST) strip 1 each by In Vitro route 3 times daily as needed (blood sugar) As needed. 100 each 1    metFORMIN (GLUCOPHAGE) 1000 MG tablet TAKE 1 TABLET BY MOUTH 2 TIMES A DAY WITH MEALS 180 tablet 0    furosemide (LASIX) 20 MG tablet    *only prn   Take 2 tablets by mouth daily 30 tablet 5    Phentermine-Topiramate (QSYMIA) 3.75-23 MG CP24 Take by mouth.       TESTOSTERONE PROPIONATE IM Inject into the muscle Every 10 days      TOUJEO SOLOSTAR 300 UNIT/ML SOPN    *130u daily now - updated   INJECT 180 UNITS INTO THE SKIN NIGHTLY 54 mL 1    Ertugliflozin L-PyroglutamicAc (STEGLATRO) 15 MG TABS Take 1 tablet by mouth daily 90 tablet 3    Continuous Blood Gluc Sensor (FREESTYLE NATHALIE 14 DAY SENSOR) MISC Use 1 sensor every 14 days to monitor blood sugars 6 each 3    TRUEPLUS PEN NEEDLES 32G X 4 MM MISC USE FOUR TIMES A  each 5    rosuvastatin (CRESTOR) 40 MG tablet Take 1 tablet by mouth daily 90 tablet 1    lisinopril (PRINIVIL;ZESTRIL) 20 MG tablet Take 1 tablet by mouth 2 times daily 180 tablet 1    Icosapent Ethyl (VASCEPA) 1 g CAPS capsule Take 2 capsules by mouth 2 times daily 360 capsule 1    Cholecalciferol (VITAMIN D3) 50 MCG (2000 UT) CAPS Take 8 capsules by mouth three times a week      insulin lispro (HUMALOG KWIKPEN) 200 UNIT/ML SOPN pen    *only on sick days INJECT UP TO 60 UNITS INTO THE SKIN THREE TIMES A DAY BEFORE MEALS PER SLIDING SCALE 108 mL 3    Continuous Blood Gluc  (FREESTYLE NATHALIE 14 DAY READER) TAMERA 1 Units by Does not apply route as needed (as needed) 1 Device 0    aspirin 325 MG tablet    *reports significant heart hx   Take 325 mg by mouth daily      fenofibrate (TRICOR) 145 MG tablet    *no longer taking (rx from 2017) - removed   Take 1 tablet by mouth daily 90 tablet 1    b complex vitamins capsule Take 1 capsule by mouth daily       Current Pharmacy: [facility]  Current testing supplies/frequency: Freestyle Nathalie and Prodigy BG meter    Allergies:   Allergies   Allergen Reactions    Penicillins Anaphylaxis      Vitals/Labs:  BP Readings from Last 3 Encounters:   11/02/20 128/72   07/31/20 (!) 167/103   07/21/20 136/79     Component      Latest Ref Rng & Units 10/17/2019           7:14 AM   Microalbumin, Random Urine      <2.0 mg/dL 2.20 (H)   Creatinine, Ur      39.0 - 259.0 mg/dL 114.8   Microalbumin Creatinine Ratio      0.0 - 30.0 mg/g 19.2     Lab Results   Component Value Date    LABA1C 7.6 09/22/2020    LABA1C 7.9 06/30/2020    LABA1C 9.0 01/23/2020     Lab Results   Component Value Date    CHOL 172 09/22/2020    TRIG 266 (H) 09/22/2020    HDL 32 (L) 09/22/2020    LDLCALC 87 09/22/2020    LDLDIRECT 68 10/17/2019     ALT   Date Value Ref Range Status   12/12/2019 24 10 - 40 U/L Final     AST   Date Value Ref Range Status   12/12/2019 28 15 - 37 U/L Final     The ASCVD Risk score (Kasandra Ontiveros, et al., 2013) failed to calculate for the following reasons:    Unable to determine if patient is Non-      Lab Results   Component Value Date    CREATININE 1.1 11/02/2020     Immunizations:  Immunization History   Administered Date(s) Administered    Influenza Virus Vaccine 10/12/2017, 10/01/2018, 10/08/2018, 10/04/2019, 10/08/2020    Pneumococcal Polysaccharide ([id number]) 12/10/2018      Social History:  Social History     Tobacco Use    Smoking status: Never Smoker    Smokeless tobacco: Never Used   Substance Use Topics    Alcohol use: Yes     Comment: rare     ASSESSMENT:  Initial Program Requirements (Y indicates has completed for the year, N indicates needs to be completed by 7/1/2021): No - OV with provider for DM (1st)  No - A1c (1st)     Ongoing Program Requirements (Y indicates has completed for the year, N indicates needs to be completed by 12/31/2021): No - OV with provider for DM (2nd)  No - ACC/diabetes educator visit (if A1c over 8%)  No - A1c (2nd)  No - Lipid panel  No - Urine microalbumin  Yes - Pneumococcal vaccination: Up-to-date, not needed again until age 71  No - Influenza vaccination for Fall 2021  N/A (insulin) - Medication adherence over 70%  Yes - On statin or contraindication(s) - prescribed rosuvastatin  Yes - On ACEi/ARB or contraindication(s) - prescribed lisinopril     Formulary Medication Review:  Non-formulary or medications with cost-effective alternatives: n/a.      Current medications eligible for copay waiver, up to $600, through [address]:  - aspirin (with prescription), Toujeo, Humalog, Ozempic, lisinopril, metformin, rosuvastatin, Freestyle Shilo  - Generic [facility] pharmacy-stocked) insulin syringes and pen needles  - Agamatrix or Prodigy meter and supplies  - Dexcom G6 supplies     Diabetes Care:   - Glycemic Goal: <7.0% and directed by provider. Not at goal, but trending toward goal, with therapy changes including reducing Humalog and Toujeo - changed endocrinologists (external, Dr. Clint Medina) and working closely with him, has developed good rapport and relationship. Currently using Toujeo 130u daily, Humalog only on \"sick\" days, metformin 1000mg BID, Steglatro 15mg daily and Ozempic 1mg weekly. - Home blood sugar records:  using [facility] which he believes has helped tremendously for his blood sugar awareness and control  - Eye exam current (within one year): no  - Foot exam current (within one year): no  - Daily Aspirin?  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   - Lipids: prescribed high-intensity (rosuvastatin 40mg daily); LDL <100  - Adherent to ACEi/ARB and/or statin: patient admittedly non-adherent with PM doses (lisinopril is BID and rosuvastatin PM)  - Smoking status: never smoked    PLAN:  - Medication list updated as noted above  - DM program gaps identified:   · Initial requirements: OV with provider for DM (1st) and A1c (1st)   · Ongoing requirements: OV with provider for DM (2nd), ACC/diabetes educator visit (if A1c over 8%), A1c (2nd), Lipid panel, Urine microalbumin, Influenza vaccination for [id number] and Medication adherence over 70%   - Education to patient:   · Addressed diet and exercise; discussed food prep, wilfredo to consider packing lunch for work  · Reminded to get yearly retinal exam  · Addressed medication adherence - patient plans to purchase the \"[employer] Medication Dispenser\", which beeps until the medications have been taken  · Overview of HHP robin  · Reminded he can request A1c at [facility]  · Reviewed that with [facility], he will likely exceed $600 of program benefit this year    Gian SethiD, [facility]  Direct: [phone]  Department, toll free: [phone], option 7     =======================================================   For Pharmacy Admin Tracking Only    PHSO: Yes  Total # of Interventions Recommended: 2  - Updated Order #: 1 Updated Order Reason(s):  Other  Recommended intervention potential cost savings: 1  Total Interventions Accepted: 2  Time Spent (min): 45

## 2021-01-15 DIAGNOSIS — Z79.4 TYPE 2 DIABETES MELLITUS WITH DIABETIC PERIPHERAL ANGIOPATHY WITHOUT GANGRENE, WITH LONG-TERM CURRENT USE OF INSULIN (HCC): ICD-10-CM

## 2021-01-15 DIAGNOSIS — E11.51 TYPE 2 DIABETES MELLITUS WITH DIABETIC PERIPHERAL ANGIOPATHY WITHOUT GANGRENE, WITH LONG-TERM CURRENT USE OF INSULIN (HCC): ICD-10-CM

## 2021-01-15 RX ORDER — FLASH GLUCOSE SENSOR
KIT MISCELLANEOUS
Qty: 2 EACH | Refills: 3 | Status: SHIPPED | OUTPATIENT
Start: 2021-01-15 | End: 2021-06-07

## 2021-01-15 NOTE — TELEPHONE ENCOUNTER
Medication:   Requested Prescriptions     Pending Prescriptions Disp Refills    Continuous Blood Gluc Sensor (FREESTYLE NATHALIE 14 DAY SENSOR) MISC [Pharmacy Med Name: Tisha Davisonbryce 14 DAY SENSO  MISC] 2 each 3     Sig: USE 1 SENSOR EVERY 14 DAYS TO MONITOR BLOOD SUGARS       Last Filled:      Patient Phone Number: [phone] (home)     Last appt: 6/18/2020   Next appt: Visit date not found    Last Labs DM:   Lab Results   Component Value Date    LABA1C 7.6 09/22/2020

## 2021-03-02 ENCOUNTER — OFFICE VISIT (OUTPATIENT)
Dept: PSYCHOLOGY | Age: 51
End: 2021-03-02
Payer: COMMERCIAL

## 2021-03-02 DIAGNOSIS — F43.22 ADJUSTMENT DISORDER WITH ANXIETY: Primary | ICD-10-CM

## 2021-03-02 PROCEDURE — 90791 PSYCH DIAGNOSTIC EVALUATION: CPT | Performed by: PSYCHOLOGIST

## 2021-03-02 ASSESSMENT — PATIENT HEALTH QUESTIONNAIRE - PHQ9
SUM OF ALL RESPONSES TO PHQ9 QUESTIONS 1 & 2: 0
2. FEELING DOWN, DEPRESSED OR HOPELESS: 0

## 2021-03-02 ASSESSMENT — ANXIETY QUESTIONNAIRES
4. TROUBLE RELAXING: 1-SEVERAL DAYS
7. FEELING AFRAID AS IF SOMETHING AWFUL MIGHT HAPPEN: 0-NOT AT ALL
5. BEING SO RESTLESS THAT IT IS HARD TO SIT STILL: 1-SEVERAL DAYS

## 2021-03-02 NOTE — PROGRESS NOTES
Tobacco Use    Smoking status: Never Smoker    Smokeless tobacco: Never Used   Substance Use Topics    Alcohol use: Yes     Comment: rare        Illicit drugs:   Social History     Substance and Sexual Activity   Drug Use No        O:  MSE:  Appearance: good hygiene   Attitude: cooperative  Consciousness: alert  Orientation: oriented to person, place, time, general circumstance  Memory: recent and remote memory intact  Attention/Concentration: intact during session  Psychomotor Activity: normal  Eye Contact: normal  Speech: normal rate and volume, well-articulated  Mood: dysphoria   Affect: congruent  Perception: within normal limits  Thought Content: within normal limits  Thought Process: logical, coherent and goal-directed  Insight: good  Judgment: intact  Ability to understand instructions: Yes  Ability to respond meaningfully: Yes  Morbid Ideation: no   Suicide Assessment: no suicidal ideation, plan, or intent  Homicidal Ideation: no    A:  This visit was primarily focused on collecting psychosocial history and helping the patient identify his goals for his behavioral change plan. He was asked about his openness towards anti-depressant medication, and he demurred at this time. This [facility] said that anti-depressants often help when anger is an issue and that we would monitor his progress meeting goals and if needed, have that discussion at a later time. He responded to creating a plan around anger management, and that subsequent visits would provide additional information towards creating his goals. GABRIEL 7 SCORE 3/2/2021   GABRIEL-7 Total Score 3     Interpretation of GABRIEL-7 score: 5-9 = mild anxiety, 10-14 = moderate anxiety, 15+ = severe anxiety. Recommend referral to behavioral health for scores 10 or greater.     PHQ Scores 3/2/2021 9/6/2018 1/19/2018 10/19/2017   PHQ2 Score 0 0 0 0   PHQ9 Score 0 0 0 0     Interpretation of Total Score Depression Severity: 1-4 = Minimal depression, 5-9 = Mild depression, 10-14 = Moderate depression, 15-19 = Moderately severe depression, 20-27 = Severe depression    Diagnosis:    1. Adjustment disorder with anxiety        Patient Active Problem List   Diagnosis    Diabetes [facility]    HTN (hypertension)    Other and unspecified hyperlipidemia    Depressive disorder, not elsewhere classified    Pharyngitis    Abnormal stress test    HLD (hyperlipidemia)    Chest pain    Unstable angina ([facility]    Coronary artery disease involving native coronary artery of native heart with angina pectoris ([facility]    Essential hypertension    Mixed hyperlipidemia    HCAP (healthcare-associated pneumonia)    Cellulitis    Fever    Cellulitis of chest wall    Type 2 diabetes mellitus with circulatory disorder (HCC)    Pure hypercholesterolemia    Klebsiella infection    High triglycerides    Vitamin D deficiency    Leg swelling    Pain in both lower extremities    PVD (peripheral vascular disease) ([facility]    Chronic venous htn w inflammation of bilateral low extrm    Degenerative disc disease, lumbar    Acute gout of right foot         Plan:  Pt interventions:  Established rapport, [place]-setting to identify pt's primary goals for [facility] [facility] visit / overall health, Supportive techniques and Emphasized self-care as important for managing overall health.        Pt Behavioral Change Plan:  Pt set the following goals:  Pt scheduled F/U visit in one week

## 2021-03-09 ENCOUNTER — OFFICE VISIT (OUTPATIENT)
Dept: PSYCHOLOGY | Age: 51
End: 2021-03-09
Payer: COMMERCIAL

## 2021-03-09 DIAGNOSIS — F43.20 ADJUSTMENT DISORDER, UNSPECIFIED TYPE: Primary | ICD-10-CM

## 2021-03-09 PROCEDURE — 90832 PSYTX W PT 30 MINUTES: CPT | Performed by: PSYCHOLOGIST

## 2021-03-09 ASSESSMENT — PATIENT HEALTH QUESTIONNAIRE - PHQ9
SUM OF ALL RESPONSES TO PHQ QUESTIONS 1-9: 5
6. FEELING BAD ABOUT YOURSELF - OR THAT YOU ARE A FAILURE OR HAVE LET YOURSELF OR YOUR FAMILY DOWN: 0
7. TROUBLE CONCENTRATING ON THINGS, SUCH AS READING THE NEWSPAPER OR WATCHING TELEVISION: 0
5. POOR APPETITE OR OVEREATING: 0
1. LITTLE INTEREST OR PLEASURE IN DOING THINGS: 2
4. FEELING TIRED OR HAVING LITTLE ENERGY: 1

## 2021-03-09 ASSESSMENT — ANXIETY QUESTIONNAIRES
7. FEELING AFRAID AS IF SOMETHING AWFUL MIGHT HAPPEN: 1-SEVERAL DAYS
5. BEING SO RESTLESS THAT IT IS HARD TO SIT STILL: 0-NOT AT ALL
2. NOT BEING ABLE TO STOP OR CONTROL WORRYING: 2-OVER HALF THE DAYS

## 2021-03-09 NOTE — PROGRESS NOTES
Behavioral Health Consultation  Benita Quezada, Ph.D.  Psychologist  3/9/2021  3:00 PM EST      Time spent with Patient: 30 minutes  This is patient's second [facility] appointment. Reason for Consult: dysphoria  Referring Provider: PAUL Luke - CNP  [id number] [facility] [address]    Feedback for PCP:     Pt provided informed consent for the behavioral health program. Discussed with patient model of service to include the limits of confidentiality (i.e. abuse reporting, suicide intervention, etc.) and short-term intervention focused approach. Pt indicated understanding. Feedback given to PCP. S:  Patient describes dissatisfaction with his current employment, citing how much more responsibility and critical thinking skills he was able to utilize in previous employment. He says that he lost employment in the past by being \"too outspoken. \" He said that in his current employment, he self-observes that he \"exagerrates' narratives of things that have occurred. He says that he believes that this is related to a sense of anxiety or stress. He believes that his current place of employment is in some ways oppressive and he believes he will not have the opportunity to provide the kind of service he had hoped to provide in the current \"culture\" at his current place of employment.         Social History     Tobacco Use    Smoking status: Never Smoker    Smokeless tobacco: Never Used   Substance Use Topics    Alcohol use: Yes     Comment: rare        Illicit drugs:   Social History     Substance and Sexual Activity   Drug Use No        O:  MSE:  Appearance: good hygiene   Attitude: cooperative  Consciousness: alert  Orientation: oriented to person, place, time, general circumstance  Memory: recent and remote memory intact  Attention/Concentration: intact during session  Psychomotor Activity: normal  Eye Contact: normal  Speech: normal rate and volume, well-articulated  Mood: less dysphoria   Affect: congruent  Perception: within normal limits  Thought Content: within normal limits  Thought Process: logical, coherent and goal-directed  Insight: good  Judgment: intact  Ability to understand instructions: Yes  Ability to respond meaningfully: Yes  Morbid Ideation: no   Suicide Assessment: no suicidal ideation, plan, or intent  Homicidal Ideation: no    A:  The patient appeared to be able to determine that he will need to find a different position/job for him to be able to help others in the capacity he feels he has been able to in the past. He sometimes would venture into areas of concern that were not within his influence, so he was guided back to focusing on things that he has control over, rather than things he wishes were different but cannot influence. He appeared to understand that the way for him to be happier in his relationship was first and foremost was for him to be happy himself. GABRIEL 7 SCORE 3/9/2021 3/2/2021   GABRIEL-7 Total Score 4 3     Interpretation of GABRIEL-7 score: 5-9 = mild anxiety, 10-14 = moderate anxiety, 15+ = severe anxiety. Recommend referral to behavioral health for scores 10 or greater. PHQ Scores 3/9/2021 3/2/2021 9/6/2018 1/19/2018 10/19/2017   PHQ2 Score 3 0 0 0 0   PHQ9 Score 5 0 0 0 0     Interpretation of Total Score Depression Severity: 1-4 = Minimal depression, 5-9 = Mild depression, 10-14 = Moderate depression, 15-19 = Moderately severe depression, 20-27 = Severe depression    Diagnosis:    1.  Adjustment disorder, unspecified type        Patient Active Problem List   Diagnosis    Diabetes mellitus ([facility]    HTN (hypertension)    Other and unspecified hyperlipidemia    Depressive disorder, not elsewhere classified    Pharyngitis    Abnormal stress test    HLD (hyperlipidemia)    Chest pain    Unstable angina ([address]    Coronary artery disease involving native coronary artery of native heart with angina pectoris ([address]    Essential hypertension    Mixed hyperlipidemia  HCAP (healthcare-associated pneumonia)    Cellulitis    Fever    Cellulitis of chest wall    Type 2 diabetes mellitus with circulatory disorder (HCC)    Pure hypercholesterolemia    Klebsiella infection    High triglycerides    Vitamin D deficiency    Leg swelling    Pain in both lower extremities    PVD (peripheral vascular disease) (HCC)    Chronic venous htn w inflammation of bilateral low extrm    Degenerative disc disease, lumbar    Acute gout of right foot         Plan:  Pt interventions:  Established rapport, [place]-setting to identify pt's primary goals for [facility] visit / overall health, Supportive techniques and Emphasized self-care as important for managing overall health.        Pt Behavioral Change Plan:  Pt set the following goals:  Pt scheduled F/U visit in one week  Pt will focus on influencing the things that are within his influence

## 2021-03-10 NOTE — PROGRESS NOTES
Patient ___ reached   __X___not reached-preop instructions left on voice qpub____[phone]_________      DATE__3/19/21______ TIME_0730_______ARRIVAL___0600  FEC______      Nothing to eat or drink after midnight the night before,except for what the prep instructions call for. If you do not have the instructions or do not understand them please contact your doctors office. Follow any instructions your doctors office has given you including what medications to take the AM of your procedure and which ones to hold. You may use your inhalers. If you take a long acting insulin the alexx prior please cut the dose in half and take no diabetic medications that AM.Follow specific doctors office instructions regarding blood thinners and if they want you to hold and for how long. If you are on a blood thinner and have no instructions please contact the office and ask-CHECK ASA    Dress comfortably,bring your insurance card,picture ID,and a complete list of medications, including supplements. You must have a responsible adult to stay with you during the procedure,drive you home and stay with you. [facility] phone number [phone] for any questions. OTHER INTRUCTIONS(if applicable)_________________________________________________________      COVID TEST         _____ Done ___ where ____       _____ Scheduled ___ where ____       __X___Other__VM instructions to schedule COVID here on 3/15 or 8/67/02_______________      VISITOR POLICY(subject to change)    There is a one visitor policy at [facility] for all surgeries and endoscopies. Whether the visitor can stay or will be asked to wait in the car will depend on the current policy and if social distancing can be maintained. The policy is subject to change at any time. Please make sure the visitor has a cell phone that is on,charged and able to accept calls, as this may be the way that the staff communicates with them. Pain management is NO VISITOR policyThe patients ride is expected to remain in the car with a cell phone for communication. If the ride is leaving the hospital grounds please make sure they are back in time for pickup. Have the patient inform the staff on arrival what their rides plans are while the patient is in the facility. At the MAIN there is one visitor allowed. Please note that the visitor policy is subject to change.

## 2021-03-15 ENCOUNTER — OFFICE VISIT (OUTPATIENT)
Dept: PRIMARY CARE CLINIC | Age: 51
End: 2021-03-15
Payer: COMMERCIAL

## 2021-03-15 DIAGNOSIS — Z20.828 EXPOSURE TO SARS-ASSOCIATED CORONAVIRUS: Primary | ICD-10-CM

## 2021-03-15 LAB — SARS-COV-2: NOT DETECTED

## 2021-03-15 PROCEDURE — 99211 OFF/OP EST MAY X REQ PHY/QHP: CPT | Performed by: NURSE PRACTITIONER

## 2021-03-15 NOTE — PROGRESS NOTES
[facility] received a viral test for COVID-19. They were educated on isolation and quarantine as appropriate. For any symptoms, they were directed to seek care from their PCP, given contact information to establish with a doctor, directed to an urgent care or the emergency room.

## 2021-03-15 NOTE — PATIENT INSTRUCTIONS

## 2021-03-16 ENCOUNTER — OFFICE VISIT (OUTPATIENT)
Dept: PSYCHOLOGY | Age: 51
End: 2021-03-16
Payer: COMMERCIAL

## 2021-03-16 DIAGNOSIS — F33.1 MAJOR DEPRESSIVE DISORDER, RECURRENT EPISODE, MODERATE (HCC): Primary | ICD-10-CM

## 2021-03-16 DIAGNOSIS — F34.1 PERSISTENT DEPRESSIVE DISORDER WITH ANXIOUS DISTRESS, CURRENTLY MODERATE: ICD-10-CM

## 2021-03-16 PROCEDURE — 90832 PSYTX W PT 30 MINUTES: CPT | Performed by: PSYCHOLOGIST

## 2021-03-16 ASSESSMENT — PATIENT HEALTH QUESTIONNAIRE - PHQ9
2. FEELING DOWN, DEPRESSED OR HOPELESS: 0
SUM OF ALL RESPONSES TO PHQ9 QUESTIONS 1 & 2: 0
SUM OF ALL RESPONSES TO PHQ QUESTIONS 1-9: 0

## 2021-03-16 ASSESSMENT — ANXIETY QUESTIONNAIRES
7. FEELING AFRAID AS IF SOMETHING AWFUL MIGHT HAPPEN: 0-NOT AT ALL
3. WORRYING TOO MUCH ABOUT DIFFERENT THINGS: 0-NOT AT ALL
GAD7 TOTAL SCORE: 1
6. BECOMING EASILY ANNOYED OR IRRITABLE: 0-NOT AT ALL
5. BEING SO RESTLESS THAT IT IS HARD TO SIT STILL: 0-NOT AT ALL
2. NOT BEING ABLE TO STOP OR CONTROL WORRYING: 0-NOT AT ALL

## 2021-03-16 NOTE — PROGRESS NOTES
Attention/Concentration: intact during session  Psychomotor Activity: normal  Eye Contact: normal  Speech: normal rate and volume, well-articulated  Mood: less dysphoria   Affect: congruent  Perception: within normal limits  Thought Content: within normal limits  Thought Process: logical, coherent and goal-directed  Insight: good  Judgment: intact  Ability to understand instructions: Yes  Ability to respond meaningfully: Yes  Morbid Ideation: no   Suicide Assessment: no suicidal ideation, plan, or intent  Homicidal Ideation: no    A:  We talked about his 'learned helplessness' about his marriage, and he was challenged to identify a few concrete behaviors that, to him, would indicate that his wife, is ready, willing, and able to demonstrate that she wants the relationship to return to an almost idyllic beginning. We talked about triangulation and the damage that can do to relationships. We also talked about his ongoing longterm search to find employment that would be more fulfilling. He said that he had decided to accept the limitations at his current position rather than get upset at things that he is completely unable to influence. GABRIEL 7 SCORE 3/16/2021 3/9/2021 3/2/2021   GABRIEL-7 Total Score 1 4 3     Interpretation of GABRIEL-7 score: 5-9 = mild anxiety, 10-14 = moderate anxiety, 15+ = severe anxiety. Recommend referral to behavioral health for scores 10 or greater. PHQ Scores 3/16/2021 3/9/2021 3/2/2021 9/6/2018 1/19/2018 10/19/2017   PHQ2 Score 0 3 0 0 0 0   PHQ9 Score 0 5 0 0 0 0     Interpretation of Total Score Depression Severity: 1-4 = Minimal depression, 5-9 = Mild depression, 10-14 = Moderate depression, 15-19 = Moderately severe depression, 20-27 = Severe depression    Diagnosis:    1.  Major depressive disorder, recurrent episode, moderate (HCC)    2. Persistent depressive disorder with anxious distress, currently moderate        Patient Active Problem List   Diagnosis    Diabetes mellitus ([facility]    HTN (hypertension)    Other and unspecified hyperlipidemia    Depressive disorder, not elsewhere classified    Pharyngitis    Abnormal stress test    HLD (hyperlipidemia)    Chest pain    Unstable angina (HCC)    Coronary artery disease involving native coronary artery of native heart with angina pectoris ([facility] [address]    Essential hypertension    Mixed hyperlipidemia    HCAP (healthcare-associated pneumonia)    Cellulitis    Fever    Cellulitis of chest wall    Type 2 diabetes mellitus with circulatory disorder (HCC)    Pure hypercholesterolemia    Klebsiella infection    High triglycerides    Vitamin D deficiency    Leg swelling    Pain in both lower extremities    PVD (peripheral vascular disease) (HCC)    Chronic venous htn w inflammation of bilateral low extrm    Degenerative disc disease, lumbar    Acute gout of right foot         Plan:  Pt interventions:  Established rapport, [place]-setting to identify pt's primary goals for [address] visit / overall health, Supportive techniques and Emphasized self-care as important for managing overall health.        Pt Behavioral Change Plan:  Pt set the following goals:  Pt scheduled F/U visit in one week  Pt will identify a few concrete behaviors that he can ask of his wife that will help him feel she is wanting things to change in the marriage  Pt will focus on influencing the things that are within his influence

## 2021-03-19 ENCOUNTER — HOSPITAL ENCOUNTER (OUTPATIENT)
Age: 51
Setting detail: OUTPATIENT SURGERY
Discharge: HOME | End: 2021-03-19
Attending: INTERNAL MEDICINE | Admitting: INTERNAL MEDICINE
Payer: COMMERCIAL

## 2021-03-19 ENCOUNTER — ANESTHESIA (OUTPATIENT)
Dept: ENDOSCOPY | Age: 51
End: 2021-03-19
Payer: COMMERCIAL

## 2021-03-19 ENCOUNTER — ANESTHESIA EVENT (OUTPATIENT)
Dept: ENDOSCOPY | Age: 51
End: 2021-03-19
Payer: COMMERCIAL

## 2021-03-19 VITALS
TEMPERATURE: 97 F | HEART RATE: 61 BPM | RESPIRATION RATE: 18 BRPM | SYSTOLIC BLOOD PRESSURE: 114 MMHG | OXYGEN SATURATION: 99 % | WEIGHT: 259 LBS | DIASTOLIC BLOOD PRESSURE: 80 MMHG | HEIGHT: 72 IN | BODY MASS INDEX: 35.08 KG/M2

## 2021-03-19 VITALS
OXYGEN SATURATION: 100 % | SYSTOLIC BLOOD PRESSURE: 107 MMHG | RESPIRATION RATE: 14 BRPM | DIASTOLIC BLOOD PRESSURE: 66 MMHG

## 2021-03-19 LAB
GLUCOSE BLD-MCNC: 110 MG/DL (ref 70–99)
GLUCOSE BLD-MCNC: 156 MG/DL (ref 70–99)
PERFORMED ON: ABNORMAL
PERFORMED ON: ABNORMAL

## 2021-03-19 PROCEDURE — 3609027000 HC COLONOSCOPY: Performed by: INTERNAL MEDICINE

## 2021-03-19 PROCEDURE — 2500000003 HC RX 250 WO HCPCS: Performed by: INTERNAL MEDICINE

## 2021-03-19 PROCEDURE — 7100000010 HC PHASE II RECOVERY - FIRST 15 MIN: Performed by: INTERNAL MEDICINE

## 2021-03-19 PROCEDURE — 2500000003 HC RX 250 WO HCPCS: Performed by: NURSE ANESTHETIST, CERTIFIED REGISTERED

## 2021-03-19 PROCEDURE — 2709999900 HC NON-CHARGEABLE SUPPLY: Performed by: INTERNAL MEDICINE

## 2021-03-19 PROCEDURE — 3700000000 HC ANESTHESIA ATTENDED CARE: Performed by: INTERNAL MEDICINE

## 2021-03-19 PROCEDURE — 2580000003 HC RX 258: Performed by: ANESTHESIOLOGY

## 2021-03-19 PROCEDURE — 6370000000 HC RX 637 (ALT 250 FOR IP): Performed by: INTERNAL MEDICINE

## 2021-03-19 PROCEDURE — 7100000011 HC PHASE II RECOVERY - ADDTL 15 MIN: Performed by: INTERNAL MEDICINE

## 2021-03-19 PROCEDURE — 2580000003 HC RX 258: Performed by: NURSE ANESTHETIST, CERTIFIED REGISTERED

## 2021-03-19 PROCEDURE — 6360000002 HC RX W HCPCS: Performed by: NURSE ANESTHETIST, CERTIFIED REGISTERED

## 2021-03-19 PROCEDURE — 3700000001 HC ADD 15 MINUTES (ANESTHESIA): Performed by: INTERNAL MEDICINE

## 2021-03-19 RX ORDER — SODIUM CHLORIDE 9 MG/ML
INJECTION, SOLUTION INTRAVENOUS CONTINUOUS
Status: DISCONTINUED | OUTPATIENT
Start: 2021-03-19 | End: 2021-03-19 | Stop reason: HOSPADM

## 2021-03-19 RX ORDER — PROPOFOL 10 MG/ML
INJECTION, EMULSION INTRAVENOUS PRN
Status: DISCONTINUED | OUTPATIENT
Start: 2021-03-19 | End: 2021-03-19 | Stop reason: SDUPTHER

## 2021-03-19 RX ORDER — SODIUM CHLORIDE 9 MG/ML
INJECTION, SOLUTION INTRAVENOUS CONTINUOUS PRN
Status: DISCONTINUED | OUTPATIENT
Start: 2021-03-19 | End: 2021-03-19 | Stop reason: SDUPTHER

## 2021-03-19 RX ORDER — PROPOFOL 10 MG/ML
INJECTION, EMULSION INTRAVENOUS CONTINUOUS PRN
Status: DISCONTINUED | OUTPATIENT
Start: 2021-03-19 | End: 2021-03-19 | Stop reason: SDUPTHER

## 2021-03-19 RX ORDER — LIDOCAINE HYDROCHLORIDE 20 MG/ML
INJECTION, SOLUTION EPIDURAL; INFILTRATION; INTRACAUDAL; PERINEURAL PRN
Status: DISCONTINUED | OUTPATIENT
Start: 2021-03-19 | End: 2021-03-19 | Stop reason: SDUPTHER

## 2021-03-19 RX ADMIN — LIDOCAINE HYDROCHLORIDE 50 MG: 20 INJECTION, SOLUTION EPIDURAL; INFILTRATION; INTRACAUDAL; PERINEURAL at 07:44

## 2021-03-19 RX ADMIN — SODIUM CHLORIDE: 9 INJECTION, SOLUTION INTRAVENOUS at 07:05

## 2021-03-19 RX ADMIN — PROPOFOL 100 MG: 10 INJECTION, EMULSION INTRAVENOUS at 07:44

## 2021-03-19 RX ADMIN — PROPOFOL 140 MCG/KG/MIN: 10 INJECTION, EMULSION INTRAVENOUS at 07:44

## 2021-03-19 RX ADMIN — SODIUM CHLORIDE: 9 INJECTION, SOLUTION INTRAVENOUS at 07:39

## 2021-03-19 NOTE — PROGRESS NOTES
Discharge instructions reviewed with patient and/or responsible adult, signed and copy given. All questions answered and patient and/or responsible adult verbalizes understanding.

## 2021-03-19 NOTE — PROGRESS NOTES
Dressed and discharged to home with wife. Has instructions, cell phone, jacket and all belongings. Has fluids in car.

## 2021-03-19 NOTE — ANESTHESIA POSTPROCEDURE EVALUATION
Department of Anesthesiology  Postprocedure Note    Patient: Lydia Gerardo  MRN: [id number]  YOB: 1970  Date of evaluation: 3/19/2021  Time:  8:23 AM     Procedure Summary     Date: 03/19/21 Room / Location: [address] [place] [address] / [facility]    Anesthesia Start: 5704 Anesthesia Stop: 0285    Procedure: COLONOSCOPY DIAGNOSTIC (N/A ) Diagnosis: (COLON CANCER SCREENING Z12.11)    Surgeons: Linda Gonzáles MD Responsible Provider: Ellen Mitchell MD    Anesthesia Type: MAC ASA Status: 3          Anesthesia Type: MAC    Te Phase I: Te Score: 10    Te Phase II: Te Score: 9    Last vitals: Reviewed and per EMR flowsheets.        Anesthesia Post Evaluation    Patient location during evaluation: PACU  Patient participation: complete - patient participated  Level of consciousness: awake and awake and alert  Pain score: 2  Airway patency: patent  Nausea & Vomiting: no vomiting  Complications: no  Cardiovascular status: blood pressure returned to baseline  Respiratory status: acceptable  Hydration status: euvolemic

## 2021-03-19 NOTE — BRIEF OP NOTE
Brief Postoperative Note - Full Note in Chart Review/Procedures tab       Patient: Roni Chirinos  YOB: 1970  MRN: [id number]    Date of Procedure: 3/19/2021    Pre-Op Diagnosis: COLON CANCER SCREENING Z12.11    Post-Op Diagnosis: Same       Procedure(s):  COLONOSCOPY DIAGNOSTIC    Surgeon(s):  Angeline Ritter MD    Assistant:  * No surgical staff found *    Anesthesia: Monitor Anesthesia Care    Estimated Blood Loss (mL): Minimal    Complications: None    Specimens:   * No specimens in log *    Implants:  * No implants in log *      Drains: * No LDAs found *    Findings:  Normal colonoscopy    Normal terminal ileum    Rec:  Resume diet  Continue present treatment.   F/U WITH ME AS NEEDED  Recall colonoscopy in 10 years    Electronically signed by Angeline Ritter MD on 3/19/2021 at 8:07 AM

## 2021-03-19 NOTE — ANESTHESIA PRE PROCEDURE
Pre-Anesthesia Evaluation/Consultation       Name:  Negar Dno  : 1970  Age:  45 y.o. MRN:  [id number]  Date: 3/19/2021           Surgeon: Surgeon(s):  Josue Clemons MD    Procedure: Procedure(s):  LEFT FIRST DORSAL COMPARTMENT (DEQUERVAIN'S) RELEASE     Allergies   Allergen Reactions    Penicillins Anaphylaxis     Patient Active Problem List   Diagnosis    Diabetes mellitus ([address]    HTN (hypertension)    Other and unspecified hyperlipidemia    Depressive disorder, not elsewhere classified    Pharyngitis    Abnormal stress test    HLD (hyperlipidemia)    Chest pain    Unstable angina ([address]    Coronary artery disease involving native coronary artery of native heart with angina pectoris ([address]    Essential hypertension    Mixed hyperlipidemia    HCAP (healthcare-associated pneumonia)    Cellulitis    Fever    Cellulitis of chest wall    Type 2 diabetes mellitus with circulatory disorder ([address]    Pure hypercholesterolemia    Klebsiella infection    High triglycerides    Vitamin D deficiency    Leg swelling    Pain in both lower extremities    PVD (peripheral vascular disease) ([address]    Chronic venous htn w inflammation of bilateral low extrm    Degenerative disc disease, lumbar    Acute gout of right foot     Past Medical History:   Diagnosis Date    Abscess     mediastinal, developed after kenalog injections    Anesthesia     AWAKE BUT UNABLE TO MOVE DURING SHOULDER SURGERY.  CAD (coronary artery disease) 3/2016    Diabetes mellitus ([address]     HgA1C 10.6    Gout     HLD (hyperlipidemia)     HTN (hypertension)     Echo  normal.     MDRO (multiple drug resistant organisms) resistance     MRSA (methicillin resistant staph aureus) culture positive 2017    Obesity     Pancreatitis 2006    high TG or byetta. flank ecchymosis.     PONV (postoperative nausea and vomiting)     Toxicity, chemicals  isopropol alcohol toxicity: SOB/anasarca. work related    Type 2 diabetes mellitus without complication ([facility] [address]     Wound abscess 1994    groin. after kenalog injections. drained. iv abx. Past Surgical History:   Procedure Laterality Date    ADENOIDECTOMY  1970    CORONARY ARTERY BYPASS GRAFT  3/8/16    cabgx5 [facility])    ELBOW SURGERY Right     elbow reconstruction    ENDOSCOPY, COLON, DIAGNOSTIC      IR AIR ABS HEMATOMA BULLA CYST  7/31/2020    IR AIR ABS HEMATOMA BULLA CYST 7/31/2020 [facility] PROCEDURES    KNEE SURGERY Left 1985    menicus tear    LUMBAR LAMINECTOMY  08/10/2017    L4-5 microhemilaminectomy; excision of cyst    LUMBAR SPINE SURGERY Right 7/21/2020    RIGHT LUMBAR4-LUMBAR5 MICRO HEMILAMINECTOMY AND CYSTECTOMY performed by Sincere Head MD [facility] prior to medistinal mass. 45 ml off lymph node, IV antibiotics for 6 months   [address]    L rectus muscle flap, infected, kenalog injections    SHOULDER SURGERY Left 2002    Rotator cuff repair, [facility] at [address] Left 7/7/2020    LEFT FIRST DORSAL COMPARTMENT (DEQUERVAIN'S) RELEASE performed by Nick Laguerre MD at . [facility] History     Tobacco Use    Smoking status: Never Smoker    Smokeless tobacco: Never Used   Substance Use Topics    Alcohol use: Yes     Comment: rare    Drug use: No     Medications  Current Outpatient Medications on File Prior to Visit   Medication Sig Dispense Refill    metFORMIN (GLUCOPHAGE) 1000 MG tablet TAKE 1 TABLET BY MOUTH 2 TIMES A DAY WITH MEALS 180 tablet 0    metoprolol succinate (TOPROL XL) 50 MG extended release tablet TAKE 1 TABLET BY MOUTH ONE TIME A DAY 90 tablet 0    lisinopril (PRINIVIL;ZESTRIL) 20 MG tablet TAKE 1 TABLET BY MOUTH 2 TIMES A  tablet 0    Continuous Blood Gluc Sensor (FREESTYLE NATHALIE 14 DAY SENSOR) MISC USE 1 SENSOR EVERY 14 DAYS TO MONITOR BLOOD SUGARS 2 each 3    Semaglutide, 1 MG/DOSE, (OZEMPIC, 1 MG/DOSE,) 2 MG/1.5ML SOPN Inject 1 mg into the skin every 7 days      blood glucose monitor kit and supplies Dispense sufficient amount for indicated testing frequency plus additional to accommodate PRN testing needs. Dispense all needed supplies to include: monitor, strips, lancing device, lancets, control solutions, alcohol swabs. Use to test blood glucose levels 4 times daily 1 kit 0    tadalafil (CIALIS) 20 MG tablet Take 1 tablet by mouth daily as needed for Erectile Dysfunction 10 tablet 0    Prodigy Lancets 28G MISC 1 Units by Does not apply route 3 times daily as needed (blood sugar) 100 each 1    blood glucose test strips (EXACTECH TEST) strip 1 each by In Vitro route 3 times daily as needed (blood sugar) As needed. 100 each 1    furosemide (LASIX) 20 MG tablet Take 2 tablets by mouth daily 30 tablet 5    Phentermine-Topiramate (QSYMIA) 3.75-23 MG CP24 Take by mouth.       TESTOSTERONE PROPIONATE IM Inject into the muscle Every 10 days      TOUJEO SOLOSTAR 300 UNIT/ML SOPN INJECT 180 UNITS INTO THE SKIN NIGHTLY (Patient taking differently: Inject 130 Units into the skin nightly ) 54 mL 1    Ertugliflozin L-PyroglutamicAc (STEGLATRO) 15 MG TABS Take 1 tablet by mouth daily 90 tablet 3    TRUEPLUS PEN NEEDLES 32G X 4 MM MISC USE FOUR TIMES A  each 5    rosuvastatin (CRESTOR) 40 MG tablet Take 1 tablet by mouth daily 90 tablet 1    Icosapent Ethyl (VASCEPA) 1 g CAPS capsule Take 2 capsules by mouth 2 times daily 360 capsule 1    Cholecalciferol (VITAMIN D3) 50 MCG (2000 UT) CAPS Take 8 capsules by mouth three times a week      insulin lispro (HUMALOG KWIKPEN) 200 UNIT/ML SOPN pen INJECT UP TO 60 UNITS INTO THE SKIN THREE TIMES A DAY BEFORE MEALS PER SLIDING SCALE (Patient taking differently: INJECT as directed - only using on/for sick days) 108 mL 3    Continuous Blood Gluc  (FREESTYLE NATHALIE 14 DAY READER) TAMERA 1 Units by Does not apply route as needed (as needed) 1 Device 0    aspirin 325 MG tablet Take 325 mg by mouth daily      b complex vitamins capsule Take 1 capsule by mouth daily       No current facility-administered medications on file prior to visit. No current outpatient medications on file. No current facility-administered medications for this visit. Vital Signs (Current)   There were no vitals filed for this visit. BP Readings from Last 3 Encounters:   21 130/74   20 128/72   20 (!) 167/103     Vital Signs Statistics (for past 48 hrs)     Pulse  Av  Min: 79   Min taken time: 21  Max: 79   Max taken time: 21  BP Readings from Last 3 Encounters:   21 130/74   20 128/72   20 (!) 167/103       BMI  There is no height or weight on file to calculate BMI. Estimated body mass index is 35.13 kg/m² as calculated from the following:    Height as of an earlier encounter on 3/19/21: 6' (1.829 m). Weight as of an earlier encounter on 3/19/21: 259 lb (117.5 kg).     CBC   Lab Results   Component Value Date    WBC 8.2 2020    RBC 5.50 2020    HGB 14.8 2020    HCT 45.8 2020    MCV 83.3 2020    RDW 14.6 2020     2020     CMP    Lab Results   Component Value Date     2020    K 4.6 2020     2020    CO2 24 2020    BUN 21 2020    CREATININE 1.1 2020    GFRAA >60 2020    GFRAA >60 2011    AGRATIO 1.5 2019    LABGLOM >60 2020    GLUCOSE 142 2020    PROT 7.0 2019    PROT 8.1 2011    CALCIUM 9.7 2020    BILITOT <0.2 2019    ALKPHOS 71 2019    AST 28 2019    ALT 24 2019     BMP    Lab Results   Component Value Date     2020    K 4.6 2020     2020    CO2 24 2020    BUN 21 2020    CREATININE 1.1 2020    CALCIUM 9.7 2020    GFRAA >60 2020 GFRAA >60 04/28/2011    LABGLOM >60 11/02/2020    GLUCOSE 142 11/02/2020     POCGlucose  No results for input(s): GLUCOSE in the last 72 hours. Coags    Lab Results   Component Value Date    PROTIME 12.5 06/30/2020    INR 1.08 06/30/2020    APTT 31.5 85/57/1831     HCG (If Applicable) No results found for: PREGTESTUR, PREGSERUM, HCG, HCGQUANT   ABGs   Lab Results   Component Value Date    PHART 7.334 03/08/2016    PO2ART 95 03/08/2016    MDQ4QTQ 47 03/08/2016    MGW5JGG 25.3 03/08/2016    BEART -1 03/08/2016    L9QDAODJ 97 03/08/2016      Type & Screen (If Applicable)  No results found for: LABABO, LABRH                         BMI: Wt Readings from Last 3 Encounters:       NPO Status:                          Anesthesia Evaluation  Patient summary reviewed and Nursing notes reviewed   history of anesthetic complications (HX OF TOTAL RECALL AFTER ANESTHESIA): PONV. Airway: Mallampati: III  TM distance: >3 FB   Neck ROM: full  Mouth opening: > = 3 FB Dental:          Pulmonary:   (+) pneumonia:                             Cardiovascular:  Exercise tolerance: good (>4 METS),   (+) hypertension: moderate, angina:, CAD: non-obstructive, CABG/stent (s/p CABG 2016):, hyperlipidemia              Stress test reviewed             ROS comment: ECHO 2018   Mild concentric LVH with normal systolic function. EF is    60%   Trivial mitral & tricuspid regurgitation. Left atrium is of normal size. Normal right ventricular size and function. Neuro/Psych:   (+) neuromuscular disease:, psychiatric history:            GI/Hepatic/Renal:   (+) morbid obesity     (-) no renal disease      ROS comment: Pancreatitis . Endo/Other:    (+) DiabetesType II DM, , .    (-) hypothyroidism, hyperthyroidism               Abdominal:   (+) obese,         Vascular:   + PVD, aortic or cerebral, . Anesthesia Plan      MAC     ASA 3       Induction: intravenous.     MIPS: Prophylactic antiemetics administered. Anesthetic plan and risks discussed with patient. Plan discussed with CRNA.     Attending anesthesiologist reviewed and agrees with Pre Eval content            This pre-anesthesia assessment may be used as a history and physical.          Patricio Antonio MD  March 19, 2021  6:49 AM

## 2021-03-19 NOTE — H&P
Gastroenterology Note             Pre-operative History and Physical    Patient: Randy Leger  : 1970  CSN:     History Obtained From:  patient and/or guardian. HISTORY OF PRESENT ILLNESS:    The patient is a 45 y.o. male with history of CAD here for screening colonoscopy. Past Medical History:    Past Medical History:   Diagnosis Date    Abscess     mediastinal, developed after kenalog injections    Anesthesia     AWAKE BUT UNABLE TO MOVE DURING SHOULDER SURGERY.  CAD (coronary artery disease) 3/2016    Diabetes mellitus ([address]     HgA1C 10.6    Gout     HLD (hyperlipidemia)     HTN (hypertension)     Echo  normal.     MDRO (multiple drug resistant organisms) resistance     MRSA (methicillin resistant staph aureus) culture positive 2017    Obesity     Pancreatitis     high TG or byetta. flank ecchymosis.  PONV (postoperative nausea and vomiting)     Toxicity, chemicals     isopropol alcohol toxicity: SOB/anasarca. work related    Type 2 diabetes mellitus without complication ([address]     Wound abscess     groin. after kenalog injections. drained. iv abx. Past Surgical History:    Past Surgical History:   Procedure Laterality Date    ADENOIDECTOMY  1970    CORONARY ARTERY BYPASS GRAFT  3/8/16    cabgx5 [facility])    ELBOW SURGERY Right     elbow reconstruction    ENDOSCOPY, COLON, DIAGNOSTIC      IR AIR ABS HEMATOMA BULLA CYST  2020    IR AIR ABS HEMATOMA BULLA CYST 2020 [facility] SPECIAL PROCEDURES    KNEE SURGERY Left     menicus tear    LUMBAR LAMINECTOMY  08/10/2017    L4-5 microhemilaminectomy; excision of cyst    LUMBAR SPINE SURGERY Right 2020    RIGHT LUMBAR4-LUMBAR5 MICRO HEMILAMINECTOMY AND CYSTECTOMY performed by Marshall Jaramillo MD at [facility]    bronchoscopy prior to medistinal mass. 45 ml off lymph node, IV antibiotics for 6 months   [address]    L rectus muscle flap, infected, kenalog injections    SHOULDER SURGERY Left 2002    Rotator cuff repair, [facility] at [address] Left 7/7/2020    LEFT FIRST DORSAL COMPARTMENT (DEQUERVAIN'S) RELEASE performed by Loring Gaucher, MD at [place] [address]     Medications Prior to Admission:   No current facility-administered medications on file prior to encounter. Current Outpatient Medications on File Prior to Encounter   Medication Sig Dispense Refill    metFORMIN (GLUCOPHAGE) 1000 MG tablet TAKE 1 TABLET BY MOUTH 2 TIMES A DAY WITH MEALS 180 tablet 0    metoprolol succinate (TOPROL XL) 50 MG extended release tablet TAKE 1 TABLET BY MOUTH ONE TIME A DAY 90 tablet 0    lisinopril (PRINIVIL;ZESTRIL) 20 MG tablet TAKE 1 TABLET BY MOUTH 2 TIMES A  tablet 0    Phentermine-Topiramate (QSYMIA) 3.75-23 MG CP24 Take by mouth.  TESTOSTERONE PROPIONATE IM Inject into the muscle Every 10 days      Ertugliflozin L-PyroglutamicAc (STEGLATRO) 15 MG TABS Take 1 tablet by mouth daily 90 tablet 3    rosuvastatin (CRESTOR) 40 MG tablet Take 1 tablet by mouth daily 90 tablet 1    Icosapent Ethyl (VASCEPA) 1 g CAPS capsule Take 2 capsules by mouth 2 times daily 360 capsule 1    Cholecalciferol (VITAMIN D3) 50 MCG (2000 UT) CAPS Take 8 capsules by mouth three times a week      b complex vitamins capsule Take 1 capsule by mouth daily      Continuous Blood Gluc Sensor (FREESTYLE NATHALIE 14 DAY SENSOR) MISC USE 1 SENSOR EVERY 14 DAYS TO MONITOR BLOOD SUGARS 2 each 3    Semaglutide, 1 MG/DOSE, (OZEMPIC, 1 MG/DOSE,) 2 MG/1.5ML SOPN Inject 1 mg into the skin every 7 days      blood glucose monitor kit and supplies Dispense sufficient amount for indicated testing frequency plus additional to accommodate PRN testing needs. Dispense all needed supplies to include: monitor, strips, lancing device, lancets, control solutions, alcohol swabs.  Use to test blood glucose levels 4 times daily 1 kit 0    tadalafil (CIALIS) 20 MG tablet Take 1 tablet by mouth daily as needed for Erectile Dysfunction 10 tablet 0    Prodigy Lancets 28G MISC 1 Units by Does not apply route 3 times daily as needed (blood sugar) 100 each 1    blood glucose test strips (EXACTECH TEST) strip 1 each by In Vitro route 3 times daily as needed (blood sugar) As needed. 100 each 1    furosemide (LASIX) 20 MG tablet Take 2 tablets by mouth daily (Patient taking differently: Take 40 mg by mouth as needed ) 30 tablet 5    TOUJEO SOLOSTAR 300 UNIT/ML SOPN INJECT 180 UNITS INTO THE SKIN NIGHTLY (Patient taking differently: Inject 130 Units into the skin nightly ) 54 mL 1    TRUEPLUS PEN NEEDLES 32G X 4 MM MISC USE FOUR TIMES A  each 5    insulin lispro (HUMALOG KWIKPEN) 200 UNIT/ML SOPN pen INJECT UP TO 60 UNITS INTO THE SKIN THREE TIMES A DAY BEFORE MEALS PER SLIDING SCALE (Patient taking differently: INJECT as directed - only using on/for sick days.  PRN sliding scale) 108 mL 3    Continuous Blood Gluc  (FREESTMovius Interactive NATHALIE 14 DAY READER) TAMERA 1 Units by Does not apply route as needed (as needed) 1 Device 0    aspirin 325 MG tablet Take 325 mg by mouth daily          Allergies:  Penicillins      Social History:   Social History     Tobacco Use    Smoking status: Never Smoker    Smokeless tobacco: Never Used   Substance Use Topics    Alcohol use: Yes     Comment: rare     Family History:   Family History   Problem Relation Age of Onset    High Blood Pressure Mother     Cancer Mother     Pacemaker Father     Other Maternal Grandmother         CVA in [de-identified] Other Maternal Grandfather         CABG and aortic valve replacement in [de-identified]    Hypertension Maternal Grandfather     Other Paternal Grandmother         CVA in [de-identified]       PHYSICAL EXAM:      /62   Pulse 66   Resp 16   Ht 6' (1.829 m)   Wt 259 lb (117.5 kg)   SpO2 98%   BMI 35.13 kg/m²  I        Heart:   RRR, normal s1s2    Lungs:  CTA bilat,  Normal effort    Abdomen:   NT, ND      ASA Grade: ASA 3 - Patient with moderate systemic disease with functional limitations    Mallampati Class:  Class I: Soft palate, uvula, fauces, pillars visible  __________  Class II: Soft palate, uvula, fauces visible  __________   Class III: Soft palate, base of uvula visible  __________  Class IV: Hard palate only visible   __________        ASSESSMENT AND PLAN:    1. Patient is a 45 y.o. male here for colonoscopy with MAC.   2.  Procedure options, risks and benefits reviewed with patient. Patient expresses understanding.      Demond Chandler, [address] [place] [address]  3/19/2021

## 2021-03-19 NOTE — PROGRESS NOTES
Received from Endo Procedure Room to Phase 2 Recovery. Drowsy, responds easily to verbal stimuli. Respirations easy on room air. VSS. Denies any discomfort or nausea.

## 2021-03-30 ENCOUNTER — OFFICE VISIT (OUTPATIENT)
Dept: PSYCHOLOGY | Age: 51
End: 2021-03-30
Payer: COMMERCIAL

## 2021-03-30 DIAGNOSIS — F43.20 ADJUSTMENT DISORDER, UNSPECIFIED TYPE: Primary | ICD-10-CM

## 2021-03-30 PROCEDURE — 90832 PSYTX W PT 30 MINUTES: CPT | Performed by: PSYCHOLOGIST

## 2021-03-30 NOTE — PROGRESS NOTES
Behavioral Health Consultation  Marcellus Hernandez, Ph.D.  Psychologist  3/30/2021  11:30 AM EST      Time spent with Patient: 30 minutes  This is patient's fourth [facility] appointment. Reason for Consult: dysphoria  Referring Provider: Parul London APRN - CNP  [address] [facility] [address]    Feedback for PCP:     Pt provided informed consent for the behavioral health program. Discussed with patient model of service to include the limits of confidentiality (i.e. abuse reporting, suicide intervention, etc.) and short-term intervention focused approach. Pt indicated understanding. Feedback given to PCP. S:  Patient continues to say that he feels \"numb\" in general, but particularly with regard to his marriage. He says that he can tell his wife is trying, and that he's pretty sure he isn't at this time. It sounds as though there have been some xuan times in the marriage that he is having difficulty letting go of. He recounted some of his history and it sounds as though he's had few trustworthy relationships, and that the one with his aunt is reliable and solid. With regard to his marriage he says, \"I'm just not sure the fruits are going to be worth the effort. \" He says that he is apprehensive that the marriage will improve, only to have it devolve to where it was. He also seems to be reluctant to ask for what he wants, not only because he's doubtful that it will be forthcoming, but also because he believes Reggie Smoker should know what your partner wants. \" Despite his pessimism, he is clearly not ready to totally give up on the marriage.          Social History     Tobacco Use    Smoking status: Never Smoker    Smokeless tobacco: Never Used   Substance Use Topics    Alcohol use: Yes     Comment: rare        Illicit drugs:   Social History     Substance and Sexual Activity   Drug Use No        O:  MSE:  Appearance: good hygiene   Attitude: cooperative  Consciousness: alert  Orientation: oriented to person, place, time, general circumstance  Memory: recent and remote memory intact  Attention/Concentration: intact during session  Psychomotor Activity: normal  Eye Contact: normal  Speech: normal rate and volume, well-articulated  Mood: dysphoria   Affect: congruent  Perception: within normal limits  Thought Content: within normal limits  Thought Process: logical, coherent and goal-directed  Insight: good  Judgment: intact  Ability to understand instructions: Yes  Ability to respond meaningfully: Yes  Morbid Ideation: no   Suicide Assessment: no suicidal ideation, plan, or intent  Homicidal Ideation: no    A:  We talked about the fact that he is not trying in the marriage because he is not ready to try at this time. He said that he may have to [address]" some issues he has with his father who abandoned the family when he was 7. It may be that he feels as though he needs to let go of the negative feelings about his father and his childhood before he will be ready to tackle his marriage. He seems to be quite pessimistic about relationships and there's the possibility that his negative feelings about his marriage stem from earlier disappointments. GABRIEL 7 SCORE 3/16/2021 3/9/2021 3/2/2021   GABRIEL-7 Total Score 1 4 3     Interpretation of GABRIEL-7 score: 5-9 = mild anxiety, 10-14 = moderate anxiety, 15+ = severe anxiety. Recommend referral to behavioral health for scores 10 or greater. PHQ Scores 3/16/2021 3/9/2021 3/2/2021 9/6/2018 1/19/2018 10/19/2017   PHQ2 Score 0 3 0 0 0 0   PHQ9 Score 0 5 0 0 0 0     Interpretation of Total Score Depression Severity: 1-4 = Minimal depression, 5-9 = Mild depression, 10-14 = Moderate depression, 15-19 = Moderately severe depression, 20-27 = Severe depression    Diagnosis:    1.  Adjustment disorder, unspecified type        Patient Active Problem List   Diagnosis    Diabetes mellitus ([facility] [facility]    HTN (hypertension)    Other and unspecified hyperlipidemia    Depressive disorder, not elsewhere classified    Pharyngitis    Abnormal stress test    HLD (hyperlipidemia)    Chest pain    Unstable angina (HCC)    Coronary artery disease involving native coronary artery of native heart with angina pectoris ([facility] [address]    Essential hypertension    Mixed hyperlipidemia    HCAP (healthcare-associated pneumonia)    Cellulitis    Fever    Cellulitis of chest wall    Type 2 diabetes mellitus with circulatory disorder (HCC)    Pure hypercholesterolemia    Klebsiella infection    High triglycerides    Vitamin D deficiency    Leg swelling    Pain in both lower extremities    PVD (peripheral vascular disease) (HCC)    Chronic venous htn w inflammation of bilateral low extrm    Degenerative disc disease, lumbar    Acute gout of right foot         Plan:  Pt interventions:  Established rapport, [place]-setting to identify pt's primary goals for [facility] visit / overall health, Supportive techniques and Emphasized self-care as important for managing overall health.        Pt Behavioral Change Plan:  Pt will continue with the following goals:  Pt scheduled F/U visit on April 27 after their vacation  Pt will identify a few concrete behaviors that he can ask of his wife that will help him feel she is wanting things to change in the marriage  Pt will focus on influencing the things that are within his influence

## 2021-04-27 ENCOUNTER — OFFICE VISIT (OUTPATIENT)
Dept: PSYCHOLOGY | Age: 51
End: 2021-04-27
Payer: COMMERCIAL

## 2021-04-27 DIAGNOSIS — F43.20 ADJUSTMENT DISORDER, UNSPECIFIED TYPE: Primary | ICD-10-CM

## 2021-04-27 PROCEDURE — 90832 PSYTX W PT 30 MINUTES: CPT | Performed by: PSYCHOLOGIST

## 2021-04-27 NOTE — PROGRESS NOTES
unspecified hyperlipidemia    Depressive disorder, not elsewhere classified    Pharyngitis    Abnormal stress test    HLD (hyperlipidemia)    Chest pain    Unstable angina (HCC)    Coronary artery disease involving native coronary artery of native heart with angina pectoris ([facility] [address]    Essential hypertension    Mixed hyperlipidemia    HCAP (healthcare-associated pneumonia)    Cellulitis    Fever    Cellulitis of chest wall    Type 2 diabetes mellitus with circulatory disorder (HCC)    Pure hypercholesterolemia    Klebsiella infection    High triglycerides    Vitamin D deficiency    Leg swelling    Pain in both lower extremities    PVD [facility]per[facility] vascular disease) ([facility])    Chronic venous htn w inflammation of bilateral low extrm    Degenerative disc disease, lumbar    Acute gout of right foot         Plan:  Pt interventions:  Established rapport, [place]-setting to identify pt's primary goals for [facility] visit / overall health, Supportive techniques and Emphasized self-care as important for managing overall health.        Pt Behavioral Change Plan:  Pt will continue with the following goals:  Pt scheduled F/U visit in 2 weeks  Pt will focus on influencing the things that are within his influence

## 2021-05-11 ENCOUNTER — OFFICE VISIT (OUTPATIENT)
Dept: PSYCHOLOGY | Age: 51
End: 2021-05-11
Payer: COMMERCIAL

## 2021-05-11 DIAGNOSIS — F43.20 ADJUSTMENT DISORDER, UNSPECIFIED TYPE: Primary | ICD-10-CM

## 2021-05-11 PROCEDURE — 90832 PSYTX W PT 30 MINUTES: CPT | Performed by: PSYCHOLOGIST

## 2021-05-11 NOTE — PROGRESS NOTES
Behavioral Health Consultation  Yves Price, Ph.D.  Psychologist  5/11/2021  10:00 AM EST      Time spent with Patient: 30 minutes  This is patient's sixth [facility] appointment. Reason for Consult: dysphoria  Referring Provider: Usha Keenan, APRN - CNP  [address] [employer] [address]    Feedback for PCP:     Pt provided informed consent for the behavioral health program. Discussed with patient model of service to include the limits of confidentiality (i.e. abuse reporting, suicide intervention, etc.) and short-term intervention focused approach. Pt indicated understanding. Feedback given to PCP. S:  Patient says that he feels as though his marriage \"is not in a good place. \" He describes an escalation in conflict that spills over into \"yelling and throwing things. \" He reports feeling as though there is \"pushback\" when they disagree. He says that his wife says \"you're not listening\" when what he feels is happening is that he is disagreeing with her. He said that she also began to \"pack up her stuff\" as though to leave, to which he said, \"once you leave, you leave. I'm not having this back and forth. \" He said that their relationship has always had these \"peaks and valleys\" but he seems to think that the current situation is a worse than usual. He said the loose end that may be fueling the current situation is that his wife's daughter may be finding out her employment is not able to sustain her living on her own, and his wife wants her daughter to be able to come live at their house. The patient says that Toni Thao will end our marriage. \"        Social History     Tobacco Use    Smoking status: Never Smoker    Smokeless tobacco: Never Used   Substance Use Topics    Alcohol use: Yes     Comment: rare        Illicit drugs:   Social History     Substance and Sexual Activity   Drug Use No        O:  MSE:  Appearance: good hygiene   Attitude: cooperative  Consciousness: alert  Orientation: oriented to person, place, time, general circumstance  Memory: recent and remote memory intact  Attention/Concentration: intact during session  Psychomotor Activity: normal  Eye Contact: normal  Speech: normal rate and volume, well-articulated  Mood:frustrated  Affect: congruent  Perception: within normal limits  Thought Content: within normal limits  Thought Process: logical, coherent and goal-directed  Insight: good  Judgment: intact  Ability to understand instructions: Yes  Ability to respond meaningfully: Yes  Morbid Ideation: no   Suicide Assessment: no suicidal ideation, plan, or intent  Homicidal Ideation: no    A:  We talked about meeting with both he and his wife in two weeks to assess communication and conflict management. He was agreeable. GABRIEL 7 SCORE 3/16/2021 3/9/2021 3/2/2021   GABRIEL-7 Total Score 1 4 3     Interpretation of GABRIEL-7 score: 5-9 = mild anxiety, 10-14 = moderate anxiety, 15+ = severe anxiety. Recommend referral to behavioral health for scores 10 or greater. PHQ Scores 3/16/2021 3/9/2021 3/2/2021 9/6/2018 1/19/2018 10/19/2017   PHQ2 Score 0 3 0 0 0 0   PHQ9 Score 0 5 0 0 0 0     Interpretation of Total Score Depression Severity: 1-4 = Minimal depression, 5-9 = Mild depression, 10-14 = Moderate depression, 15-19 = Moderately severe depression, 20-27 = Severe depression    Diagnosis:    1.  Adjustment disorder, unspecified type        Patient Active Problem List   Diagnosis    Diabetes mellitus ([facility] [address]    HTN (hypertension)    Other and unspecified hyperlipidemia    Depressive disorder, not elsewhere classified    Pharyngitis    Abnormal stress test    HLD (hyperlipidemia)    Chest pain    Unstable angina ([address]    Coronary artery disease involving native coronary artery of native heart with angina pectoris ([address]    Essential hypertension    Mixed hyperlipidemia    HCAP (healthcare-associated pneumonia)    Cellulitis    Fever    Cellulitis of chest wall    Type 2 diabetes mellitus with circulatory disorder (HCC)    Pure hypercholesterolemia    Klebsiella infection    High triglycerides    Vitamin D deficiency    Leg swelling    Pain in both lower extremities    PVD (peripheral vascular disease) (HCC)    Chronic venous htn w inflammation of bilateral low extrm    Degenerative disc disease, lumbar    Acute gout of right foot         Plan:  Pt interventions:  Established rapport, [place]-setting to identify pt's primary goals for [facility] visit / overall health, Supportive techniques and Emphasized self-care as important for managing overall health.        Pt Behavioral Change Plan:  Pt will continue with the following goals:  Pt scheduled F/U visit in 2 weeks for both he and his wife  Pt will focus on influencing the things that are within his influence

## 2021-05-18 ENCOUNTER — HOSPITAL ENCOUNTER (OUTPATIENT)
Age: 51
Discharge: HOME | End: 2021-05-18
Payer: COMMERCIAL

## 2021-05-18 LAB
AVERAGE GLUCOSE: NORMAL
CHOLESTEROL, TOTAL: 86 MG/DL (ref 0–199)
HBA1C MFR BLD: 6.5 %
HCT VFR BLD CALC: 58.9 % (ref 40.5–52.5)
HDLC SERPL-MCNC: 27 MG/DL (ref 40–60)
HEMOGLOBIN: 19.6 G/DL (ref 13.5–17.5)
LDL CHOLESTEROL CALCULATED: 41 MG/DL
MCH RBC QN AUTO: 27.9 PG (ref 26–34)
MCHC RBC AUTO-ENTMCNC: 33.4 G/DL (ref 31–36)
MCV RBC AUTO: 83.7 FL (ref 80–100)
PDW BLD-RTO: 15.2 % (ref 12.4–15.4)
PLATELET # BLD: 175 K/UL (ref 135–450)
PMV BLD AUTO: 9 FL (ref 5–10.5)
PROSTATE SPECIFIC ANTIGEN: 0.32 NG/ML (ref 0–4)
RBC # BLD: 7.04 M/UL (ref 4.2–5.9)
SEDIMENTATION RATE, ERYTHROCYTE: 0 MM/HR (ref 0–20)
T4 FREE: 1.4 NG/DL (ref 0.9–1.8)
TRIGL SERPL-MCNC: 90 MG/DL (ref 0–150)
TSH SERPL DL<=0.05 MIU/L-ACNC: 1.35 UIU/ML (ref 0.27–4.2)
VLDLC SERPL CALC-MCNC: 18 MG/DL
WBC # BLD: 6.8 K/UL (ref 4–11)

## 2021-05-18 PROCEDURE — 36415 COLL VENOUS BLD VENIPUNCTURE: CPT

## 2021-05-18 PROCEDURE — 85027 COMPLETE CBC AUTOMATED: CPT

## 2021-05-18 PROCEDURE — 84270 ASSAY OF SEX HORMONE GLOBUL: CPT

## 2021-05-18 PROCEDURE — 84439 ASSAY OF FREE THYROXINE: CPT

## 2021-05-18 PROCEDURE — 81401 MOPATH PROCEDURE LEVEL 2: CPT

## 2021-05-18 PROCEDURE — 84153 ASSAY OF PSA TOTAL: CPT

## 2021-05-18 PROCEDURE — 84403 ASSAY OF TOTAL TESTOSTERONE: CPT

## 2021-05-18 PROCEDURE — 85652 RBC SED RATE AUTOMATED: CPT

## 2021-05-18 PROCEDURE — 80061 LIPID PANEL: CPT

## 2021-05-18 PROCEDURE — 84443 ASSAY THYROID STIM HORMONE: CPT

## 2021-05-18 PROCEDURE — 82306 VITAMIN D 25 HYDROXY: CPT

## 2021-05-18 PROCEDURE — 84681 ASSAY OF C-PEPTIDE: CPT

## 2021-05-19 LAB — VITAMIN D 25-HYDROXY: 29.5 NG/ML

## 2021-05-20 LAB
C-PEPTIDE: 2.7 NG/ML (ref 1.1–4.4)
SEX HORMONE BINDING GLOBULIN: 37 NMOL/L (ref 11–80)
TESTOSTERONE FREE-NONMALE: 252.3 PG/ML (ref 47–244)
TESTOSTERONE TOTAL: 1089 NG/DL (ref 220–1000)

## 2021-05-25 LAB — MISCELLANEOUS LAB TEST ORDER: NORMAL

## 2021-06-05 DIAGNOSIS — Z79.4 TYPE 2 DIABETES MELLITUS WITH DIABETIC PERIPHERAL ANGIOPATHY WITHOUT GANGRENE, WITH LONG-TERM CURRENT USE OF INSULIN (HCC): ICD-10-CM

## 2021-06-05 DIAGNOSIS — E11.51 TYPE 2 DIABETES MELLITUS WITH DIABETIC PERIPHERAL ANGIOPATHY WITHOUT GANGRENE, WITH LONG-TERM CURRENT USE OF INSULIN (HCC): ICD-10-CM

## 2021-06-07 RX ORDER — FLASH GLUCOSE SENSOR
KIT MISCELLANEOUS
Qty: 2 EACH | Refills: 3 | Status: SHIPPED | OUTPATIENT
Start: 2021-06-07 | End: 2021-08-23

## 2021-06-10 ENCOUNTER — PATIENT MESSAGE (OUTPATIENT)
Dept: PHARMACY | Facility: CLINIC | Age: 51
End: 2021-06-10

## 2021-06-10 ENCOUNTER — TELEPHONE (OUTPATIENT)
Dept: PHARMACY | Facility: CLINIC | Age: 51
End: 2021-06-10

## 2021-06-10 NOTE — TELEPHONE ENCOUNTER
Pharmacy Pop Care Documentation:   Called patient with reminder for requirements for Diabetes Management Program.     According to our records, patient is missing the following requirement(s) that must be completed by July 1st 2021:   · 1st 2021 A1C      Spoke to patient at cell number and advised them of the above information. Patient verified understanding and has had the A1C done at his Providers office. Per patient he has a new Endocrinologist outside of [address] [place] [address].      Beryl Muse, [address] [employer]   Department, toll free: [phone], option 7

## 2021-06-28 NOTE — TELEPHONE ENCOUNTER
H[place]le message not read by patient. Letter and e-mail sent to patient.     Zaheer David, [address] [email or web address]   Department, toll free: [phone], option 7

## 2021-07-08 NOTE — TELEPHONE ENCOUNTER
Patient calling to verify Dr. Dina Solis office has sent in documentation of last A1c. I have provided him with our fax#. Reminded him of 7/1 deadline. He will call the office again to try to obtain (again).

## 2021-07-14 ENCOUNTER — TELEPHONE (OUTPATIENT)
Dept: PHARMACY | Facility: CLINIC | Age: 51
End: 2021-07-14

## 2021-07-14 NOTE — TELEPHONE ENCOUNTER
Pharmacy Pop Care Documentation:   Called patient with reminder for requirements for Diabetes Management Program.     According to our records, patient is missing the following requirement(s) that must be completed by July 1st 2021:     · 1st 2021 A1C        Spoke to patient at mobile number and advised them of the above information. Patient verified understanding. Patient has tried numerous times to get A1c results sent to department. Called provider's office and obtained A1c results and documented.          Roanne Romberg, [address]   Department, toll free: [phone], option 7

## 2021-08-17 ENCOUNTER — OFFICE VISIT (OUTPATIENT)
Dept: PSYCHOLOGY | Age: 51
End: 2021-08-17
Payer: COMMERCIAL

## 2021-08-17 DIAGNOSIS — F43.20 ADJUSTMENT DISORDER, UNSPECIFIED TYPE: Primary | ICD-10-CM

## 2021-08-17 PROCEDURE — 90832 PSYTX W PT 30 MINUTES: CPT | Performed by: PSYCHOLOGIST

## 2021-08-17 ASSESSMENT — ANXIETY QUESTIONNAIRES
7. FEELING AFRAID AS IF SOMETHING AWFUL MIGHT HAPPEN: 0-NOT AT ALL
GAD7 TOTAL SCORE: 3
5. BEING SO RESTLESS THAT IT IS HARD TO SIT STILL: 0-NOT AT ALL
4. TROUBLE RELAXING: 1-SEVERAL DAYS
2. NOT BEING ABLE TO STOP OR CONTROL WORRYING: 0-NOT AT ALL
6. BECOMING EASILY ANNOYED OR IRRITABLE: 1-SEVERAL DAYS
3. WORRYING TOO MUCH ABOUT DIFFERENT THINGS: 0-NOT AT ALL
1. FEELING NERVOUS, ANXIOUS, OR ON EDGE: 1-SEVERAL DAYS

## 2021-08-17 ASSESSMENT — PATIENT HEALTH QUESTIONNAIRE - PHQ9
SUM OF ALL RESPONSES TO PHQ QUESTIONS 1-9: 0
SUM OF ALL RESPONSES TO PHQ9 QUESTIONS 1 & 2: 0
2. FEELING DOWN, DEPRESSED OR HOPELESS: 0
SUM OF ALL RESPONSES TO PHQ QUESTIONS 1-9: 0
SUM OF ALL RESPONSES TO PHQ QUESTIONS 1-9: 0
1. LITTLE INTEREST OR PLEASURE IN DOING THINGS: 0

## 2021-08-17 NOTE — PROGRESS NOTES
Behavioral Health Consultation  Macario Julien, Ph.D.  Psychologist  8/17/2021  9:30 AM EST      Time spent with Patient: 30 minutes  This is patient's seventh [facility] appointment. Reason for Consult: stress  Referring Provider: PAUL Rodriguez - CNP  [facility] [address]    Feedback for PCP:     Pt provided informed consent for the behavioral health program. Discussed with patient model of service to include the limits of confidentiality (i.e. abuse reporting, suicide intervention, etc.) and short-term intervention focused approach. Pt indicated understanding. Feedback given to PCP. S:  Patient reports that his adult step-daughter has moved back home following her break-up with her boyfriend. Patient reports that in the past, the dynamic that ensues has been detrimental to the marriage between he and his wife. The patient says that he and his wife have made significant progress in establishing healthier communication and conflict management skills, and after only a short time with his step-daughter at home, both he and his wife see reactive maladaptive patterns reemerging. The patient wants to he collaborative in how he and his wife manage this historically destructive relational triangle.          Social History     Tobacco Use    Smoking status: Never Smoker    Smokeless tobacco: Never Used   Substance Use Topics    Alcohol use: Yes     Comment: rare        Illicit drugs:   Social History     Substance and Sexual Activity   Drug Use No        O:  MSE:  Appearance: good hygiene   Attitude: cooperative  Consciousness: alert  Orientation: oriented to person, place, time, general circumstance  Memory: recent and remote memory intact  Attention/Concentration: intact during session  Psychomotor Activity: normal  Eye Contact: normal  Speech: normal rate and volume, well-articulated  Mood: stressed  Affect: congruent  Perception: within normal limits  Thought Content: within normal limits  Thought Process: logical, coherent and goal-directed  Insight: good  Judgment: intact  Ability to understand instructions: Yes  Ability to respond meaningfully: Yes  Morbid Ideation: no   Suicide Assessment: no suicidal ideation, plan, or intent  Homicidal Ideation: no    A:  We discussed ways to protect the progress that he and his wife have made, and had an initial discussion of how to establish reasonable expectations that are agreed upon by both, with contingencies and consequences for disregarding the expectations for how people, property, and their dogs are treated in the house. We talked about the trap of falling back into negative patterns that may be triggered by familiar circumstances. GABRIEL 7 SCORE 8/17/2021 3/16/2021 3/9/2021 3/2/2021   GABRIEL-7 Total Score 3 1 4 3     Interpretation of GABRIEL-7 score: 5-9 = mild anxiety, 10-14 = moderate anxiety, 15+ = severe anxiety. Recommend referral to behavioral health for scores 10 or greater. PHQ Scores 8/17/2021 3/16/2021 3/9/2021 3/2/2021 9/6/2018 1/19/2018 10/19/2017   PHQ2 Score 0 0 3 0 0 0 0   PHQ9 Score 0 0 5 0 0 0 0     Interpretation of Total Score Depression Severity: 1-4 = Minimal depression, 5-9 = Mild depression, 10-14 = Moderate depression, 15-19 = Moderately severe depression, 20-27 = Severe depression    Diagnosis:    1.  Adjustment disorder, unspecified type        Patient Active Problem List   Diagnosis    Diabetes mellitus ([facility] [address]    HTN (hypertension)    Other and unspecified hyperlipidemia    Depressive disorder, not elsewhere classified    Pharyngitis    Abnormal stress test    HLD (hyperlipidemia)    Chest pain    Unstable angina ([address]    Coronary artery disease involving native coronary artery of native heart with angina pectoris ([address]    Essential hypertension    Mixed hyperlipidemia    HCAP (healthcare-associated pneumonia)    Cellulitis    Fever    Cellulitis of chest wall    Type 2 diabetes mellitus with circulatory disorder (HCC)    Pure hypercholesterolemia    Klebsiella infection    High triglycerides    Vitamin D deficiency    Leg swelling    Pain in both lower extremities    PVD (peripheral vascular disease) (HCC)    Chronic venous htn w inflammation of bilateral low extrm    Degenerative disc disease, lumbar    Acute gout of right foot         Plan:  Pt interventions:  Established rapport, [place]-setting to identify pt's primary goals for [facility] visit / overall health, Supportive techniques and Emphasized self-care as important for managing overall health.        Pt Behavioral Change Plan:  Pt will continue with the following goals:  Pt scheduled F/U visit in one weeks for both he and his wife

## 2021-08-20 DIAGNOSIS — E11.51 TYPE 2 DIABETES MELLITUS WITH DIABETIC PERIPHERAL ANGIOPATHY WITHOUT GANGRENE, WITH LONG-TERM CURRENT USE OF INSULIN (HCC): ICD-10-CM

## 2021-08-20 DIAGNOSIS — Z79.4 TYPE 2 DIABETES MELLITUS WITH DIABETIC PERIPHERAL ANGIOPATHY WITHOUT GANGRENE, WITH LONG-TERM CURRENT USE OF INSULIN (HCC): ICD-10-CM

## 2021-08-23 RX ORDER — FLASH GLUCOSE SENSOR
KIT MISCELLANEOUS
Qty: 6 EACH | Refills: 3 | Status: SHIPPED | OUTPATIENT
Start: 2021-08-23 | End: 2022-02-02

## 2021-08-23 NOTE — TELEPHONE ENCOUNTER
Medication:   Requested Prescriptions     Pending Prescriptions Disp Refills    Continuous Blood Gluc Sensor (FREESTYLE NATHALIE 14 DAY SENSOR) MISC [Pharmacy Med Name: Brandt Rodriguez 14 DAY SENSO  MISC]  3     Sig: USSE ONE SENSOR EVERY 14 DAYS TO MONITOR BLOOD SUGARS         Last appt: 1/23/2020   Next appt: Visit date not found    Last Labs DM:   Lab Results   Component Value Date    LABA1C 6.5 05/18/2021

## 2021-08-24 ENCOUNTER — OFFICE VISIT (OUTPATIENT)
Dept: PSYCHOLOGY | Age: 51
End: 2021-08-24
Payer: COMMERCIAL

## 2021-08-24 DIAGNOSIS — E11.51 TYPE 2 DIABETES MELLITUS WITH DIABETIC PERIPHERAL ANGIOPATHY WITHOUT GANGRENE, WITH LONG-TERM CURRENT USE OF INSULIN (HCC): ICD-10-CM

## 2021-08-24 DIAGNOSIS — F43.20 ADJUSTMENT DISORDER, UNSPECIFIED TYPE: Primary | ICD-10-CM

## 2021-08-24 DIAGNOSIS — Z79.4 TYPE 2 DIABETES MELLITUS WITH DIABETIC PERIPHERAL ANGIOPATHY WITHOUT GANGRENE, WITH LONG-TERM CURRENT USE OF INSULIN (HCC): ICD-10-CM

## 2021-08-24 PROCEDURE — 90832 PSYTX W PT 30 MINUTES: CPT | Performed by: PSYCHOLOGIST

## 2021-08-24 NOTE — PROGRESS NOTES
Behavioral Health Consultation  Jose Ramon Pruett, Ph.D.  Psychologist  8/24/2021  10:30 AM EST      Time spent with Patient: 30 minutes  This is patient's seventh [facility] appointment. Reason for Consult: stress  Referring Provider: Thaddeus Garcia, APRN - CNP  [address] [facility] [address]    Feedback for PCP:     Pt provided informed consent for the behavioral health program. Discussed with patient model of service to include the limits of confidentiality (i.e. abuse reporting, suicide intervention, etc.) and short-term intervention focused approach. Pt indicated understanding. Feedback given to PCP. S:  Patient reports that his adult step-daughter's anger problem is dominating his home life, and that it's so tense, he can't sleep or rest. His wife agreed that her anger is non-relenting. They are trying to figure out how to stay connected with each other, manage their stress, and figure out a plan on how to manage her behavior.          Social History     Tobacco Use    Smoking status: Never Smoker    Smokeless tobacco: Never Used   Substance Use Topics    Alcohol use: Yes     Comment: rare        Illicit drugs:   Social History     Substance and Sexual Activity   Drug Use No        O:  MSE:  Appearance: good hygiene   Attitude: cooperative  Consciousness: alert  Orientation: oriented to person, place, time, general circumstance  Memory: recent and remote memory intact  Attention/Concentration: intact during session  Psychomotor Activity: normal  Eye Contact: normal  Speech: normal rate and volume, well-articulated  Mood: stressed  Affect: congruent  Perception: within normal limits  Thought Content: within normal limits  Thought Process: logical, coherent and goal-directed  Insight: good  Judgment: intact  Ability to understand instructions: Yes  Ability to respond meaningfully: Yes  Morbid Ideation: no   Suicide Assessment: no suicidal ideation, plan, or intent  Homicidal Ideation: no    A:  The patient's exhaustion and tenseness is high, and he's trying to manage it as best he can, but he says that he is running out of ideas and ways to protect his marriage and his emotions in such a state. GABRIEL 7 SCORE 8/17/2021 3/16/2021 3/9/2021 3/2/2021   GABRIEL-7 Total Score 3 1 4 3     Interpretation of GABRIEL-7 score: 5-9 = mild anxiety, 10-14 = moderate anxiety, 15+ = severe anxiety. Recommend referral to behavioral health for scores 10 or greater. PHQ Scores 8/17/2021 3/16/2021 3/9/2021 3/2/2021 9/6/2018 1/19/2018 10/19/2017   PHQ2 Score 0 0 3 0 0 0 0   PHQ9 Score 0 0 5 0 0 0 0     Interpretation of Total Score Depression Severity: 1-4 = Minimal depression, 5-9 = Mild depression, 10-14 = Moderate depression, 15-19 = Moderately severe depression, 20-27 = Severe depression    Diagnosis:    1.  Adjustment disorder, unspecified type        Patient Active Problem List   Diagnosis    Diabetes mellitus ([address]    HTN (hypertension)    Other and unspecified hyperlipidemia    Depressive disorder, not elsewhere classified    Pharyngitis    Abnormal stress test    HLD (hyperlipidemia)    Chest pain    Unstable angina ([address]    Coronary artery disease involving native coronary artery of native heart with angina pectoris ([address]    Essential hypertension    Mixed hyperlipidemia    HCAP (healthcare-associated pneumonia)    Cellulitis    Fever    Cellulitis of chest wall    Type 2 diabetes mellitus with circulatory disorder ([address]    Pure hypercholesterolemia    Klebsiella infection    High triglycerides    Vitamin D deficiency    Leg swelling    Pain in both lower extremities    PVD (peripheral vascular disease) ([address]    Chronic venous htn w inflammation of bilateral low extrm    Degenerative disc disease, lumbar    Acute gout of right foot         Plan:  Pt interventions:  Established rapport, [place]-setting to identify pt's primary goals for [facility] visit / overall health, Supportive techniques and Emphasized self-care as important for managing overall health.        Pt Behavioral Change Plan:  Pt will continue with the following goals:  Pt scheduled F/U visit in one week

## 2021-08-25 RX ORDER — FLASH GLUCOSE SENSOR
KIT MISCELLANEOUS
Refills: 3 | OUTPATIENT
Start: 2021-08-25

## 2021-09-07 ENCOUNTER — OFFICE VISIT (OUTPATIENT)
Dept: PSYCHOLOGY | Age: 51
End: 2021-09-07
Payer: COMMERCIAL

## 2021-09-07 DIAGNOSIS — F43.20 ADJUSTMENT DISORDER, UNSPECIFIED TYPE: Primary | ICD-10-CM

## 2021-09-07 PROCEDURE — 90832 PSYTX W PT 30 MINUTES: CPT | Performed by: PSYCHOLOGIST

## 2021-09-07 ASSESSMENT — PATIENT HEALTH QUESTIONNAIRE - PHQ9
SUM OF ALL RESPONSES TO PHQ9 QUESTIONS 1 & 2: 1
1. LITTLE INTEREST OR PLEASURE IN DOING THINGS: 0
SUM OF ALL RESPONSES TO PHQ QUESTIONS 1-9: 1
SUM OF ALL RESPONSES TO PHQ QUESTIONS 1-9: 1
2. FEELING DOWN, DEPRESSED OR HOPELESS: 1
SUM OF ALL RESPONSES TO PHQ QUESTIONS 1-9: 1

## 2021-09-08 NOTE — PROGRESS NOTES
Behavioral Health Consultation  Prabhu Johnson, Ph.D.  Psychologist  9/7/2021  3:30 PM EST      Time spent with Patient: 30 minutes  This is patient's eighth [facility] appointment. Reason for Consult: stress  Referring Provider: Lory Curry, APRN - CNP  [id number] [facility] [address]    Feedback for PCP:     Pt provided informed consent for the behavioral health program. Discussed with patient model of service to include the limits of confidentiality (i.e. abuse reporting, suicide intervention, etc.) and short-term intervention focused approach. Pt indicated understanding. Feedback given to PCP. S:  Patient reports that his daughter in law is still the focus of his annoyance and frustration. He and his wife fight about the dynamic with his step-daughter but the patient says that she is doing better, but he still brings up failures from the past which makes a realistic rapprochement difficult. He says that he and his wife still fight over their daughter/step-daughter they are keeping it civil for the most part.           Social History     Tobacco Use    Smoking status: Never Smoker    Smokeless tobacco: Never Used   Substance Use Topics    Alcohol use: Yes     Comment: rare        Illicit drugs:   Social History     Substance and Sexual Activity   Drug Use No        O:  MSE:  Appearance: good hygiene   Attitude: cooperative  Consciousness: alert  Orientation: oriented to person, place, time, general circumstance  Memory: recent and remote memory intact  Attention/Concentration: intact during session  Psychomotor Activity: normal  Eye Contact: normal  Speech: normal rate and volume, well-articulated  Mood: stressed  Affect: congruent  Perception: within normal limits  Thought Content: within normal limits  Thought Process: logical, coherent and goal-directed  Insight: good  Judgment: intact  Ability to understand instructions: Yes  Ability to respond meaningfully: Yes  Morbid Ideation: no   Suicide Assessment: no suicidal ideation, plan, or intent  Homicidal Ideation: no    A:  We talked about his step-daughter's progress with emotional dyscontrol, and he admitted that she was doing better. He seems to be caught in her 'past' than her present and projected future. We talked about how he might be able to keep a more here-and-now approach with her, for the family's sake. GABRIEL 7 SCORE 8/17/2021 3/16/2021 3/9/2021 3/2/2021   GABRIEL-7 Total Score 3 1 4 3     Interpretation of GABRIEL-7 score: 5-9 = mild anxiety, 10-14 = moderate anxiety, 15+ = severe anxiety. Recommend referral to behavioral health for scores 10 or greater. PHQ Scores 9/7/2021 8/17/2021 3/16/2021 3/9/2021 3/2/2021 9/6/2018 1/19/2018   PHQ2 Score 1 0 0 3 0 0 0   PHQ9 Score 1 0 0 5 0 0 0     Interpretation of Total Score Depression Severity: 1-4 = Minimal depression, 5-9 = Mild depression, 10-14 = Moderate depression, 15-19 = Moderately severe depression, 20-27 = Severe depression    Diagnosis:    1.  Adjustment disorder, unspecified type        Patient Active Problem List   Diagnosis    Diabetes mellitus ([address]    HTN (hypertension)    Other and unspecified hyperlipidemia    Depressive disorder, not elsewhere classified    Pharyngitis    Abnormal stress test    HLD (hyperlipidemia)    Chest pain    Unstable angina ([address]    Coronary artery disease involving native coronary artery of native heart with angina pectoris ([address]    Essential hypertension    Mixed hyperlipidemia    HCAP (healthcare-associated pneumonia)    Cellulitis    Fever    Cellulitis of chest wall    Type 2 diabetes mellitus with circulatory disorder ([address]    Pure hypercholesterolemia    Klebsiella infection    High triglycerides    Vitamin D deficiency    Leg swelling    Pain in both lower extremities    PVD (peripheral vascular disease) ([address]    Chronic venous htn w inflammation of bilateral low extrm    Degenerative disc disease, lumbar    Acute gout of right foot         Plan:  Pt interventions:  Established rapport, [place]-setting to identify pt's primary goals for [facility] visit / overall health, Supportive techniques and Emphasized self-care as important for managing overall health.        Pt Behavioral Change Plan:  Pt will continue with the following goals:  Pt scheduled F/U visit in two  weeks

## 2021-09-08 NOTE — PROGRESS NOTES
BMI 35.26 kg/m²      Physical Exam:   Constitutional: The patient is oriented to person, place, and time. Appears well-developed and well-nourished. In no acute distress. Head: Normocephalic and atraumatic. Pupils equal and round. Neck: Neck supple. No JVP or carotid bruit appreciated. No mass and no thyromegaly present. No lymphadenopathy present. Cardiovascular: Normal rate. Normal heart sounds. Exam reveals no gallop and no friction rub. No murmur heard. Pulmonary/Chest: Effort normal and breath sounds normal. No respiratory distress. No wheezes, rhonchi or rales. Abdominal: Soft, non-tender. Bowel sounds are normal. Exhibits no organomegaly, mass or bruit. Extremities: No edema. No cyanosis or clubbing. Pulses are 2+ radial and carotid bilaterally. Neurological: No gross cranial nerve deficit. Coordination normal.   Skin: Skin is warm and dry. There is no rash or diaphoresis. Psychiatric: Patient has a normal mood and affect. Speech is normal and behavior is normal.     Outpatient Medications Marked as Taking for the 9/28/21 encounter (Office Visit) with Tee Tatum MD   Medication Sig Dispense Refill    Continuous Blood Gluc Sensor (FREESTYLE NATHALIE 14 DAY SENSOR) [facility] [place] SENSOR EVERY 14 DAYS TO MONITOR BLOOD SUGARS 6 each 3    dapagliflozin (FARXIGA) 10 MG tablet Take 10 mg by mouth daily Take 10mg daily      tadalafil (CIALIS) 20 MG tablet TAKE 1 TABLET BY MOUTH DAILY AS NEEDED FOR ERECTILE DYSFUNCTION 10 tablet 0    metFORMIN (GLUCOPHAGE) 1000 MG tablet TAKE 1 TABLET BY MOUTH 2 TIMES A DAY WITH MEALS 180 tablet 0    lisinopril (PRINIVIL;ZESTRIL) 20 MG tablet TAKE 1 TABLET BY MOUTH 2 TIMES A  tablet 0    Semaglutide, 1 MG/DOSE, (OZEMPIC, 1 MG/DOSE,) 2 MG/1.5ML SOPN Inject 0.5 mg into the skin every 7 days       blood glucose monitor kit and supplies Dispense sufficient amount for indicated testing frequency plus additional to accommodate PRN testing needs.  Dispense all needed supplies to include: monitor, strips, lancing device, lancets, control solutions, alcohol swabs. Use to test blood glucose levels 4 times daily 1 kit 0    Prodigy Lancets 28G MISC 1 Units by Does not apply route 3 times daily as needed (blood sugar) 100 each 1    blood glucose test strips (EXACTECH TEST) strip 1 each by In Vitro route 3 times daily as needed (blood sugar) As needed. 100 each 1    furosemide (LASIX) 20 MG tablet Take 2 tablets by mouth daily (Patient taking differently: Take 40 mg by mouth as needed ) 30 tablet 5    Phentermine-Topiramate (QSYMIA) 3.75-23 MG CP24 Take by mouth.  TESTOSTERONE PROPIONATE IM Inject into the muscle Every 10 days      TOUJEO SOLOSTAR 300 UNIT/ML SOPN INJECT 180 UNITS INTO THE SKIN NIGHTLY (Patient taking differently: Inject 130 Units into the skin nightly ) 54 mL 1    TRUEPLUS PEN NEEDLES 32G X 4 MM MISC USE FOUR TIMES A  each 5    rosuvastatin (CRESTOR) 40 MG tablet Take 1 tablet by mouth daily 90 tablet 1    Icosapent Ethyl (VASCEPA) 1 g CAPS capsule Take 2 capsules by mouth 2 times daily 360 capsule 1    Cholecalciferol (VITAMIN D3) 50 MCG (2000 UT) CAPS Take 8 capsules by mouth three times a week      insulin lispro (HUMALOG KWIKPEN) 200 UNIT/ML SOPN pen INJECT UP TO 60 UNITS INTO THE SKIN THREE TIMES A DAY BEFORE MEALS PER SLIDING SCALE (Patient taking differently: INJECT as directed - only using on/for sick days. PRN sliding scale) 108 mL 3    Continuous Blood Gluc  (FREESTYLE NATHALIE 14 DAY READER) TAMERA 1 Units by Does not apply route as needed (as needed) 1 Device 0    aspirin 325 MG tablet Take 325 mg by mouth daily      b complex vitamins capsule Take 1 capsule by mouth daily       EC20 NSR   EKG 21 marked SB  EKG 21 NSR     ECHO:16  Slightly dilated LV with normal systolic function; EF 39% Mild mitral regurgitation is present. The left atrium is dilated. RV is enlarged with reduced systolic function.   There is mild tricuspid regurgitation with RVSP estimated at 35 mmHg. Right atrial size is mildly dilated . Mild pulmonic regurgitation present.      CABG X 4/ Sternal Stabilization on 3/8/16  TOMASA, endoscopic radial artery harvest, CABGX4 (LIMA-LAD, left radial off the LIMA sequentially to OM1-OM2-PDA) sternal stabilization. Corornary angiogram  & Intervention: 3/4/16 at [facility]  3 vessel coronary artery disease    Cath 7/2020  1. All the bypass grafts are patent. 2.  LIMA is anastomosed in the mid LAD and the mid segment has widely open anastomosis, has a competitive flow. The radial artery graft to      the OM and the JACINDA/PDA branch of the RCA is widely patent. 3.  Elevated left heart filling pressure. 4.  Normal left ventricular size and systolic function. Stress test 3/19/18  Normal myocardial perfusion study. Normal LV size and systolic function with septal hypokinesis possibly from   reported CABG. Overall findings represent a low risk study. ECHO 3/19/18  Mild concentric LVH with normal systolic function. EF is 60%  Trivial mitral & tricuspid regurgitation. Left atrium is of normal size. Normal right ventricular size and function. Assessment/Plan:    CAD s/p CABG X4 / sternal stabilization on 3/8/16  Continue with medical management and risk factor modification including Aspirin, Statin, and Farxiga. BB stopped due to bradycardia. Erectile dysfunction  Could be from medications, but also could be part and parcel of CAD  Unimproved off Toprol. Also is on lisinopril. May also consider discontinuing lisinopril and trying Norvasc to see if this would help alleviate ED  Taking Cialis. Essential HTN  Controlled. Goal BP <130/80. Continue medical therapy. Hyperlipidemia  He reported myalgias with taking Lipitor 40 mg. LDL 41, HDL 27, TG 90 5/2021   Tolerating Crestor 40 mg and Vascepa  May stop Omega 3. Fasting lipid profile, CMP before next visit.     Diabetes   HgAIC 6.5 5/2021  Managed per PCP and endocrinology     Follow up in 6 months. Sincerely,    Flora Villarreal M.D  [facility], [address] [facility] Hospital[address]hadar willisAndrew [place] [address]  Ph: [phone]  Fax: [phone]    This note was scribed in the presence of the physician by Deven Lugo RN. Physician Attestation:  The scribes documentation has been prepared under my direction and personally reviewed by me in its entirety. I confirm that the note above accurately reflects all work, treatment, procedures, and medical decision making performed by me.

## 2021-09-10 ENCOUNTER — TELEPHONE (OUTPATIENT)
Dept: PHARMACY | Facility: CLINIC | Age: 51
End: 2021-09-10

## 2021-09-10 NOTE — TELEPHONE ENCOUNTER
As of 9/3/21 patient has used $550 of the maximum of $600 a year in waived co pays for specific medications and pharmacy-related supplies through the [address] [employer] for the DM Program.    Patient currently enrolled in the DM Program and is nearing the maximum of $600 a year in waived co pays for specific medications and pharmacy-related supplies through the [address] [employer]. Courtesy call placed to patient at mobile number to advise them of the above information. Spoke to patient and advised him of the above information. Patient verified understanding.     Clinton Ruiz[address]   Phone: [phone], option #3

## 2021-09-10 NOTE — TELEPHONE ENCOUNTER
Patient called back regarding question for P about wrong medications sent to him that he never used. Transferred to [facility] for further assistance.

## 2021-09-21 ENCOUNTER — OFFICE VISIT (OUTPATIENT)
Dept: PSYCHOLOGY | Age: 51
End: 2021-09-21
Payer: COMMERCIAL

## 2021-09-21 DIAGNOSIS — F43.21 ADJUSTMENT DISORDER WITH DEPRESSED MOOD: Primary | ICD-10-CM

## 2021-09-21 PROBLEM — F43.20 ADJUSTMENT DISORDER: Status: ACTIVE | Noted: 2021-09-21

## 2021-09-21 PROCEDURE — 90832 PSYTX W PT 30 MINUTES: CPT | Performed by: PSYCHOLOGIST

## 2021-09-21 NOTE — PROGRESS NOTES
Behavioral Health Consultation  Sadiq Will, Ph.D.  Psychologist  9/21/2021  4:00 PM EST      Time spent with Patient: 30 minutes  This is patient's eighth [facility] appointment. Reason for Consult: stress  Referring Provider: Zoraida Zacarias APRN - CNP  [facility] [facility] [address]    Feedback for PCP:     Pt provided informed consent for the behavioral health program. Discussed with patient model of service to include the limits of confidentiality (i.e. abuse reporting, suicide intervention, etc.) and short-term intervention focused approach. Pt indicated understanding. Feedback given to PCP. S:  Patient reports that he is becoming discouraged with the dynamics at home and that his communication with his wife is currently strained. The patient displayed more emotion than he does in general, so it appears as though these feelings have been accumulating. 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          Social History     Tobacco Use    Smoking status: Never Smoker    Smokeless tobacco: Never Used   Substance Use Topics    Alcohol use: Yes     Comment: rare        Illicit drugs:   Social History     Substance and Sexual Activity   Drug Use No        O:  MSE:  Appearance: good hygiene   Attitude: cooperative  Consciousness: alert  Orientation: oriented to person, place, time, general circumstance  Memory: recent and remote memory intact  Attention/Concentration: intact during session  Psychomotor Activity: normal  Eye Contact: normal  Speech: normal rate and volume, well-articulated  Mood: stressed  Affect: congruent  Perception: within normal limits  Thought Content: within normal limits  Thought Process: logical, coherent and goal-directed  Insight: good  Judgment: intact  Ability to understand instructions: Yes  Ability to respond meaningfully: Yes  Morbid Ideation: no   Suicide Assessment: no suicidal ideation, plan, or intent  Homicidal Ideation: no    A:  We talked about how he and his wife appear to have been able to discuss and resolve long-standing conflicts in the past, and he was encouraged to talk with her about his concerns, and failing that, schedule a F/U visit for both of them. GABRIEL 7 SCORE 8/17/2021 3/16/2021 3/9/2021 3/2/2021   GABRIEL-7 Total Score 3 1 4 3     Interpretation of GABRIEL-7 score: 5-9 = mild anxiety, 10-14 = moderate anxiety, 15+ = severe anxiety. Recommend referral to behavioral health for scores 10 or greater. PHQ Scores 9/7/2021 8/17/2021 3/16/2021 3/9/2021 3/2/2021 9/6/2018 1/19/2018   PHQ2 Score 1 0 0 3 0 0 0   PHQ9 Score 1 0 0 5 0 0 0     Interpretation of Total Score Depression Severity: 1-4 = Minimal depression, 5-9 = Mild depression, 10-14 = Moderate depression, 15-19 = Moderately severe depression, 20-27 = Severe depression    Diagnosis:    1.  Adjustment disorder with depressed mood        Patient Active Problem List   Diagnosis    Diabetes mellitus ([address]    HTN (hypertension)    Other and unspecified hyperlipidemia    Depressive disorder, not elsewhere classified    Pharyngitis    Abnormal stress test    Dyslipidemia    Chest pain    Unstable angina ([address]    Coronary artery disease involving native coronary artery of native heart with angina pectoris ([address]    Essential hypertension    Mixed hyperlipidemia    HCAP (healthcare-associated pneumonia)    Cellulitis    Fever    Cellulitis of chest wall    CAD, multiple vessel    Type 2 diabetes mellitus with circulatory disorder ([address]    Pure hypercholesterolemia    Klebsiella infection    High triglycerides    Vitamin D deficiency    Leg swelling    Pain in both lower extremities    PVD (peripheral vascular disease) (HCC)    Chronic venous htn w inflammation of bilateral low extrm    Degenerative disc disease, lumbar    Acute gout of right foot    Adjustment disorder Plan:  Pt interventions:  Established rapport, [place]-setting to identify pt's primary goals for [facility] visit / overall health, Supportive techniques and Emphasized self-care as important for managing overall health.        Pt Behavioral Change Plan:  Pt will continue with the following goals:  Pt scheduled F/U visit in two  weeks

## 2021-09-28 ENCOUNTER — OFFICE VISIT (OUTPATIENT)
Dept: CARDIOLOGY CLINIC | Age: 51
End: 2021-09-28
Payer: COMMERCIAL

## 2021-09-28 ENCOUNTER — OFFICE VISIT (OUTPATIENT)
Dept: ORTHOPEDIC SURGERY | Age: 51
End: 2021-09-28
Payer: COMMERCIAL

## 2021-09-28 VITALS
DIASTOLIC BLOOD PRESSURE: 72 MMHG | WEIGHT: 260 LBS | OXYGEN SATURATION: 97 % | SYSTOLIC BLOOD PRESSURE: 118 MMHG | BODY MASS INDEX: 35.21 KG/M2 | HEART RATE: 72 BPM | HEIGHT: 72 IN

## 2021-09-28 VITALS — RESPIRATION RATE: 16 BRPM | HEIGHT: 72 IN | BODY MASS INDEX: 35.08 KG/M2 | WEIGHT: 259 LBS

## 2021-09-28 DIAGNOSIS — M25.511 CHRONIC PAIN OF BOTH SHOULDERS: Primary | ICD-10-CM

## 2021-09-28 DIAGNOSIS — I10 ESSENTIAL HYPERTENSION: ICD-10-CM

## 2021-09-28 DIAGNOSIS — E78.5 DYSLIPIDEMIA: ICD-10-CM

## 2021-09-28 DIAGNOSIS — I25.10 CAD, MULTIPLE VESSEL: Primary | ICD-10-CM

## 2021-09-28 DIAGNOSIS — G89.29 CHRONIC PAIN OF BOTH SHOULDERS: Primary | ICD-10-CM

## 2021-09-28 DIAGNOSIS — M75.42 SUBACROMIAL IMPINGEMENT OF LEFT SHOULDER: ICD-10-CM

## 2021-09-28 DIAGNOSIS — M25.512 CHRONIC PAIN OF BOTH SHOULDERS: Primary | ICD-10-CM

## 2021-09-28 PROCEDURE — 99214 OFFICE O/P EST MOD 30 MIN: CPT | Performed by: ORTHOPAEDIC SURGERY

## 2021-09-28 PROCEDURE — 93000 ELECTROCARDIOGRAM COMPLETE: CPT | Performed by: INTERNAL MEDICINE

## 2021-09-28 PROCEDURE — 99214 OFFICE O/P EST MOD 30 MIN: CPT | Performed by: INTERNAL MEDICINE

## 2021-09-28 NOTE — PROGRESS NOTES
ORTHOPAEDIC CONSULTATION NOTE    Chief Complaint   Patient presents with    Shoulder Pain     bilat shoulder        HPI   9/28/21  45 y.o. male RHD seen in consultation at the request of PAUL Reynolds CNP for evaluation of bilateral shoulder pain, left > right:    Patient reports his current symptoms started a couple of months ago  There is no injury or trauma  Pain is currently rated 1 out of 10  The pain is worse at nighttime  He does sleep with his left arm in an abducted/overhead position  His history is significant for previous open left shoulder surgery approximately 20 years ago with Dr. Camacho Bolds  There was a distal clavicle excision performed as well as cuff repair  His right shoulder symptoms are mild at this time    The pain is described over the anterior lateral shoulder, with intermittent radiation down into the biceps region  No distal radiation, no numbness and tingling  The pain is worse with abduction maneuvers/positioning   Better with rest, arm at side, Tylenol/NSAIDs    He works as an RN here at [employer]      Review of Systems  I have read over the ROS from the Patient History Form dated on 9/28/2021  Pertinent positives include weight change, hypertension, history of fractures, neck and back pain, neuropathy, chronic pain  Rest of 13 point ROS otherwise negative except per HPI, and scanned into the patient's chart under the Media tab.       Allergies   Allergen Reactions    Penicillins Anaphylaxis        Current Outpatient Medications   Medication Sig Dispense Refill    Continuous Blood Gluc Sensor (MedTel.comYLE NATHALIE 14 DAY SENSOR) [facility] USSE ONE SENSOR EVERY 14 DAYS TO MONITOR BLOOD SUGARS 6 each 3    dapagliflozin (FARXIGA) 10 MG tablet Take 10 mg by mouth daily Take 10mg daily      tadalafil (CIALIS) 20 MG tablet TAKE 1 TABLET BY MOUTH DAILY AS NEEDED FOR ERECTILE DYSFUNCTION 10 tablet 0    metFORMIN (GLUCOPHAGE) 1000 MG tablet TAKE 1 TABLET BY MOUTH 2 TIMES A DAY WITH MEALS 180 tablet 0    lisinopril (PRINIVIL;ZESTRIL) 20 MG tablet TAKE 1 TABLET BY MOUTH 2 TIMES A  tablet 0    Semaglutide, 1 MG/DOSE, (OZEMPIC, 1 MG/DOSE,) 2 MG/1.5ML SOPN Inject 0.5 mg into the skin every 7 days       blood glucose monitor kit and supplies Dispense sufficient amount for indicated testing frequency plus additional to accommodate PRN testing needs. Dispense all needed supplies to include: monitor, strips, lancing device, lancets, control solutions, alcohol swabs. Use to test blood glucose levels 4 times daily 1 kit 0    Prodigy Lancets 28G MISC 1 Units by Does not apply route 3 times daily as needed (blood sugar) 100 each 1    blood glucose test strips (EXACTECH TEST) strip 1 each by In Vitro route 3 times daily as needed (blood sugar) As needed. 100 each 1    furosemide (LASIX) 20 MG tablet Take 2 tablets by mouth daily (Patient taking differently: Take 40 mg by mouth as needed ) 30 tablet 5    Phentermine-Topiramate (QSYMIA) 3.75-23 MG CP24 Take by mouth.  TESTOSTERONE PROPIONATE IM Inject into the muscle Every 10 days      TOUJEO SOLOSTAR 300 UNIT/ML SOPN INJECT 180 UNITS INTO THE SKIN NIGHTLY (Patient taking differently: Inject 130 Units into the skin nightly ) 54 mL 1    TRUEPLUS PEN NEEDLES 32G X 4 MM MISC USE FOUR TIMES A  each 5    rosuvastatin (CRESTOR) 40 MG tablet Take 1 tablet by mouth daily 90 tablet 1    Icosapent Ethyl (VASCEPA) 1 g CAPS capsule Take 2 capsules by mouth 2 times daily 360 capsule 1    Cholecalciferol (VITAMIN D3) 50 MCG (2000 UT) CAPS Take 8 capsules by mouth three times a week      insulin lispro (HUMALOG KWIKPEN) 200 UNIT/ML SOPN pen INJECT UP TO 60 UNITS INTO THE SKIN THREE TIMES A DAY BEFORE MEALS PER SLIDING SCALE (Patient taking differently: INJECT as directed - only using on/for sick days.  PRN sliding scale) 108 mL 3    Continuous Blood Gluc  (FREESTYLE NATHALIE 14 DAY READER) TAMERA 1 Units by Does not apply route as needed (as needed) 1 Device 0    aspirin 325 MG tablet Take 325 mg by mouth daily      b complex vitamins capsule Take 1 capsule by mouth daily       No current facility-administered medications for this visit. Past Medical History:   Diagnosis Date    Abscess 1994    mediastinal, developed after kenalog injections    Anesthesia     AWAKE BUT UNABLE TO MOVE DURING SHOULDER SURGERY.  CAD (coronary artery disease) 3/2016    Diabetes mellitus ([facility] 2001    HgA1C 10.6    Gout     HLD (hyperlipidemia)     HTN (hypertension)     Echo 2013 normal.     MDRO (multiple drug resistant organisms) resistance     MRSA (methicillin resistant staph aureus) culture positive 04/19/2017    Obesity     Pancreatitis 2006    high TG or byetta. flank ecchymosis.  PONV (postoperative nausea and vomiting)     Toxicity, chemicals 2013    isopropol alcohol toxicity: SOB/anasarca. work related    Type 2 diabetes mellitus without complication ([facility]     Wound abscess 1994    groin. after kenalog injections. drained. iv abx. Past Surgical History:   Procedure Laterality Date    ADENOIDECTOMY  1970    COLONOSCOPY N/A 3/19/2021    COLONOSCOPY DIAGNOSTIC performed by Lakshmi Ocampo MD at [address]  3/8/16    [id number] Xiao Anchors)    ELBOW SURGERY Right     elbow reconstruction    ENDOSCOPY, COLON, DIAGNOSTIC      IR AIR ABS HEMATOMA BULLA CYST  7/31/2020    IR AIR ABS HEMATOMA BULLA CYST 7/31/2020 [facility]    KNEE SURGERY Left 1985    menicus tear    LUMBAR LAMINECTOMY  08/10/2017    L4-5 microhemilaminectomy; excision of cyst    LUMBAR SPINE SURGERY Right 7/21/2020    RIGHT LUMBAR4-LUMBAR5 MICRO HEMILAMINECTOMY AND CYSTECTOMY performed by Michelle Mckoy MD at [facility]    bronchoscopy prior to medistinal mass. 45 ml off lymph node, IV antibiotics for 6 months   [facility]    L rectus muscle flap, infected, kenalog injections    SHOULDER SURGERY Left 2002    Rotator cuff repair, [facility] at [address] Left 7/7/2020    LEFT FIRST DORSAL COMPARTMENT (DEQUERVAIN'S) RELEASE performed by Tyler Galicia MD at [facility] History   Problem Relation Age of Onset    High Blood Pressure Mother     Cancer Mother     Pacemaker Father     Other Maternal Grandmother         CVA in [de-identified] Other Maternal Grandfather         CABG and aortic valve replacement in [de-identified]    Hypertension Maternal Grandfather     Other Paternal Grandmother         CVA in [de-identified]       Social History     Socioeconomic History    Marital status:      Spouse name: Not on file    Number of children: Not on file    Years of education: Not on file    Highest education level: Not on file   Occupational History    Not on file   Tobacco Use    Smoking status: Never Smoker    Smokeless tobacco: Never Used   Vaping Use    Vaping Use: Never used   Substance and Sexual Activity    Alcohol use: Yes     Comment: rare    Drug use: No    Sexual activity: Yes     Partners: Female   Other Topics Concern    Not on file   Social History Narrative    Not on file     Social Determinants of Health     Financial Resource Strain: Low Risk     Difficulty of Paying Living Expenses: Not hard at all   Food Insecurity: No Food Insecurity    Worried About [facility] in the Last Year: Never true    [facility] in the Last Year: Never true   Transportation Needs:     Lack of Transportation (Medical):      Lack of Transportation (Non-Medical):    Physical Activity:     Days of Exercise per Week:     Minutes of Exercise per Session:    Stress:     Feeling of Stress :    Social Connections:     Frequency of Communication with Friends and Family:     Frequency of Social Gatherings with Friends and Family:     Attends Zoroastrian Services:     Active Member of Clubs or Organizations:     Attends Club or Organization Meetings:     Marital Status:    Intimate Partner Violence:     Fear of Current or Ex-Partner:     Emotionally Abused:     Physically Abused:     Sexually Abused:         Vitals:    09/28/21 1414   Resp: 16   Weight: 259 lb (117.5 kg)   Height: 6' (1.829 m)       Physical Exam  Constitutional  well-groomed, well-nourished, Body mass index is 35.13 kg/m². Psychiatric  pleasant, normal mood & affect  Cardiovascular  RRR, negative UE peripheral edema, radial pulse 2+  Respiratory  respirations unlabored, on room air; mask on  Skin  no rashes, wounds, or lesions seen on exposed skin  Neck  negative Spurling's  Neurological - SILT M/U/R/A nerve distributions; AIN/PIN/IO intact  Left shoulder:   No obvious deformity/swelling/ecchymosis   No atrophy seen    Prior anterior surgical scar well-healed   Mild TTP over bicipital groove    Range of Motion: Forward flexion:  165    Abduction:  145    External rotation with arm at side:  30 (right 40)    Internal rotation:  L4   Strength:    Abduction:  5/5     External rotation:  5/5    Special tests:    negative lift-off sign    negative belly-press test    positive Hawkin's test        Imaging:  Images were personally reviewed by myself and discussed with the patient  Left shoulder 4 views performed today in clinic - glenohumeral articulation is mildly narrowed, there are no loose bodies appreciated. Ana's line is preserved. On axillary view, the humeral head is well-centered within the glenoid. The acromioclavicular joint demonstrates findings consistent with previous distal clavicle excision. The tuberosities are normal in appearance. Calcific tendonitis is absent. Right shoulder 4 views performed today in clinic - glenohumeral articulation is mildly narrowed, there are no loose bodies appreciated. Ana's line is preserved. On axillary view, the humeral head is well-centered within the glenoid. The acromioclavicular joint demonstrates moderate degenerative changes. The tuberosities are normal in appearance. Calcific tendonitis is absent. Assessment & Plan:  45 y.o. male who presents with    Diagnosis Orders   1. Chronic pain of both shoulders  XR SHOULDER LEFT (MIN 2 VIEWS)    XR SHOULDER RIGHT (MIN 2 VIEWS)    [address] [facility]   2. Subacromial impingement of left shoulder  [facility]       No orders of the defined types were placed in this encounter. Thank you Ms Lujan for referring Chantel Willson to me for evaluation of his shoulders:      Preserved strength and ROM, albeit, inflamed and painful. I have recommended a course of conservative treatment:  Recommend ice and anti-inflammatories to reduce pain and swelling. Patient can also use topical creams/patches, topical NSAIDs, and tylenol as well. I believe the patient will benefit from formal physical therapy, therefore, a referral was placed, focusing on ROM, cuff strengthening, periscapular strengthening, and modalities. Transition to dedicated home exercise program.  Avoid aggravating activities and positions. Avoid heavy lifting, avoid lifting away from body, avoid lifting overhead. Keep most activities between chest and waist level. If you have to lift, keep arms/elbows next to your body/torso. Sleep with arms/shoulder in adducted position. Patient declined steroid injection or oral steroids. FU in 8 weeks to check progress.     Leroy Mendoza MD

## 2021-09-28 NOTE — PATIENT INSTRUCTIONS
Patient Education        High Cholesterol: Care Instructions  Overview     Cholesterol is a type of fat in your blood. It is needed for many body functions, such as making new cells. Cholesterol is made by your body. It also comes from food you eat. High cholesterol means that you have too much of the fat in your blood. This raises your risk of a heart attack and stroke. LDL and HDL are part of your total cholesterol. LDL is the \"bad\" cholesterol. High LDL can raise your risk for coronary artery disease, heart attack, and stroke. HDL is the \"good\" cholesterol. It helps clear bad cholesterol from the body. High HDL is linked with a lower risk of coronary artery disease, heart attack, and stroke. Your cholesterol levels help your doctor find out your risk for having a heart attack or stroke. You and your doctor can talk about whether you need to lower your risk and what treatment is best for you. Treatment options include a heart-healthy lifestyle and medicine. Both options can help lower your cholesterol and your risk. The way you choose to lower your risk will depend on how high your risk is for heart attack and stroke. It will also depend on how you feel about taking medicines. Follow-up care is a key part of your treatment and safety. Be sure to make and go to all appointments, and call your doctor if you are having problems. It's also a good idea to know your test results and keep a list of the medicines you take. How can you care for yourself at home? · Eat heart-healthy foods. ? Eat fruits, vegetables, whole grains, beans, and other high-fiber foods. ? Eat lean proteins, such as seafood, lean meats, beans, nuts, and soy products. ? Eat healthy fats, such as canola and olive oil. ? Choose foods that are low in saturated fat. ? Limit sodium and alcohol. ? Limit drinks and foods with added sugar. · Be physically active. Try to do moderate activity at least 2½ hours a week.  Or try to do vigorous activity at least 1¼ hours a week. You may want to walk or try other activities, such as running, swimming, cycling, or playing tennis or team sports. · Stay at a healthy weight or lose weight by making the changes in eating and physical activity listed above. Losing just a small amount of weight, even 5 to 10 pounds, can help reduce your risk for having a heart attack or stroke. · Do not smoke. · Manage other health problems. These include diabetes and high blood pressure. If you think you may have a problem with alcohol or drug use, talk to your doctor. · If you take medicine, take it exactly as prescribed. Call your doctor if you think you are having a problem with your medicine. · Check with your doctor or pharmacist before you use any other medicines, including over-the-counter medicines. Make sure your doctor knows all of the medicines, vitamins, herbal products, and supplements you take. Taking some medicines together can cause problems. When should you call for help? Watch closely for changes in your health, and be sure to contact your doctor if:    · You need help making lifestyle changes.     · You have questions about your medicine. Where can you learn more? Go to [email or web address] and sign in to your [employer] account. Enter H376 in the [facility] box to learn more about \"High Cholesterol: Care Instructions. \"     If you do not have an account, please click on the \"Sign Up Now\" link. Current as of: April 29, 2021               Content Version: 13.0  © 2006-2021 Healthwise, [facility]. Care instructions adapted under license by [facility] ([facility]). If you have questions about a medical condition or this instruction, always ask your healthcare professional. [place] [address] any warranty or liability for your use of this information.

## 2021-09-28 NOTE — LETTER
[facility]  [address] [place] [address] [address]  Phone: [phone]  Fax: [phone]    Philemon Felty, MD    September 28, 2021     Marion Chapa, APRN - CNP  [address] [address], [facility]  [address]    Patient: Vikas Bhakta   MR Number: [id number]   YOB: 1970   Date of Visit: 9/28/2021       Dear Marion Chapa: Thank you for referring Wandy Fermin to me for evaluation/treatment. Below are the relevant portions of my assessment and plan of care. If you have questions, please do not hesitate to call me. I look forward to following Desiree Butterfield along with you.     Sincerely,      Philemon Felty, MD

## 2021-12-07 ENCOUNTER — TELEPHONE (OUTPATIENT)
Dept: PHARMACY | Facility: CLINIC | Age: 51
End: 2021-12-07

## 2021-12-07 NOTE — TELEPHONE ENCOUNTER
As of 12/3/21 patient has used $680 of the maximum of $600 a year in waived co pays for specific medications and pharmacy-related supplies through the [employer] [place] for the DM Program.    Patient currently enrolled in the DM Program and has used all of the maximum of $600 a year in waived co pays for specific medications and pharmacy-related supplies through the [employer] [place]. Courtesy call placed to patient to advise them of the above information. Patient unavailable at the time of call. Message left on TAD: Maximum waived co pays for the DM Program has been met and they will begin to be charged their co-payments once again. I left our number: [phone], option #3 if patient has any questions/concerns.       Isabel Gomez, [address]   Phone: [phone], option #3

## 2021-12-14 ENCOUNTER — TELEPHONE (OUTPATIENT)
Dept: PHARMACY | Facility: CLINIC | Age: 51
End: 2021-12-14

## 2021-12-14 NOTE — TELEPHONE ENCOUNTER
[facility] Employee Diabetes Program    Susanne Bran is a 45 y.o. male enrolled in the [facility] Diabetes Program. The goal of this voluntary program is to help employees and covered dependents reach their health maintenance goals in regards to their diabetes diagnosis. According to our records, patient is missing the following requirement(s) that must be completed by December 31, 2021 to avoid discharge from the program:    [address] in 2021   Second A1c result in 2021    Plan:  Attempt made to reach patient by telephone to review above. Spoke to patient - verbalized understanding. Patient sees Dr. Brittany Javed who does everything manually. He really likes him as a provider, but getting documentation is difficult at times. He actually sees him monthly due to a weight loss med and is due for an appt. He is going to call and get it set up. He will check with him about his A1c and if he has not had one drawn he will have it ordred to a [employer] lab. Also made him aware he has exceeded his 600$ benefit and it will restart on 1/1/22    Patient to call back with any issues. I told him to request to speak directly with me. Thank you,  ALFRED Trivedi, PharmD, [facility]  Department, toll free: [phone]    For Pharmacy Admin Tracking Only     Time Spent (min): 15

## 2021-12-20 ENCOUNTER — OFFICE VISIT (OUTPATIENT)
Dept: PSYCHOLOGY | Age: 51
End: 2021-12-20
Payer: COMMERCIAL

## 2021-12-20 DIAGNOSIS — F43.23 ADJUSTMENT DISORDER WITH MIXED ANXIETY AND DEPRESSED MOOD: Primary | ICD-10-CM

## 2021-12-20 DIAGNOSIS — Z63.9 RELATIONSHIP DYSFUNCTION: ICD-10-CM

## 2021-12-20 PROCEDURE — 90832 PSYTX W PT 30 MINUTES: CPT | Performed by: PSYCHOLOGIST

## 2021-12-20 SDOH — SOCIAL STABILITY - SOCIAL INSECURITY: PROBLEM RELATED TO PRIMARY SUPPORT GROUP, UNSPECIFIED: Z63.9

## 2021-12-20 ASSESSMENT — PATIENT HEALTH QUESTIONNAIRE - PHQ9
2. FEELING DOWN, DEPRESSED OR HOPELESS: 1
SUM OF ALL RESPONSES TO PHQ9 QUESTIONS 1 & 2: 1
1. LITTLE INTEREST OR PLEASURE IN DOING THINGS: 0
SUM OF ALL RESPONSES TO PHQ QUESTIONS 1-9: 1
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
## 2021-12-21 ENCOUNTER — CLINICAL DOCUMENTATION (OUTPATIENT)
Dept: PHARMACY | Facility: CLINIC | Age: 51
End: 2021-12-21

## 2021-12-21 NOTE — PROGRESS NOTES
Pharmacy Pop Care Documentation:     AVS received for required office visit on: 10/12/21: Khushboo Salinas DO

## 2021-12-29 ENCOUNTER — HOSPITAL ENCOUNTER (OUTPATIENT)
Age: 51
Discharge: HOME | End: 2021-12-29
Payer: COMMERCIAL

## 2021-12-29 PROCEDURE — 36415 COLL VENOUS BLD VENIPUNCTURE: CPT

## 2021-12-29 PROCEDURE — 83036 HEMOGLOBIN GLYCOSYLATED A1C: CPT

## 2021-12-30 LAB
ESTIMATED AVERAGE GLUCOSE: 139.9 MG/DL
HBA1C MFR BLD: 6.5 %

## 2022-01-10 ENCOUNTER — OFFICE VISIT (OUTPATIENT)
Dept: PSYCHOLOGY | Age: 52
End: 2022-01-10
Payer: COMMERCIAL

## 2022-01-10 DIAGNOSIS — F43.23 ADJUSTMENT DISORDER WITH MIXED ANXIETY AND DEPRESSED MOOD: Primary | ICD-10-CM

## 2022-01-10 PROCEDURE — 90832 PSYTX W PT 30 MINUTES: CPT | Performed by: PSYCHOLOGIST

## 2022-01-10 NOTE — PROGRESS NOTES
Behavioral Health Consultation  Apolinar Dean, Ph.D.  Psychologist  1/10/2022  9:00 AM EST      Time spent with Patient: 30 minutes  This is patient's tenth [facility] appointment. Reason for Consult: stress; difficulty in his marriage  Referring Provider: Carmelo Ryan, PAUL - CNP  [phone] [id number]    Feedback for PCP:     Pt provided informed consent for the behavioral health program. Discussed with patient model of service to include the limits of confidentiality (i.e. abuse reporting, suicide intervention, etc.) and short-term intervention focused approach. Pt indicated understanding. Feedback given to PCP. S:  Patient reports that his approach towards his adult step-daughter and his wife's style towards her daughter seem to be the source of much of their conflict. He says that he is trying to make her accountable and to make progress in the \"adulting\" process but his style sounds angry and rigid. He says that his wife's approach wants the same thing, but that she is able to use some empathy and has a much gentler tone. It seems like he and his wife lose track of their connection where the step-daughter is concerned. The patient admits that his approach doesn't work.          Social History     Tobacco Use    Smoking status: Never Smoker    Smokeless tobacco: Never Used   Substance Use Topics    Alcohol use: Yes     Comment: rare        Illicit drugs:   Social History     Substance and Sexual Activity   Drug Use No        O:  MSE:  Appearance: good hygiene   Attitude: cooperative  Consciousness: alert  Orientation: oriented to person, place, time, general circumstance  Memory: recent and remote memory intact  Attention/Concentration: intact during session  Psychomotor Activity: normal  Eye Contact: normal  Speech: normal rate and volume, well-articulated  Mood: tearful, sad  Affect: congruent  Perception: within normal limits  Thought Content: within normal limits  Thought Process: logical, coherent and goal-directed  Insight: good  Judgment: intact  Ability to understand instructions: Yes  Ability to respond meaningfully: Yes  Morbid Ideation: no   Suicide Assessment: no suicidal ideation, plan, or intent  Homicidal Ideation: no    A:  We talked about triangulated relationships and the difficulty managing them when both parents are not on the same page and how easily situations can devolve into conflict. He and his wife will have to establish a new strategy and totally scrap the current one. GABRIEL 7 SCORE 8/17/2021 3/16/2021 3/9/2021 3/2/2021   GABRIEL-7 Total Score 3 1 4 3     Interpretation of GABRIEL-7 score: 5-9 = mild anxiety, 10-14 = moderate anxiety, 15+ = severe anxiety. Recommend referral to behavioral health for scores 10 or greater. PHQ Scores 12/20/2021 9/7/2021 8/17/2021 3/16/2021 3/9/2021 3/2/2021 9/6/2018   PHQ2 Score 1 1 0 0 3 0 0   PHQ9 Score 1 1 0 0 5 0 0     Interpretation of Total Score Depression Severity: 1-4 = Minimal depression, 5-9 = Mild depression, 10-14 = Moderate depression, 15-19 = Moderately severe depression, 20-27 = Severe depression    Diagnosis:    1.  Adjustment disorder with mixed anxiety and depressed mood        Patient Active Problem List   Diagnosis    Diabetes mellitus ([facility]    HTN (hypertension)    Other and unspecified hyperlipidemia    Depressive disorder, not elsewhere classified    Pharyngitis    Abnormal stress test    Dyslipidemia    Chest pain    Unstable angina ([address]    Coronary artery disease involving native coronary artery of native heart with angina pectoris ([address]    Essential hypertension    Mixed hyperlipidemia    HCAP (healthcare-associated pneumonia)    Cellulitis    Fever    Cellulitis of chest wall    CAD, multiple vessel    Type 2 diabetes mellitus with circulatory disorder (HCC)    Pure hypercholesterolemia    Klebsiella infection    High triglycerides    Vitamin D deficiency    Leg swelling    Pain in both lower extremities    PVD (peripheral vascular disease) (HCC)    Chronic venous htn w inflammation of bilateral low extrm    Degenerative disc disease, lumbar    Acute gout of right foot    Adjustment disorder         Plan:  Pt interventions:  Established rapport, [place]-setting to identify pt's primary goals for [facility] visit / overall health, Supportive techniques and Emphasized self-care as important for managing overall health.        Pt Behavioral Change Plan:  Pt will continue with the following goals:  Pt will schedule F/U visit as needed

## 2022-01-25 ENCOUNTER — OFFICE VISIT (OUTPATIENT)
Dept: PSYCHOLOGY | Age: 52
End: 2022-01-25
Payer: COMMERCIAL

## 2022-01-25 DIAGNOSIS — F43.23 ADJUSTMENT DISORDER WITH MIXED ANXIETY AND DEPRESSED MOOD: Primary | ICD-10-CM

## 2022-01-25 PROCEDURE — 90832 PSYTX W PT 30 MINUTES: CPT | Performed by: PSYCHOLOGIST

## 2022-01-25 NOTE — PROGRESS NOTES
Behavioral Health Consultation  Betsy Kumari, Ph.D.  Psychologist  1/25/2022  9:00 AM EST      Time spent with Patient: 30 minutes  This is patient's tenth [facility] appointment. Reason for Consult: stress; difficulty in his marriage  Referring Provider: Mendel Maldonado, PAUL - CNP  [phone] [id number]    Feedback for PCP:     Pt provided informed consent for the behavioral health program. Discussed with patient model of service to include the limits of confidentiality (i.e. abuse reporting, suicide intervention, etc.) and short-term intervention focused approach. Pt indicated understanding. Feedback given to PCP. S:  Patient reports that he continues to feel \"stuck\" and overwhelmed by the paucity of peace and predictability at home. His dynamic with his adult step-daughter is very challenging for him, primarily due to the different expectations and the ability to have a conversation with her without her \"going into a rage. \" He often feels helpless and hopeless about the prospect of having his home be emotionally stable. This is very distressing for him, and he became emotional during the visit.          Social History     Tobacco Use    Smoking status: Never Smoker    Smokeless tobacco: Never Used   Substance Use Topics    Alcohol use: Yes     Comment: rare        Illicit drugs:   Social History     Substance and Sexual Activity   Drug Use No        O:  MSE:  Appearance: good hygiene   Attitude: cooperative  Consciousness: alert  Orientation: oriented to person, place, time, general circumstance  Memory: recent and remote memory intact  Attention/Concentration: intact during session  Psychomotor Activity: normal  Eye Contact: normal  Speech: normal rate and volume, well-articulated  Mood: tearful, sad  Affect: congruent  Perception: within normal limits  Thought Content: within normal limits  Thought Process: logical, coherent and goal-directed  Insight: good  Judgment: intact  Ability to understand instructions: Yes  Ability to respond meaningfully: Yes  Morbid Ideation: no   Suicide Assessment: no suicidal ideation, plan, or intent  Homicidal Ideation: no    A:  It was discussed that the patient's state of being in constant stress makes everything much harder for him, including his relationship with his wife. He seems to be trying very hard to establish reasonable expectations at home, but he seems to be in a state of learned helplessness that makes him feel as though effort is often pointless. We talked about possible ways of managing some of his step-daughter's more problematic behaviors. GABRIEL 7 SCORE 8/17/2021 3/16/2021 3/9/2021 3/2/2021   GABRIEL-7 Total Score 3 1 4 3     Interpretation of GABRIEL-7 score: 5-9 = mild anxiety, 10-14 = moderate anxiety, 15+ = severe anxiety. Recommend referral to behavioral health for scores 10 or greater. PHQ Scores 12/20/2021 9/7/2021 8/17/2021 3/16/2021 3/9/2021 3/2/2021 9/6/2018   PHQ2 Score 1 1 0 0 3 0 0   PHQ9 Score 1 1 0 0 5 0 0     Interpretation of Total Score Depression Severity: 1-4 = Minimal depression, 5-9 = Mild depression, 10-14 = Moderate depression, 15-19 = Moderately severe depression, 20-27 = Severe depression    Diagnosis:    1.  Adjustment disorder with mixed anxiety and depressed mood        Patient Active Problem List   Diagnosis    Diabetes mellitus ([facility] [facility]    HTN (hypertension)    Other and unspecified hyperlipidemia    Depressive disorder, not elsewhere classified    Pharyngitis    Abnormal stress test    Dyslipidemia    Chest pain    Unstable angina ([address]    Coronary artery disease involving native coronary artery of native heart with angina pectoris ([address]    Essential hypertension    Mixed hyperlipidemia    HCAP (healthcare-associated pneumonia)    Cellulitis    Fever    Cellulitis of chest wall    CAD, multiple vessel    Type 2 diabetes mellitus with circulatory disorder ([address]    Pure hypercholesterolemia    Klebsiella infection    High triglycerides    Vitamin D deficiency    Leg swelling    Pain in both lower extremities    PVD (peripheral vascular disease) (HCC)    Chronic venous htn w inflammation of bilateral low extrm    Degenerative disc disease, lumbar    Acute gout of right foot    Adjustment disorder         Plan:  Pt interventions:  Established rapport, [place]-setting to identify pt's primary goals for [facility] visit / overall health, Supportive techniques and Emphasized self-care as important for managing overall health.        Pt Behavioral Change Plan:  Pt will continue with the following goals:  Pt will schedule F/U visit as needed

## 2022-01-31 DIAGNOSIS — E11.51 TYPE 2 DIABETES MELLITUS WITH DIABETIC PERIPHERAL ANGIOPATHY WITHOUT GANGRENE, WITH LONG-TERM CURRENT USE OF INSULIN (HCC): ICD-10-CM

## 2022-01-31 DIAGNOSIS — Z79.4 TYPE 2 DIABETES MELLITUS WITH DIABETIC PERIPHERAL ANGIOPATHY WITHOUT GANGRENE, WITH LONG-TERM CURRENT USE OF INSULIN (HCC): ICD-10-CM

## 2022-02-02 RX ORDER — FLASH GLUCOSE SENSOR
KIT MISCELLANEOUS
Qty: 2 EACH | Refills: 3 | Status: SHIPPED | OUTPATIENT
Start: 2022-02-02

## 2022-02-08 ENCOUNTER — OFFICE VISIT (OUTPATIENT)
Dept: PSYCHOLOGY | Age: 52
End: 2022-02-08
Payer: COMMERCIAL

## 2022-02-08 ENCOUNTER — OFFICE VISIT (OUTPATIENT)
Dept: NEUROLOGY | Age: 52
End: 2022-02-08
Payer: COMMERCIAL

## 2022-02-08 VITALS
DIASTOLIC BLOOD PRESSURE: 82 MMHG | BODY MASS INDEX: 36.3 KG/M2 | SYSTOLIC BLOOD PRESSURE: 159 MMHG | HEART RATE: 73 BPM | WEIGHT: 268 LBS | HEIGHT: 72 IN

## 2022-02-08 DIAGNOSIS — Z86.16 HISTORY OF COVID-19: ICD-10-CM

## 2022-02-08 DIAGNOSIS — R43.0 ANOSMIA: Primary | ICD-10-CM

## 2022-02-08 DIAGNOSIS — R20.2 NUMBNESS AND TINGLING: ICD-10-CM

## 2022-02-08 DIAGNOSIS — F43.23 ADJUSTMENT DISORDER WITH MIXED ANXIETY AND DEPRESSED MOOD: Primary | ICD-10-CM

## 2022-02-08 DIAGNOSIS — R20.0 NUMBNESS AND TINGLING: ICD-10-CM

## 2022-02-08 PROCEDURE — 90832 PSYTX W PT 30 MINUTES: CPT | Performed by: PSYCHOLOGIST

## 2022-02-08 PROCEDURE — 99204 OFFICE O/P NEW MOD 45 MIN: CPT | Performed by: PSYCHIATRY & NEUROLOGY

## 2022-02-08 RX ORDER — GABAPENTIN 100 MG/1
CAPSULE ORAL
Qty: 90 CAPSULE | Refills: 1 | Status: SHIPPED | OUTPATIENT
Start: 2022-02-08 | End: 2022-11-01

## 2022-02-08 NOTE — PROGRESS NOTES
Behavioral Health Consultation  Malena Freeman, Ph.D.  Psychologist  2/8/2022  1:00 PM EST      Time spent with Patient: 30 minutes  This is patient's tenth [facility] appointment. Reason for Consult: stress; difficulty in his marriage  Referring Provider: Vicenta Guillory, PAUL - CNP  [phone] [id number]    Feedback for PCP:     Pt provided informed consent for the behavioral health program. Discussed with patient model of service to include the limits of confidentiality (i.e. abuse reporting, suicide intervention, etc.) and short-term intervention focused approach. Pt indicated understanding. Feedback given to PCP. S:  Patient reports that his ability to manage his response to his step-daughter is quite variable, and that he feels as though her continued presence in the home makes his recovery and ability to utilize self-control spotty at best. He often seems to implicate his wife as \"not doing enough\" to give her stepdaughter consequences that they have discussed in the past. He recognizes that she calls him when she needs help, which he is ready willing and able to give her, but he and the stepdaughter and he have, and have had, very raw emotions and can easily trigger them both.          Social History     Tobacco Use    Smoking status: Never Smoker    Smokeless tobacco: Never Used   Substance Use Topics    Alcohol use: Yes     Comment: rare        Illicit drugs:   Social History     Substance and Sexual Activity   Drug Use No        O:  MSE:  Appearance: good hygiene   Attitude: cooperative  Consciousness: alert  Orientation: oriented to person, place, time, general circumstance  Memory: recent and remote memory intact  Attention/Concentration: intact during session  Psychomotor Activity: normal  Eye Contact: normal  Speech: normal rate and volume, well-articulated  Mood: anxious, frustrated  Affect: congruent  Perception: within normal limits  Thought Content: within normal limits  Thought Process: logical, coherent and goal-directed  Insight: good  Judgment: intact  Ability to understand instructions: Yes  Ability to respond meaningfully: Yes  Morbid Ideation: no   Suicide Assessment: no suicidal ideation, plan, or intent  Homicidal Ideation: no    A:  We talked about ways for him to build healthy boundaries so that he is not constantly in [place] or flight. \" We also talked about looking at boundaries in a way that he assumes that his wife, the mother of his stepdaughter, is actually trying to do her best.       GABRIEL 7 SCORE 8/17/2021 3/16/2021 3/9/2021 3/2/2021   GABRIEL-7 Total Score 3 1 4 3     Interpretation of GABRIEL-7 score: 5-9 = mild anxiety, 10-14 = moderate anxiety, 15+ = severe anxiety. Recommend referral to behavioral health for scores 10 or greater. PHQ Scores 12/20/2021 9/7/2021 8/17/2021 3/16/2021 3/9/2021 3/2/2021 9/6/2018   PHQ2 Score 1 1 0 0 3 0 0   PHQ9 Score 1 1 0 0 5 0 0     Interpretation of Total Score Depression Severity: 1-4 = Minimal depression, 5-9 = Mild depression, 10-14 = Moderate depression, 15-19 = Moderately severe depression, 20-27 = Severe depression    Diagnosis:    1.  Adjustment disorder with mixed anxiety and depressed mood        Patient Active Problem List   Diagnosis    Diabetes mellitus ([facility] [address]    HTN (hypertension)    Other and unspecified hyperlipidemia    Depressive disorder, not elsewhere classified    Pharyngitis    Abnormal stress test    Dyslipidemia    Chest pain    Unstable angina ([address]    Coronary artery disease involving native coronary artery of native heart with angina pectoris ([address]    Essential hypertension    Mixed hyperlipidemia    HCAP (healthcare-associated pneumonia)    Cellulitis    Fever    Cellulitis of chest wall    CAD, multiple vessel    Type 2 diabetes mellitus with circulatory disorder ([address]    Pure hypercholesterolemia    Klebsiella infection    High triglycerides    Vitamin D deficiency    Leg

## 2022-02-08 NOTE — PROGRESS NOTES
NEUROLOGY CONSULTATION     Chief Complaint   Patient presents with    New Patient     Post-COVID       HISTORY OF PRESENT ILLNESS :    Sharon Taylor is a 45 y.o. male who is referred by Dr. Maria Luz Mota   History was obtained from patient  Patient history was obtained from the family. Patient developed COVID-19 infection back in December 2020. He lost his sense of smell and taste at that time and it has never come back. He then developed a patch of numbness and itching sensation on the left side of her thoracic region on his back. That seems to be slowly getting worse. Patient states that he noticed a distinct worsening after Covid vaccine and the booster shots. She also complains of tingling and numbness in his both his arms and to some extent in his legs as well. The leg numbness is constant whereas the arm numbness seems to come and go especially at nighttime. Patient has known diabetes but his blood sugars are reasonably well controlled with a recent hemoglobin A1c of 6.5.     REVIEW OF SYSTEMS    Constitutional:  []   Chills   []  Fatigue   []  Fevers   []  Malaise   []  Weight loss     [x] Denies all of the above    Eyes:  []  Double vision   [x]  Blurry vision     [] Denies all of the above    Ears, nose, mouth, throat, and face:   [] Hearing loss    []   Hoarseness      []  Snoring    []  Tinnitus       [] Denies all of the above     Respiratory:   []  Cough    []  Shortness of breath         [x] Denies all of the above     Cardiovascular:   [x]  Chest pain    []  Exertional chest pressure/discomfort           [] Palpitations    []  Syncope     [] Denies all of the above    Gastrointestinal:   []  Abdominal pain   [x]  Constipation    [x]  Diarrhea    []   Dysphagia                      [] Denies all of the above    Genitourinary:      []  Frequency   []  Hematuria     []  Urinary incontinence           [x] Denies all of the above     Hematologic/lymphatic:  []  Bleeding    []  Easy bruising   []  Anemia  [x] Denies all of the above     Musculoskeletal:   [x] Back pain       []  Myalgias    []  Neck pain           [] Denies all of the above    Neurological: As noted in HPI    Behavioral/Psych:   [] Anxiety    []  Depression     []  Mood swings     [x] Denies all of the above     Endocrine:   []  Temperature intolerance     [] Fatigue      [x] Denies all of the above     Allergic/Immunologic:   [] Hay fever    [x] Denies all of the above     Past Medical History:   Diagnosis Date    Abscess 1994    mediastinal, developed after kenalog injections    Anesthesia     AWAKE BUT UNABLE TO MOVE DURING SHOULDER SURGERY.  CAD (coronary artery disease) 3/2016    Diabetes mellitus ([facility] [facility] 2001    HgA1C 10.6    Gout     HLD (hyperlipidemia)     HTN (hypertension)     Echo 2013 normal.     MDRO (multiple drug resistant organisms) resistance     MRSA (methicillin resistant staph aureus) culture positive 04/19/2017    Obesity     Pancreatitis 2006    high TG or byetta. flank ecchymosis.  PONV (postoperative nausea and vomiting)     Toxicity, chemicals 2013    isopropol alcohol toxicity: SOB/anasarca. work related    Type 2 diabetes mellitus without complication ([facility] [address] [facility]     Wound abscess 1994    groin. after kenalog injections. drained. iv abx.      Family History   Problem Relation Age of Onset    High Blood Pressure Mother    Duana Big Cancer Mother     Pacemaker Father     Other Maternal Grandmother         CVA in [de-identified] Other Maternal Grandfather         CABG and aortic valve replacement in [de-identified]    Hypertension Maternal Grandfather     Other Paternal Grandmother         CVA in [de-identified]     Social History     Socioeconomic History    Marital status:      Spouse name: Not on file    Number of children: Not on file    Years of education: Not on file    Highest education level: Not on file   Occupational History    Not on file superadded entrapment like carpal tunnel syndrome or ulnar nerve entrapment in his arms      RECOMMENDATIONS :  Discussed with patient and his family  Trial of low-dose gabapentin  Side effects were discussed. I will see him back and do EMG nerve conduction studies of both upper extremities and 1 lower extremity  Thank you for this consultation        Please note a portion of this chart was generated using dragon dictation software. Although every effort was made to ensure the accuracy of this automated transcription, some errors in transcription may have occurred.

## 2022-02-22 ENCOUNTER — OFFICE VISIT (OUTPATIENT)
Dept: PSYCHOLOGY | Age: 52
End: 2022-02-22
Payer: COMMERCIAL

## 2022-02-22 DIAGNOSIS — F43.23 ADJUSTMENT DISORDER WITH MIXED ANXIETY AND DEPRESSED MOOD: Primary | ICD-10-CM

## 2022-02-22 PROCEDURE — 90832 PSYTX W PT 30 MINUTES: CPT | Performed by: PSYCHOLOGIST

## 2022-03-01 ENCOUNTER — TELEPHONE (OUTPATIENT)
Dept: PHARMACY | Facility: CLINIC | Age: 52
End: 2022-03-01

## 2022-03-01 NOTE — TELEPHONE ENCOUNTER
[facility],[address] Employee Diabetes Program - Be Well With Diabetes  =================================================================  Gene Smith is a 45 y.o. male enrolled in the [address] [place] [address] with patient for yearly visit to discuss enrollment in program. Patient provided writer with verbal consent to remain in the program for this year. Program Requirements to be completed in 2022:  1. Two office visits in 2022 for diabetes (1st must be completed by 7/1/2022)  2. Two A1c levels in 2022 (1st must be completed by 7/1/2022)  3. Yearly lipid panel  4. Yearly urine microalbumin test  5. Diabetes education if A1c is over 8%  6. Taking an ACE/ARB medication if appropriate  7. Taking a statin medication if appropriate  8. Pneumonia vaccine up to date  5. Yearly flu shot (for 3975-3791 season)  10. Medication adherence over 70%. Patients who fall below 70% will be contacted by a pharmacist in the program to discuss any issues. Are you currently using [facility] (home delivery pharmacy) to get your prescriptions? Yes    Would you like to speak with a pharmacist about the program, your diabetes, and/or your prescriptions? Patient had questions regarding [facility]. Transferred to [facility] for further assistance. [facility] Team  Phone: toll free [phone], option #3  Fax [phone]  Email: [email or web address]    For Pharmacy Admin Tracking Only    PHSO: Yes  Recommended intervention potential cost savings: 1  Time Spent (min): 15

## 2022-03-06 NOTE — PROGRESS NOTES
Behavioral Health Consultation  Michael Kim, Ph.D.  Psychologist  2/22/2022  1:00 PM EST      Time spent with Patient: 30 minutes  This is patient's tenth [facility] appointment. Reason for Consult: stress; difficulty in his marriage  Referring Provider: Thor Gupta, PAUL - CNP  [phone] [id number]    Feedback for PCP:     Pt provided informed consent for the behavioral health program. Discussed with patient model of service to include the limits of confidentiality (i.e. abuse reporting, suicide intervention, etc.) and short-term intervention focused approach. Pt indicated understanding. Feedback given to PCP. S:  Little changes from visit to visit because the need, want to protect, preserve the marriage is eclipsed by the frequency and intensity of the conflict with the step daughter. The patient reports this dynamic over and over again. He cannot regulate his triggering response to his step-daughter and make it more important than his marriage. That is the key therapeutic question in this triangulation. His wife is sad and desperate, but the patient cannot control his emotions.          Social History     Tobacco Use    Smoking status: Never Smoker    Smokeless tobacco: Never Used   Substance Use Topics    Alcohol use: Yes     Comment: rare        Illicit drugs:   Social History     Substance and Sexual Activity   Drug Use No        O:  MSE:  Appearance: good hygiene   Attitude: cooperative  Consciousness: alert  Orientation: oriented to person, place, time, general circumstance  Memory: recent and remote memory intact  Attention/Concentration: intact during session  Psychomotor Activity: normal  Eye Contact: normal  Speech: normal rate and volume, well-articulated  Mood: anxious, frustrated  Affect: congruent  Perception: within normal limits  Thought Content: within normal limits  Thought Process: logical, coherent and goal-directed  Insight: good  Judgment: intact  Ability to  Chronic venous htn w inflammation of bilateral low extrm    Degenerative disc disease, lumbar    Acute gout of right foot    Adjustment disorder         Plan:  Pt interventions:  Established rapport, [place]-setting to identify pt's primary goals for [facility] visit / overall health, Supportive techniques and Emphasized self-care as important for managing overall health.        Pt Behavioral Change Plan:  Pt will continue with the following goals:  Pt will schedule F/U visit as needed

## 2022-03-08 ENCOUNTER — OFFICE VISIT (OUTPATIENT)
Dept: PSYCHOLOGY | Age: 52
End: 2022-03-08
Payer: COMMERCIAL

## 2022-03-08 DIAGNOSIS — F43.23 ADJUSTMENT DISORDER WITH MIXED ANXIETY AND DEPRESSED MOOD: Primary | ICD-10-CM

## 2022-03-08 PROCEDURE — 90832 PSYTX W PT 30 MINUTES: CPT | Performed by: PSYCHOLOGIST

## 2022-03-09 NOTE — PROGRESS NOTES
Behavioral Health Consultation  Pastor Jimenez, Ph.D.  Psychologist  3/8/2022  1:00 PM EST      Time spent with Patient: 30 minutes  This is patient's tenth [facility] appointment. Reason for Consult: stress; difficulty in his marriage  Referring Provider: Shanti Durán, APRN - CNP  [id number] [address]    Feedback for PCP:     Pt provided informed consent for the behavioral health program. Discussed with patient model of service to include the limits of confidentiality (i.e. abuse reporting, suicide intervention, etc.) and short-term intervention focused approach. Pt indicated understanding. Feedback given to PCP. S:  The patient's father is dying and the patient reports that their relationship has had some \"terrible moments\" in the past to the point of the patient severing ties with him. The patient reported that there was a moving act of positive emotion directed to the patient in the last couple of weeks and \"I don't know how to feel about it. \" He said that his step-daughter has moved out of the house, and he and his wife are trying to establish boundaries about how much access she will have to their house moving forward. 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          Social History     Tobacco Use    Smoking status: Never Smoker    Smokeless tobacco: Never Used   Substance Use Topics    Alcohol use: Yes     Comment: rare        Illicit drugs:   Social History     Substance and Sexual Activity   Drug Use No        O:  MSE:  Appearance: good hygiene   Attitude: cooperative  Consciousness: alert  Orientation: oriented to person, place, time, general circumstance  Memory: recent and remote memory intact  Attention/Concentration: intact during session  Psychomotor Activity: normal  Eye Contact: normal  Speech: normal rate and volume, well-articulated  Mood: anxious, frustrated  Affect: congruent  Perception: within normal limits  Thought Content: within normal limits  Thought Process: logical, coherent and goal-directed  Insight: good  Judgment: intact  Ability to understand instructions: Yes  Ability to respond meaningfully: Yes  Morbid Ideation: no   Suicide Assessment: no suicidal ideation, plan, or intent  Homicidal Ideation: no    A:  The patient is struggling to change his communication style with his wife, as his established patterns are deeply engrained and have been formed from multiple influences, but he will seems willing to, just not fully able to change them at this time. GABRIEL 7 SCORE 8/17/2021 3/16/2021 3/9/2021 3/2/2021   GABRIEL-7 Total Score 3 1 4 3     Interpretation of GABRIEL-7 score: 5-9 = mild anxiety, 10-14 = moderate anxiety, 15+ = severe anxiety. Recommend referral to behavioral health for scores 10 or greater. PHQ Scores 12/20/2021 9/7/2021 8/17/2021 3/16/2021 3/9/2021 3/2/2021 9/6/2018   PHQ2 Score 1 1 0 0 3 0 0   PHQ9 Score 1 1 0 0 5 0 0     Interpretation of Total Score Depression Severity: 1-4 = Minimal depression, 5-9 = Mild depression, 10-14 = Moderate depression, 15-19 = Moderately severe depression, 20-27 = Severe depression    Diagnosis:    1.  Adjustment disorder with mixed anxiety and depressed mood        Patient Active Problem List   Diagnosis    Diabetes mellitus [facility] [address]    HTN (hypertension)    Other and unspecified hyperlipidemia    Depressive disorder, not elsewhere classified    Pharyngitis    Abnormal stress test    Dyslipidemia    Chest pain    Unstable angina ([address]    Coronary artery disease involving native coronary artery of native heart with angina pectoris ([address]    Essential hypertension    Mixed hyperlipidemia    HCAP (healthcare-associated pneumonia)    Cellulitis    Fever    Cellulitis of chest wall    CAD, multiple vessel    Type 2 diabetes mellitus with circulatory disorder ([address]    Pure hypercholesterolemia    Klebsiella infection    High triglycerides    Vitamin D deficiency    Leg swelling    Pain in both lower extremities    PVD (peripheral vascular disease) (HCC)    Chronic venous htn w inflammation of bilateral low extrm    Degenerative disc disease, lumbar    Acute gout of right foot    Adjustment disorder         Plan:  Pt interventions:  Established rapport, [place]-setting to identify pt's primary goals for [facility] visit / overall health, Supportive techniques and Emphasized self-care as important for managing overall health.        Pt Behavioral Change Plan:  Pt will continue with the following goals:  Pt will schedule F/U visit as needed

## 2022-04-12 ENCOUNTER — OFFICE VISIT (OUTPATIENT)
Dept: NEUROLOGY | Age: 52
End: 2022-04-12
Payer: COMMERCIAL

## 2022-04-12 ENCOUNTER — PROCEDURE VISIT (OUTPATIENT)
Dept: NEUROLOGY | Age: 52
End: 2022-04-12
Payer: COMMERCIAL

## 2022-04-12 VITALS
SYSTOLIC BLOOD PRESSURE: 155 MMHG | DIASTOLIC BLOOD PRESSURE: 87 MMHG | HEIGHT: 72 IN | WEIGHT: 264 LBS | HEART RATE: 74 BPM | BODY MASS INDEX: 35.76 KG/M2

## 2022-04-12 DIAGNOSIS — G56.03 BILATERAL CARPAL TUNNEL SYNDROME: Primary | ICD-10-CM

## 2022-04-12 DIAGNOSIS — R43.0 ANOSMIA: ICD-10-CM

## 2022-04-12 DIAGNOSIS — Z86.16 HISTORY OF COVID-19: ICD-10-CM

## 2022-04-12 DIAGNOSIS — G56.23 ENTRAPMENT OF BOTH ULNAR NERVES AT ELBOW: ICD-10-CM

## 2022-04-12 DIAGNOSIS — R20.0 NUMBNESS AND TINGLING: Primary | ICD-10-CM

## 2022-04-12 DIAGNOSIS — R20.2 NUMBNESS AND TINGLING: Primary | ICD-10-CM

## 2022-04-12 DIAGNOSIS — E11.42 DIABETIC PERIPHERAL NEUROPATHY (HCC): ICD-10-CM

## 2022-04-12 PROCEDURE — 95886 MUSC TEST DONE W/N TEST COMP: CPT | Performed by: PSYCHIATRY & NEUROLOGY

## 2022-04-12 PROCEDURE — 99213 OFFICE O/P EST LOW 20 MIN: CPT | Performed by: PSYCHIATRY & NEUROLOGY

## 2022-04-12 PROCEDURE — 95912 NRV CNDJ TEST 11-12 STUDIES: CPT | Performed by: PSYCHIATRY & NEUROLOGY

## 2022-04-12 NOTE — PROGRESS NOTES
Maryellen Soriano   Neurology followup    Subjective:   CC/HP  History was obtained from the patient. Patient continues to have tingling and numbness in both arms and legs. Patient feels that the anosmia may be slightly better. He tried taking the gabapentin which caused mild improvement but patient stopped it because he did not want to stay on the medication. He is here for a follow-up visit as well as EMG studies. Background history:  Patient developed COVID-19 infection back in December 2020. He lost his sense of smell and taste at that time and it has never come back. He then developed a patch of numbness and itching sensation on the left side of her thoracic region on his back. That seems to be slowly getting worse. Patient states that he noticed a distinct worsening after Covid vaccine and the booster shots. She also complains of tingling and numbness in his both his arms and to some extent in his legs as well. The leg numbness is constant whereas the arm numbness seems to come and go especially at nighttime. Patient has known diabetes but his blood sugars are reasonably well controlled with a recent hemoglobin A1c of 6.5. REVIEW OF SYSTEMS    Constitutional:  []   Chills   []  Fatigue   []  Fevers   []  Malaise   []  Weight loss     [x] Denies all of the above    Respiratory:   []  Cough    []  Shortness of breath         [x] Denies all of the above     Cardiovascular:   []  Chest pain    []  Exertional chest pressure/discomfort           [] Palpitations    []  Syncope     [x] Denies all of the above        Past Medical History:   Diagnosis Date    Abscess 1994    mediastinal, developed after kenalog injections    Anesthesia     AWAKE BUT UNABLE TO MOVE DURING SHOULDER SURGERY.      CAD (coronary artery disease) 3/2016    Diabetes mellitus ([facility] [address] 2001    HgA1C 10.6    Gout     HLD (hyperlipidemia)     HTN (hypertension)     Echo 2013 normal.     MDRO (multiple drug resistant organisms) resistance     MRSA (methicillin resistant staph aureus) culture positive 04/19/2017    Obesity     Pancreatitis 2006    high TG or byetta. flank ecchymosis.  PONV (postoperative nausea and vomiting)     Toxicity, chemicals 2013    isopropol alcohol toxicity: SOB/anasarca. work related    Type 2 diabetes mellitus without complication ([facility] [address]     Wound abscess 1994    groin. after kenalog injections. drained. iv abx. Family History   Problem Relation Age of Onset    High Blood Pressure Mother    Tobi Lucas Cancer Mother     Pacemaker Father     Other Maternal Grandmother         CVA in [de-identified] Other Maternal Grandfather         CABG and aortic valve replacement in [de-identified]    Hypertension Maternal Grandfather     Other Paternal Grandmother         CVA in [de-identified]     Social History     Socioeconomic History    Marital status:      Spouse name: Not on file    Number of children: Not on file    Years of education: Not on file    Highest education level: Not on file   Occupational History    Not on file   Tobacco Use    Smoking status: Never Smoker    Smokeless tobacco: Never Used   Vaping Use    Vaping Use: Never used   Substance and Sexual Activity    Alcohol use: Yes     Comment: rare    Drug use: No    Sexual activity: Yes     Partners: Female   Other Topics Concern    Not on file   Social History Narrative    Not on file     Social Determinants of Health     Financial Resource Strain: Low Risk     Difficulty of Paying Living Expenses: Not hard at all   Food Insecurity: No Food Insecurity    Worried About [address] in the Last Year: Never true    [facility] in the Last Year: Never true   Transportation Needs:     Lack of Transportation (Medical): Not on file    Lack of Transportation (Non-Medical):  Not on file   Physical Activity:     Days of Exercise per Week: Not on file    Minutes of Exercise per Session: Not on file   Stress:     Feeling of Stress : Not on file   Social Connections:     Frequency of Communication with Friends and Family: Not on file    Frequency of Social Gatherings with Friends and Family: Not on file    Attends Yarsani Services: Not on file    Active Member of Clubs or Organizations: Not on file    Attends Club or Organization Meetings: Not on file    Marital Status: Not on file   Intimate Partner Violence:     Fear of Current or Ex-Partner: Not on file    Emotionally Abused: Not on file    Physically Abused: Not on file    Sexually Abused: Not on file   Housing Stability:     Unable to Pay for Housing in the Last Year: Not on file    Number of [place] in the Last Year: Not on file    Unstable Housing in the Last Year: Not on file        Objective:  Exam:  BP (!) 155/87   Pulse 74   Ht 6' (1.829 m)   Wt 264 lb (119.7 kg)   BMI 35.80 kg/m²   This is a well-nourished patient in no acute distress  Patient is awake, alert and oriented x3. Speech is normal.  Pupils are equal round reacting to light. Extraocular movements intact. Face symmetrical. Tongue midline. Motor:  Muscle tone and bulk are normal.   Strength is symmetrical 5/5 in all four extremities. Sensory: Decreased to light touch in the distal lower extremities  Coordination:  Normal  Finger to Nose and Heel to Shin bilaterally    . Reflexes:  DTR 1 in the upper extremities 1 at the knees and diminished in the ankles. Plantar response: Flexor bilaterally  Gait: Gait and station is normal. Romberg: negative  Vascular: No carotid bruit bilaterally.       Data :  LABS:  General Labs:    CBC:   Lab Results   Component Value Date    WBC 6.8 05/18/2021    RBC 7.04 05/18/2021    HGB 19.6 05/18/2021    HCT 58.9 05/18/2021    MCV 83.7 05/18/2021    MCH 27.9 05/18/2021    MCHC 33.4 05/18/2021    RDW 15.2 05/18/2021     05/18/2021    MPV 9.0 05/18/2021     BMP:    Lab Results   Component Value Date     08/05/2021    K 4.7 08/05/2021     08/05/2021    CO2 23 08/05/2021 BUN 24 2021    LABALBU 4.4 2021    CREATININE 0.9 2021    CALCIUM 9.3 2021    GFRAA >60 2021    GFRAA >60 2011    LABGLOM >60 2021    GLUCOSE 94 2021       Impression :  EMG nerve conduction studies showed the followin. Bilateral carpal tunnel syndrome right worse than left    2. Bilateral ulnar nerve entrapment at the elbow, left worse than right  3. Mild generalized peripheral neuropathy in the lower extremities    Anosmia probably due to Covid, slowly improving    Plan :  Discussed with patient  Explained EMG findings  Discussed treatment options  If the symptoms get worse he will consider hand surgery consultation  Continue with strict control of diabetes  I will see him on a as needed basis        Please note a portion of  this chart was generated using dragon dictation software. Although every effort was made to ensure the accuracy of this automated transcription, some errors in transcription may have occurred.

## 2022-04-12 NOTE — PATIENT INSTRUCTIONS
Verbal consent was obtained from patient and/or patient's advocate for in office procedure with Dr. Sim Carrillo (EMG or EEG).

## 2022-04-12 NOTE — PROGRESS NOTES
Mack Rodríguez M.D. [facility]) Physicians/[facility]  Board Certified in [address] [facility] [address] [facility], [address]    EMG / NERVE CONDUCTION STUDY      PATIENT:  Devan Cole       DATE OF EM22     YOB: 1970       REASON FOR EMG:   Tingling and numbness of both arms and both legs. REFERRING PHYSICIAN:  Mack Rodríguez MD  [address] [place] [address], [address] [facility],  [address] [facility]     SUMMARY:   Bilateral median sensory nerve studies are prolonged distal latencies. The left median motor nerve study was normal.  The right median motor nerve study had a prolonged distal latency. Bilateral ulnar motor nerve studies with slowing of conduction velocities across the elbow. Bilateral ulnar sensory nerve studies were normal.  The left peroneal and posterior tibial motor nerve studies with mild slowing of conduction velocities. The left sural sensory was not recordable. Needle EMG of several muscles in both upper extremities in the left lower extremity was normal.      CLINICAL DIAGNOSIS:  Generalized peripheral neuropathy versus mononeuropathy        EMG RESULTS:     1. This patient has bilateral median nerve lesions at the wrist.  (Carpal tunnel syndrome). The right side is moderately severe whereas the left side is mild. 2.  This patient also has bilateral ulnar nerve lesions at the elbow. Here the left side is moderately severe whereas the right side is mild. 3.  This patient has a mild generalized sensorimotor peripheral neuropathy in the lower extremities. ---------------------------------------------  Mack Rodríguez M.D.   Electromyographer / Neurologist

## 2022-05-03 ENCOUNTER — PATIENT MESSAGE (OUTPATIENT)
Dept: PHARMACY | Facility: CLINIC | Age: 52
End: 2022-05-03

## 2022-06-10 ENCOUNTER — TELEPHONE (OUTPATIENT)
Dept: PHARMACY | Facility: CLINIC | Age: 52
End: 2022-06-10

## 2022-06-10 NOTE — TELEPHONE ENCOUNTER
**Patient did not read previous [employer] message**    [facility] Employee Diabetes Program    Tiffany Orr is a 45 y.o. male enrolled in the [facility] Diabetes [facility]. The goal of this voluntary program is to help employees and covered dependents reach their health maintenance goals in regards to their diabetes diagnosis. According to our records, patient is missing the following requirement(s) that must be completed by July 1, 2022 to avoid discharge from the program:     First diabetes visit with your physician in 2022   First A1c result in 2022      Plan:  Attempt made to reach patient by telephone to review above. Left voice message for patient to return clinician's phone call to [phone], option 3. Letter mailed to patient.     Erin Orantes, [address] [facility]   Department, toll free: [phone] Option #3

## 2022-06-10 NOTE — LETTER
[address] [facility]  [address] [facility], [address]        [address] [facility] [address] [address]           06/10/22     Dear Kendell Mcginnis,    You are currently enrolled in the [facility] Be Well with Diabetes Program. Our records indicate that we are missing the requirements listed below. If these requirements are not completed by July 1, 2022, then you may be disenrolled from the program:      First diabetes visit with your physician in 2022   First A1c result in 2022    You must submit documentation of completion of requirements if your provider does not use the [facility] electronic charting system or if completed outside of the system. Return the documentation to [email or web address] or by fax at [phone]. Please call our office so we can discuss the missing requirements with you to ensure that you will remain enrolled in the [address] [place] [address] Be Well with Diabetes Program.    Thank you,    [employer]   Phone: toll free [phone], Option #3  Email: [email or web address]. [email or web address]   Fax Number: [phone]

## 2022-06-14 NOTE — TELEPHONE ENCOUNTER
Patient returned call in regards to DM program. Advised we were missing A1c and MD visit for 1st half of the year. He requested that I send him an email with the information that was missing. Copy of [employer] message was sent to him via [employer] email. Stated that he would have his MD send it to us that way.

## 2022-07-18 ENCOUNTER — OFFICE VISIT (OUTPATIENT)
Dept: PSYCHOLOGY | Age: 52
End: 2022-07-18
Payer: COMMERCIAL

## 2022-07-18 DIAGNOSIS — F43.23 ADJUSTMENT DISORDER WITH MIXED ANXIETY AND DEPRESSED MOOD: Primary | ICD-10-CM

## 2022-07-18 PROCEDURE — 90832 PSYTX W PT 30 MINUTES: CPT | Performed by: PSYCHOLOGIST

## 2022-07-18 ASSESSMENT — PATIENT HEALTH QUESTIONNAIRE - PHQ9
SUM OF ALL RESPONSES TO PHQ QUESTIONS 1-9: 7
SUM OF ALL RESPONSES TO PHQ QUESTIONS 1-9: 7
7. TROUBLE CONCENTRATING ON THINGS, SUCH AS READING THE NEWSPAPER OR WATCHING TELEVISION: 1
SUM OF ALL RESPONSES TO PHQ9 QUESTIONS 1 & 2: 2
9. THOUGHTS THAT YOU WOULD BE BETTER OFF DEAD, OR OF HURTING YOURSELF: 0
6. FEELING BAD ABOUT YOURSELF - OR THAT YOU ARE A FAILURE OR HAVE LET YOURSELF OR YOUR FAMILY DOWN: 0
8. MOVING OR SPEAKING SO SLOWLY THAT OTHER PEOPLE COULD HAVE NOTICED. OR THE OPPOSITE, BEING SO FIGETY OR RESTLESS THAT YOU HAVE BEEN MOVING AROUND A LOT MORE THAN USUAL: 0
2. FEELING DOWN, DEPRESSED OR HOPELESS: 1
SUM OF ALL RESPONSES TO PHQ QUESTIONS 1-9: 7
5. POOR APPETITE OR OVEREATING: 0
4. FEELING TIRED OR HAVING LITTLE ENERGY: 2
SUM OF ALL RESPONSES TO PHQ QUESTIONS 1-9: 7
3. TROUBLE FALLING OR STAYING ASLEEP: 2
1. LITTLE INTEREST OR PLEASURE IN DOING THINGS: 1

## 2022-07-18 ASSESSMENT — ANXIETY QUESTIONNAIRES
1. FEELING NERVOUS, ANXIOUS, OR ON EDGE: 1
7. FEELING AFRAID AS IF SOMETHING AWFUL MIGHT HAPPEN: 0-NOT AT ALL
4. TROUBLE RELAXING: 1-SEVERAL DAYS
6. BECOMING EASILY ANNOYED OR IRRITABLE: 0-NOT AT ALL
GAD7 TOTAL SCORE: 2
5. BEING SO RESTLESS THAT IT IS HARD TO SIT STILL: 0-NOT AT ALL
3. WORRYING TOO MUCH ABOUT DIFFERENT THINGS: 0-NOT AT ALL
2. NOT BEING ABLE TO STOP OR CONTROL WORRYING: 0-NOT AT ALL
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
## 2022-08-02 LAB
CHOLESTEROL, TOTAL: 207 MG/DL (ref 0–199)
GLUCOSE BLD-MCNC: 115 MG/DL (ref 70–99)
HDLC SERPL-MCNC: 28 MG/DL (ref 40–60)
LDL CHOLESTEROL CALCULATED: ABNORMAL MG/DL
LDL CHOLESTEROL DIRECT: 119 MG/DL
TRIGL SERPL-MCNC: 354 MG/DL (ref 0–150)

## 2022-09-02 ENCOUNTER — TELEPHONE (OUTPATIENT)
Dept: PHARMACY | Facility: CLINIC | Age: 52
End: 2022-09-02

## 2022-09-02 NOTE — TELEPHONE ENCOUNTER
As of 8/26/22 patient has used $635 of the maximum of $600 a year in waived co pays for specific medications and pharmacy-related supplies through the [address] for the DM Program.    Patient currently enrolled in the DM Program and has used all of the maximum of $600 a year in waived co pays for specific medications and pharmacy-related supplies through the [address]. Courtesy call placed to patient to advise them of the above information. Patient unavailable at the time of call. Message left on TAD: Maximum waived co pays for the DM Program has been met and they will begin to be charged their co-payments once again. I left our number: [phone], option #3 if patient has any questions/concerns.       Kimmy Qureshi, [address]   Phone: [phone], option #3       For Pharmacy Admin Tracking Only    CPA in place:  No  Time Spent (min): 10

## 2022-10-21 NOTE — TELEPHONE ENCOUNTER
FLU SCREENING     1. Is the person to be vaccinated sick today? no  2. Does the person to be vaccinated have an allergy to a component of the vaccine? no  3. Has the person to be vaccinated ever had a serious reaction to influenza vaccine in the past?no  4. Has the person to be vaccinated ever have Guillain-Mason syndrome?no   I called Natasha Carlo. 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 He did have MRSA infection which was tetracycline susceptible in January 2020 and this time the wound culture was positive for staph epi, which was also tetracycline susceptible. The patient had stopped linezolid on Monday. He is going to get his sed rate and CRP done today. I have ordered p.o. doxycycline 100 mg every 12 hour for him to be started today after he gets her sed rate and CRP done    Doxycyline/minocycline related instructions: Take Doxycyline/minocycline with a full glass of water and stay upright or sitting up after taking it for 30 minutes as it can cause food pipe irritation (pill esophagitis). Use sunscreen when going in bright sun while on Doxycycline/minocycline as it can cause photosensitivity. Take 1 hour before or 2 hour after dairy, calcium, iron, magnesium, aluminum or zinc.    Patient is agreeable with above plan. He was advised to call me if he experiences any worsening of symptoms.       Sabina Hanna MD, MPH  8/20/2020, 12:38 PM  [facility]   Office: [phone]  Fax: [phone]  Tuesday AM clinic:   [address] [address]  Thursday AM clinic: [address] [facility]

## 2022-11-01 ENCOUNTER — OFFICE VISIT (OUTPATIENT)
Dept: CARDIOLOGY CLINIC | Age: 52
End: 2022-11-01
Payer: COMMERCIAL

## 2022-11-01 VITALS
WEIGHT: 281 LBS | DIASTOLIC BLOOD PRESSURE: 78 MMHG | HEIGHT: 71 IN | SYSTOLIC BLOOD PRESSURE: 136 MMHG | BODY MASS INDEX: 39.34 KG/M2 | HEART RATE: 74 BPM | OXYGEN SATURATION: 98 %

## 2022-11-01 DIAGNOSIS — I10 ESSENTIAL HYPERTENSION: ICD-10-CM

## 2022-11-01 DIAGNOSIS — I25.10 CAD IN NATIVE ARTERY: Primary | ICD-10-CM

## 2022-11-01 DIAGNOSIS — E78.5 HYPERLIPIDEMIA LDL GOAL <70: ICD-10-CM

## 2022-11-01 PROCEDURE — 3078F DIAST BP <80 MM HG: CPT | Performed by: INTERNAL MEDICINE

## 2022-11-01 PROCEDURE — 99214 OFFICE O/P EST MOD 30 MIN: CPT | Performed by: INTERNAL MEDICINE

## 2022-11-01 PROCEDURE — 93000 ELECTROCARDIOGRAM COMPLETE: CPT | Performed by: INTERNAL MEDICINE

## 2022-11-01 PROCEDURE — 3074F SYST BP LT 130 MM HG: CPT | Performed by: INTERNAL MEDICINE

## 2022-11-01 RX ORDER — CLOPIDOGREL BISULFATE 75 MG/1
75 TABLET ORAL DAILY
Qty: 90 TABLET | Refills: 3 | Status: SHIPPED | OUTPATIENT
Start: 2022-11-01

## 2022-11-01 NOTE — PROGRESS NOTES
[facility]   Office Progress Note           Ann Lujan, APRN - CNP     CC: \" Chest pain has returned. \"     HPI:  Patient with history of CAD s/p CABG x4 (3/8/2016), DM, HTN, and HLD. He is a nurse at [employer]. Today, he is here for follow up. He has no new complaints. He has chronic randomly occurring left sided, localized chest pain that he attributes to musculoskeletal pain. He continues to have ED issues. He stopped his metoprolol 2 weeks ago due to bradycardia and his fatigue and energy levels have since improved. His ED has not improved. The patient denies exertional chest pain, palpitations, dizziness, syncope, worsening leg swelling and worsening dyspnea. He is tolerating his medications. Interval History:  He returns for yearly follow up. Reports left sided pain that radiates up neck. Lasting all night that kept him awake. Subsided the next morning. Turned to a dull ache. This does not happen in his daily life with activity. No longer taking Aspirin due to CKD stage 2 managed per Dr. Mirtha Mares. Review of Systems:  Constitutional: No unanticipated weight loss. There's been no change in energy level, sleep pattern, or activity level. No fevers, chills. Eyes: No visual changes or diplopia. No scleral icterus. ENT: No Headaches, hearing loss or vertigo. No mouth sores or sore throat. Cardiovascular: as reviewed in HPI  Respiratory: No cough or wheezing, no sputum production. No hemoptysis. Gastrointestinal: No abdominal pain, appetite loss, blood in stools. No change in bowel or bladder habits. Genitourinary: No dysuria, trouble voiding, or hematuria. Musculoskeletal:  No gait disturbance, no joint complaints. Integumentary: No rash or pruritis. Neurological: No headache, diplopia, change in muscle strength, numbness or tingling. Psychiatric: No anxiety or depression. Endocrine: No temperature intolerance. No excessive thirst, fluid intake, or urination.  No tremor. Hematologic/Lymphatic: No abnormal bruising or bleeding, blood clots or swollen lymph nodes. Allergic/Immunologic: No nasal congestion or hives. /78   Pulse 74   Ht 5' 11\" (1.803 m)   Wt 281 lb (127.5 kg)   SpO2 98%   BMI 39.19 kg/m²      Physical Exam:   Constitutional: The patient is oriented to person, place, and time. Appears well-developed and well-nourished. In no acute distress. Head: Normocephalic and atraumatic. Pupils equal and round. Neck: Neck supple. No JVP or carotid bruit appreciated. No mass and no thyromegaly present. No lymphadenopathy present. Cardiovascular: Normal rate. Normal heart sounds. Exam reveals no gallop and no friction rub. No murmur heard. Pulmonary/Chest: Effort normal and breath sounds normal. No respiratory distress. No wheezes, rhonchi or rales. Abdominal: Soft, non-tender. Bowel sounds are normal. Exhibits no organomegaly, mass or bruit. Extremities: No edema. No cyanosis or clubbing. Pulses are 2+ radial and carotid bilaterally. Neurological: No gross cranial nerve deficit. Coordination normal.   Skin: Skin is warm and dry. There is no rash or diaphoresis. Psychiatric: Patient has a normal mood and affect. Speech is normal and behavior is normal.     Outpatient Medications Marked as Taking for the 11/1/22 encounter (Office Visit) with Tavo Ochoa MD   Medication Sig Dispense Refill    lisinopril (PRINIVIL;ZESTRIL) 20 MG tablet TAKE 1 TABLET BY MOUTH 2 TIMES A  tablet 0    Continuous Blood Gluc Sensor (FREESTYLE NATHALIE 14 DAY SENSOR) [facility] USSE ONE SENSOR EVERY 14 DAYS TO MONITOR BLOOD SUGARS 2 each 3    ketoconazole (NIZORAL) 2 % shampoo Apply topically daily as needed.  120 mL 1    dapagliflozin (FARXIGA) 10 MG tablet Take 10 mg by mouth daily Take 10mg daily      Semaglutide, 1 MG/DOSE, (OZEMPIC, 1 MG/DOSE,) 2 MG/1.5ML SOPN Inject 0.5 mg into the skin every 7 days       blood glucose monitor kit and supplies Dispense sufficient amount for indicated testing frequency plus additional to accommodate PRN testing needs. Dispense all needed supplies to include: monitor, strips, lancing device, lancets, control solutions, alcohol swabs. Use to test blood glucose levels 4 times daily 1 kit 0    Prodigy Lancets 28G MISC 1 Units by Does not apply route 3 times daily as needed (blood sugar) 100 each 1    blood glucose test strips (EXACTECH TEST) strip 1 each by In Vitro route 3 times daily as needed (blood sugar) As needed. 100 each 1    TESTOSTERONE PROPIONATE IM Inject into the muscle Every 10 days      TOUJEO SOLOSTAR 300 UNIT/ML SOPN INJECT 180 UNITS INTO THE SKIN NIGHTLY (Patient taking differently: Inject 130 Units into the skin nightly) 54 mL 1    TRUEPLUS PEN NEEDLES 32G X 4 MM MISC USE FOUR TIMES A  each 5    rosuvastatin (CRESTOR) 40 MG tablet Take 1 tablet by mouth daily 90 tablet 1    Icosapent Ethyl (VASCEPA) 1 g CAPS capsule Take 2 capsules by mouth 2 times daily 360 capsule 1    Cholecalciferol (VITAMIN D3) 50 MCG ( UT) CAPS Take 8 capsules by mouth three times a week      insulin lispro (HUMALOG KWIKPEN) 200 UNIT/ML SOPN pen INJECT UP TO 60 UNITS INTO THE SKIN THREE TIMES A DAY BEFORE MEALS PER SLIDING SCALE (Patient taking differently: INJECT as directed - only using on/for sick days. PRN sliding scale) 108 mL 3    Continuous Blood Gluc  (FREESTYLE NATHALIE 14 DAY READER) TAMERA 1 Units by Does not apply route as needed (as needed) 1 Device 0    b complex vitamins capsule Take 1 capsule by mouth daily       EC20 NSR   EKG 21 marked SB  EKG 21 NSR  2022 Sinus rhythm       ECHO:16  Slightly dilated LV with normal systolic function; EF 88% Mild mitral regurgitation is present. The left atrium is dilated. RV is enlarged with reduced systolic function. There is mild tricuspid regurgitation with RVSP estimated at 35 mmHg. Right atrial size is mildly dilated . Mild pulmonic regurgitation present. repeat a fasting lipid panel. If not at goal, will try Leqvio. Diabetes   Managed per PCP and endocrinology     CKD stage 2  Managed per Dr. Cheryl Mcginnis    Follow up on a yearly basis     Sincerely,    Mendoza Malin M.D  [facility], [address] [place] [address]  Ph: [phone]  Fax: [phone]    This note was scribed in the presence of Dr. Mendoza Malin MD by Ed Garza RN  Physician Attestation:  The scribes documentation has been prepared under my direction and personally reviewed by me in its entirety. I confirm that the note above accurately reflects all work, treatment, procedures, and medical decision making performed by me.

## 2022-11-28 ENCOUNTER — OFFICE VISIT (OUTPATIENT)
Dept: PSYCHOLOGY | Age: 52
End: 2022-11-28

## 2022-11-28 DIAGNOSIS — F43.23 ADJUSTMENT DISORDER WITH MIXED ANXIETY AND DEPRESSED MOOD: Primary | ICD-10-CM

## 2022-11-28 NOTE — PROGRESS NOTES
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 GABRIEL 7 SCORE 12/2/2022 7/18/2022 8/17/2021 3/16/2021 3/9/2021 3/2/2021   GABRIEL-7 Total Score 5 2 3 1 4 3     Interpretation of GABRIEL-7 score: 5-9 = mild anxiety, 10-14 = moderate anxiety, 15+ = severe anxiety. Recommend referral to behavioral health for scores 10 or greater. PHQ Scores 12/2/2022 7/18/2022 12/20/2021 9/7/2021 8/17/2021 3/16/2021 3/9/2021   PHQ2 Score 0 2 1 1 0 0 3   PHQ9 Score 4 7 1 1 0 0 5     Interpretation of Total Score Depression Severity: 1-4 = Minimal depression, 5-9 = Mild depression, 10-14 = Moderate depression, 15-19 = Moderately severe depression, 20-27 = Severe depression    Diagnosis:    1.  Adjustment disorder with mixed anxiety and depressed mood            Patient Active Problem List   Diagnosis    Diabetes mellitus ([facility]    HTN (hypertension)    Other and unspecified hyperlipidemia    Depressive disorder, not elsewhere classified    Pharyngitis    Abnormal stress test    Dyslipidemia    Chest pain    Unstable angina (HCC)    Coronary artery disease involving native coronary artery of native heart with angina pectoris (HCC)    Essential hypertension    Mixed hyperlipidemia    HCAP (healthcare-associated pneumonia)    Cellulitis    Fever    Cellulitis of chest wall    CAD, multiple vessel    Type 2 diabetes mellitus with circulatory disorder (HCC)    Pure hypercholesterolemia    Klebsiella infection    High triglycerides    Vitamin D deficiency    Leg swelling    Pain in both lower extremities    PVD (peripheral vascular disease) (HCC)    Chronic venous htn w inflammation of bilateral low extrm    Degenerative disc disease, lumbar    Acute gout of right foot    Adjustment disorder         Plan:  Pt interventions:  Established rapport, [place]-setting to identify pt's primary goals for [facility] visit / overall health, Supportive techniques and Emphasized self-care as important for managing overall health.        Pt Behavioral Change Plan:  Pt will continue with the following goals:  Pt will schedule F/U visit as needed

## 2022-12-02 ENCOUNTER — TELEPHONE (OUTPATIENT)
Dept: PHARMACY | Facility: CLINIC | Age: 52
End: 2022-12-02

## 2022-12-02 ASSESSMENT — PATIENT HEALTH QUESTIONNAIRE - PHQ9
2. FEELING DOWN, DEPRESSED OR HOPELESS: 0
1. LITTLE INTEREST OR PLEASURE IN DOING THINGS: 0
SUM OF ALL RESPONSES TO PHQ QUESTIONS 1-9: 4
8. MOVING OR SPEAKING SO SLOWLY THAT OTHER PEOPLE COULD HAVE NOTICED. OR THE OPPOSITE, BEING SO FIGETY OR RESTLESS THAT YOU HAVE BEEN MOVING AROUND A LOT MORE THAN USUAL: 0
SUM OF ALL RESPONSES TO PHQ9 QUESTIONS 1 & 2: 0
3. TROUBLE FALLING OR STAYING ASLEEP: 0
7. TROUBLE CONCENTRATING ON THINGS, SUCH AS READING THE NEWSPAPER OR WATCHING TELEVISION: 2
4. FEELING TIRED OR HAVING LITTLE ENERGY: 1
SUM OF ALL RESPONSES TO PHQ QUESTIONS 1-9: 4
6. FEELING BAD ABOUT YOURSELF - OR THAT YOU ARE A FAILURE OR HAVE LET YOURSELF OR YOUR FAMILY DOWN: 1
5. POOR APPETITE OR OVEREATING: 0
9. THOUGHTS THAT YOU WOULD BE BETTER OFF DEAD, OR OF HURTING YOURSELF: 0
SUM OF ALL RESPONSES TO PHQ QUESTIONS 1-9: 4
SUM OF ALL RESPONSES TO PHQ QUESTIONS 1-9: 4

## 2022-12-02 ASSESSMENT — ANXIETY QUESTIONNAIRES
7. FEELING AFRAID AS IF SOMETHING AWFUL MIGHT HAPPEN: 1-SEVERAL DAYS
2. NOT BEING ABLE TO STOP OR CONTROL WORRYING: 0-NOT AT ALL
5. BEING SO RESTLESS THAT IT IS HARD TO SIT STILL: 0-NOT AT ALL
GAD7 TOTAL SCORE: 5
1. FEELING NERVOUS, ANXIOUS, OR ON EDGE: 1
4. TROUBLE RELAXING: 1-SEVERAL DAYS
3. WORRYING TOO MUCH ABOUT DIFFERENT THINGS: 1-SEVERAL DAYS
6. BECOMING EASILY ANNOYED OR IRRITABLE: 1-SEVERAL DAYS

## 2022-12-02 NOTE — TELEPHONE ENCOUNTER
[facility] Diabetes [facility]    Enid Armendariz is a 47 y.o. male enrolled in the [facility] with Diabetes Program. The goal of this voluntary program is to help employees and covered dependents reach their health maintenance goals in regards to their diabetes diagnosis. According to our records, patient is missing the following requirement(s) that must be completed by December 31, 2022 to avoid discharge from the program:    Second diabetes visit with your provider in 2022  Second A1c result in 2022    Plan:  Attempt made to reach patient by telephone to review above. Spoke to patient - verbalized understanding. Sees endo not in epic. Will get fax/emailed to us and ask for second A1c. Discussed he is on humalog 200 and difference in cost. He is aware and trying to get his insulin use down so can do u-100.  Is not interested in Jose Angel 108, PharmD, [address] Pharmacist  Department: Nahid Deleon Only    Time Spent (min): 15

## 2022-12-29 ENCOUNTER — HOSPITAL ENCOUNTER (OUTPATIENT)
Age: 52
Discharge: HOME | End: 2022-12-29
Payer: COMMERCIAL

## 2022-12-29 DIAGNOSIS — M10.00 IDIOPATHIC GOUT, UNSPECIFIED CHRONICITY, UNSPECIFIED SITE: ICD-10-CM

## 2022-12-29 DIAGNOSIS — R80.9 MICROALBUMINURIA: ICD-10-CM

## 2022-12-29 DIAGNOSIS — N18.2 CKD (CHRONIC KIDNEY DISEASE), STAGE II: ICD-10-CM

## 2022-12-29 DIAGNOSIS — I25.10 CAD IN NATIVE ARTERY: ICD-10-CM

## 2022-12-29 DIAGNOSIS — I10 ESSENTIAL HYPERTENSION: ICD-10-CM

## 2022-12-29 LAB
ALBUMIN SERPL-MCNC: 4.4 G/DL (ref 3.4–5)
ANION GAP SERPL CALCULATED.3IONS-SCNC: 13 MMOL/L (ref 3–16)
BACTERIA: ABNORMAL /HPF
BILIRUBIN URINE: NEGATIVE
BLOOD, URINE: NEGATIVE
BUN BLDV-MCNC: 23 MG/DL (ref 7–20)
CALCIUM SERPL-MCNC: 9.8 MG/DL (ref 8.3–10.6)
CHLORIDE BLD-SCNC: 103 MMOL/L (ref 99–110)
CHOLESTEROL, FASTING: 151 MG/DL (ref 0–199)
CLARITY: CLEAR
CO2: 23 MMOL/L (ref 21–32)
COLOR: YELLOW
CREAT SERPL-MCNC: 1 MG/DL (ref 0.9–1.3)
CREATININE URINE: 120.4 MG/DL (ref 39–259)
EPITHELIAL CELLS, UA: 0 /HPF (ref 0–5)
GFR SERPL CREATININE-BSD FRML MDRD: >60 ML/MIN/{1.73_M2}
GLUCOSE BLD-MCNC: 102 MG/DL (ref 70–99)
GLUCOSE URINE: >=1000 MG/DL
HDLC SERPL-MCNC: 28 MG/DL (ref 40–60)
HYALINE CASTS: 0 /LPF (ref 0–8)
KETONES, URINE: NEGATIVE MG/DL
LDL CHOLESTEROL CALCULATED: 93 MG/DL
LEUKOCYTE ESTERASE, URINE: NEGATIVE
MICROALBUMIN UR-MCNC: 1.6 MG/DL
MICROALBUMIN/CREAT UR-RTO: 13.3 MG/G (ref 0–30)
MICROSCOPIC EXAMINATION: ABNORMAL
NITRITE, URINE: NEGATIVE
PH UA: 5.5 (ref 5–8)
POTASSIUM SERPL-SCNC: 4.5 MMOL/L (ref 3.5–5.1)
PROTEIN UA: NEGATIVE MG/DL
RBC UA: 1 /HPF (ref 0–4)
SODIUM BLD-SCNC: 139 MMOL/L (ref 136–145)
SPECIFIC GRAVITY UA: 1.02 (ref 1–1.03)
TRIGLYCERIDE, FASTING: 148 MG/DL (ref 0–150)
URIC ACID, SERUM: 8.2 MG/DL (ref 3.5–7.2)
URINE TYPE: ABNORMAL
UROBILINOGEN, URINE: 0.2 E.U./DL
VLDLC SERPL CALC-MCNC: 30 MG/DL
WBC UA: 0 /HPF (ref 0–5)

## 2022-12-29 PROCEDURE — 82040 ASSAY OF SERUM ALBUMIN: CPT

## 2022-12-29 PROCEDURE — 82043 UR ALBUMIN QUANTITATIVE: CPT

## 2022-12-29 PROCEDURE — 82570 ASSAY OF URINE CREATININE: CPT

## 2022-12-29 PROCEDURE — 80061 LIPID PANEL: CPT

## 2022-12-29 PROCEDURE — 36415 COLL VENOUS BLD VENIPUNCTURE: CPT

## 2022-12-29 PROCEDURE — 84155 ASSAY OF PROTEIN SERUM: CPT

## 2022-12-29 PROCEDURE — 86038 ANTINUCLEAR ANTIBODIES: CPT

## 2022-12-29 PROCEDURE — 84165 PROTEIN E-PHORESIS SERUM: CPT

## 2022-12-29 PROCEDURE — 80048 BASIC METABOLIC PNL TOTAL CA: CPT

## 2022-12-29 PROCEDURE — 84550 ASSAY OF BLOOD/URIC ACID: CPT

## 2022-12-29 PROCEDURE — 83036 HEMOGLOBIN GLYCOSYLATED A1C: CPT

## 2022-12-29 PROCEDURE — 81001 URINALYSIS AUTO W/SCOPE: CPT

## 2022-12-30 LAB
ALBUMIN SERPL-MCNC: 3.7 G/DL (ref 3.1–4.9)
ALPHA-1-GLOBULIN: 0.3 G/DL (ref 0.2–0.4)
ALPHA-2-GLOBULIN: 0.8 G/DL (ref 0.4–1.1)
ANTI-NUCLEAR ANTIBODY (ANA): NEGATIVE
BETA GLOBULIN: 1.5 G/DL (ref 0.9–1.6)
ESTIMATED AVERAGE GLUCOSE: 151.3 MG/DL
GAMMA GLOBULIN: 1.3 G/DL (ref 0.6–1.8)
HBA1C MFR BLD: 6.9 %
SPE/IFE INTERPRETATION: NORMAL
TOTAL PROTEIN: 7.5 G/DL (ref 6.4–8.2)

## 2023-01-06 ENCOUNTER — TELEPHONE (OUTPATIENT)
Dept: CARDIOLOGY CLINIC | Age: 53
End: 2023-01-06
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
## 2023-01-06 NOTE — TELEPHONE ENCOUNTER
Contacted patient at [phone] and reviewed all labs 12/29/2022. He is agreeable to the addition of leqvio along with his Crestor and Vascepa. I will submit forms and instructed him they will email him for an electronic signature. He vu and is agreeable. No further q/c at this time. LDL 93 and TRG and TC now within range. Start form, order form, insurance and demographics faxed to [facility]. Request to email for approval signature. Email provided. Fax to [facility] with start form, order form, demo, insurance and instructions to obtain electronic signature.

## 2023-01-12 RX ORDER — ACETAMINOPHEN 325 MG/1
650 TABLET ORAL
OUTPATIENT
Start: 2023-01-19

## 2023-01-12 RX ORDER — EPINEPHRINE 1 MG/ML
0.3 INJECTION, SOLUTION, CONCENTRATE INTRAVENOUS PRN
OUTPATIENT
Start: 2023-01-19

## 2023-01-12 RX ORDER — ALBUTEROL SULFATE 90 UG/1
4 AEROSOL, METERED RESPIRATORY (INHALATION) PRN
OUTPATIENT
Start: 2023-01-19

## 2023-01-12 RX ORDER — ONDANSETRON 2 MG/ML
8 INJECTION INTRAMUSCULAR; INTRAVENOUS
OUTPATIENT
Start: 2023-01-19

## 2023-01-12 RX ORDER — SODIUM CHLORIDE 9 MG/ML
INJECTION, SOLUTION INTRAVENOUS CONTINUOUS
OUTPATIENT
Start: 2023-01-19

## 2023-01-12 RX ORDER — DIPHENHYDRAMINE HYDROCHLORIDE 50 MG/ML
50 INJECTION INTRAMUSCULAR; INTRAVENOUS
OUTPATIENT
Start: 2023-01-19

## 2023-01-14 NOTE — TELEPHONE ENCOUNTER
I spoke with Mr. Evelyn Zavala. I am not seeing an outstanding balance for Dr. Bernard Harrison from 2022 or 2021. There is something older from 2020 but I see a a payment made for that balance. Patient had poor cell reception and will call back next week to discuss further.

## 2023-01-16 NOTE — TELEPHONE ENCOUNTER
Latest Quantiferon Gold (Optional): neg 12/2021 Pt advised Length Of Therapy: 3+ years Add High Risk Medication Management Associated Diagnosis?: No Detail Level: Zone

## 2023-01-20 ENCOUNTER — TELEPHONE (OUTPATIENT)
Dept: PHARMACY | Facility: CLINIC | Age: 53
End: 2023-01-20

## 2023-01-20 NOTE — LETTER
[address] [facility] 12  [address], Luige [address]        [address] [facility] [address]           01/20/23     Dear Carlos Frazier,    Thanks, so much for taking the first step towards better health. This is a friendly reminder of the requirements that were due for the [facility] Be Well With Diabetes Program by December 31st, 2022 - If the below requirement(s) are not completed by January 31, 2023 you will be discharged from the program:    Program Requirements that were due to be completed by December 31st 2022:    2nd office visit in 2022 for diabetes       You will have to submit documentation of completion of requirements if your Physician does not use the [facility] electronic charting system or if you have your lab test done outside of [facility]. Return the documentation to [email or web address] or by fax at [phone]  If requirement(s) are not met by January 31, 2023 you will be dis-enrolled from the program and the credit valued at up to $600 towards your diabetic medications and supplies will be revoked. You will be able to reapply the following calendar year. Dena 2  Phone: toll free [phone], option 3  Email: [email or web address]. [employer]  Fax Number: [phone]

## 2023-01-20 NOTE — TELEPHONE ENCOUNTER
Called provider office again and still got VM. Patient does not read Mychart messages. Sending letter.      Tamiko Estrada, [address] [employer]   Phone: [phone]       For Pharmacy Admin Tracking Only    Program: [address]:  No  Gap Closed?: No   Time Spent (min): 15

## 2023-01-20 NOTE — TELEPHONE ENCOUNTER
Pharmacy Pop Care Documentation:   Called patient with reminder for 2022 requirements for Diabetes Management Program.     According to our records, patient is missing the following 2022 requirement(s) that must be completed by January 31, 2023 to avoid being discharged from the program:     2nd office visit in 2022 for diabetes       Called patient's endocrinologists' office, Dr. Trevor Hernandez. Phone number for office found on documentation in media tab. Called twice. Unable to speak to live person and goes to voicemail stating the provider's name and to leave VM. Left VM. Will try later today to speak to live person. Patient did have recent hospital encounter where labs were drawn including 2nd A1c. Patient not available at the time of call. Left VM.        Isaiah Stanley, [address]  [facility]armacy   Phone: [phone]

## 2023-01-23 ENCOUNTER — CLINICAL DOCUMENTATION (OUTPATIENT)
Dept: PHARMACY | Facility: CLINIC | Age: 53
End: 2023-01-23

## 2023-01-23 NOTE — PROGRESS NOTES
Called Dr. Jess Keys' and confirmed patient was seen 11/18/22. Spoke to Simi Hinton. Code not dropped due to visit being in 2022 and 2023. Master database updated.      Nadine Gross, [facility]   Phone: [phone]       For Pharmacy Admin Tracking Only    Program: [address]:  No  Gap Closed?: Yes   Time Spent (min): 5

## 2023-02-27 ENCOUNTER — TELEPHONE (OUTPATIENT)
Dept: PHARMACY | Facility: CLINIC | Age: 53
End: 2023-02-27

## 2023-02-27 NOTE — TELEPHONE ENCOUNTER
[address] [facility] Employee Diabetes Program - Be Well With Diabetes  =================================================================  Alexis Warren is a 47 y.o. male enrolled in the [address] [place] [address] with patient for yearly visit to discuss enrollment in program. Patient provided writer with verbal consent to remain in the program for this year. Program Requirements to be completed in 2023:  Two office visits in 2023 for diabetes (1st must be completed by 7/1/2023)  Two A1c levels in 2023 (1st must be completed by 7/1/2023)  Yearly lipid panel  Yearly urine microalbumin test  Diabetes education if A1c is over 8%  Taking an ACE/ARB medication if appropriate  Taking a statin medication if appropriate  Pneumonia vaccine up to date. Guidelines changed, talk with provider. Yearly flu shot (for 4718-7074 season)  Medication adherence over 70%. Patients who fall below 70% will be contacted by a pharmacist in the program to discuss any issues. Are you currently using [address] [place] [address] (home delivery pharmacy) to get your prescriptions? Yes    Would you like to speak with a pharmacist about the program, your diabetes, and/or your prescriptions? Patient had question regarding Shilo 2 and 3 sensors. Transferred to pharmacist.     [facility] Team  Phone: toll free [phone], option #3  Fax [phone]  Email: [email or web address]      For Pharmacy Admin Tracking Only    Program: [address]:  No  Gap Closed?: Yes   Time Spent (min): 10

## 2023-02-27 NOTE — TELEPHONE ENCOUNTER
Spoke with patient about differences between [address]. Pt was interested but said he will discuss with his PCP. Advised him that current stock is low, and PA may be updated, but his cost should stay the same.     Aye Amezcua, PharmD, [facility], toll free: [phone]

## 2023-04-07 RX ORDER — COLCHICINE 0.6 MG/1
0.6 TABLET ORAL 2 TIMES DAILY
Qty: 20 TABLET | Refills: 2 | Status: SHIPPED | OUTPATIENT
Start: 2023-04-07 | End: 2023-04-17

## 2023-04-20 ENCOUNTER — TELEPHONE (OUTPATIENT)
Dept: PHARMACY | Facility: CLINIC | Age: 53
End: 2023-04-20

## 2023-04-20 NOTE — TELEPHONE ENCOUNTER
Assessment/Outcomes:  Yes - On statin-   Yes - On ACEi/ARB- however no confirmed fills os lisinopril in 2023. Dispensed Days Supply Quantity Provider Pharmacy   LISINOPRIL 20 MG TABLET 09/19/2022 90 180 tablet PAUL Celaya - [facility] [place] . .. LISINOPRIL 20 MG TABLET 05/19/2022 90 180 tablet [address] [facility] . .. LISINOPRIL 20 MG TABLET 01/14/2022 90 180 tablet [address] [facility] . .. Reached patient for review. Patient states he still has lisinopril and has filled it since Sept 2022 and states he filled it [facility]. But I told him it really was last filled Sept 2022. Patient states there was a period of time he was worse with his compliance but has been taking twice a day, with sometimes forgetting the second dose of the day. He will check with his providers if lisinopril 40 mg once daily for better compliance would be an option for him. He would prefer to take it once a day if providers agreed. He does have pcp visit tomorrow and will ask her. But lisinopril script on hold was written by carly, so educated patient he may also want to check to endo to make sure since I can not see endo's notes. For now, he states he still has a back supply of lisinopril and does not need a refill.      Leanne Ochoa, PharmD, [address] Pharmacist  Department: [phone]     For Pharmacy Admin Tracking Only    Program: [address] Closed?: Yes   Time Spent (min): 15

## 2023-05-01 ENCOUNTER — HOSPITAL ENCOUNTER (OUTPATIENT)
Age: 53
Discharge: HOME | End: 2023-05-01
Payer: COMMERCIAL

## 2023-05-01 LAB
25(OH)D3 SERPL-MCNC: 19.6 NG/ML
ALBUMIN SERPL-MCNC: 4.3 G/DL (ref 3.4–5)
ANION GAP SERPL CALCULATED.3IONS-SCNC: 18 MMOL/L (ref 3–16)
BUN SERPL-MCNC: 21 MG/DL (ref 7–20)
CALCIUM SERPL-MCNC: 9.6 MG/DL (ref 8.3–10.6)
CHLORIDE SERPL-SCNC: 104 MMOL/L (ref 99–110)
CHOLEST SERPL-MCNC: 143 MG/DL (ref 0–199)
CO2 SERPL-SCNC: 17 MMOL/L (ref 21–32)
CREAT SERPL-MCNC: 0.9 MG/DL (ref 0.9–1.3)
DEPRECATED RDW RBC AUTO: 15.5 % (ref 12.4–15.4)
GFR SERPLBLD CREATININE-BSD FMLA CKD-EPI: >60 ML/MIN/{1.73_M2}
GLUCOSE SERPL-MCNC: 92 MG/DL (ref 70–99)
HCT VFR BLD AUTO: 55.8 % (ref 40.5–52.5)
HDLC SERPL-MCNC: 39 MG/DL (ref 40–60)
HGB BLD-MCNC: 18.1 G/DL (ref 13.5–17.5)
LDLC SERPL CALC-MCNC: 71 MG/DL
MCH RBC QN AUTO: 28.1 PG (ref 26–34)
MCHC RBC AUTO-ENTMCNC: 32.5 G/DL (ref 31–36)
MCV RBC AUTO: 86.3 FL (ref 80–100)
PHOSPHATE SERPL-MCNC: 3.2 MG/DL (ref 2.5–4.9)
PLATELET # BLD AUTO: 168 K/UL (ref 135–450)
PLATELET BLD QL SMEAR: ADEQUATE
PMV BLD AUTO: 8.5 FL (ref 5–10.5)
POTASSIUM SERPL-SCNC: 4.7 MMOL/L (ref 3.5–5.1)
RBC # BLD AUTO: 6.46 M/UL (ref 4.2–5.9)
SLIDE REVIEW: ABNORMAL
SODIUM SERPL-SCNC: 139 MMOL/L (ref 136–145)
T4 FREE SERPL-MCNC: 1.3 NG/DL (ref 0.9–1.8)
TRIGL SERPL-MCNC: 167 MG/DL (ref 0–150)
TSH SERPL DL<=0.005 MIU/L-ACNC: 2.6 UIU/ML (ref 0.27–4.2)
VLDLC SERPL CALC-MCNC: 33 MG/DL
WBC # BLD AUTO: 7.2 K/UL (ref 4–11)

## 2023-05-01 PROCEDURE — 84403 ASSAY OF TOTAL TESTOSTERONE: CPT

## 2023-05-01 PROCEDURE — 36415 COLL VENOUS BLD VENIPUNCTURE: CPT

## 2023-05-01 PROCEDURE — 84270 ASSAY OF SEX HORMONE GLOBUL: CPT

## 2023-05-01 PROCEDURE — 80061 LIPID PANEL: CPT

## 2023-05-01 PROCEDURE — 83036 HEMOGLOBIN GLYCOSYLATED A1C: CPT

## 2023-05-01 PROCEDURE — 82985 ASSAY OF GLYCATED PROTEIN: CPT

## 2023-05-01 PROCEDURE — 85027 COMPLETE CBC AUTOMATED: CPT

## 2023-05-01 PROCEDURE — 84439 ASSAY OF FREE THYROXINE: CPT

## 2023-05-01 PROCEDURE — 80069 RENAL FUNCTION PANEL: CPT

## 2023-05-01 PROCEDURE — 82306 VITAMIN D 25 HYDROXY: CPT

## 2023-05-01 PROCEDURE — 84443 ASSAY THYROID STIM HORMONE: CPT

## 2023-05-01 PROCEDURE — 84681 ASSAY OF C-PEPTIDE: CPT

## 2023-05-02 LAB
EST. AVERAGE GLUCOSE BLD GHB EST-MCNC: 168.6 MG/DL
HBA1C MFR BLD: 7.5 %

## 2023-05-03 LAB
C PEPTIDE SERPL-MCNC: 1.3 NG/ML (ref 1.1–4.4)
FRUCTOSAMINE SERPL-SCNC: 289 UMOL/L (ref 205–285)

## 2023-05-08 LAB — MISCELLANEOUS LAB TEST ORDER: ABNORMAL

## 2023-05-25 PROBLEM — F33.1 MAJOR DEPRESSIVE DISORDER, RECURRENT EPISODE, MODERATE (HCC): Status: ACTIVE | Noted: 2023-05-25

## 2023-06-30 LAB
CHOLEST SERPL-MCNC: 139 MG/DL (ref 0–199)
EST. AVERAGE GLUCOSE BLD GHB EST-MCNC: 145.6 MG/DL
GLUCOSE SERPL-MCNC: 118 MG/DL (ref 70–99)
HBA1C MFR BLD: 6.7 %
HDLC SERPL-MCNC: 25 MG/DL (ref 40–60)
LDLC SERPL CALC-MCNC: 60 MG/DL
TRIGL SERPL-MCNC: 272 MG/DL (ref 0–150)

## 2023-07-19 ENCOUNTER — TELEPHONE (OUTPATIENT)
Dept: PHARMACY | Facility: CLINIC | Age: 53
End: 2023-07-19

## 2023-07-19 NOTE — TELEPHONE ENCOUNTER
As of 7/11/23 patient has used $595 of the maximum of $600 a year in waived co pays for specific medications and pharmacy-related supplies through the [address] [employer] [place] for the DM Program.    Patient currently enrolled in the DM Program and is nearing the maximum of $600 a year in waived co pays for specific medications and pharmacy-related supplies through the [address] [employer] [place]. Courtesy call placed to patient to advise them of the above information. Patient unavailable at the time of call. Message left on TAD: Maximum waived co pays for the DM Program has almost been met. Once maximum has been reached, they will begin to be charged their co-payment once again. I left our number: [phone], option #3 if patient has any questions/concerns.       Tori Estrada, [address]   Phone: [phone], option #3    For Pharmacy Admin Tracking Only    Program: [address] in place:  No  Time Spent (min): 5

## 2023-07-19 NOTE — TELEPHONE ENCOUNTER
Patient returned call and requested itemized list of medication and supply copayment costs be sent to him. Confirmed email as [email or web address]. Routing to supervisor. Patient also expressed concerned over high costs of medications and Mounjaro back order. Wanted to know if any decisions have been made to make amount higher than $600 for next year. Advised no official word on any amount change for next year.        Teena Cabrera, [address] [facility]   Phone: [phone]

## 2023-07-21 ENCOUNTER — HOSPITAL ENCOUNTER (OUTPATIENT)
Age: 53
Discharge: HOME | End: 2023-07-21

## 2023-07-21 LAB
25(OH)D3 SERPL-MCNC: 20.8 NG/ML
ALBUMIN SERPL-MCNC: 4.4 G/DL (ref 3.4–5)
ANION GAP SERPL CALCULATED.3IONS-SCNC: 18 MMOL/L (ref 3–16)
BUN SERPL-MCNC: 15 MG/DL (ref 7–20)
CALCIUM SERPL-MCNC: 9.7 MG/DL (ref 8.3–10.6)
CHLORIDE SERPL-SCNC: 103 MMOL/L (ref 99–110)
CO2 SERPL-SCNC: 17 MMOL/L (ref 21–32)
CREAT SERPL-MCNC: 0.9 MG/DL (ref 0.9–1.3)
DEPRECATED RDW RBC AUTO: 14.3 % (ref 12.4–15.4)
GFR SERPLBLD CREATININE-BSD FMLA CKD-EPI: >60 ML/MIN/{1.73_M2}
GLUCOSE SERPL-MCNC: 86 MG/DL (ref 70–99)
HCT VFR BLD AUTO: 56.5 % (ref 40.5–52.5)
HGB BLD-MCNC: 19.2 G/DL (ref 13.5–17.5)
MCH RBC QN AUTO: 28.7 PG (ref 26–34)
MCHC RBC AUTO-ENTMCNC: 33.9 G/DL (ref 31–36)
MCV RBC AUTO: 84.7 FL (ref 80–100)
PHOSPHATE SERPL-MCNC: 3.1 MG/DL (ref 2.5–4.9)
PLATELET # BLD AUTO: 169 K/UL (ref 135–450)
PMV BLD AUTO: 9.2 FL (ref 5–10.5)
POTASSIUM SERPL-SCNC: 4.5 MMOL/L (ref 3.5–5.1)
PSA SERPL DL<=0.01 NG/ML-MCNC: 0.39 NG/ML (ref 0–4)
RBC # BLD AUTO: 6.67 M/UL (ref 4.2–5.9)
SODIUM SERPL-SCNC: 138 MMOL/L (ref 136–145)
T4 FREE SERPL-MCNC: 1.4 NG/DL (ref 0.9–1.8)
TSH SERPL DL<=0.005 MIU/L-ACNC: 1.9 UIU/ML (ref 0.27–4.2)
WBC # BLD AUTO: 6.5 K/UL (ref 4–11)

## 2023-07-25 LAB
C PEPTIDE SERPL-MCNC: 1.7 NG/ML (ref 1.1–4.4)
SHBG SERPL-SCNC: 32 NMOL/L (ref 11–80)
TESTOST FREE SERPL-MCNC: 378 PG/ML (ref 47–244)
TESTOST SERPL-MCNC: 1409 NG/DL (ref 220–1000)

## 2023-07-27 LAB — MISCELLANEOUS LAB TEST ORDER: NORMAL

## 2023-08-03 ENCOUNTER — HOSPITAL ENCOUNTER (OUTPATIENT)
Age: 53
Discharge: HOME | End: 2023-08-03
Payer: COMMERCIAL

## 2023-08-03 LAB
BASOPHILS # BLD: 0.1 K/UL (ref 0–0.2)
BASOPHILS NFR BLD: 1 %
DEPRECATED RDW RBC AUTO: 14.6 % (ref 12.4–15.4)
EOSINOPHIL # BLD: 0.2 K/UL (ref 0–0.6)
EOSINOPHIL NFR BLD: 2.4 %
HCT VFR BLD AUTO: 54.6 % (ref 40.5–52.5)
HGB BLD-MCNC: 18.5 G/DL (ref 13.5–17.5)
LDH SERPL L TO P-CCNC: 212 U/L (ref 100–190)
LYMPHOCYTES # BLD: 2 K/UL (ref 1–5.1)
LYMPHOCYTES NFR BLD: 29.6 %
MCH RBC QN AUTO: 28.7 PG (ref 26–34)
MCHC RBC AUTO-ENTMCNC: 33.8 G/DL (ref 31–36)
MCV RBC AUTO: 84.7 FL (ref 80–100)
MONOCYTES # BLD: 0.6 K/UL (ref 0–1.3)
MONOCYTES NFR BLD: 8.6 %
NEUTROPHILS # BLD: 3.9 K/UL (ref 1.7–7.7)
NEUTROPHILS NFR BLD: 58.4 %
PLATELET # BLD AUTO: 167 K/UL (ref 135–450)
PMV BLD AUTO: 8.8 FL (ref 5–10.5)
RBC # BLD AUTO: 6.45 M/UL (ref 4.2–5.9)
WBC # BLD AUTO: 6.8 K/UL (ref 4–11)

## 2023-08-03 PROCEDURE — 81270 JAK2 GENE: CPT

## 2023-08-03 PROCEDURE — 81338 MPL GENE COMMON VARIANTS: CPT

## 2023-08-03 PROCEDURE — 82668 ASSAY OF ERYTHROPOIETIN: CPT

## 2023-08-03 PROCEDURE — 83615 LACTATE (LD) (LDH) ENZYME: CPT

## 2023-08-03 PROCEDURE — 36415 COLL VENOUS BLD VENIPUNCTURE: CPT

## 2023-08-03 PROCEDURE — 81219 CALR GENE COM VARIANTS: CPT

## 2023-08-03 PROCEDURE — 85025 COMPLETE CBC W/AUTO DIFF WBC: CPT

## 2023-08-04 LAB — EPO SERPL-ACNC: 13 MU/ML (ref 4–27)

## 2023-08-10 ENCOUNTER — HOSPITAL ENCOUNTER (OUTPATIENT)
Dept: ULTRASOUND IMAGING | Age: 53
Discharge: HOME | End: 2023-08-10
Attending: INTERNAL MEDICINE
Payer: COMMERCIAL

## 2023-08-10 DIAGNOSIS — D75.1 POLYCYTHEMIA: ICD-10-CM

## 2023-08-10 PROCEDURE — 76700 US EXAM ABDOM COMPLETE: CPT

## 2023-08-14 LAB
MISCELLANEOUS LAB TEST ORDER: NORMAL
MISCELLANEOUS LAB TEST RESULT: NORMAL

## 2023-08-15 ENCOUNTER — HOSPITAL ENCOUNTER (OUTPATIENT)
Dept: ULTRASOUND IMAGING | Age: 53
Discharge: HOME | End: 2023-08-15
Attending: INTERNAL MEDICINE
Payer: COMMERCIAL

## 2023-08-15 DIAGNOSIS — N18.2 CHRONIC KIDNEY DISEASE, STAGE II (MILD): ICD-10-CM

## 2023-08-15 LAB — MISCELLANEOUS LAB TEST RESULT: NORMAL

## 2023-08-15 PROCEDURE — 76770 US EXAM ABDO BACK WALL COMP: CPT

## 2023-09-21 ENCOUNTER — HOSPITAL ENCOUNTER (OUTPATIENT)
Age: 53
Discharge: HOME | End: 2023-09-21
Payer: COMMERCIAL

## 2023-09-21 ENCOUNTER — OFFICE VISIT (OUTPATIENT)
Dept: ENDOCRINOLOGY | Age: 53
End: 2023-09-21
Payer: COMMERCIAL

## 2023-09-21 VITALS
RESPIRATION RATE: 16 BRPM | BODY MASS INDEX: 37.52 KG/M2 | HEART RATE: 71 BPM | WEIGHT: 277 LBS | HEIGHT: 72 IN | DIASTOLIC BLOOD PRESSURE: 78 MMHG | SYSTOLIC BLOOD PRESSURE: 143 MMHG

## 2023-09-21 DIAGNOSIS — E11.59 TYPE 2 DIABETES MELLITUS WITH OTHER CIRCULATORY COMPLICATION, WITH LONG-TERM CURRENT USE OF INSULIN (HCC): Primary | ICD-10-CM

## 2023-09-21 DIAGNOSIS — E29.1 HYPOGONADISM IN MALE: ICD-10-CM

## 2023-09-21 DIAGNOSIS — E11.51 TYPE 2 DIABETES MELLITUS WITH DIABETIC PERIPHERAL ANGIOPATHY WITHOUT GANGRENE, WITH LONG-TERM CURRENT USE OF INSULIN (HCC): ICD-10-CM

## 2023-09-21 DIAGNOSIS — E78.2 MIXED HYPERLIPIDEMIA: ICD-10-CM

## 2023-09-21 DIAGNOSIS — Z79.4 TYPE 2 DIABETES MELLITUS WITH OTHER CIRCULATORY COMPLICATION, WITH LONG-TERM CURRENT USE OF INSULIN (HCC): Primary | ICD-10-CM

## 2023-09-21 DIAGNOSIS — Z79.4 TYPE 2 DIABETES MELLITUS WITH DIABETIC PERIPHERAL ANGIOPATHY WITHOUT GANGRENE, WITH LONG-TERM CURRENT USE OF INSULIN (HCC): ICD-10-CM

## 2023-09-21 DIAGNOSIS — E66.01 SEVERE OBESITY (BMI 35.0-39.9) WITH COMORBIDITY (HCC): ICD-10-CM

## 2023-09-21 LAB
HCT VFR BLD AUTO: 57.7 % (ref 40.5–52.5)
HGB BLD-MCNC: 19.3 G/DL (ref 13.5–17.5)

## 2023-09-21 PROCEDURE — 3044F HG A1C LEVEL LT 7.0%: CPT | Performed by: INTERNAL MEDICINE

## 2023-09-21 PROCEDURE — 99205 OFFICE O/P NEW HI 60 MIN: CPT | Performed by: INTERNAL MEDICINE

## 2023-09-21 PROCEDURE — 85014 HEMATOCRIT: CPT

## 2023-09-21 PROCEDURE — 95251 CONT GLUC MNTR ANALYSIS I&R: CPT | Performed by: INTERNAL MEDICINE

## 2023-09-21 PROCEDURE — 3077F SYST BP >= 140 MM HG: CPT | Performed by: INTERNAL MEDICINE

## 2023-09-21 PROCEDURE — 3078F DIAST BP <80 MM HG: CPT | Performed by: INTERNAL MEDICINE

## 2023-09-21 PROCEDURE — 36415 COLL VENOUS BLD VENIPUNCTURE: CPT

## 2023-09-21 PROCEDURE — 84403 ASSAY OF TOTAL TESTOSTERONE: CPT

## 2023-09-21 PROCEDURE — 85018 HEMOGLOBIN: CPT

## 2023-09-21 RX ORDER — INSULIN LISPRO 200 [IU]/ML
INJECTION, SOLUTION SUBCUTANEOUS
Qty: 108 ML | Refills: 3 | Status: SHIPPED | OUTPATIENT
Start: 2023-09-21

## 2023-09-21 RX ORDER — INSULIN LISPRO 200 [IU]/ML
INJECTION, SOLUTION SUBCUTANEOUS
Qty: 108 ML | Refills: 3 | Status: SHIPPED | OUTPATIENT
Start: 2023-09-21 | End: 2023-09-21 | Stop reason: SDUPTHER

## 2023-09-21 RX ORDER — BLOOD-GLUCOSE SENSOR
EACH MISCELLANEOUS
Qty: 6 EACH | Refills: 0 | Status: SHIPPED | OUTPATIENT
Start: 2023-09-21

## 2023-09-21 RX ORDER — BLOOD SUGAR DIAGNOSTIC
1 STRIP MISCELLANEOUS 3 TIMES DAILY PRN
Qty: 100 EACH | Refills: 1 | Status: SHIPPED | OUTPATIENT
Start: 2023-09-21 | End: 2023-09-21

## 2023-09-21 RX ORDER — PEN NEEDLE, DIABETIC 32GX 5/32"
1 NEEDLE, DISPOSABLE MISCELLANEOUS 4 TIMES DAILY
Qty: 120 EACH | Refills: 5 | Status: SHIPPED | OUTPATIENT
Start: 2023-09-21

## 2023-09-21 RX ORDER — TIRZEPATIDE 12.5 MG/.5ML
12.5 INJECTION, SOLUTION SUBCUTANEOUS WEEKLY
Qty: 12 ADJUSTABLE DOSE PRE-FILLED PEN SYRINGE | Refills: 0 | Status: SHIPPED | OUTPATIENT
Start: 2023-09-21

## 2023-09-21 RX ORDER — BLOOD-GLUCOSE CONTROL, LOW
1 EACH MISCELLANEOUS 3 TIMES DAILY PRN
Qty: 100 EACH | Refills: 1 | Status: SHIPPED | OUTPATIENT
Start: 2023-09-21

## 2023-09-21 RX ORDER — INSULIN GLARGINE 300 U/ML
INJECTION, SOLUTION SUBCUTANEOUS
Qty: 54 ML | Refills: 1 | Status: SHIPPED | OUTPATIENT
Start: 2023-09-21

## 2023-09-21 RX ORDER — INSULIN GLARGINE 300 U/ML
INJECTION, SOLUTION SUBCUTANEOUS
Qty: 54 ML | Refills: 1 | Status: CANCELLED | OUTPATIENT
Start: 2023-09-21

## 2023-09-21 NOTE — ASSESSMENT & PLAN NOTE
Improved control with A1c of 6.7% from June 2023. Diabetes is complicated with neuropathy and cardiovascular disease.      - The target A1c for this patient is <7%, given the age and PMH, provided this can be achieved with no significant hypoglycemia. - Reviewed recent labs, blood sugars and A1c, current diabetes regimen, food intake and meal times. I also reviewed all available self-testing BG results. For now, would recommend to continue current dose of Toujeo. Increase Mounjaro to 12.5 mg weekly with plan to gradually titrate as tolerated. Continue current dose of Patti. Continue lispro sliding scale as needed. Anticipate less requirements while on high-dose of Mounjaro. Continue on diet efforts. Continue efforts to have regular exercise with a aim for about 150 minutes/week of moderate intensity exercise. Continues to freestyle [place]. The patient remains at ongoing is at risk for complications related to diabetes and it's treatment. The patient requires a great deal of self-management support.

## 2023-09-21 NOTE — ASSESSMENT & PLAN NOTE
Diagnosed about 2021. Unable to see persistently low testosterone levels per chart review. Had been on testosterone cypionate 0.7 cc every 10 days. Previously noted to have marked elevation in testosterone suggestive of over replacement. There is also evidence of erythrocytosis of secondary etiology. This coincides with the time of testosterone initiation therefore suspect that to be a culprit. The sleep apnea is also a possibility. He though reports that it would be difficult for him to complete in office sleep study. For now, would recommend to check total testosterone level as well as hemoglobin/hematocrit. If hemoglobin/hematocrit remain elevated then would recommend to cut down testosterone dose further. He is due for testosterone injection today so testosterone level might be low as a result. Patient understands the risk of erythrocytosis especially in the setting of high cardiovascular risk and prior CABG. Discussed that safety is important while replacing testosterone.

## 2023-09-21 NOTE — PROGRESS NOTES
[address] [facility]    Chief Complaint:     Chief Complaint   Patient presents with    New Patient     Type 2 DM, hypogonadism      Subjective:   Tra Chaney is a pleasant 46 y.o. male who presents for an initial evaluation of Diabetes Mellitus type 2. Patient also has hyperlipidemia, hypertension, coronary artery disease, peripheral vascular disease, major depressive disorder and has a BMI of 37. He is transferring care from Dr. Ashley Corral. Diagnosed: early 2000  Other diabetes meds tried in the past: Ozempic, Byetta (had pancreatitis while on it, TRIG were markedly high then), metformin (GI upset). Hx of MI/stroke/CKD: CABG    Most recent HbA1c:   Hemoglobin A1C   Date Value Ref Range Status   06/30/2023 6.7 See comment % Final     Comment:     Comment:  Diagnosis of Diabetes: > or = 6.5%  Increased risk of diabetes (Prediabetes): 5.7-6.4%  Glycemic Control: Nonpregnant Adults: <7.0%                    Pregnant: <6.0%            Current regimen: Toujeo U-300 130 units at bedtime  Mounjaro 10 mg weekly  Lispro sliding scale, used for BG > 130. Given resistance he would take 15-50 units. Farxiga 10 mg daily  Since starting Mounjaro, he had been able to lose weight and cut down on insulin needs. He is also on Berberine supplement. Blood sugar ranges: Patient is currently using freestyle adelita #3. The review of data over the past 2 weeks shows average glucose of 137, time in range 91%, high at 9% and low at 0%. Patient is noted to have overall adequate glycemic control with occasional hyperglycemia in the afternoon and evening time. Hypoglycemic episodes: rare  Meals: 2 meals daily, 35 gm of carbs per meal. Occasional snacking. No juice or coke. Exercise: very active at work. 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   Last eye exam: due to update. Foot care: numbness and tinglings. Hyperlipidemia: On Crestor 40 mg daily and Vascepa 2 capsules twice daily.   Hypertension:

## 2023-09-21 NOTE — TELEPHONE ENCOUNTER
Pt called asking to get a refill he thought he had enough when was in the office. Pt said its the adelita 3 for  90 day fill. Pt is on his last one.       Continuous Blood Gluc Sensor ([address]) [address] [facility], [address] [place] [address], [address] [place] [address]   Phone:  [phone]  Fax:  [phone]

## 2023-09-26 ENCOUNTER — TELEPHONE (OUTPATIENT)
Dept: ENDOCRINOLOGY | Age: 53
End: 2023-09-26

## 2023-09-26 DIAGNOSIS — E11.51 TYPE 2 DIABETES MELLITUS WITH DIABETIC PERIPHERAL ANGIOPATHY WITHOUT GANGRENE, WITH LONG-TERM CURRENT USE OF INSULIN (HCC): Primary | ICD-10-CM

## 2023-09-26 DIAGNOSIS — Z79.4 TYPE 2 DIABETES MELLITUS WITH DIABETIC PERIPHERAL ANGIOPATHY WITHOUT GANGRENE, WITH LONG-TERM CURRENT USE OF INSULIN (HCC): Primary | ICD-10-CM

## 2023-09-26 RX ORDER — INSULIN GLARGINE 300 U/ML
INJECTION, SOLUTION SUBCUTANEOUS
Qty: 13.5 ML | Refills: 2 | Status: SHIPPED | OUTPATIENT
Start: 2023-09-26

## 2023-09-26 NOTE — TELEPHONE ENCOUNTER
Call from [facility] stating in the past pt had the [facility] \"Max\" Solostar  and this time the rx was sent for just the [employer] which has a max units of 90.  Pharmacy is asking if the rx can be switched to [facility] since pt is taking 130 units    [facility] # [phone]

## 2023-09-27 ENCOUNTER — TELEPHONE (OUTPATIENT)
Dept: ENDOCRINOLOGY | Age: 53
End: 2023-09-27

## 2023-09-27 DIAGNOSIS — Z79.4 TYPE 2 DIABETES MELLITUS WITH OTHER CIRCULATORY COMPLICATION, WITH LONG-TERM CURRENT USE OF INSULIN (HCC): Primary | ICD-10-CM

## 2023-09-27 DIAGNOSIS — E11.59 TYPE 2 DIABETES MELLITUS WITH OTHER CIRCULATORY COMPLICATION, WITH LONG-TERM CURRENT USE OF INSULIN (HCC): Primary | ICD-10-CM

## 2023-09-27 DIAGNOSIS — E29.1 HYPOGONADISM IN MALE: ICD-10-CM

## 2023-09-27 LAB — TESTOST SERPL-MCNC: 1394 NG/DL (ref 220–1000)

## 2023-09-27 NOTE — TELEPHONE ENCOUNTER
Jono Allen from pharmacy called needing to verify rx that was sent.  Stated that it was 150 units and it dropped to 130 units and just wants to make sure that is correct      Insulin Glargine, 2 Unit Dial, (TOUJEO MAX SOLOSTAR) 300 UNIT/ML [address] [place] [address], [address] [place] [address], [address] [place] [address]   Phone:  [phone]  Fax:  [phone]

## 2023-10-09 ENCOUNTER — PATIENT MESSAGE (OUTPATIENT)
Dept: PHARMACY | Facility: CLINIC | Age: 53
End: 2023-10-09

## 2023-10-17 DIAGNOSIS — E11.51 TYPE 2 DIABETES MELLITUS WITH DIABETIC PERIPHERAL ANGIOPATHY WITHOUT GANGRENE, WITH LONG-TERM CURRENT USE OF INSULIN (HCC): Primary | ICD-10-CM

## 2023-10-17 DIAGNOSIS — Z79.4 TYPE 2 DIABETES MELLITUS WITH DIABETIC PERIPHERAL ANGIOPATHY WITHOUT GANGRENE, WITH LONG-TERM CURRENT USE OF INSULIN (HCC): Primary | ICD-10-CM

## 2023-10-17 RX ORDER — DAPAGLIFLOZIN 10 MG/1
10 TABLET, FILM COATED ORAL DAILY
Qty: 90 TABLET | Refills: 0 | Status: SHIPPED | OUTPATIENT
Start: 2023-10-17

## 2023-10-20 ENCOUNTER — OFFICE VISIT (OUTPATIENT)
Dept: SURGERY | Age: 53
End: 2023-10-20
Payer: COMMERCIAL

## 2023-10-20 VITALS
SYSTOLIC BLOOD PRESSURE: 126 MMHG | DIASTOLIC BLOOD PRESSURE: 76 MMHG | OXYGEN SATURATION: 97 % | TEMPERATURE: 97.9 F | HEART RATE: 71 BPM

## 2023-10-20 DIAGNOSIS — K64.5 PERIANAL VENOUS THROMBOSIS: Primary | ICD-10-CM

## 2023-10-20 PROCEDURE — 3074F SYST BP LT 130 MM HG: CPT | Performed by: SURGERY

## 2023-10-20 PROCEDURE — 99202 OFFICE O/P NEW SF 15 MIN: CPT | Performed by: SURGERY

## 2023-10-20 PROCEDURE — 3078F DIAST BP <80 MM HG: CPT | Performed by: SURGERY

## 2023-10-20 NOTE — PROGRESS NOTES
Subjective:     Patient is a 46 y.o. man with thrombosed hemorrhoid    HPI: Left lateral thrombosed hemorrhoid x ten days. Ruptured a few days ago. He saw blood, no pus. Prep H not helping. Primitivo Lorenz helps a little     Patient Active Problem List    Diagnosis Date Noted    Unstable angina ([address]) 03/05/2016    Chest pain 03/02/2016    Abnormal stress test     Dyslipidemia     Diabetes mellitus ([address]) 05/17/2010    HTN (hypertension) 05/17/2010    Depressive disorder, not elsewhere classified 05/17/2010    Hypogonadism in male 09/21/2023    Severe obesity (BMI 35.0-39. 9) with comorbidity ([address]) 09/21/2023    Major depressive disorder, recurrent episode, moderate ([address]) 05/25/2023    Adjustment disorder 09/21/2021    Acute gout of right foot 11/02/2020    Leg swelling 06/22/2018    Pain in both lower extremities 06/22/2018    PVD (peripheral vascular disease) ([address]) 06/22/2018    Chronic venous htn w inflammation of bilateral low extrm 06/22/2018    Degenerative disc disease, lumbar 06/22/2018    High triglycerides 01/19/2018    Vitamin D deficiency 01/19/2018    Klebsiella infection     Cellulitis of chest wall     CAD, multiple vessel     Type 2 diabetes mellitus with circulatory disorder (HCC)     Pure hypercholesterolemia     Fever     HCAP (healthcare-associated pneumonia) 03/27/2016    Cellulitis 03/27/2016    Coronary artery disease involving coronary bypass graft of native heart with angina pectoris [facility])     Essential hypertension     Mixed hyperlipidemia     Pharyngitis 09/23/2011    Other and unspecified hyperlipidemia 05/17/2010     Past Medical History:   Diagnosis Date    Abscess 1994    mediastinal, developed after kenalog injections    Anesthesia     AWAKE BUT UNABLE TO MOVE DURING SHOULDER SURGERY.      CAD (coronary artery disease) 3/2016    Diabetes mellitus ([address]) 2001    HgA1C 10.6    Gout     HLD (hyperlipidemia)     HTN (hypertension)     Echo 2013 normal.     MDRO (multiple drug resistant organisms)

## 2023-10-25 NOTE — TELEPHONE ENCOUNTER
Faviola message not read by patient. Letter mailed.     For Pharmacy Admin Tracking Only    Program: Mariela in place:  No  Gap Closed?: No   Time Spent (min): 5

## 2023-11-20 ENCOUNTER — HOSPITAL ENCOUNTER (OUTPATIENT)
Age: 53
Discharge: HOME | End: 2023-11-20
Payer: COMMERCIAL

## 2023-11-20 DIAGNOSIS — E29.1 HYPOGONADISM IN MALE: ICD-10-CM

## 2023-11-20 DIAGNOSIS — E11.59 TYPE 2 DIABETES MELLITUS WITH OTHER CIRCULATORY COMPLICATION, WITH LONG-TERM CURRENT USE OF INSULIN (HCC): ICD-10-CM

## 2023-11-20 DIAGNOSIS — N18.2 CHRONIC KIDNEY DISEASE, STAGE II (MILD): ICD-10-CM

## 2023-11-20 DIAGNOSIS — Z79.4 TYPE 2 DIABETES MELLITUS WITH OTHER CIRCULATORY COMPLICATION, WITH LONG-TERM CURRENT USE OF INSULIN (HCC): ICD-10-CM

## 2023-11-20 DIAGNOSIS — N06.9 ISOLATED PROTEINURIA WITH MORPHOLOGIC LESION: ICD-10-CM

## 2023-11-20 LAB
ALBUMIN SERPL-MCNC: 4.4 G/DL (ref 3.4–5)
ALBUMIN/GLOB SERPL: 1.6 {RATIO} (ref 1.1–2.2)
ALP SERPL-CCNC: 62 U/L (ref 40–129)
ALT SERPL-CCNC: 19 U/L (ref 10–40)
ANION GAP SERPL CALCULATED.3IONS-SCNC: 11 MMOL/L (ref 3–16)
AST SERPL-CCNC: 18 U/L (ref 15–37)
BILIRUB SERPL-MCNC: 0.5 MG/DL (ref 0–1)
BUN SERPL-MCNC: 15 MG/DL (ref 7–20)
CALCIUM SERPL-MCNC: 9.4 MG/DL (ref 8.3–10.6)
CHLORIDE SERPL-SCNC: 102 MMOL/L (ref 99–110)
CO2 SERPL-SCNC: 25 MMOL/L (ref 21–32)
CREAT SERPL-MCNC: 0.9 MG/DL (ref 0.9–1.3)
CREAT UR-MCNC: 68.4 MG/DL (ref 39–259)
GFR SERPLBLD CREATININE-BSD FMLA CKD-EPI: >60 ML/MIN/{1.73_M2}
GLUCOSE SERPL-MCNC: 126 MG/DL (ref 70–99)
HCT VFR BLD AUTO: 49.8 % (ref 40.5–52.5)
HGB BLD-MCNC: 17 G/DL (ref 13.5–17.5)
MICROALBUMIN UR DL<=1MG/L-MCNC: 4.6 MG/DL
MICROALBUMIN/CREAT UR: 67.3 MG/G (ref 0–30)
POTASSIUM SERPL-SCNC: 4.5 MMOL/L (ref 3.5–5.1)
PROT SERPL-MCNC: 7.1 G/DL (ref 6.4–8.2)
SODIUM SERPL-SCNC: 138 MMOL/L (ref 136–145)

## 2023-11-20 PROCEDURE — 84270 ASSAY OF SEX HORMONE GLOBUL: CPT

## 2023-11-20 PROCEDURE — 85018 HEMOGLOBIN: CPT

## 2023-11-20 PROCEDURE — 85014 HEMATOCRIT: CPT

## 2023-11-20 PROCEDURE — 82570 ASSAY OF URINE CREATININE: CPT

## 2023-11-20 PROCEDURE — 80053 COMPREHEN METABOLIC PANEL: CPT

## 2023-11-20 PROCEDURE — 82043 UR ALBUMIN QUANTITATIVE: CPT

## 2023-11-20 PROCEDURE — 84403 ASSAY OF TOTAL TESTOSTERONE: CPT

## 2023-11-20 PROCEDURE — 36415 COLL VENOUS BLD VENIPUNCTURE: CPT

## 2023-11-23 LAB
SHBG SERPL-SCNC: 17 NMOL/L (ref 11–80)
TESTOST FREE SERPL-MCNC: 35.8 PG/ML (ref 47–244)
TESTOST SERPL-MCNC: 138 NG/DL (ref 220–1000)

## 2023-11-30 DIAGNOSIS — E29.1 HYPOGONADISM IN MALE: Primary | ICD-10-CM

## 2023-11-30 DIAGNOSIS — E11.51 TYPE 2 DIABETES MELLITUS WITH DIABETIC PERIPHERAL ANGIOPATHY WITHOUT GANGRENE, WITH LONG-TERM CURRENT USE OF INSULIN (HCC): ICD-10-CM

## 2023-11-30 DIAGNOSIS — Z79.4 TYPE 2 DIABETES MELLITUS WITH DIABETIC PERIPHERAL ANGIOPATHY WITHOUT GANGRENE, WITH LONG-TERM CURRENT USE OF INSULIN (HCC): ICD-10-CM

## 2023-12-07 DIAGNOSIS — Z79.4 TYPE 2 DIABETES MELLITUS WITH DIABETIC PERIPHERAL ANGIOPATHY WITHOUT GANGRENE, WITH LONG-TERM CURRENT USE OF INSULIN (HCC): ICD-10-CM

## 2023-12-07 DIAGNOSIS — E11.51 TYPE 2 DIABETES MELLITUS WITH DIABETIC PERIPHERAL ANGIOPATHY WITHOUT GANGRENE, WITH LONG-TERM CURRENT USE OF INSULIN (HCC): ICD-10-CM

## 2023-12-07 RX ORDER — BLOOD-GLUCOSE SENSOR
EACH MISCELLANEOUS
Qty: 6 EACH | Refills: 0 | Status: SHIPPED | OUTPATIENT
Start: 2023-12-07

## 2023-12-07 NOTE — TELEPHONE ENCOUNTER
Requested Prescriptions     Signed Prescriptions Disp Refills    Continuous Blood Gluc Sensor (FREESTYLE NATHALIE 3 SENSOR) MISC 6 each 0     Sig: CHANGE SENSOR EVERY 14 DAYS     Authorizing Provider: Rayray Pabon     Ordering User: Shira Hoskins

## 2023-12-14 ENCOUNTER — OFFICE VISIT (OUTPATIENT)
Dept: ENDOCRINOLOGY | Age: 53
End: 2023-12-14
Payer: COMMERCIAL

## 2023-12-14 VITALS
RESPIRATION RATE: 16 BRPM | BODY MASS INDEX: 36.27 KG/M2 | WEIGHT: 267.8 LBS | HEIGHT: 72 IN | SYSTOLIC BLOOD PRESSURE: 136 MMHG | DIASTOLIC BLOOD PRESSURE: 83 MMHG | HEART RATE: 67 BPM

## 2023-12-14 DIAGNOSIS — E29.1 HYPOGONADISM IN MALE: ICD-10-CM

## 2023-12-14 DIAGNOSIS — Z79.4 TYPE 2 DIABETES MELLITUS WITH DIABETIC PERIPHERAL ANGIOPATHY WITHOUT GANGRENE, WITH LONG-TERM CURRENT USE OF INSULIN (HCC): Primary | ICD-10-CM

## 2023-12-14 DIAGNOSIS — R35.1 NOCTURIA: ICD-10-CM

## 2023-12-14 DIAGNOSIS — E11.51 TYPE 2 DIABETES MELLITUS WITH DIABETIC PERIPHERAL ANGIOPATHY WITHOUT GANGRENE, WITH LONG-TERM CURRENT USE OF INSULIN (HCC): Primary | ICD-10-CM

## 2023-12-14 PROCEDURE — 99214 OFFICE O/P EST MOD 30 MIN: CPT | Performed by: INTERNAL MEDICINE

## 2023-12-14 PROCEDURE — 3075F SYST BP GE 130 - 139MM HG: CPT | Performed by: INTERNAL MEDICINE

## 2023-12-14 PROCEDURE — 95251 CONT GLUC MNTR ANALYSIS I&R: CPT | Performed by: INTERNAL MEDICINE

## 2023-12-14 PROCEDURE — 3079F DIAST BP 80-89 MM HG: CPT | Performed by: INTERNAL MEDICINE

## 2023-12-14 PROCEDURE — 3044F HG A1C LEVEL LT 7.0%: CPT | Performed by: INTERNAL MEDICINE

## 2023-12-14 RX ORDER — TIRZEPATIDE 15 MG/.5ML
15 INJECTION, SOLUTION SUBCUTANEOUS WEEKLY
Qty: 4 ADJUSTABLE DOSE PRE-FILLED PEN SYRINGE | Refills: 2 | Status: SHIPPED | OUTPATIENT
Start: 2023-12-14

## 2023-12-14 RX ORDER — TESTOSTERONE CYPIONATE 200 MG/ML
100 INJECTION, SOLUTION INTRAMUSCULAR
Qty: 9 ML | Refills: 2 | Status: SHIPPED | OUTPATIENT
Start: 2023-12-14 | End: 2024-03-04

## 2023-12-14 NOTE — PROGRESS NOTES
[facility] Endocrinology    Chief Complaint:     Chief Complaint   Patient presents with    Follow-up     DM      Subjective:   Jose Antonio Corrales is a pleasant 46 y.o. male who presents for follow up of Diabetes Mellitus type 2 and hypogonadism. Patient also has hyperlipidemia, hypertension, coronary artery disease, peripheral vascular disease, major depressive disorder and has a BMI of 37. Diagnosed: early 2000  Other diabetes meds tried in the past: Ozempic, Byetta (had pancreatitis while on it, TRIG were markedly high then), metformin (GI upset). Hx of MI/stroke/CKD: CABG    Most recent HbA1c:   Hemoglobin A1C   Date Value Ref Range Status   06/30/2023 6.7 See comment % Final     Comment:     Comment:  Diagnosis of Diabetes: > or = 6.5%  Increased risk of diabetes (Prediabetes): 5.7-6.4%  Glycemic Control: Nonpregnant Adults: <7.0%                    Pregnant: <6.0%          Current regimen: Toujeo U-300 130 units at bedtime  Mounjaro 12.5 mg weekly, reports overall compliance and tolerance. He reports occasional sulfur burps and GI symptoms. Lispro sliding scale, used for BG > 130. Given resistance he would take 15-50 units. Farxiga 10 mg daily  Since starting Mounjaro, he had been able to lose weight and cut down on insulin needs. In fact, he had noticed that for a whole week he did not need to take any basal insulin. He is interested in a higher dose to see if he can come off of insulin. He would like to maintain his 's license. Blood sugar ranges: Patient is currently using freestyle adelita #3. The review of data over the past 2 weeks shows average glucose of 151, time in range 79%, high at 21% and low at 0%. Patient is noted to have reasonable control of blood sugars with gradual elevation towards the evening. Hypoglycemic episodes: rare  Meals: 2 meals daily, 35 gm of carbs per meal. Occasional snacking. No juice or coke. Exercise: very active at work.  He works as an RN

## 2024-01-15 ENCOUNTER — TELEPHONE (OUTPATIENT)
Dept: PHARMACY | Facility: CLINIC | Age: 54
End: 2024-01-15

## 2024-01-15 NOTE — TELEPHONE ENCOUNTER
**Patient is [facility]**   2024 Annual Pharmacist Visit    Called patient to schedule 2024 yearly pharmacist appointment to discuss medications for Diabetes Management Program.    Spoke to patient and appointment scheduled for   Friday Jan 26, 2024   Appt at 4:00 PM (30 min)       [facility]  Clinical Pharmacy   Department, toll free: [phone] Option #3    For Pharmacy Admin Tracking Only    Program: [employer]  CPA in place:  No  Recommendation Provided To: Patient/Caregiver: 1 via Telephone  Intervention Detail: Scheduled Appointment  Intervention Accepted By: Patient/Caregiver: 1  Gap Closed?: Yes   Time Spent (min): 5

## 2024-01-16 ENCOUNTER — TELEPHONE (OUTPATIENT)
Dept: ENDOCRINOLOGY | Age: 54
End: 2024-01-16

## 2024-01-16 DIAGNOSIS — E29.1 HYPOGONADISM IN MALE: ICD-10-CM

## 2024-01-16 RX ORDER — TESTOSTERONE CYPIONATE 200 MG/ML
100 INJECTION, SOLUTION INTRAMUSCULAR
Qty: 9 ML | Refills: 1 | Status: SHIPPED | OUTPATIENT
Start: 2024-01-16 | End: 2024-04-16

## 2024-01-26 ENCOUNTER — TELEPHONE (OUTPATIENT)
Dept: PHARMACY | Facility: CLINIC | Age: 54
End: 2024-01-26

## 2024-01-26 NOTE — TELEPHONE ENCOUNTER
pneumonia. You may need a dose to be considered \"up-to-date\" with the pneumonia vaccine series. [id number] is covered by your medical and prescription insurance.    If your A1c increases to 8% or higher, an additional education requirement will be required.     Reach out to our team with any questions!     Thank you,  [facility] Clinical Pharmacy  Department, toll free: [phone], option 3  Fax: [phone]  Email: [email or web address]

## 2024-02-06 DIAGNOSIS — E11.51 TYPE 2 DIABETES MELLITUS WITH DIABETIC PERIPHERAL ANGIOPATHY WITHOUT GANGRENE, WITH LONG-TERM CURRENT USE OF INSULIN (HCC): ICD-10-CM

## 2024-02-06 DIAGNOSIS — Z79.4 TYPE 2 DIABETES MELLITUS WITH DIABETIC PERIPHERAL ANGIOPATHY WITHOUT GANGRENE, WITH LONG-TERM CURRENT USE OF INSULIN (HCC): ICD-10-CM

## 2024-02-07 RX ORDER — TIRZEPATIDE 15 MG/.5ML
INJECTION, SOLUTION SUBCUTANEOUS
Qty: 2 ML | Refills: 2 | Status: SHIPPED | OUTPATIENT
Start: 2024-02-07

## 2024-02-07 RX ORDER — INSULIN GLARGINE 300 U/ML
INJECTION, SOLUTION SUBCUTANEOUS
Qty: 13.5 ML | Refills: 2 | Status: SHIPPED | OUTPATIENT
Start: 2024-02-07

## 2024-02-21 ENCOUNTER — TELEPHONE (OUTPATIENT)
Dept: ENDOCRINOLOGY | Age: 54
End: 2024-02-21

## 2024-02-21 DIAGNOSIS — E78.2 MIXED HYPERLIPIDEMIA: Primary | ICD-10-CM

## 2024-02-21 NOTE — TELEPHONE ENCOUNTER
Call from [facility] w/ refill request for Vascepa 1 gram. 2 caps -2 x daily    CB# [facility] # [phone] -Yuli

## 2024-02-22 RX ORDER — ICOSAPENT ETHYL 1000 MG/1
2 CAPSULE ORAL 2 TIMES DAILY
Qty: 360 CAPSULE | Refills: 1 | Status: SHIPPED | OUTPATIENT
Start: 2024-02-22

## 2024-02-23 RX ORDER — COLCHICINE 0.6 MG/1
0.6 TABLET ORAL DAILY PRN
COMMUNITY

## 2024-03-06 DIAGNOSIS — Z79.4 TYPE 2 DIABETES MELLITUS WITH DIABETIC PERIPHERAL ANGIOPATHY WITHOUT GANGRENE, WITH LONG-TERM CURRENT USE OF INSULIN (HCC): ICD-10-CM

## 2024-03-06 DIAGNOSIS — E11.51 TYPE 2 DIABETES MELLITUS WITH DIABETIC PERIPHERAL ANGIOPATHY WITHOUT GANGRENE, WITH LONG-TERM CURRENT USE OF INSULIN (HCC): ICD-10-CM

## 2024-03-06 RX ORDER — BLOOD-GLUCOSE SENSOR
EACH MISCELLANEOUS
Qty: 6 EACH | Refills: 0 | Status: SHIPPED | OUTPATIENT
Start: 2024-03-06

## 2024-03-06 NOTE — TELEPHONE ENCOUNTER
Requested Prescriptions     Signed Prescriptions Disp Refills    Continuous Blood Gluc Sensor (FREESTYLE NATHALIE 3 SENSOR) MISC 6 each 0     Sig: CHANGE SENSOR EVERY 14 DAYS     Authorizing Provider: MIL NEWSOME     Ordering User: JENNA ISRAEL [facility] - [place], [place] - [address] - P [phone] - F [phone]  [address] [facility] [address]  Phone: [phone] Fax: [phone]    [facility] [place] - [address] - P [phone] - F [phone]  [address] [place] [address]  Phone: [phone] Fax: [phone]    [facility] - [place], [place] - [address], [address] - P [phone] - F [phone]  [address], [address]  [facility] [address]  Phone: [phone] Fax: [phone]

## 2024-03-08 PROBLEM — F33.1 MAJOR DEPRESSIVE DISORDER, RECURRENT EPISODE, MODERATE (HCC): Status: RESOLVED | Noted: 2023-05-25 | Resolved: 2024-03-08

## 2024-03-08 PROBLEM — M79.604 PAIN IN BOTH LOWER EXTREMITIES: Status: RESOLVED | Noted: 2018-06-22 | Resolved: 2024-03-08

## 2024-03-08 PROBLEM — M79.89 LEG SWELLING: Status: RESOLVED | Noted: 2018-06-22 | Resolved: 2024-03-08

## 2024-03-08 PROBLEM — E78.1 HIGH TRIGLYCERIDES: Status: RESOLVED | Noted: 2018-01-19 | Resolved: 2024-03-08

## 2024-03-08 PROBLEM — M10.9 ACUTE GOUT OF RIGHT FOOT: Status: RESOLVED | Noted: 2020-11-02 | Resolved: 2024-03-08

## 2024-03-08 PROBLEM — M79.605 PAIN IN BOTH LOWER EXTREMITIES: Status: RESOLVED | Noted: 2018-06-22 | Resolved: 2024-03-08

## 2024-03-09 ENCOUNTER — HOSPITAL ENCOUNTER (OUTPATIENT)
Age: 54
Discharge: HOME | End: 2024-03-09
Payer: COMMERCIAL

## 2024-03-09 DIAGNOSIS — Z79.4 TYPE 2 DIABETES MELLITUS WITH DIABETIC PERIPHERAL ANGIOPATHY WITHOUT GANGRENE, WITH LONG-TERM CURRENT USE OF INSULIN (HCC): ICD-10-CM

## 2024-03-09 DIAGNOSIS — E29.1 HYPOGONADISM IN MALE: ICD-10-CM

## 2024-03-09 DIAGNOSIS — R35.1 NOCTURIA: ICD-10-CM

## 2024-03-09 DIAGNOSIS — E11.51 TYPE 2 DIABETES MELLITUS WITH DIABETIC PERIPHERAL ANGIOPATHY WITHOUT GANGRENE, WITH LONG-TERM CURRENT USE OF INSULIN (HCC): ICD-10-CM

## 2024-03-09 LAB
ALBUMIN SERPL-MCNC: 4.3 G/DL (ref 3.4–5)
ALBUMIN/GLOB SERPL: 1.4 {RATIO} (ref 1.1–2.2)
ALP SERPL-CCNC: 61 U/L (ref 40–129)
ALT SERPL-CCNC: 23 U/L (ref 10–40)
ANION GAP SERPL CALCULATED.3IONS-SCNC: 8 MMOL/L (ref 3–16)
AST SERPL-CCNC: 19 U/L (ref 15–37)
BILIRUB SERPL-MCNC: 0.4 MG/DL (ref 0–1)
BUN SERPL-MCNC: 23 MG/DL (ref 7–20)
CALCIUM SERPL-MCNC: 9.1 MG/DL (ref 8.3–10.6)
CHLORIDE SERPL-SCNC: 100 MMOL/L (ref 99–110)
CHOLEST SERPL-MCNC: 175 MG/DL (ref 0–199)
CO2 SERPL-SCNC: 28 MMOL/L (ref 21–32)
CREAT SERPL-MCNC: 1.1 MG/DL (ref 0.9–1.3)
CREAT UR-MCNC: 146.7 MG/DL (ref 39–259)
DEPRECATED RDW RBC AUTO: 12.9 % (ref 12.4–15.4)
GFR SERPLBLD CREATININE-BSD FMLA CKD-EPI: >60 ML/MIN/{1.73_M2}
GLUCOSE SERPL-MCNC: 146 MG/DL (ref 70–99)
HCT VFR BLD AUTO: 53.2 % (ref 40.5–52.5)
HDLC SERPL-MCNC: 35 MG/DL (ref 40–60)
HGB BLD-MCNC: 18.2 G/DL (ref 13.5–17.5)
LDLC SERPL CALC-MCNC: 118 MG/DL
MCH RBC QN AUTO: 29.3 PG (ref 26–34)
MCHC RBC AUTO-ENTMCNC: 34.2 G/DL (ref 31–36)
MCV RBC AUTO: 85.8 FL (ref 80–100)
MICROALBUMIN UR DL<=1MG/L-MCNC: 5 MG/DL
MICROALBUMIN/CREAT UR: 34.1 MG/G (ref 0–30)
PLATELET # BLD AUTO: 209 K/UL (ref 135–450)
PMV BLD AUTO: 8.5 FL (ref 5–10.5)
POTASSIUM SERPL-SCNC: 4.4 MMOL/L (ref 3.5–5.1)
PROT SERPL-MCNC: 7.4 G/DL (ref 6.4–8.2)
PSA SERPL DL<=0.01 NG/ML-MCNC: 0.49 NG/ML (ref 0–4)
RBC # BLD AUTO: 6.19 M/UL (ref 4.2–5.9)
SODIUM SERPL-SCNC: 136 MMOL/L (ref 136–145)
TRIGL SERPL-MCNC: 110 MG/DL (ref 0–150)
TSH SERPL DL<=0.005 MIU/L-ACNC: 1.62 UIU/ML (ref 0.27–4.2)
URATE SERPL-MCNC: 7.7 MG/DL (ref 3.5–7.2)
VLDLC SERPL CALC-MCNC: 22 MG/DL
WBC # BLD AUTO: 7 K/UL (ref 4–11)

## 2024-03-09 PROCEDURE — 82043 UR ALBUMIN QUANTITATIVE: CPT

## 2024-03-09 PROCEDURE — 80061 LIPID PANEL: CPT

## 2024-03-09 PROCEDURE — 83036 HEMOGLOBIN GLYCOSYLATED A1C: CPT

## 2024-03-09 PROCEDURE — 84153 ASSAY OF PSA TOTAL: CPT

## 2024-03-09 PROCEDURE — 85027 COMPLETE CBC AUTOMATED: CPT

## 2024-03-09 PROCEDURE — 84443 ASSAY THYROID STIM HORMONE: CPT

## 2024-03-09 PROCEDURE — 82570 ASSAY OF URINE CREATININE: CPT

## 2024-03-09 PROCEDURE — 80053 COMPREHEN METABOLIC PANEL: CPT

## 2024-03-09 PROCEDURE — 84550 ASSAY OF BLOOD/URIC ACID: CPT

## 2024-03-09 PROCEDURE — 36415 COLL VENOUS BLD VENIPUNCTURE: CPT

## 2024-03-10 LAB
EST. AVERAGE GLUCOSE BLD GHB EST-MCNC: 125.5 MG/DL
HBA1C MFR BLD: 6 %

## 2024-03-14 LAB — TESTOST SERPL-MCNC: 618 NG/DL (ref 193–740)

## 2024-03-15 ENCOUNTER — OFFICE VISIT (OUTPATIENT)
Dept: ENDOCRINOLOGY | Age: 54
End: 2024-03-15

## 2024-03-15 VITALS
SYSTOLIC BLOOD PRESSURE: 147 MMHG | HEIGHT: 72 IN | WEIGHT: 262 LBS | BODY MASS INDEX: 35.49 KG/M2 | DIASTOLIC BLOOD PRESSURE: 85 MMHG | HEART RATE: 72 BPM

## 2024-03-15 DIAGNOSIS — Z79.4 TYPE 2 DIABETES MELLITUS WITH DIABETIC PERIPHERAL ANGIOPATHY WITHOUT GANGRENE, WITH LONG-TERM CURRENT USE OF INSULIN (HCC): Primary | ICD-10-CM

## 2024-03-15 DIAGNOSIS — E11.51 TYPE 2 DIABETES MELLITUS WITH DIABETIC PERIPHERAL ANGIOPATHY WITHOUT GANGRENE, WITH LONG-TERM CURRENT USE OF INSULIN (HCC): Primary | ICD-10-CM

## 2024-03-15 DIAGNOSIS — E78.2 MIXED HYPERLIPIDEMIA: ICD-10-CM

## 2024-03-15 DIAGNOSIS — E29.1 HYPOGONADISM IN MALE: ICD-10-CM

## 2024-03-15 RX ORDER — TIRZEPATIDE 15 MG/.5ML
INJECTION, SOLUTION SUBCUTANEOUS
Qty: 6 ML | Refills: 2 | Status: SHIPPED | OUTPATIENT
Start: 2024-03-15

## 2024-03-15 NOTE — PROGRESS NOTES
by PCP.  Hypertension: Currently on lisinopril 20 mg daily.  He has CAD post CABG.     Patient was diagnosed previously with hypogonadism and was started on testosterone cypionate around 2021.  This was previously diagnosed by Dr. Diehl. He was previously on 1 mL every 10 days.  Testosterone in the past had been noted to be markedly elevated around 1500.  This has been accompanied with erythrocytosis.    He had been evaluated by hematology and nephrology in regard to that with the impression that this is secondary in etiology. Sleep study done 10 years ago was normal per patient.  I had patient stop testosterone completely for few months.  Repeat testosterone level did drop to 130.  Hematocrit though normalized.  He though reported excessive tiredness since stopping therefore he did restart with testosterone cypionate 100 mg every 10 days.  Most recent labs showed testosterone of 618 with a hemoglobin of 18.2, hematocrit 53.2.    He lives with his wife. 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 He had been under lots of stress.  He doesn't smoke, no illicit drug use, rare drinker.     The following portions of the patient's history were reviewed and updated as appropriate:       Current Outpatient Medications:     MOUNJARO 15 MG/0.5ML SOPN SC injection, INJECT 15MG UNDER THE SKIN ONCE WEEKLY, Disp: 6 mL, Rfl: 2    Continuous Blood Gluc Sensor (DEXCOM G7 SENSOR) MISC, 1 each by Does not apply route every 10 days, Disp: 9 each, Rfl: 1    allopurinol 200 MG TABS, Take 200 mg by mouth daily, Disp: 90 tablet, Rfl: 1    Evolocumab with Infusor 420 MG/3.5ML SOCT, Inject 420 mg into the skin every 30 days, Disp: 10.5 mL, Rfl: 3    lisinopril (PRINIVIL;ZESTRIL) 30 MG tablet, Take 1 tablet by mouth daily, Disp: 90 tablet, Rfl: 1    Continuous Blood Gluc Sensor (FREESTYLE NATHALIE 3 SENSOR) MISC, CHANGE SENSOR EVERY 14 DAYS, Disp: 6 each, Rfl: 0    colchicine (COLCRYS) 0.6 MG tablet, Take 1 tablet by mouth

## 2024-04-01 ENCOUNTER — PATIENT MESSAGE (OUTPATIENT)
Dept: PHARMACY | Facility: CLINIC | Age: 54
End: 2024-04-01

## 2024-04-01 ENCOUNTER — OFFICE VISIT (OUTPATIENT)
Dept: ORTHOPEDIC SURGERY | Age: 54
End: 2024-04-01
Payer: COMMERCIAL

## 2024-04-01 ENCOUNTER — CLINICAL DOCUMENTATION (OUTPATIENT)
Dept: PHARMACY | Facility: CLINIC | Age: 54
End: 2024-04-01

## 2024-04-01 VITALS — BODY MASS INDEX: 35.49 KG/M2 | RESPIRATION RATE: 16 BRPM | HEIGHT: 72 IN | WEIGHT: 262 LBS

## 2024-04-01 DIAGNOSIS — Z98.890 H/O SHOULDER SURGERY: ICD-10-CM

## 2024-04-01 DIAGNOSIS — M25.512 CHRONIC PAIN OF BOTH SHOULDERS: Primary | ICD-10-CM

## 2024-04-01 DIAGNOSIS — M25.511 CHRONIC PAIN OF BOTH SHOULDERS: Primary | ICD-10-CM

## 2024-04-01 DIAGNOSIS — G89.29 CHRONIC PAIN OF BOTH SHOULDERS: Primary | ICD-10-CM

## 2024-04-01 PROCEDURE — 99214 OFFICE O/P EST MOD 30 MIN: CPT | Performed by: ORTHOPAEDIC SURGERY

## 2024-04-01 NOTE — PROGRESS NOTES
1st Quarterly Reminder sent to patient for the DM Program - See Mychart message or Letter for more information.    Sheryl Wilson CphT  [facility]  Clinical Pharmacy   Department, toll free: [phone] Option #3      For Pharmacy Admin Tracking Only    Program: [employer]  CPA in place:  No  Gap Closed?: Yes   Time Spent (min): 5

## 2024-04-01 NOTE — PROGRESS NOTES
ORTHOPAEDIC OFFICE NOTE    Chief Complaint   Patient presents with    Follow-up     Bilat shoulder          HPI   4/1/24  Mr. Chanel returns to clinic today for follow-up of bilateral shoulder pain, left worse than right  Chronic in nature, I saw him in 2021 for the same issue  No significant improvement since then, however he just dealt with it  However he reports worsening over the past 8 months or so  No injury or trauma  Pain is anterolateral over the bilateral shoulders and brachium  No distal radiation beyond the elbow  He reports he did have 1 episode where his left hand went numb, went away after a few minutes.  This has not recurred.  Pain is up to 10 out of 10 with activity and range of motion      9/28/21  51 y.o. male RHD seen in consultation at the request of PAUL Chang CNP for evaluation of bilateral shoulder pain, left > right:  Patient reports his current symptoms started a couple of months ago  There is no injury or trauma  Pain is currently rated 1 out of 10  The pain is worse at nighttime  He does sleep with his left arm in an abducted/overhead position  His history is significant for previous open left shoulder surgery approximately 20 years ago with Dr. 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

## 2024-04-04 ENCOUNTER — TELEPHONE (OUTPATIENT)
Dept: ENDOCRINOLOGY | Age: 54
End: 2024-04-04

## 2024-04-04 DIAGNOSIS — E11.51 TYPE 2 DIABETES MELLITUS WITH DIABETIC PERIPHERAL ANGIOPATHY WITHOUT GANGRENE, WITH LONG-TERM CURRENT USE OF INSULIN (HCC): ICD-10-CM

## 2024-04-04 DIAGNOSIS — Z79.4 TYPE 2 DIABETES MELLITUS WITH DIABETIC PERIPHERAL ANGIOPATHY WITHOUT GANGRENE, WITH LONG-TERM CURRENT USE OF INSULIN (HCC): ICD-10-CM

## 2024-04-04 RX ORDER — DAPAGLIFLOZIN 10 MG/1
10 TABLET, FILM COATED ORAL DAILY
Qty: 90 TABLET | Refills: 0 | Status: SHIPPED | OUTPATIENT
Start: 2024-04-04

## 2024-04-04 NOTE — TELEPHONE ENCOUNTER
Fax from [facility] w/ refill request for Farxiga 10mg tab    LOV    3-15-24   FOV   7-18-24   [Fever] : no fever [Chills] : no chills [As Noted in HPI] : as noted in HPI

## 2024-04-09 NOTE — TELEPHONE ENCOUNTER
FamilyApp message not read by patient.  Letter mailed to patient with the information from the FamilyApp message.    Pema Will [facility]  Clinical Pharmacy   Department, toll free: [phone] Option #3      For Pharmacy Admin Tracking Only    Program: [employer]  CPA in place:  No  Gap Closed?: Yes   Time Spent (min): 5

## 2024-04-16 ENCOUNTER — TELEPHONE (OUTPATIENT)
Dept: ORTHOPEDIC SURGERY | Age: 54
End: 2024-04-16

## 2024-04-24 ENCOUNTER — TELEPHONE (OUTPATIENT)
Dept: PHARMACY | Facility: CLINIC | Age: 54
End: 2024-04-24

## 2024-04-24 NOTE — TELEPHONE ENCOUNTER
Per chart review, endo note 3/15/24, \"He was previously on Crestor but did stop due to left-sided chest tenderness.  Currently in the process of starting on Repatha.  Ordered by PCP.  Hypertension: Currently on lisinopril 20 mg daily.  He has CAD post CABG. \"   - but I do not see any fills for repatha either. Has patient discussed this with cardio? Willing to try a different statin? He said his doctors have tried to get him repatha, he does not know what happened to it. Had tried other statins as well. Educated patient to call specialty pharmacy. There was notes in [facility] that speciality was trying to reach the patient. Patient states he will call specialty pharmacy, number provided.     PDC review, per April report, PDC 34.43  - farxiga filled 10/19/23 for 90 ds, then 4/9/24 for 90 ds- he said he had back supply  - lisinopril filled 10/31/23 for 90 ds, then 3/12/24 for 90 ds- said some confusion on the dosage at some point BID vs daily but not missing doses  - is filling toujeo, filled late but did fill in Feb so not sure why has PDC  - filled humalog in 2023 but only rarely has to use, last A1c controlled at 6  - has filled mounjaro 15 mg on time except now has been out for 3 weeks and can not find it anywhere. He has called 8 pharmacies. I checked with [facility] again today, they still do not have any 15 mg or 12.5 mg. Patient failed ozempic in the past.     Filled adelita Dec 2023. I see dexcom g7  script on hold but no sensor script? Discuss with patient.  - does have dexcom 7 sensors and has robin on phone. First sensor failed and issues with robin wilfredo with android phone and he has android phones. Educated can go back to adelita with a PA but it is a little more expensive.     Marci Elizabeth, PharmD, [facility]  [facility] Pharmacy  [facility] Clinical Pharmacist  Department: [phone]     For Pharmacy Admin Tracking Only    Program: [employer]  CPA in place:  No  Recommendation Provided To:

## 2024-05-06 ENCOUNTER — OFFICE VISIT (OUTPATIENT)
Dept: ORTHOPEDIC SURGERY | Age: 54
End: 2024-05-06
Payer: COMMERCIAL

## 2024-05-06 VITALS — HEIGHT: 72 IN | WEIGHT: 262 LBS | BODY MASS INDEX: 35.49 KG/M2 | RESPIRATION RATE: 16 BRPM

## 2024-05-06 DIAGNOSIS — M75.02 ADHESIVE CAPSULITIS OF LEFT SHOULDER: Primary | ICD-10-CM

## 2024-05-06 DIAGNOSIS — S46.812A FULL THICKNESS TEAR OF LEFT SUBSCAPULARIS TENDON, INITIAL ENCOUNTER: ICD-10-CM

## 2024-05-06 PROCEDURE — 99214 OFFICE O/P EST MOD 30 MIN: CPT | Performed by: ORTHOPAEDIC SURGERY

## 2024-05-06 NOTE — PROGRESS NOTES
bodies appreciated.  Ana's line is preserved.  On axillary view, the humeral head is well-centered within the glenoid.  The acromioclavicular joint demonstrates moderate degenerative changes.  The tuberosities are normal in appearance.  Calcific tendonitis is absent.      MRI left shoulder from [facility]  Date of exam 4/22/24  Radiologist report scanned under media tab  Prior SAD and DCE  Partial full thickness upper subscapularis tendon tear  Associated minimal medial biceps subluxation  Rest of rotator cuff intact  Thickened axillary capsule with edema, c/w adhesive capsulitis      Assessment & Plan:  53 y.o. male who presents with    Diagnosis Orders   1. Adhesive capsulitis of left shoulder  [facility]      2. Full thickness tear of left subscapularis tendon, initial encounter  [facility]          No orders of the defined types were placed in this encounter.      MRI left shoulder overall reassuring  Small upper subscap tear  Findings c/w adhesive capsulitis  Symptoms mainly related to adhesive capsulitis  On exam today, left shoulder ROM is better than previous, less inflamed, and motion is more fluid as well  Likely similar process right shoulder  He is a diabetic, he reports sugars well controlled    Development of frozen shoulder and pathoanatomy:  More common in females, age 40-60s, and associated with patients with endocrinopathy such as DM and thyroid disease.  In cases associated with diabetes, disease can be more refractory to both conservative and surgical treatment.  Can also be associated with cervical disc disease.  Can be a long process, and to not expect a quick resolution.  Clinical stages of pain, stiffness, and thawing can extend over a year.  Majority of patients respond favorably to conservative treatment, however, there is a subset of refractory patients who end up requiring surgical management.    Avoid

## 2024-05-11 ENCOUNTER — HOSPITAL ENCOUNTER (EMERGENCY)
Age: 54
Discharge: HOME | End: 2024-05-11
Payer: COMMERCIAL

## 2024-05-11 ENCOUNTER — APPOINTMENT (OUTPATIENT)
Dept: GENERAL RADIOLOGY | Age: 54
End: 2024-05-11
Payer: COMMERCIAL

## 2024-05-11 VITALS
BODY MASS INDEX: 35.91 KG/M2 | DIASTOLIC BLOOD PRESSURE: 96 MMHG | OXYGEN SATURATION: 96 % | SYSTOLIC BLOOD PRESSURE: 163 MMHG | HEIGHT: 72 IN | RESPIRATION RATE: 20 BRPM | HEART RATE: 80 BPM | TEMPERATURE: 98.4 F | WEIGHT: 265.1 LBS

## 2024-05-11 DIAGNOSIS — S61.209A AVULSION OF FINGER TIP, INITIAL ENCOUNTER: Primary | ICD-10-CM

## 2024-05-11 PROCEDURE — 12002 RPR S/N/AX/GEN/TRNK2.6-7.5CM: CPT

## 2024-05-11 PROCEDURE — 73140 X-RAY EXAM OF FINGER(S): CPT

## 2024-05-11 PROCEDURE — 90714 TD VACC NO PRESV 7 YRS+ IM: CPT | Performed by: NURSE PRACTITIONER

## 2024-05-11 PROCEDURE — 90471 IMMUNIZATION ADMIN: CPT | Performed by: NURSE PRACTITIONER

## 2024-05-11 PROCEDURE — 6360000002 HC RX W HCPCS: Performed by: NURSE PRACTITIONER

## 2024-05-11 PROCEDURE — 99284 EMERGENCY DEPT VISIT MOD MDM: CPT

## 2024-05-11 PROCEDURE — 6370000000 HC RX 637 (ALT 250 FOR IP): Performed by: NURSE PRACTITIONER

## 2024-05-11 RX ORDER — CEPHALEXIN 500 MG/1
500 CAPSULE ORAL 4 TIMES DAILY
Qty: 40 CAPSULE | Refills: 0 | Status: SHIPPED | OUTPATIENT
Start: 2024-05-11 | End: 2024-05-21

## 2024-05-11 RX ORDER — CEPHALEXIN 250 MG/1
500 CAPSULE ORAL ONCE
Status: COMPLETED | OUTPATIENT
Start: 2024-05-11 | End: 2024-05-11

## 2024-05-11 RX ADMIN — CLOSTRIDIUM TETANI TOXOID ANTIGEN (FORMALDEHYDE INACTIVATED) AND CORYNEBACTERIUM DIPHTHERIAE TOXOID ANTIGEN (FORMALDEHYDE INACTIVATED) 0.5 ML: 5; 2 INJECTION, SUSPENSION INTRAMUSCULAR at 21:19

## 2024-05-11 RX ADMIN — CEPHALEXIN 500 MG: 250 CAPSULE ORAL at 21:16

## 2024-05-11 ASSESSMENT — PAIN DESCRIPTION - DESCRIPTORS: DESCRIPTORS: ACHING

## 2024-05-11 ASSESSMENT — PAIN - FUNCTIONAL ASSESSMENT: PAIN_FUNCTIONAL_ASSESSMENT: 0-10

## 2024-05-11 ASSESSMENT — LIFESTYLE VARIABLES
HOW MANY STANDARD DRINKS CONTAINING ALCOHOL DO YOU HAVE ON A TYPICAL DAY: PATIENT DOES NOT DRINK
HOW OFTEN DO YOU HAVE A DRINK CONTAINING ALCOHOL: NEVER

## 2024-05-11 ASSESSMENT — PAIN SCALES - GENERAL: PAINLEVEL_OUTOF10: 7

## 2024-05-11 ASSESSMENT — PAIN DESCRIPTION - LOCATION: LOCATION: FINGER (COMMENT WHICH ONE)

## 2024-05-12 NOTE — ED PROVIDER NOTES
without complication (HCC), and Wound abscess (1994).     Chronic Conditions affecting Care: none    EMERGENCY DEPARTMENT COURSE and DIFFERENTIAL DIAGNOSIS/MDM:   Vitals:    Vitals:    05/11/24 2023   BP: (!) 163/96   Pulse: 80   Resp: 20   Temp: 98.4 °F (36.9 °C)   TempSrc: Oral   SpO2: 96%   Weight: 120.2 kg (265 lb 1.6 oz)   Height: 1.829 m (6')       Patient was given the following medications:  Medications   tetanus & diphtheria toxoids (adult) 5-2 LFU injection 0.5 mL (0.5 mLs IntraMUSCular Given 5/11/24 2119)   cephALEXin (KEFLEX) capsule 500 mg (500 mg Oral Given 5/11/24 2116)             Is this patient to be included in the SEP-1 Core Measure due to severe sepsis or septic shock?   No   Exclusion criteria - the patient is NOT to be included for SEP-1 Core Measure due to:  Infection is not suspected    CONSULTS: (Who and What was discussed)  None  Discussion with Other Profesionals : None    Social Determinants : None    Records Reviewed : None    CC/HPI Summary, DDx, ED Course, and Reassessment:     Briefly, this is a very pleasant 53-year-old male who presents to the emergency department with avulsion injury to the tip of the left thumb.  Bleeding is controlled.  Patient is a diabetic.  He is not anticoagulated.  He is right-hand dominant.    Repair was performed by myself.  Patient is placed in a 3-day dressing, instructed to return on Monday night after his shift so we can recheck the wound.  He is prophylactically placed on Keflex with history of diabetes and tetanus updated.    XR FINGER LEFT (MIN 2 VIEWS) (Final result)  Result time 05/11/24 21:49:16  Final result by Calvin Gil DO (05/11/24 21:49:16)                Impression:    No acute osseous abnormality.  Laceration overlying the distal 1st digit.    Surgical clips and punctate radiopaque density overlying the lateral aspect  of the wrist, which could represent a foreign body.              All questions were answered.  Outpatient follow-up

## 2024-05-22 NOTE — PLAN OF CARE
[facility] [address], [place] [address] office: [phone] fax: [phone]     Physical Therapy Initial Evaluation Certification      Dear Bryan Mcmahon MD,    We had the pleasure of evaluating the following patient for physical therapy services at [facility].  A summary of our findings can be found in the initial assessment below.  This includes our plan of care.  If you have any questions or concerns regarding these findings, please do not hesitate to contact me at the office phone number listed above.  Thank you for the referral.     Physician Signature:_______________________________Date:__________________  By signing above (or electronic signature), therapist’s plan is approved by physician       Physical Therapy: TREATMENT/PROGRESS NOTE   Patient: Marv Chanel (53 y.o. male)   Examination Date: 2024   :  1970 MRN: [id number]   Visit #: 1   Insurance Allowable Auth Needed   [place] []Yes    [x]No    Insurance: Payor: [facility] / Plan: [employer] / Product Type: *No Product type* /   Insurance ID: [id number] - (Commercial)  Secondary Insurance (if applicable):    Treatment Diagnosis:     ICD-10-CM    1. Decreased ROM of left shoulder  M25.612       2. Decreased strength of upper extremity  R29.898       3. Deficit in activities of daily living (ADL)  Z78.9          Medical Diagnosis:  Full thickness tear of left subscapularis tendon, initial encounter [S46.812A]  Adhesive capsulitis of left shoulder [M75.02]   Referring Physician: rByan Mcmahon MD  PCP: Suhail Lujan, APRN - CNP     Plan of care signed (Y/N):     Date of Patient follow up with Physician:      Progress Report/POC: EVAL today  POC update due: (10 visits /OR AUTH LIMITS, whichever is less)  2024                                             Precautions/ Contra-indications:           Latex allergy:  NO  Pacemaker:

## 2024-05-23 ENCOUNTER — HOSPITAL ENCOUNTER (OUTPATIENT)
Dept: PHYSICAL THERAPY | Age: 54
Setting detail: THERAPIES SERIES
Discharge: HOME | End: 2024-05-23
Attending: ORTHOPAEDIC SURGERY
Payer: COMMERCIAL

## 2024-05-23 DIAGNOSIS — M25.612 DECREASED ROM OF LEFT SHOULDER: Primary | ICD-10-CM

## 2024-05-23 DIAGNOSIS — R29.898 DECREASED STRENGTH OF UPPER EXTREMITY: ICD-10-CM

## 2024-05-23 DIAGNOSIS — Z78.9 DEFICIT IN ACTIVITIES OF DAILY LIVING (ADL): ICD-10-CM

## 2024-05-23 PROCEDURE — 97530 THERAPEUTIC ACTIVITIES: CPT

## 2024-05-23 PROCEDURE — 97110 THERAPEUTIC EXERCISES: CPT

## 2024-05-23 PROCEDURE — 97161 PT EVAL LOW COMPLEX 20 MIN: CPT

## 2024-06-19 DIAGNOSIS — E29.1 HYPOGONADISM IN MALE: ICD-10-CM

## 2024-06-19 RX ORDER — TESTOSTERONE CYPIONATE 200 MG/ML
100 INJECTION, SOLUTION INTRAMUSCULAR
Qty: 4.5 ML | Refills: 0 | Status: SHIPPED | OUTPATIENT
Start: 2024-06-19 | End: 2024-09-18

## 2024-06-19 NOTE — TELEPHONE ENCOUNTER
Fax from [facility]     This rx if for a controlled substance . If you would like to continue this medication for this pt you are required to send by fax or by phone not using this form    Re: Testosterone Cyp 200 soln

## 2024-07-01 ENCOUNTER — TELEPHONE (OUTPATIENT)
Dept: ENDOCRINOLOGY | Age: 54
End: 2024-07-01

## 2024-07-01 DIAGNOSIS — E29.1 HYPOGONADISM IN MALE: ICD-10-CM

## 2024-07-01 NOTE — TELEPHONE ENCOUNTER
Pharmacy requesting quantity for testosterone be changed from 4.5 ml to 9 ml for a 90 days supply. State vials are single use and must be discarded after first use.

## 2024-07-03 ENCOUNTER — TELEPHONE (OUTPATIENT)
Dept: ENDOCRINOLOGY | Age: 54
End: 2024-07-03

## 2024-07-03 RX ORDER — TESTOSTERONE CYPIONATE 200 MG/ML
100 INJECTION, SOLUTION INTRAMUSCULAR
Qty: 9 ML | Refills: 2 | Status: SHIPPED | OUTPATIENT
Start: 2024-07-03 | End: 2024-07-03

## 2024-07-03 RX ORDER — TESTOSTERONE CYPIONATE 200 MG/ML
100 INJECTION, SOLUTION INTRAMUSCULAR
Qty: 9 ML | Refills: 2 | Status: SHIPPED | OUTPATIENT
Start: 2024-07-03 | End: 2024-10-02

## 2024-07-03 NOTE — TELEPHONE ENCOUNTER
Needs clarification on rx     testosterone cypionate (DEPOTESTOTERONE CYPIONATE) 200 MG/ML injection         [facility] [phone]

## 2024-07-03 NOTE — TELEPHONE ENCOUNTER
Resubmitted prescription with adjustment 9 mL with refills.  This was submitted to [facility] delivery.  Please change pharmacy if needed.

## 2024-07-18 ENCOUNTER — HOSPITAL ENCOUNTER (OUTPATIENT)
Age: 54
Discharge: HOME | End: 2024-07-18
Payer: COMMERCIAL

## 2024-07-18 ENCOUNTER — OFFICE VISIT (OUTPATIENT)
Dept: ENDOCRINOLOGY | Age: 54
End: 2024-07-18
Payer: COMMERCIAL

## 2024-07-18 ENCOUNTER — CLINICAL DOCUMENTATION (OUTPATIENT)
Dept: PHARMACY | Facility: CLINIC | Age: 54
End: 2024-07-18

## 2024-07-18 ENCOUNTER — PATIENT MESSAGE (OUTPATIENT)
Dept: PHARMACY | Facility: CLINIC | Age: 54
End: 2024-07-18

## 2024-07-18 VITALS
DIASTOLIC BLOOD PRESSURE: 85 MMHG | BODY MASS INDEX: 34.95 KG/M2 | HEIGHT: 72 IN | SYSTOLIC BLOOD PRESSURE: 141 MMHG | HEART RATE: 89 BPM | WEIGHT: 258 LBS

## 2024-07-18 DIAGNOSIS — E55.9 HYPOVITAMINOSIS D: ICD-10-CM

## 2024-07-18 DIAGNOSIS — M25.50 DIFFUSE ARTHRALGIA: ICD-10-CM

## 2024-07-18 DIAGNOSIS — Z79.4 TYPE 2 DIABETES MELLITUS WITH DIABETIC PERIPHERAL ANGIOPATHY WITHOUT GANGRENE, WITH LONG-TERM CURRENT USE OF INSULIN (HCC): Primary | ICD-10-CM

## 2024-07-18 DIAGNOSIS — E11.51 TYPE 2 DIABETES MELLITUS WITH DIABETIC PERIPHERAL ANGIOPATHY WITHOUT GANGRENE, WITH LONG-TERM CURRENT USE OF INSULIN (HCC): Primary | ICD-10-CM

## 2024-07-18 DIAGNOSIS — Z79.4 TYPE 2 DIABETES MELLITUS WITH DIABETIC PERIPHERAL ANGIOPATHY WITHOUT GANGRENE, WITH LONG-TERM CURRENT USE OF INSULIN (HCC): ICD-10-CM

## 2024-07-18 DIAGNOSIS — E11.51 TYPE 2 DIABETES MELLITUS WITH DIABETIC PERIPHERAL ANGIOPATHY WITHOUT GANGRENE, WITH LONG-TERM CURRENT USE OF INSULIN (HCC): ICD-10-CM

## 2024-07-18 DIAGNOSIS — E29.1 HYPOGONADISM IN MALE: ICD-10-CM

## 2024-07-18 LAB
25(OH)D3 SERPL-MCNC: 14.5 NG/ML
ALBUMIN SERPL-MCNC: 4.3 G/DL (ref 3.4–5)
ALBUMIN/GLOB SERPL: 1.2 {RATIO} (ref 1.1–2.2)
ALP SERPL-CCNC: 54 U/L (ref 40–129)
ALT SERPL-CCNC: 18 U/L (ref 10–40)
ANION GAP SERPL CALCULATED.3IONS-SCNC: 15 MMOL/L (ref 3–16)
AST SERPL-CCNC: 22 U/L (ref 15–37)
BILIRUB SERPL-MCNC: 0.7 MG/DL (ref 0–1)
BUN SERPL-MCNC: 24 MG/DL (ref 7–20)
CALCIUM SERPL-MCNC: 9.4 MG/DL (ref 8.3–10.6)
CHLORIDE SERPL-SCNC: 101 MMOL/L (ref 99–110)
CHOLEST SERPL-MCNC: 83 MG/DL (ref 0–199)
CO2 SERPL-SCNC: 19 MMOL/L (ref 21–32)
CREAT SERPL-MCNC: 1 MG/DL (ref 0.9–1.3)
CREAT UR-MCNC: 126.6 MG/DL (ref 39–259)
DEPRECATED RDW RBC AUTO: 15 % (ref 12.4–15.4)
EST. AVERAGE GLUCOSE BLD GHB EST-MCNC: 128.4 MG/DL
GFR SERPLBLD CREATININE-BSD FMLA CKD-EPI: 89 ML/MIN/{1.73_M2}
GLUCOSE SERPL-MCNC: 102 MG/DL (ref 70–99)
HBA1C MFR BLD: 6.1 %
HBA1C MFR BLD: 6.5 %
HCT VFR BLD AUTO: 57.7 % (ref 40.5–52.5)
HDLC SERPL-MCNC: 33 MG/DL (ref 40–60)
HGB BLD-MCNC: 19 G/DL (ref 13.5–17.5)
LDLC SERPL CALC-MCNC: 31 MG/DL
MCH RBC QN AUTO: 28.4 PG (ref 26–34)
MCHC RBC AUTO-ENTMCNC: 32.9 G/DL (ref 31–36)
MCV RBC AUTO: 86.3 FL (ref 80–100)
MICROALBUMIN UR DL<=1MG/L-MCNC: 2.8 MG/DL
MICROALBUMIN/CREAT UR: 22.1 MG/G (ref 0–30)
PLATELET # BLD AUTO: 205 K/UL (ref 135–450)
PMV BLD AUTO: 8.4 FL (ref 5–10.5)
POTASSIUM SERPL-SCNC: 4.3 MMOL/L (ref 3.5–5.1)
PROT SERPL-MCNC: 7.8 G/DL (ref 6.4–8.2)
RBC # BLD AUTO: 6.68 M/UL (ref 4.2–5.9)
SODIUM SERPL-SCNC: 135 MMOL/L (ref 136–145)
TRIGL SERPL-MCNC: 96 MG/DL (ref 0–150)
VLDLC SERPL CALC-MCNC: 19 MG/DL
WBC # BLD AUTO: 10 K/UL (ref 4–11)

## 2024-07-18 PROCEDURE — 3079F DIAST BP 80-89 MM HG: CPT | Performed by: INTERNAL MEDICINE

## 2024-07-18 PROCEDURE — 85027 COMPLETE CBC AUTOMATED: CPT

## 2024-07-18 PROCEDURE — 82043 UR ALBUMIN QUANTITATIVE: CPT

## 2024-07-18 PROCEDURE — 84305 ASSAY OF SOMATOMEDIN: CPT

## 2024-07-18 PROCEDURE — 83036 HEMOGLOBIN GLYCOSYLATED A1C: CPT

## 2024-07-18 PROCEDURE — G2211 COMPLEX E/M VISIT ADD ON: HCPCS | Performed by: INTERNAL MEDICINE

## 2024-07-18 PROCEDURE — 99214 OFFICE O/P EST MOD 30 MIN: CPT | Performed by: INTERNAL MEDICINE

## 2024-07-18 PROCEDURE — 80061 LIPID PANEL: CPT

## 2024-07-18 PROCEDURE — 3044F HG A1C LEVEL LT 7.0%: CPT | Performed by: INTERNAL MEDICINE

## 2024-07-18 PROCEDURE — 82306 VITAMIN D 25 HYDROXY: CPT

## 2024-07-18 PROCEDURE — 80053 COMPREHEN METABOLIC PANEL: CPT

## 2024-07-18 PROCEDURE — 3077F SYST BP >= 140 MM HG: CPT | Performed by: INTERNAL MEDICINE

## 2024-07-18 PROCEDURE — 36415 COLL VENOUS BLD VENIPUNCTURE: CPT

## 2024-07-18 PROCEDURE — 83036 HEMOGLOBIN GLYCOSYLATED A1C: CPT | Performed by: INTERNAL MEDICINE

## 2024-07-18 PROCEDURE — 82570 ASSAY OF URINE CREATININE: CPT

## 2024-07-18 NOTE — PROGRESS NOTES
1.829 m (6')   Wt 117 kg (258 lb)   BMI 34.99 kg/m²      General: Well-nourished, well-developed, alert and oriented in no acute distress  HEENT: Normocephalic, no pallor, sclera anicteric  Neurologic: normal coordination and normal general cortical function  Thyroid not enlarged, nontender  Psychiatric: Pleasant, conversational, normal insight and affect  No edema in lower extremities    Lab Review:    Hemoglobin A1C (%)   Date Value   07/18/2024 6.5   03/09/2024 6.0     LDL Calculated (mg/dL)   Date Value   03/09/2024 118 (H)   06/30/2023 60     Triglyceride, Fasting (mg/dL)   Date Value   12/29/2022 148     HDL   Date Value   03/09/2024 35 mg/dL (L)   06/30/2023 25 mg/dL (L)   04/28/2011 34 mg/dl (L)     Creatinine (mg/dL)   Date Value   03/09/2024 1.1   11/20/2023 0.9     Microalb/Creat Ratio (mg/g)   Date Value   03/09/2024 34.1 (H)   11/20/2023 67.3 (H)         Office Visit on 07/18/2024   Component Date Value Ref Range Status    Hemoglobin A1C 07/18/2024 6.5  % Final   Hospital Outpatient Visit on 03/09/2024   Component Date Value Ref Range Status    Hemoglobin A1C 03/09/2024 6.0  See comment % Final    Estimated Avg Glucose 03/09/2024 125.5  mg/dL Final    Sodium 03/09/2024 136  136 - 145 mmol/L Final    Potassium 03/09/2024 4.4  3.5 - 5.1 mmol/L Final    Chloride 03/09/2024 100  99 - 110 mmol/L Final    CO2 03/09/2024 28  21 - 32 mmol/L Final    Anion Gap 03/09/2024 8  3 - 16 Final    Glucose 03/09/2024 146 (H)  70 - 99 mg/dL Final    BUN 03/09/2024 23 (H)  7 - 20 mg/dL Final    Creatinine 03/09/2024 1.1  0.9 - 1.3 mg/dL Final    Est, Glom Filt Rate 03/09/2024 >60  >60 Final    Calcium 03/09/2024 9.1  8.3 - 10.6 mg/dL Final    Total Protein 03/09/2024 7.4  6.4 - 8.2 g/dL Final    Albumin 03/09/2024 4.3  3.4 - 5.0 g/dL Final    Albumin/Globulin Ratio 03/09/2024 1.4  1.1 - 2.2 Final    Total Bilirubin 03/09/2024 0.4  0.0 - 1.0 mg/dL Final    Alkaline Phosphatase 03/09/2024 61  40 - 129 U/L Final    ALT

## 2024-07-18 NOTE — PROGRESS NOTES
2nd Quarterly Reminder sent to patient for the DM Program - See Mychart message or Letter for more information.    Sheryl Wilson CphT  [facility]  Clinical Pharmacy   Department, toll free: [phone] Option #3    For Pharmacy Admin Tracking Only    Program: [employer]  CPA in place:  No  Gap Closed?: Yes   Time Spent (min): 5

## 2024-07-19 DIAGNOSIS — E29.1 HYPOGONADISM IN MALE: ICD-10-CM

## 2024-07-19 DIAGNOSIS — E11.51 TYPE 2 DIABETES MELLITUS WITH DIABETIC PERIPHERAL ANGIOPATHY WITHOUT GANGRENE, WITH LONG-TERM CURRENT USE OF INSULIN (HCC): ICD-10-CM

## 2024-07-19 DIAGNOSIS — E55.9 HYPOVITAMINOSIS D: Primary | ICD-10-CM

## 2024-07-19 DIAGNOSIS — Z79.4 TYPE 2 DIABETES MELLITUS WITH DIABETIC PERIPHERAL ANGIOPATHY WITHOUT GANGRENE, WITH LONG-TERM CURRENT USE OF INSULIN (HCC): ICD-10-CM

## 2024-07-19 RX ORDER — TESTOSTERONE CYPIONATE 200 MG/ML
100 INJECTION, SOLUTION INTRAMUSCULAR
Qty: 6 ML | Refills: 1 | Status: SHIPPED | OUTPATIENT
Start: 2024-07-19 | End: 2024-10-18

## 2024-07-21 LAB
IGF-I SERPL-MCNC: 210 NG/ML (ref 61–210)
IGF-I Z-SCORE SERPL: 1.8

## 2024-07-24 ENCOUNTER — HOSPITAL ENCOUNTER (OUTPATIENT)
Age: 54
Discharge: HOME | End: 2024-07-24
Payer: COMMERCIAL

## 2024-07-24 DIAGNOSIS — E29.1 HYPOGONADISM IN MALE: Primary | ICD-10-CM

## 2024-07-24 LAB
ESTRADIOL SERPL-MCNC: 32 PG/ML
PSA SERPL DL<=0.01 NG/ML-MCNC: 0.47 NG/ML (ref 0–4)
TESTOST SERPL-MCNC: 1003 NG/DL (ref 193–740)

## 2024-07-24 PROCEDURE — 84403 ASSAY OF TOTAL TESTOSTERONE: CPT

## 2024-07-24 PROCEDURE — 82985 ASSAY OF GLYCATED PROTEIN: CPT

## 2024-07-24 PROCEDURE — 36415 COLL VENOUS BLD VENIPUNCTURE: CPT

## 2024-07-24 PROCEDURE — 82670 ASSAY OF TOTAL ESTRADIOL: CPT

## 2024-07-24 PROCEDURE — 81256 HFE GENE: CPT

## 2024-07-24 PROCEDURE — 83704 LIPOPROTEIN BLD QUAN PART: CPT

## 2024-07-24 PROCEDURE — 86769 SARS-COV-2 COVID-19 ANTIBODY: CPT

## 2024-07-24 PROCEDURE — 80061 LIPID PANEL: CPT

## 2024-07-24 PROCEDURE — 84270 ASSAY OF SEX HORMONE GLOBUL: CPT

## 2024-07-24 PROCEDURE — 84153 ASSAY OF PSA TOTAL: CPT

## 2024-07-25 ENCOUNTER — TELEPHONE (OUTPATIENT)
Dept: ENDOCRINOLOGY | Age: 54
End: 2024-07-25

## 2024-07-25 DIAGNOSIS — E11.51 TYPE 2 DIABETES MELLITUS WITH DIABETIC PERIPHERAL ANGIOPATHY WITHOUT GANGRENE, WITH LONG-TERM CURRENT USE OF INSULIN (HCC): ICD-10-CM

## 2024-07-25 DIAGNOSIS — Z79.4 TYPE 2 DIABETES MELLITUS WITH DIABETIC PERIPHERAL ANGIOPATHY WITHOUT GANGRENE, WITH LONG-TERM CURRENT USE OF INSULIN (HCC): ICD-10-CM

## 2024-07-25 RX ORDER — INSULIN GLARGINE 300 U/ML
INJECTION, SOLUTION SUBCUTANEOUS
Qty: 36 ADJUSTABLE DOSE PRE-FILLED PEN SYRINGE | Refills: 2 | Status: SHIPPED | OUTPATIENT
Start: 2024-07-25

## 2024-07-25 NOTE — TELEPHONE ENCOUNTER
Fax from [facility]    Refill authorization Toujeo Max Solostar 300 sopn   Qty 36

## 2024-07-25 NOTE — TELEPHONE ENCOUNTER
[employer] message not read by patient.  Letter mailed to patient with the information from the [employer] message.    Pema Will [facility]  Clinical Pharmacy   Department, toll free: [phone] Option #3    For Pharmacy Admin Tracking Only    Program: [facility]  CPA in place:  No  Gap Closed?: Yes   Time Spent (min): 5

## 2024-07-26 LAB
FRUCTOSAMINE SERPL-SCNC: 273 UMOL/L (ref 205–285)
SHBG SERPL-SCNC: 30 NMOL/L (ref 19–76)
TESTOST FREE SERPL-MCNC: 73.5 PG/ML (ref 47–244)
TESTOST SERPL-MCNC: 348 NG/DL (ref 193–740)

## 2024-07-29 LAB
CHOLEST SERPL-MCNC: 113 MG/DL
HDL SERPL QN: 8.6 NM
HDL SERPL-SCNC: 29.1 UMOL/L
HDLC SERPL-MCNC: 36 MG/DL (ref 40–59)
HLD.LARGE SERPL-SCNC: <2.8 UMOL/L
LDL SERPL QN: 20.6 NM
LDL SERPL-SCNC: 744 NMOL/L
LDL SMALL SERPL-SCNC: 521 NMOL/L
LDLC SERPL CALC-MCNC: 46 MG/DL
PATHOLOGY STUDY: ABNORMAL
SARS-COV-2 IGG SERPLBLD QL IA.RAPID: POSITIVE
TRIGL SERPL-MCNC: 157 MG/DL (ref 30–149)
VLDL LARGE SERPL-SCNC: 6 NMOL/L
VLDL SERPL QN: 52.6 NM

## 2024-07-30 LAB
HFE GENE MUT ANL BLD/T: NORMAL
HFE P.C282Y BLD/T QL: NEGATIVE
HFE P.H63D BLD/T QL: NEGATIVE
HFE P.S65C BLD/T QL: NEGATIVE
SPECIMEN SOURCE: NORMAL

## 2024-08-13 ENCOUNTER — TELEPHONE (OUTPATIENT)
Dept: ENDOCRINOLOGY | Age: 54
End: 2024-08-13

## 2024-08-13 DIAGNOSIS — Z79.4 TYPE 2 DIABETES MELLITUS WITH DIABETIC PERIPHERAL ANGIOPATHY WITHOUT GANGRENE, WITH LONG-TERM CURRENT USE OF INSULIN (HCC): ICD-10-CM

## 2024-08-13 DIAGNOSIS — E11.51 TYPE 2 DIABETES MELLITUS WITH DIABETIC PERIPHERAL ANGIOPATHY WITHOUT GANGRENE, WITH LONG-TERM CURRENT USE OF INSULIN (HCC): ICD-10-CM

## 2024-08-13 RX ORDER — DAPAGLIFLOZIN 10 MG/1
10 TABLET, FILM COATED ORAL DAILY
Qty: 90 TABLET | Refills: 0 | Status: SHIPPED | OUTPATIENT
Start: 2024-08-13

## 2024-08-27 ENCOUNTER — OFFICE VISIT (OUTPATIENT)
Dept: CARDIOLOGY CLINIC | Age: 54
End: 2024-08-27
Payer: COMMERCIAL

## 2024-08-27 VITALS
SYSTOLIC BLOOD PRESSURE: 124 MMHG | BODY MASS INDEX: 35.35 KG/M2 | HEART RATE: 72 BPM | OXYGEN SATURATION: 98 % | HEIGHT: 72 IN | WEIGHT: 261 LBS | DIASTOLIC BLOOD PRESSURE: 78 MMHG

## 2024-08-27 DIAGNOSIS — I25.10 CAD IN NATIVE ARTERY: Primary | ICD-10-CM

## 2024-08-27 DIAGNOSIS — I10 ESSENTIAL HYPERTENSION: ICD-10-CM

## 2024-08-27 PROCEDURE — 3074F SYST BP LT 130 MM HG: CPT | Performed by: INTERNAL MEDICINE

## 2024-08-27 PROCEDURE — 99213 OFFICE O/P EST LOW 20 MIN: CPT | Performed by: INTERNAL MEDICINE

## 2024-08-27 PROCEDURE — 3078F DIAST BP <80 MM HG: CPT | Performed by: INTERNAL MEDICINE

## 2024-08-27 PROCEDURE — 93000 ELECTROCARDIOGRAM COMPLETE: CPT | Performed by: INTERNAL MEDICINE

## 2024-08-27 RX ORDER — EVOLOCUMAB 420 MG/3.5
420 KIT SUBCUTANEOUS
COMMUNITY
Start: 2024-08-12

## 2024-08-27 NOTE — PROGRESS NOTES
[facility]   Cardiology Note      Marv PATTERSON Darío  1970 54 y.o.        CC: HTN, HLD, CAD, Hx CABG x4 2016  Suhail Lujan, APRN - CNP      8/27/24       HPI:  Patient is a 54 y.o. male with history of CAD s/p CABG x4 (3/8/2016), DM, HTN, and HLD. He is a nurse at [employer]. Today, he is here for follow up. He has no new complaints.     Patient is here for a follow-up visit.  He claims that he has been diagnosed with \"post COVID\".  He has had problems with memory forgetfulness fatigue.  It is affecting his work and his forgetting a lot of things that were told to him.  His wife also close the same thing at home.  So he is off work unpaid time off.  Not sure if he would return to work.  He is seeing Dr. Diehl for his primary care needs.    He is taking Repatha.  His LDL is down to 34.          Past Medical History:   Diagnosis Date    Abscess 1994    mediastinal, developed after kenalog injections    Anesthesia     AWAKE BUT UNABLE TO MOVE DURING SHOULDER SURGERY.     CAD (coronary artery disease) 03/2016    Diabetes mellitus (HCC) 2001    HgA1C 10.6    Gout     HLD (hyperlipidemia)     HTN (hypertension)     Echo 2013 normal.     Major depressive disorder, recurrent episode, moderate (HCC) 05/25/2023    MDRO (multiple drug resistant organisms) resistance     MRSA (methicillin resistant staph aureus) culture positive 04/19/2017    Obesity     Pancreatitis 2006    high TG or byetta. flank ecchymosis.    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 after kenalog injections. drained. iv abx.     Past Surgical History:   Procedure Laterality Date    ADENOIDECTOMY  1970    COLONOSCOPY N/A 3/19/2021    COLONOSCOPY DIAGNOSTIC performed by Dmitry Coley MD at [facility] ENDOSCOPY    CORONARY ARTERY BYPASS GRAFT   sounds are normal. Exhibits no organomegaly, mass or bruit.   Extremities: No edema. No cyanosis or clubbing. Pulses are 2+ radial and carotid bilaterally.  Neurological: No gross cranial nerve deficit. Coordination normal.   Skin: Skin is warm and dry. There is no rash or diaphoresis.   Psychiatric: Patient has a normal mood and affect. Speech is normal and behavior is normal.     Current Outpatient Medications   Medication Sig Dispense Refill    dapagliflozin (FARXIGA) 10 MG tablet Take 1 tablet by mouth daily 90 tablet 0    telmisartan (MICARDIS) 80 MG tablet Take 1 tablet by mouth daily      Insulin Glargine, 2 Unit Dial, (TOUJEO MAX SOLOSTAR) 300 UNIT/ML SOPN INJECT 130 UNITS UNDER THE SKIN ONCE DAILY 36 Adjustable Dose Pre-filled Pen Syringe 2    testosterone cypionate (DEPOTESTOTERONE CYPIONATE) 200 MG/ML injection Inject 0.5 mLs into the muscle every 14 days for 91 days. Max Daily Amount: 100 mg 6 mL 1    Cholecalciferol (VITAMIN D3) 1.25 MG (18658 UT) TABS Take 1 tablet by mouth once a week 12 tablet 0    MOUNJARO 15 MG/0.5ML SOPN SC injection INJECT 15MG UNDER THE SKIN ONCE WEEKLY 6 mL 2    Continuous Blood Gluc Sensor (DEXCOM G7 SENSOR) MISC 1 each by Does not apply route every 10 days 9 each 1    allopurinol 200 MG TABS Take 200 mg by mouth daily 90 tablet 1    colchicine (COLCRYS) 0.6 MG tablet Take 1 tablet by mouth daily as needed      Icosapent Ethyl (VASCEPA) 1 g CAPS capsule Take 2 capsules by mouth 2 times daily 360 capsule 1    Syringe/Needle, Disp, (SYRINGE 3CC/64XN5-4/2\") 25G X 1-1/2\" 3 ML MISC Use weekly with testosterone 4 each 3    NEEDLE, DISP, 18 G 18G X 1\" MISC Use to draw testosterone. 100 each 0    Insulin Pen Needle (TRUEPLUS PEN NEEDLES) 32G X 4 MM MISC Inject 1 each into the skin in the morning, at noon, in the evening, and at bedtime 120 each 5    Prodigy Lancets 28G MISC 1 Units by Does not apply route 3 times daily as needed (blood sugar) 100 each 1    insulin lispro (HUMALOG

## 2024-09-30 ENCOUNTER — PATIENT MESSAGE (OUTPATIENT)
Dept: PHARMACY | Facility: CLINIC | Age: 54
End: 2024-09-30

## 2024-10-09 NOTE — TELEPHONE ENCOUNTER
[email or web address] message not read. Sending letter.     Kristin Garcia [facility]  Clinical Pharmacy   Phone: [phone], Option #3

## 2024-10-24 ENCOUNTER — TELEPHONE (OUTPATIENT)
Dept: ENDOCRINOLOGY | Age: 54
End: 2024-10-24

## 2024-10-25 ENCOUNTER — OFFICE VISIT (OUTPATIENT)
Dept: ENDOCRINOLOGY | Age: 54
End: 2024-10-25

## 2024-10-25 ENCOUNTER — HOSPITAL ENCOUNTER (OUTPATIENT)
Age: 54
Discharge: HOME | End: 2024-10-25
Payer: COMMERCIAL

## 2024-10-25 VITALS
RESPIRATION RATE: 14 BRPM | SYSTOLIC BLOOD PRESSURE: 130 MMHG | DIASTOLIC BLOOD PRESSURE: 66 MMHG | TEMPERATURE: 98 F | WEIGHT: 263 LBS | HEIGHT: 72 IN | BODY MASS INDEX: 35.62 KG/M2 | HEART RATE: 66 BPM

## 2024-10-25 DIAGNOSIS — Z79.4 TYPE 2 DIABETES MELLITUS WITH DIABETIC PERIPHERAL ANGIOPATHY WITHOUT GANGRENE, WITH LONG-TERM CURRENT USE OF INSULIN (HCC): ICD-10-CM

## 2024-10-25 DIAGNOSIS — E11.51 TYPE 2 DIABETES MELLITUS WITH DIABETIC PERIPHERAL ANGIOPATHY WITHOUT GANGRENE, WITH LONG-TERM CURRENT USE OF INSULIN (HCC): ICD-10-CM

## 2024-10-25 DIAGNOSIS — E55.9 HYPOVITAMINOSIS D: Primary | ICD-10-CM

## 2024-10-25 DIAGNOSIS — E55.9 HYPOVITAMINOSIS D: ICD-10-CM

## 2024-10-25 DIAGNOSIS — E78.2 MIXED HYPERLIPIDEMIA: ICD-10-CM

## 2024-10-25 DIAGNOSIS — E29.1 HYPOGONADISM IN MALE: ICD-10-CM

## 2024-10-25 LAB
25(OH)D3 SERPL-MCNC: 21 NG/ML
DEPRECATED RDW RBC AUTO: 13.7 % (ref 12.4–15.4)
EST. AVERAGE GLUCOSE BLD GHB EST-MCNC: 157.1 MG/DL
HBA1C MFR BLD: 7.1 %
HCT VFR BLD AUTO: 50.5 % (ref 40.5–52.5)
HGB BLD-MCNC: 17.1 G/DL (ref 13.5–17.5)
MCH RBC QN AUTO: 28.8 PG (ref 26–34)
MCHC RBC AUTO-ENTMCNC: 33.8 G/DL (ref 31–36)
MCV RBC AUTO: 85.1 FL (ref 80–100)
PLATELET # BLD AUTO: 222 K/UL (ref 135–450)
PMV BLD AUTO: 7.9 FL (ref 5–10.5)
RBC # BLD AUTO: 5.94 M/UL (ref 4.2–5.9)
WBC # BLD AUTO: 7.6 K/UL (ref 4–11)

## 2024-10-25 PROCEDURE — 83036 HEMOGLOBIN GLYCOSYLATED A1C: CPT

## 2024-10-25 PROCEDURE — 36415 COLL VENOUS BLD VENIPUNCTURE: CPT

## 2024-10-25 PROCEDURE — 82306 VITAMIN D 25 HYDROXY: CPT

## 2024-10-25 PROCEDURE — 85027 COMPLETE CBC AUTOMATED: CPT

## 2024-10-25 RX ORDER — ICOSAPENT ETHYL 1 G/1
2 CAPSULE ORAL 2 TIMES DAILY
Qty: 360 CAPSULE | Refills: 1 | Status: SHIPPED | OUTPATIENT
Start: 2024-10-25

## 2024-10-25 RX ORDER — BLOOD-GLUCOSE SENSOR
EACH MISCELLANEOUS
Qty: 2 EACH | Refills: 2 | Status: SHIPPED | OUTPATIENT
Start: 2024-10-25

## 2024-10-25 RX ORDER — TESTOSTERONE 1.62 MG/G
1 GEL TRANSDERMAL DAILY
Qty: 75 G | Refills: 3 | Status: SHIPPED | OUTPATIENT
Start: 2024-10-25 | End: 2024-12-24

## 2024-10-25 RX ORDER — ACYCLOVIR 400 MG/1
1 TABLET ORAL
Qty: 9 EACH | Refills: 1 | Status: SHIPPED | OUTPATIENT
Start: 2024-10-25 | End: 2024-10-25 | Stop reason: ALTCHOICE

## 2024-10-25 RX ORDER — RANOLAZINE 500 MG/1
TABLET, EXTENDED RELEASE ORAL
COMMUNITY
Start: 2024-09-19

## 2024-10-25 NOTE — PROGRESS NOTES
Note   Final    Testosterone 07/24/2024 348  193 - 740 ng/dL Final    Sex Hormone Binding 07/24/2024 30  19 - 76 nmol/L Final    Testosterone, Free 07/24/2024 73.5  47.0 - 244.0 pg/mL Final    Estradiol 07/24/2024 32  pg/mL Final    Fructosamine 07/24/2024 273  205 - 285 umol/L Final    PSA 07/24/2024 0.47  0.00 - 4.00 ng/mL Final    H63D Hemochrom Mut 07/24/2024 Negative   Final    C282Y Hemochrom Mut 07/24/2024 Negative   Final    Hemochromatosis Gene Analysis 07/24/2024 See Note   Final    S65C Hemochrom Mut 07/24/2024 Negative   Final    HFE PCR Specimen 07/24/2024 Whole Blood   Final    SARS-CoV-2, IgG 07/24/2024 Positive (A)  Negative Final   Orders Only on 07/22/2024   Component Date Value Ref Range Status    Uric Acid 07/22/2024 6.7  3.5 - 7.2 mg/dL Final    HALIMA 07/22/2024 Negative  Negative Final    Rheumatoid Factor 07/22/2024 <10.0  <14 IU/mL Final    CRP 07/22/2024 <3.0  0.0 - 5.1 mg/L Final    Sed Rate, Automated 07/22/2024 8  0 - 20 mm/Hr Final    TSH 07/22/2024 1.62  0.27 - 4.20 uIU/mL Final   Hospital Outpatient Visit on 07/18/2024   Component Date Value Ref Range Status    Vit D, 25-Hydroxy 07/18/2024 14.5 (L)  >=30 ng/mL Final    IGF-1 (INSULIN-LIKE GROWTH I) 07/18/2024 210  61 - 210 ng/mL Final    Insulin-Like GF-1 Z-Score 07/18/2024 1.8   Final    Testosterone 07/18/2024 1,003 (H)  193 - 740 ng/dL Final    WBC 07/18/2024 10.0  4.0 - 11.0 K/uL Final    RBC 07/18/2024 6.68 (H)  4.20 - 5.90 M/uL Final    Hemoglobin 07/18/2024 19.0 (H)  13.5 - 17.5 g/dL Final    Hematocrit 07/18/2024 57.7 (H)  40.5 - 52.5 % Final    MCV 07/18/2024 86.3  80.0 - 100.0 fL Final    MCH 07/18/2024 28.4  26.0 - 34.0 pg Final    MCHC 07/18/2024 32.9  31.0 - 36.0 g/dL Final    RDW 07/18/2024 15.0  12.4 - 15.4 % Final    Platelets 07/18/2024 205  135 - 450 K/uL Final    MPV 07/18/2024 8.4  5.0 - 10.5 fL Final    Cholesterol, Total 07/18/2024 83  0 - 199 mg/dL Final    Triglycerides 07/18/2024 96  0 - 150 mg/dL Final    HDL

## 2024-10-29 ENCOUNTER — HOSPITAL ENCOUNTER (EMERGENCY)
Age: 54
Discharge: ANOTHER HOSPITAL | End: 2024-10-29
Attending: INTERNAL MEDICINE
Payer: COMMERCIAL

## 2024-10-29 ENCOUNTER — APPOINTMENT (OUTPATIENT)
Dept: CT IMAGING | Age: 54
End: 2024-10-29
Payer: COMMERCIAL

## 2024-10-29 ENCOUNTER — APPOINTMENT (OUTPATIENT)
Dept: MRI IMAGING | Age: 54
End: 2024-10-29
Payer: COMMERCIAL

## 2024-10-29 ENCOUNTER — TELEPHONE (OUTPATIENT)
Dept: ENDOCRINOLOGY | Age: 54
End: 2024-10-29

## 2024-10-29 ENCOUNTER — HOSPITAL ENCOUNTER (INPATIENT)
Age: 54
LOS: 4 days | Discharge: HOME | End: 2024-11-02
Attending: INTERNAL MEDICINE | Admitting: INTERNAL MEDICINE
Payer: COMMERCIAL

## 2024-10-29 ENCOUNTER — APPOINTMENT (OUTPATIENT)
Dept: GENERAL RADIOLOGY | Age: 54
End: 2024-10-29
Payer: COMMERCIAL

## 2024-10-29 VITALS
SYSTOLIC BLOOD PRESSURE: 171 MMHG | WEIGHT: 263 LBS | BODY MASS INDEX: 35.62 KG/M2 | OXYGEN SATURATION: 99 % | HEIGHT: 72 IN | HEART RATE: 72 BPM | TEMPERATURE: 98.5 F | RESPIRATION RATE: 18 BRPM | DIASTOLIC BLOOD PRESSURE: 101 MMHG

## 2024-10-29 DIAGNOSIS — H53.132 VISION, LOSS, SUDDEN, LEFT: ICD-10-CM

## 2024-10-29 DIAGNOSIS — Z79.4 TYPE 2 DIABETES MELLITUS WITH DIABETIC PERIPHERAL ANGIOPATHY WITHOUT GANGRENE, WITH LONG-TERM CURRENT USE OF INSULIN (HCC): ICD-10-CM

## 2024-10-29 DIAGNOSIS — I63.9 CEREBROVASCULAR ACCIDENT (CVA), UNSPECIFIED MECHANISM (HCC): Primary | ICD-10-CM

## 2024-10-29 DIAGNOSIS — I63.531 CEREBROVASCULAR ACCIDENT (CVA) DUE TO OCCLUSION OF RIGHT POSTERIOR CEREBRAL ARTERY (HCC): Primary | ICD-10-CM

## 2024-10-29 DIAGNOSIS — R51.9 ACUTE NONINTRACTABLE HEADACHE, UNSPECIFIED HEADACHE TYPE: ICD-10-CM

## 2024-10-29 DIAGNOSIS — E11.51 TYPE 2 DIABETES MELLITUS WITH DIABETIC PERIPHERAL ANGIOPATHY WITHOUT GANGRENE, WITH LONG-TERM CURRENT USE OF INSULIN (HCC): ICD-10-CM

## 2024-10-29 PROBLEM — H53.9 VISION CHANGES: Status: ACTIVE | Noted: 2024-10-29

## 2024-10-29 LAB
ANION GAP SERPL CALCULATED.3IONS-SCNC: 12 MMOL/L (ref 3–16)
BASOPHILS # BLD: 0.1 K/UL (ref 0–0.2)
BASOPHILS NFR BLD: 0.8 %
BUN SERPL-MCNC: 20 MG/DL (ref 7–20)
CALCIUM SERPL-MCNC: 9.9 MG/DL (ref 8.3–10.6)
CHLORIDE SERPL-SCNC: 100 MMOL/L (ref 99–110)
CO2 SERPL-SCNC: 25 MMOL/L (ref 21–32)
CREAT SERPL-MCNC: 1.2 MG/DL (ref 0.9–1.3)
DEPRECATED RDW RBC AUTO: 14.1 % (ref 12.4–15.4)
EKG ATRIAL RATE: 69 BPM
EKG DIAGNOSIS: NORMAL
EKG P AXIS: 38 DEGREES
EKG P-R INTERVAL: 178 MS
EKG Q-T INTERVAL: 394 MS
EKG QRS DURATION: 100 MS
EKG QTC CALCULATION (BAZETT): 422 MS
EKG R AXIS: 10 DEGREES
EKG T AXIS: 17 DEGREES
EKG VENTRICULAR RATE: 69 BPM
EOSINOPHIL # BLD: 0.1 K/UL (ref 0–0.6)
EOSINOPHIL NFR BLD: 1.2 %
GFR SERPLBLD CREATININE-BSD FMLA CKD-EPI: 71 ML/MIN/{1.73_M2}
GLUCOSE BLD-MCNC: 164 MG/DL (ref 70–99)
GLUCOSE SERPL-MCNC: 157 MG/DL (ref 70–99)
HCT VFR BLD AUTO: 51.1 % (ref 40.5–52.5)
HGB BLD-MCNC: 17.7 G/DL (ref 13.5–17.5)
LYMPHOCYTES # BLD: 2.6 K/UL (ref 1–5.1)
LYMPHOCYTES NFR BLD: 28.2 %
MCH RBC QN AUTO: 29.4 PG (ref 26–34)
MCHC RBC AUTO-ENTMCNC: 34.7 G/DL (ref 31–36)
MCV RBC AUTO: 84.9 FL (ref 80–100)
MONOCYTES # BLD: 0.8 K/UL (ref 0–1.3)
MONOCYTES NFR BLD: 8.2 %
NEUTROPHILS # BLD: 5.7 K/UL (ref 1.7–7.7)
NEUTROPHILS NFR BLD: 61.6 %
PERFORMED ON: ABNORMAL
PLATELET # BLD AUTO: 260 K/UL (ref 135–450)
PMV BLD AUTO: 7.5 FL (ref 5–10.5)
POTASSIUM SERPL-SCNC: 4.3 MMOL/L (ref 3.5–5.1)
RBC # BLD AUTO: 6.03 M/UL (ref 4.2–5.9)
SODIUM SERPL-SCNC: 137 MMOL/L (ref 136–145)
TROPONIN, HIGH SENSITIVITY: 9 NG/L (ref 0–22)
WBC # BLD AUTO: 9.2 K/UL (ref 4–11)

## 2024-10-29 PROCEDURE — 84484 ASSAY OF TROPONIN QUANT: CPT

## 2024-10-29 PROCEDURE — B3151ZZ FLUOROSCOPY OF BILATERAL COMMON CAROTID ARTERIES USING LOW OSMOLAR CONTRAST: ICD-10-PCS | Performed by: SINGLE SPECIALTY

## 2024-10-29 PROCEDURE — 96375 TX/PRO/DX INJ NEW DRUG ADDON: CPT

## 2024-10-29 PROCEDURE — 36415 COLL VENOUS BLD VENIPUNCTURE: CPT

## 2024-10-29 PROCEDURE — 6360000004 HC RX CONTRAST MEDICATION: Performed by: INTERNAL MEDICINE

## 2024-10-29 PROCEDURE — 96376 TX/PRO/DX INJ SAME DRUG ADON: CPT

## 2024-10-29 PROCEDURE — B3181ZZ FLUOROSCOPY OF BILATERAL INTERNAL CAROTID ARTERIES USING LOW OSMOLAR CONTRAST: ICD-10-PCS | Performed by: SINGLE SPECIALTY

## 2024-10-29 PROCEDURE — 71045 X-RAY EXAM CHEST 1 VIEW: CPT

## 2024-10-29 PROCEDURE — 80048 BASIC METABOLIC PNL TOTAL CA: CPT

## 2024-10-29 PROCEDURE — B31C1ZZ FLUOROSCOPY OF BILATERAL EXTERNAL CAROTID ARTERIES USING LOW OSMOLAR CONTRAST: ICD-10-PCS | Performed by: SINGLE SPECIALTY

## 2024-10-29 PROCEDURE — 70551 MRI BRAIN STEM W/O DYE: CPT

## 2024-10-29 PROCEDURE — B31F1ZZ FLUOROSCOPY OF LEFT VERTEBRAL ARTERY USING LOW OSMOLAR CONTRAST: ICD-10-PCS | Performed by: SINGLE SPECIALTY

## 2024-10-29 PROCEDURE — B41F1ZZ FLUOROSCOPY OF RIGHT LOWER EXTREMITY ARTERIES USING LOW OSMOLAR CONTRAST: ICD-10-PCS | Performed by: SINGLE SPECIALTY

## 2024-10-29 PROCEDURE — 96365 THER/PROPH/DIAG IV INF INIT: CPT

## 2024-10-29 PROCEDURE — 70498 CT ANGIOGRAPHY NECK: CPT

## 2024-10-29 PROCEDURE — 99285 EMERGENCY DEPT VISIT HI MDM: CPT

## 2024-10-29 PROCEDURE — 2000000000 HC ICU R&B

## 2024-10-29 PROCEDURE — 6360000002 HC RX W HCPCS: Performed by: INTERNAL MEDICINE

## 2024-10-29 PROCEDURE — 96366 THER/PROPH/DIAG IV INF ADDON: CPT

## 2024-10-29 PROCEDURE — 99291 CRITICAL CARE FIRST HOUR: CPT

## 2024-10-29 PROCEDURE — 85025 COMPLETE CBC W/AUTO DIFF WBC: CPT

## 2024-10-29 PROCEDURE — 6370000000 HC RX 637 (ALT 250 FOR IP): Performed by: INTERNAL MEDICINE

## 2024-10-29 PROCEDURE — B31Q1ZZ FLUOROSCOPY OF CERVICO-CEREBRAL ARCH USING LOW OSMOLAR CONTRAST: ICD-10-PCS | Performed by: SINGLE SPECIALTY

## 2024-10-29 PROCEDURE — 70450 CT HEAD/BRAIN W/O DYE: CPT

## 2024-10-29 RX ORDER — FENTANYL CITRATE 50 UG/ML
25 INJECTION, SOLUTION INTRAMUSCULAR; INTRAVENOUS ONCE
Status: COMPLETED | OUTPATIENT
Start: 2024-10-29 | End: 2024-10-29

## 2024-10-29 RX ORDER — ACYCLOVIR 400 MG/1
TABLET ORAL
Qty: 9 EACH | Refills: 1 | Status: SHIPPED | OUTPATIENT
Start: 2024-10-29

## 2024-10-29 RX ORDER — MAGNESIUM SULFATE IN WATER 40 MG/ML
2000 INJECTION, SOLUTION INTRAVENOUS ONCE
Status: COMPLETED | OUTPATIENT
Start: 2024-10-29 | End: 2024-10-29

## 2024-10-29 RX ORDER — ACETAMINOPHEN 500 MG
1000 TABLET ORAL ONCE
Status: COMPLETED | OUTPATIENT
Start: 2024-10-29 | End: 2024-10-29

## 2024-10-29 RX ORDER — CLOPIDOGREL BISULFATE 75 MG/1
300 TABLET ORAL ONCE
Status: COMPLETED | OUTPATIENT
Start: 2024-10-29 | End: 2024-10-29

## 2024-10-29 RX ORDER — ENOXAPARIN SODIUM 100 MG/ML
30 INJECTION SUBCUTANEOUS 2 TIMES DAILY
Status: DISCONTINUED | OUTPATIENT
Start: 2024-10-30 | End: 2024-11-01

## 2024-10-29 RX ORDER — MORPHINE SULFATE 4 MG/ML
4 INJECTION, SOLUTION INTRAMUSCULAR; INTRAVENOUS ONCE
Status: COMPLETED | OUTPATIENT
Start: 2024-10-29 | End: 2024-10-29

## 2024-10-29 RX ORDER — ACETAMINOPHEN 325 MG/1
650 TABLET ORAL EVERY 6 HOURS PRN
Status: DISCONTINUED | OUTPATIENT
Start: 2024-10-29 | End: 2024-11-02 | Stop reason: HOSPADM

## 2024-10-29 RX ORDER — SODIUM CHLORIDE 0.9 % (FLUSH) 0.9 %
5-40 SYRINGE (ML) INJECTION PRN
Status: DISCONTINUED | OUTPATIENT
Start: 2024-10-29 | End: 2024-11-02 | Stop reason: HOSPADM

## 2024-10-29 RX ORDER — PROCHLORPERAZINE EDISYLATE 5 MG/ML
10 INJECTION INTRAMUSCULAR; INTRAVENOUS ONCE
Status: COMPLETED | OUTPATIENT
Start: 2024-10-29 | End: 2024-10-29

## 2024-10-29 RX ORDER — SODIUM CHLORIDE 0.9 % (FLUSH) 0.9 %
5-40 SYRINGE (ML) INJECTION EVERY 12 HOURS SCHEDULED
Status: DISCONTINUED | OUTPATIENT
Start: 2024-10-30 | End: 2024-11-02 | Stop reason: HOSPADM

## 2024-10-29 RX ORDER — OXYCODONE AND ACETAMINOPHEN 5; 325 MG/1; MG/1
2 TABLET ORAL EVERY 4 HOURS PRN
Status: DISCONTINUED | OUTPATIENT
Start: 2024-10-29 | End: 2024-10-30

## 2024-10-29 RX ORDER — POLYETHYLENE GLYCOL 3350 17 G/17G
17 POWDER, FOR SOLUTION ORAL DAILY PRN
Status: DISCONTINUED | OUTPATIENT
Start: 2024-10-29 | End: 2024-11-02 | Stop reason: HOSPADM

## 2024-10-29 RX ORDER — SODIUM CHLORIDE 9 MG/ML
INJECTION, SOLUTION INTRAVENOUS PRN
Status: DISCONTINUED | OUTPATIENT
Start: 2024-10-29 | End: 2024-11-02 | Stop reason: HOSPADM

## 2024-10-29 RX ORDER — ONDANSETRON 4 MG/1
4 TABLET, ORALLY DISINTEGRATING ORAL EVERY 8 HOURS PRN
Status: DISCONTINUED | OUTPATIENT
Start: 2024-10-29 | End: 2024-11-02 | Stop reason: HOSPADM

## 2024-10-29 RX ORDER — ASPIRIN 325 MG
325 TABLET ORAL ONCE
Status: COMPLETED | OUTPATIENT
Start: 2024-10-29 | End: 2024-10-29

## 2024-10-29 RX ORDER — ONDANSETRON 2 MG/ML
4 INJECTION INTRAMUSCULAR; INTRAVENOUS EVERY 6 HOURS PRN
Status: DISCONTINUED | OUTPATIENT
Start: 2024-10-29 | End: 2024-11-02 | Stop reason: HOSPADM

## 2024-10-29 RX ORDER — IOPAMIDOL 755 MG/ML
75 INJECTION, SOLUTION INTRAVASCULAR
Status: COMPLETED | OUTPATIENT
Start: 2024-10-29 | End: 2024-10-29

## 2024-10-29 RX ORDER — ENOXAPARIN SODIUM 100 MG/ML
30 INJECTION SUBCUTANEOUS 2 TIMES DAILY
Status: DISCONTINUED | OUTPATIENT
Start: 2024-10-30 | End: 2024-10-29

## 2024-10-29 RX ORDER — ACETAMINOPHEN 650 MG/1
650 SUPPOSITORY RECTAL EVERY 6 HOURS PRN
Status: DISCONTINUED | OUTPATIENT
Start: 2024-10-29 | End: 2024-11-02 | Stop reason: HOSPADM

## 2024-10-29 RX ORDER — OXYCODONE AND ACETAMINOPHEN 5; 325 MG/1; MG/1
1 TABLET ORAL EVERY 4 HOURS PRN
Status: DISCONTINUED | OUTPATIENT
Start: 2024-10-29 | End: 2024-10-30

## 2024-10-29 RX ADMIN — ACETAMINOPHEN 1000 MG: 500 TABLET ORAL at 18:02

## 2024-10-29 RX ADMIN — MORPHINE SULFATE 4 MG: 4 INJECTION, SOLUTION INTRAMUSCULAR; INTRAVENOUS at 21:44

## 2024-10-29 RX ADMIN — MAGNESIUM SULFATE HEPTAHYDRATE 2000 MG: 40 INJECTION, SOLUTION INTRAVENOUS at 18:01

## 2024-10-29 RX ADMIN — PROCHLORPERAZINE EDISYLATE 10 MG: 5 INJECTION INTRAMUSCULAR; INTRAVENOUS at 18:02

## 2024-10-29 RX ADMIN — ASPIRIN 325 MG: 325 TABLET ORAL at 18:01

## 2024-10-29 RX ADMIN — MORPHINE SULFATE 4 MG: 4 INJECTION, SOLUTION INTRAMUSCULAR; INTRAVENOUS at 19:22

## 2024-10-29 RX ADMIN — FENTANYL CITRATE 25 MCG: 50 INJECTION INTRAMUSCULAR; INTRAVENOUS at 18:02

## 2024-10-29 RX ADMIN — IOPAMIDOL 75 ML: 755 INJECTION, SOLUTION INTRAVENOUS at 16:09

## 2024-10-29 RX ADMIN — CLOPIDOGREL BISULFATE 300 MG: 75 TABLET ORAL at 18:01

## 2024-10-29 ASSESSMENT — PAIN SCALES - GENERAL: PAINLEVEL_OUTOF10: 2

## 2024-10-29 ASSESSMENT — LIFESTYLE VARIABLES
HOW MANY STANDARD DRINKS CONTAINING ALCOHOL DO YOU HAVE ON A TYPICAL DAY: 1 OR 2
HOW OFTEN DO YOU HAVE A DRINK CONTAINING ALCOHOL: MONTHLY OR LESS

## 2024-10-29 ASSESSMENT — PAIN - FUNCTIONAL ASSESSMENT: PAIN_FUNCTIONAL_ASSESSMENT: NONE - DENIES PAIN

## 2024-10-29 NOTE — CONSULTS
Stroke Team Telemedicine Consult:    I saw Mr. Chanel via telemedicine earlier today for visual loss.  He has been having intermittent thunderclap headaches since Sunday.  Today at 3 pm he had partial loss of vision in the left field and tingling of his left face and hand.  No other deficits.  On exam his NIHSS score = 2 (1 ror partial hemianopsia and 1 for sensation). He did not have a dense homonymous hemianopsia.  He was able to read well.  His CT and CTA were reviewed.  I was concerned his thunderclap headaches could indicate RCVS or some other concerning (non-migrainous) pathology.  RCVS itself can cause intracranial hemorrhage.  For this reason and Mr. Chanel's relatively mild deficits I recommended against TNK.  He understood and agreed.  An MRI head was obtained and is described in Dr. Dan' note as are her suggestions for care.    Storm Simmons MD  [phone]

## 2024-10-29 NOTE — CONSULTS
Stroke Team Consult Note    Received stroke consult signout from my colleague Dr. Storm Simmons 10/29/24 at 1700. Initial consult 10/29/24 at 1604.    Marv Chanel is a 54 y.o. male with polycythemia vera, HTN, HLD on Repatha, DMII, gout, CAD s/p CABG x 4 (03/08/2016) presenting to [facility] 10/29/24 for 5-days of thunderclap headaches and acute L visual field loss.    Last known well (LKW) on 10/29/24   at 1500 when he had onset of LEFT homonymous hemianopia. Has been having thunderclap headaches for a few days. Initial /101, , NIHSS 2 (relayed to me by Dr. Simmons). CT head negative for hemorrhage, or other acute intracranial abnormality - there is a chronic L caudate stroke. CTA head and neck with stenosis vs sub-occlusive thrombus vs occlusion of the P2/P3 junction with distal opacification (possible flow from collaterals or this lesion is stenotic).    R PCA stenosis or occlusion    Scattered punctate infarcts R PCA territory    MRI brain ordered by my colleague for diagnostic clarity and discussion of thrombolytic. Not considered a candidate for thrombolytic with Tenecteplase (TNK) due to DWI-FLAIR match on MRI brain.    Etiology of strokes R PCA occlusion or stenosis with resultant strokes - etiology may be stenosis due to atherosclerosis or vasospasm vs polycythemia vera vs cardioembolic. Recurrent thunderclap headaches raise concern for reversible vasoconstriction syndrome (RCVS).    -recommend transfer to [facility] for Vascular/NeuroInterventional consult; consult for DSA given RCVS concern  -permissive hypertension BP<220/120 for first 24 hours, then reduce by 20% daily with normotensive goal  -TTE w/bubble evaluate for intracardiac thrombus, PFO   -BLE venous duplex if +PFO  -tele surveillance for occult arrhythmia  -LDL goal <70 for secondary prevention of stroke; start statin  -A1C goal <6.5% for secondary prevention of stroke  -load ASA 325mg and Plavix 300mg once

## 2024-10-29 NOTE — ED PROVIDER NOTES
EMERGENCY MEDICINE PROVIDER NOTE    Patient Identification  Pt Name: Marv Chanel  MRN: [id number]  Birthdate 1970  Date of evaluation: 10/29/2024  Provider: KIRSTEN MOREAU DO  PCP: Suhail Lujan APRN - CNP    Chief Complaint  Headache (Stabbing headache behind right eye, no vision on left eye, started 3pm, headache started Sunday)      HPI  (History provided by patient)  This is a 54 y.o. male who was brought in by self for stabbing headache pain behind the right eye and left lateral vision loss that started at 3 PM today.  The headache started on Sunday.  The vision loss started at 3 PM today.  He denies any head trauma.  Patient states he does not have a history of migraines.  He tells me he also has polycythemia vera.  He denies any chest pain or shortness of breath.  He does have tingling of his left hand but tells me that he has neuropathy.  The headache is moderate in intensity.    I have reviewed the following nursing documentation:  Allergies: Penicillins    Past medical history:   Past Medical History:   Diagnosis Date    Abscess 1994    mediastinal, developed after kenalog injections    Anesthesia     AWAKE BUT UNABLE TO MOVE DURING SHOULDER SURGERY.     CAD (coronary artery disease) 03/2016    Diabetes mellitus (HCC) 2001    HgA1C 10.6    Gout     HLD (hyperlipidemia)     HTN (hypertension)     Echo 2013 normal.     Major depressive disorder, recurrent episode, moderate (HCC) 05/25/2023    MDRO (multiple drug resistant organisms) resistance     MRSA (methicillin resistant staph aureus) culture positive 04/19/2017    Obesity     Pancreatitis 2006    high TG or byetta. flank ecchymosis.    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 after kenalog injections. drained. iv abx.     Past surgical history:   Past Surgical

## 2024-10-29 NOTE — TELEPHONE ENCOUNTER
Pt called in asking about his Dexcom 7 can you please send that over to [facility], I just scanned it into media for him.  He stated that this has been approved and they can fill it for him now.  Franci put the script in for the Shilo 3, which he wants to keep, but it will take time for the PA to go through, but he needs something in the mean time, so can you please send over the one for the Dexcom 7 and resend the Shilo 3 to [employer]gies as well. He has to use them for insurance purposes.         [facility] - [place], [place] - [address], [address] - P [phone] - F [phone]  [address], [address], [facility] [address]  Phone: [phone]  Fax: [phone]

## 2024-10-30 ENCOUNTER — APPOINTMENT (OUTPATIENT)
Age: 54
End: 2024-10-30
Attending: INTERNAL MEDICINE
Payer: COMMERCIAL

## 2024-10-30 ENCOUNTER — APPOINTMENT (OUTPATIENT)
Dept: VASCULAR LAB | Age: 54
End: 2024-10-30
Attending: INTERNAL MEDICINE
Payer: COMMERCIAL

## 2024-10-30 ENCOUNTER — CARE COORDINATION (OUTPATIENT)
Dept: OTHER | Facility: CLINIC | Age: 54
End: 2024-10-30

## 2024-10-30 PROBLEM — I63.9 ACUTE CVA (CEREBROVASCULAR ACCIDENT) (HCC): Status: ACTIVE | Noted: 2024-10-30

## 2024-10-30 LAB
ALBUMIN SERPL-MCNC: 4.1 G/DL (ref 3.4–5)
AMPHETAMINES UR QL SCN>1000 NG/ML: ABNORMAL
ANION GAP SERPL CALCULATED.3IONS-SCNC: 8 MMOL/L (ref 3–16)
BARBITURATES UR QL SCN>200 NG/ML: ABNORMAL
BASOPHILS # BLD: 0.1 K/UL (ref 0–0.2)
BASOPHILS NFR BLD: 0.9 %
BENZODIAZ UR QL SCN>200 NG/ML: ABNORMAL
BUN SERPL-MCNC: 18 MG/DL (ref 7–20)
C3 SERPL-MCNC: 171 MG/DL (ref 90–180)
C4 SERPL-MCNC: 44.4 MG/DL (ref 10–40)
CALCIUM SERPL-MCNC: 9 MG/DL (ref 8.3–10.6)
CANNABINOIDS UR QL SCN>50 NG/ML: POSITIVE
CHLORIDE SERPL-SCNC: 100 MMOL/L (ref 99–110)
CHOLEST SERPL-MCNC: 94 MG/DL (ref 0–199)
CO2 SERPL-SCNC: 28 MMOL/L (ref 21–32)
COCAINE UR QL SCN: ABNORMAL
CREAT SERPL-MCNC: 1 MG/DL (ref 0.9–1.3)
CRP SERPL-MCNC: <3 MG/L (ref 0–5.1)
DEPRECATED RDW RBC AUTO: 14.4 % (ref 12.4–15.4)
DRUG SCREEN COMMENT UR-IMP: ABNORMAL
ECHO AO ARCH DIAM: 2.7 CM
ECHO AO ASC DIAM: 3.3 CM
ECHO AO ASCENDING AORTA INDEX: 1.42 CM/M2
ECHO AO ROOT DIAM: 3.4 CM
ECHO AO ROOT INDEX: 1.46 CM/M2
ECHO AV AREA PEAK VELOCITY: 3.4 CM2
ECHO AV AREA VTI: 3.8 CM2
ECHO AV AREA/BSA PEAK VELOCITY: 1.5 CM2/M2
ECHO AV AREA/BSA VTI: 1.6 CM2/M2
ECHO AV MEAN GRADIENT: 3 MMHG
ECHO AV MEAN VELOCITY: 0.8 M/S
ECHO AV PEAK GRADIENT: 6 MMHG
ECHO AV PEAK VELOCITY: 1.2 M/S
ECHO AV VELOCITY RATIO: 0.92
ECHO AV VTI: 20.9 CM
ECHO BSA: 2.39 M2
ECHO IVC PROX: 1.3 CM
ECHO LA AREA 2C: 15.7 CM2
ECHO LA AREA 4C: 19.7 CM2
ECHO LA DIAMETER INDEX: 1.55 CM/M2
ECHO LA DIAMETER: 3.6 CM
ECHO LA MAJOR AXIS: 5.7 CM
ECHO LA MINOR AXIS: 4.9 CM
ECHO LA TO AORTIC ROOT RATIO: 1.06
ECHO LA VOL BP: 51 ML (ref 18–58)
ECHO LA VOL MOD A2C: 41 ML (ref 18–58)
ECHO LA VOL MOD A4C: 55 ML (ref 18–58)
ECHO LA VOL/BSA BIPLANE: 22 ML/M2 (ref 16–34)
ECHO LA VOLUME INDEX MOD A2C: 18 ML/M2 (ref 16–34)
ECHO LA VOLUME INDEX MOD A4C: 24 ML/M2 (ref 16–34)
ECHO LV E' LATERAL VELOCITY: 13.4 CM/S
ECHO LV E' SEPTAL VELOCITY: 6.9 CM/S
ECHO LV EDV A2C: 114 ML
ECHO LV EDV A4C: 110 ML
ECHO LV EDV INDEX A4C: 47 ML/M2
ECHO LV EDV NDEX A2C: 49 ML/M2
ECHO LV EJECTION FRACTION A2C: 57 %
ECHO LV EJECTION FRACTION A4C: 62 %
ECHO LV EJECTION FRACTION BIPLANE: 57 % (ref 55–100)
ECHO LV ESV A2C: 49 ML
ECHO LV ESV A4C: 42 ML
ECHO LV ESV INDEX A2C: 21 ML/M2
ECHO LV ESV INDEX A4C: 18 ML/M2
ECHO LV FRACTIONAL SHORTENING: 32 % (ref 28–44)
ECHO LV INTERNAL DIMENSION DIASTOLE INDEX: 1.59 CM/M2
ECHO LV INTERNAL DIMENSION DIASTOLIC: 3.7 CM (ref 4.2–5.9)
ECHO LV INTERNAL DIMENSION SYSTOLIC INDEX: 1.07 CM/M2
ECHO LV INTERNAL DIMENSION SYSTOLIC: 2.5 CM
ECHO LV IVSD: 1.1 CM (ref 0.6–1)
ECHO LV MASS 2D: 120.8 G (ref 88–224)
ECHO LV MASS INDEX 2D: 51.8 G/M2 (ref 49–115)
ECHO LV POSTERIOR WALL DIASTOLIC: 1 CM (ref 0.6–1)
ECHO LV RELATIVE WALL THICKNESS RATIO: 0.54
ECHO LVOT AREA: 3.8 CM2
ECHO LVOT AV VTI INDEX: 1
ECHO LVOT DIAM: 2.2 CM
ECHO LVOT MEAN GRADIENT: 2 MMHG
ECHO LVOT PEAK GRADIENT: 5 MMHG
ECHO LVOT PEAK VELOCITY: 1.1 M/S
ECHO LVOT STROKE VOLUME INDEX: 34.1 ML/M2
ECHO LVOT SV: 79.4 ML
ECHO LVOT VTI: 20.9 CM
ECHO MV A VELOCITY: 0.65 M/S
ECHO MV E VELOCITY: 0.8 M/S
ECHO MV E/A RATIO: 1.23
ECHO MV E/E' LATERAL: 5.97
ECHO MV E/E' RATIO (AVERAGED): 8.78
ECHO MV E/E' SEPTAL: 11.59
ECHO PULMONARY ARTERY END DIASTOLIC PRESSURE: 3 MMHG
ECHO PULMONARY ARTERY SYSTOLIC PRESSURE (PASP): 22 MMHG
ECHO PV MAX VELOCITY: 1.1 M/S
ECHO PV MEAN GRADIENT: 2 MMHG
ECHO PV MEAN VELOCITY: 0.7 M/S
ECHO PV PEAK GRADIENT: 5 MMHG
ECHO PV REGURGITANT MAX VELOCITY: 0.8 M/S
ECHO PV VTI: 19.5 CM
ECHO RA AREA 4C: 16.1 CM2
ECHO RA END SYSTOLIC VOLUME APICAL 4 CHAMBER INDEX BSA: 18 ML/M2
ECHO RA VOLUME: 43 ML
ECHO RV BASAL DIMENSION: 4 CM
ECHO RV FREE WALL PEAK S': 16 CM/S
ECHO RV LONGITUDINAL DIMENSION: 8 CM
ECHO RV MID DIMENSION: 3.5 CM
ECHO RV TAPSE: 1.9 CM (ref 1.7–?)
ECHO TV REGURGITANT MAX VELOCITY: 2.15 M/S
ECHO TV REGURGITANT PEAK GRADIENT: 18 MMHG
EOSINOPHIL # BLD: 0.1 K/UL (ref 0–0.6)
EOSINOPHIL NFR BLD: 1.5 %
ERYTHROCYTE [SEDIMENTATION RATE] IN BLOOD BY WESTERGREN METHOD: 10 MM/HR (ref 0–20)
EST. AVERAGE GLUCOSE BLD GHB EST-MCNC: 154.2 MG/DL
FENTANYL SCREEN, URINE: POSITIVE
FLUAV RNA RESP QL NAA+PROBE: NOT DETECTED
FLUBV RNA RESP QL NAA+PROBE: NOT DETECTED
GFR SERPLBLD CREATININE-BSD FMLA CKD-EPI: 89 ML/MIN/{1.73_M2}
GLUCOSE BLD-MCNC: 129 MG/DL (ref 70–99)
GLUCOSE BLD-MCNC: 136 MG/DL (ref 70–99)
GLUCOSE BLD-MCNC: 139 MG/DL (ref 70–99)
GLUCOSE BLD-MCNC: 139 MG/DL (ref 70–99)
GLUCOSE BLD-MCNC: 141 MG/DL (ref 70–99)
GLUCOSE BLD-MCNC: 156 MG/DL (ref 70–99)
GLUCOSE BLD-MCNC: 166 MG/DL (ref 70–99)
GLUCOSE BLD-MCNC: 91 MG/DL (ref 70–99)
GLUCOSE SERPL-MCNC: 121 MG/DL (ref 70–99)
HBA1C MFR BLD: 7 %
HCT VFR BLD AUTO: 49.1 % (ref 40.5–52.5)
HDLC SERPL-MCNC: 38 MG/DL (ref 40–60)
HGB BLD-MCNC: 16.4 G/DL (ref 13.5–17.5)
IGA SERPL-MCNC: 429 MG/DL (ref 70–400)
IGG SERPL-MCNC: 1129 MG/DL (ref 700–1600)
IGM SERPL-MCNC: 100 MG/DL (ref 40–230)
LDLC SERPL CALC-MCNC: 18 MG/DL
LYMPHOCYTES # BLD: 2.2 K/UL (ref 1–5.1)
LYMPHOCYTES NFR BLD: 25.9 %
MAGNESIUM SERPL-MCNC: 2 MG/DL (ref 1.8–2.4)
MAGNESIUM SERPL-MCNC: 2.2 MG/DL (ref 1.8–2.4)
MCH RBC QN AUTO: 28.5 PG (ref 26–34)
MCHC RBC AUTO-ENTMCNC: 33.5 G/DL (ref 31–36)
MCV RBC AUTO: 85.2 FL (ref 80–100)
METHADONE UR QL SCN>300 NG/ML: ABNORMAL
MONOCYTES # BLD: 0.6 K/UL (ref 0–1.3)
MONOCYTES NFR BLD: 7.6 %
NEUTROPHILS # BLD: 5.4 K/UL (ref 1.7–7.7)
NEUTROPHILS NFR BLD: 64.1 %
OPIATES UR QL SCN>300 NG/ML: POSITIVE
OXYCODONE UR QL SCN: ABNORMAL
PCP UR QL SCN>25 NG/ML: ABNORMAL
PERFORMED ON: ABNORMAL
PERFORMED ON: NORMAL
PH UR STRIP: 5 [PH]
PHOSPHATE SERPL-MCNC: 3.3 MG/DL (ref 2.5–4.9)
PLATELET # BLD AUTO: 212 K/UL (ref 135–450)
PMV BLD AUTO: 7.7 FL (ref 5–10.5)
POTASSIUM SERPL-SCNC: 4.1 MMOL/L (ref 3.5–5.1)
PROT UR-MCNC: 0.01 G/DL
PROT UR-MCNC: 12.3 MG/DL
RBC # BLD AUTO: 5.76 M/UL (ref 4.2–5.9)
SARS-COV-2 RNA RESP QL NAA+PROBE: NOT DETECTED
SODIUM SERPL-SCNC: 136 MMOL/L (ref 136–145)
TESTOST SERPL-MCNC: 233 NG/DL (ref 193–740)
TRIGL SERPL-MCNC: 189 MG/DL (ref 0–150)
VLDLC SERPL CALC-MCNC: 38 MG/DL
WBC # BLD AUTO: 8.5 K/UL (ref 4–11)

## 2024-10-30 PROCEDURE — 6370000000 HC RX 637 (ALT 250 FOR IP): Performed by: NURSE PRACTITIONER

## 2024-10-30 PROCEDURE — 99291 CRITICAL CARE FIRST HOUR: CPT | Performed by: INTERNAL MEDICINE

## 2024-10-30 PROCEDURE — 6360000002 HC RX W HCPCS

## 2024-10-30 PROCEDURE — 86146 BETA-2 GLYCOPROTEIN ANTIBODY: CPT

## 2024-10-30 PROCEDURE — 85025 COMPLETE CBC W/AUTO DIFF WBC: CPT

## 2024-10-30 PROCEDURE — 86036 ANCA SCREEN EACH ANTIBODY: CPT

## 2024-10-30 PROCEDURE — 6370000000 HC RX 637 (ALT 250 FOR IP)

## 2024-10-30 PROCEDURE — 87636 SARSCOV2 & INF A&B AMP PRB: CPT

## 2024-10-30 PROCEDURE — 86160 COMPLEMENT ANTIGEN: CPT

## 2024-10-30 PROCEDURE — 80069 RENAL FUNCTION PANEL: CPT

## 2024-10-30 PROCEDURE — 85610 PROTHROMBIN TIME: CPT

## 2024-10-30 PROCEDURE — 93306 TTE W/DOPPLER COMPLETE: CPT | Performed by: INTERNAL MEDICINE

## 2024-10-30 PROCEDURE — APPNB45 APP NON BILLABLE 31-45 MINUTES: Performed by: NURSE PRACTITIONER

## 2024-10-30 PROCEDURE — 84166 PROTEIN E-PHORESIS/URINE/CSF: CPT

## 2024-10-30 PROCEDURE — 86140 C-REACTIVE PROTEIN: CPT

## 2024-10-30 PROCEDURE — 99233 SBSQ HOSP IP/OBS HIGH 50: CPT | Performed by: PSYCHIATRY & NEUROLOGY

## 2024-10-30 PROCEDURE — 82784 ASSAY IGA/IGD/IGG/IGM EACH: CPT

## 2024-10-30 PROCEDURE — 6360000004 HC RX CONTRAST MEDICATION: Performed by: STUDENT IN AN ORGANIZED HEALTH CARE EDUCATION/TRAINING PROGRAM

## 2024-10-30 PROCEDURE — 82595 ASSAY OF CRYOGLOBULIN: CPT

## 2024-10-30 PROCEDURE — 36415 COLL VENOUS BLD VENIPUNCTURE: CPT

## 2024-10-30 PROCEDURE — 85730 THROMBOPLASTIN TIME PARTIAL: CPT

## 2024-10-30 PROCEDURE — 83735 ASSAY OF MAGNESIUM: CPT

## 2024-10-30 PROCEDURE — 80061 LIPID PANEL: CPT

## 2024-10-30 PROCEDURE — 80307 DRUG TEST PRSMV CHEM ANLYZR: CPT

## 2024-10-30 PROCEDURE — 6360000002 HC RX W HCPCS: Performed by: NURSE PRACTITIONER

## 2024-10-30 PROCEDURE — 85652 RBC SED RATE AUTOMATED: CPT

## 2024-10-30 PROCEDURE — 83036 HEMOGLOBIN GLYCOSYLATED A1C: CPT

## 2024-10-30 PROCEDURE — 83516 IMMUNOASSAY NONANTIBODY: CPT

## 2024-10-30 PROCEDURE — 86038 ANTINUCLEAR ANTIBODIES: CPT

## 2024-10-30 PROCEDURE — 85613 RUSSELL VIPER VENOM DILUTED: CPT

## 2024-10-30 PROCEDURE — 84155 ASSAY OF PROTEIN SERUM: CPT

## 2024-10-30 PROCEDURE — 2000000000 HC ICU R&B

## 2024-10-30 PROCEDURE — 2580000003 HC RX 258

## 2024-10-30 PROCEDURE — 87040 BLOOD CULTURE FOR BACTERIA: CPT

## 2024-10-30 PROCEDURE — 84156 ASSAY OF PROTEIN URINE: CPT

## 2024-10-30 PROCEDURE — C8929 TTE W OR WO FOL WCON,DOPPLER: HCPCS

## 2024-10-30 PROCEDURE — 84165 PROTEIN E-PHORESIS SERUM: CPT

## 2024-10-30 PROCEDURE — 86147 CARDIOLIPIN ANTIBODY EA IG: CPT

## 2024-10-30 PROCEDURE — 93882 EXTRACRANIAL UNI/LTD STUDY: CPT

## 2024-10-30 RX ORDER — INSULIN LISPRO 100 [IU]/ML
0-16 INJECTION, SOLUTION INTRAVENOUS; SUBCUTANEOUS
Status: DISCONTINUED | OUTPATIENT
Start: 2024-10-30 | End: 2024-11-02 | Stop reason: HOSPADM

## 2024-10-30 RX ORDER — GLUCAGON 1 MG/ML
1 KIT INJECTION PRN
Status: DISCONTINUED | OUTPATIENT
Start: 2024-10-30 | End: 2024-11-02 | Stop reason: HOSPADM

## 2024-10-30 RX ORDER — OXYCODONE HYDROCHLORIDE 5 MG/1
5 TABLET ORAL EVERY 4 HOURS PRN
Status: DISCONTINUED | OUTPATIENT
Start: 2024-10-30 | End: 2024-11-02 | Stop reason: HOSPADM

## 2024-10-30 RX ORDER — HYDROMORPHONE HYDROCHLORIDE 1 MG/ML
0.5 INJECTION, SOLUTION INTRAMUSCULAR; INTRAVENOUS; SUBCUTANEOUS EVERY 4 HOURS PRN
Status: DISCONTINUED | OUTPATIENT
Start: 2024-10-30 | End: 2024-11-02 | Stop reason: HOSPADM

## 2024-10-30 RX ORDER — PROCHLORPERAZINE EDISYLATE 5 MG/ML
10 INJECTION INTRAMUSCULAR; INTRAVENOUS EVERY 6 HOURS PRN
Status: DISCONTINUED | OUTPATIENT
Start: 2024-10-30 | End: 2024-11-02 | Stop reason: HOSPADM

## 2024-10-30 RX ORDER — ATORVASTATIN CALCIUM 40 MG/1
40 TABLET, FILM COATED ORAL NIGHTLY
Status: DISCONTINUED | OUTPATIENT
Start: 2024-10-30 | End: 2024-11-02 | Stop reason: HOSPADM

## 2024-10-30 RX ORDER — COLCHICINE 0.6 MG/1
0.6 TABLET ORAL DAILY PRN
Status: DISCONTINUED | OUTPATIENT
Start: 2024-10-30 | End: 2024-10-30

## 2024-10-30 RX ORDER — INSULIN LISPRO 100 [IU]/ML
0-8 INJECTION, SOLUTION INTRAVENOUS; SUBCUTANEOUS
Status: DISCONTINUED | OUTPATIENT
Start: 2024-10-30 | End: 2024-10-30

## 2024-10-30 RX ORDER — PANTOPRAZOLE SODIUM 40 MG/1
40 TABLET, DELAYED RELEASE ORAL
Status: DISCONTINUED | OUTPATIENT
Start: 2024-10-31 | End: 2024-11-02 | Stop reason: HOSPADM

## 2024-10-30 RX ORDER — DEXTROSE MONOHYDRATE 100 MG/ML
INJECTION, SOLUTION INTRAVENOUS CONTINUOUS PRN
Status: DISCONTINUED | OUTPATIENT
Start: 2024-10-30 | End: 2024-11-02 | Stop reason: HOSPADM

## 2024-10-30 RX ORDER — HYDROMORPHONE HYDROCHLORIDE 1 MG/ML
0.5 INJECTION, SOLUTION INTRAMUSCULAR; INTRAVENOUS; SUBCUTANEOUS ONCE
Status: COMPLETED | OUTPATIENT
Start: 2024-10-30 | End: 2024-10-30

## 2024-10-30 RX ORDER — ALLOPURINOL 100 MG/1
200 TABLET ORAL DAILY
Status: DISCONTINUED | OUTPATIENT
Start: 2024-10-30 | End: 2024-11-02 | Stop reason: HOSPADM

## 2024-10-30 RX ORDER — VERAPAMIL HYDROCHLORIDE 80 MG/1
80 TABLET ORAL EVERY 8 HOURS SCHEDULED
Status: DISCONTINUED | OUTPATIENT
Start: 2024-10-30 | End: 2024-10-31

## 2024-10-30 RX ORDER — LOSARTAN POTASSIUM 25 MG/1
25 TABLET ORAL DAILY
Status: DISCONTINUED | OUTPATIENT
Start: 2024-10-30 | End: 2024-11-02 | Stop reason: HOSPADM

## 2024-10-30 RX ORDER — OXYCODONE HYDROCHLORIDE 5 MG/1
10 TABLET ORAL EVERY 4 HOURS PRN
Status: DISCONTINUED | OUTPATIENT
Start: 2024-10-30 | End: 2024-11-02 | Stop reason: HOSPADM

## 2024-10-30 RX ADMIN — OXYCODONE 10 MG: 5 TABLET ORAL at 08:27

## 2024-10-30 RX ADMIN — PERFLUTREN 1.5 ML: 6.52 INJECTION, SUSPENSION INTRAVENOUS at 15:54

## 2024-10-30 RX ADMIN — PROCHLORPERAZINE EDISYLATE 10 MG: 5 INJECTION INTRAMUSCULAR; INTRAVENOUS at 23:21

## 2024-10-30 RX ADMIN — VERAPAMIL HYDROCHLORIDE 80 MG: 80 TABLET ORAL at 22:36

## 2024-10-30 RX ADMIN — ENOXAPARIN SODIUM 30 MG: 100 INJECTION SUBCUTANEOUS at 08:31

## 2024-10-30 RX ADMIN — HYDROMORPHONE HYDROCHLORIDE 0.5 MG: 1 INJECTION, SOLUTION INTRAMUSCULAR; INTRAVENOUS; SUBCUTANEOUS at 00:17

## 2024-10-30 RX ADMIN — HYDROMORPHONE HYDROCHLORIDE 0.5 MG: 1 INJECTION, SOLUTION INTRAMUSCULAR; INTRAVENOUS; SUBCUTANEOUS at 05:07

## 2024-10-30 RX ADMIN — ALLOPURINOL 200 MG: 100 TABLET ORAL at 08:27

## 2024-10-30 RX ADMIN — OXYCODONE 10 MG: 5 TABLET ORAL at 04:02

## 2024-10-30 RX ADMIN — HYDROMORPHONE HYDROCHLORIDE 0.5 MG: 1 INJECTION, SOLUTION INTRAMUSCULAR; INTRAVENOUS; SUBCUTANEOUS at 22:13

## 2024-10-30 RX ADMIN — VERAPAMIL HYDROCHLORIDE 80 MG: 80 TABLET ORAL at 11:19

## 2024-10-30 RX ADMIN — SODIUM CHLORIDE, PRESERVATIVE FREE 10 ML: 5 INJECTION INTRAVENOUS at 20:58

## 2024-10-30 RX ADMIN — OXYCODONE 10 MG: 5 TABLET ORAL at 18:31

## 2024-10-30 RX ADMIN — ONDANSETRON 4 MG: 2 INJECTION INTRAMUSCULAR; INTRAVENOUS at 22:19

## 2024-10-30 RX ADMIN — HYDROMORPHONE HYDROCHLORIDE 0.5 MG: 1 INJECTION, SOLUTION INTRAMUSCULAR; INTRAVENOUS; SUBCUTANEOUS at 14:49

## 2024-10-30 RX ADMIN — ACETAMINOPHEN 650 MG: 325 TABLET ORAL at 08:27

## 2024-10-30 RX ADMIN — ONDANSETRON 4 MG: 2 INJECTION INTRAMUSCULAR; INTRAVENOUS at 14:49

## 2024-10-30 RX ADMIN — ACETAMINOPHEN 650 MG: 325 TABLET ORAL at 21:07

## 2024-10-30 RX ADMIN — ATORVASTATIN CALCIUM 40 MG: 40 TABLET, FILM COATED ORAL at 20:57

## 2024-10-30 RX ADMIN — ONDANSETRON 4 MG: 2 INJECTION INTRAMUSCULAR; INTRAVENOUS at 05:14

## 2024-10-30 RX ADMIN — SODIUM CHLORIDE, PRESERVATIVE FREE 10 ML: 5 INJECTION INTRAVENOUS at 08:28

## 2024-10-30 RX ADMIN — PROCHLORPERAZINE EDISYLATE 10 MG: 5 INJECTION INTRAMUSCULAR; INTRAVENOUS at 08:38

## 2024-10-30 RX ADMIN — ACETAMINOPHEN 650 MG: 325 TABLET ORAL at 14:49

## 2024-10-30 ASSESSMENT — PAIN DESCRIPTION - DESCRIPTORS
DESCRIPTORS: THROBBING
DESCRIPTORS: ACHING
DESCRIPTORS: ACHING;THROBBING
DESCRIPTORS: ACHING;STABBING;PRESSURE
DESCRIPTORS: ACHING;THROBBING

## 2024-10-30 ASSESSMENT — PAIN DESCRIPTION - LOCATION
LOCATION: HEAD

## 2024-10-30 ASSESSMENT — PAIN DESCRIPTION - ORIENTATION
ORIENTATION: RIGHT
ORIENTATION: RIGHT;ANTERIOR;POSTERIOR
ORIENTATION: RIGHT;POSTERIOR;ANTERIOR
ORIENTATION: RIGHT
ORIENTATION: RIGHT
ORIENTATION: RIGHT;ANTERIOR;POSTERIOR
ORIENTATION: RIGHT
ORIENTATION: RIGHT

## 2024-10-30 ASSESSMENT — PAIN SCALES - GENERAL
PAINLEVEL_OUTOF10: 1
PAINLEVEL_OUTOF10: 7
PAINLEVEL_OUTOF10: 7
PAINLEVEL_OUTOF10: 2
PAINLEVEL_OUTOF10: 3
PAINLEVEL_OUTOF10: 1
PAINLEVEL_OUTOF10: 7
PAINLEVEL_OUTOF10: 7
PAINLEVEL_OUTOF10: 4
PAINLEVEL_OUTOF10: 4
PAINLEVEL_OUTOF10: 7

## 2024-10-30 NOTE — CONSULTS
ICU CONSULT       PCP:  Suhail Lujan APRN - CNP          Admit Date:  10/29/2024                            Hospital Day:1  ICU Day: 1      CC: vision changes  Reason for consult: stroke  History obtained from:  chart review and the patient    SUBJECTIVE   HPI:    Mr. Marv Chanel is a 54 y.o. male with a medical hx significant for DM2 (A1C 7.1 10/2024), hypogonadism, HTN, CAD (s/p CABG x4 2016), otherwise as listed in the MHx table below, who presented from [facility] to the ICU on 10/29 with severe headache and left lateral vision loss.   Patient describes feeling unwell after his COVID vaccination in January 2024, which has persisted for several months.  He describes waking up almost every morning with general malaise and myalgias, and has not been able to sleep well.  For this he has been away on unpaid leave from his job as an  at [employer].  For sleep he uses marijuana Gummies approximately 3 times per week, at took a dose on Saturday 10/26 around 9 PM.  He woke up the following morning and had the sudden onset of a severe headache over his right orbit and right side of the upper face while watching telivision.  This did not improve with ephedrine, and had slight worsening with tilting of the head.  No accompanying vomiting or visual changes initially.  On 10/29 he describes a sudden onset of decreased left eye visual acuity in the lateral visual field.  He had no accompanying motor or extremity sensory changes.  He has never had such visual changes in the past, but did have a similar headache in 2008 which went away without treatment.      ED Course:  On arrival to the [facility] ED, VS: /101, T 36.9, HR 72, O2 99% RA. NIHSS 2 on arrival.   Labs were significant for: HgB 17.7, Plts 260, Glucose 157,   Imaging: CTA @1617: No acute intracranial abnormality. No aneurysm or hemodynamically significant stenosis of the major arteries of the head and neck.  MRI brain w/o

## 2024-10-30 NOTE — H&P
ICU HISTORY AND PHYSICAL       Admit Date:  10/29/2024                            Hospital Day:1  ICU Day: 1      CC: Visual field deficit    History obtained from:  chart review and the patient    SUBJECTIVE   HPI:    Mr. Marv Chanel is a 54 y.o. male with a medical hx significant for DM2 (A1C 7.1 10/2024), hypogonadism, HTN, CAD (s/p CABG x4 2016), otherwise as listed in the MHx table below, who presented from [facility] to the ICU on 10/29 with severe headache and left lateral vision loss.   Patient describes feeling unwell after his COVID vaccination in January 2024, which has persisted for several months.  He describes waking up almost every morning with general malaise and myalgias, and has not been able to sleep well.  For this he has been away on unpaid leave from his job as an  at [employer].  For sleep he uses marijuana Gummies approximately 3 times per week, at took a dose on Saturday 10/26 around 9 PM.  He woke up the following morning and had the sudden onset of a severe headache over his right orbit and right side of the upper face while watching telivision.  This did not improve with ephedrine, and had slight worsening with tilting of the head.  No accompanying vomiting or visual changes initially.  On 10/29 he describes a sudden onset of decreased left eye visual acuity in the lateral visual field.  He had no accompanying motor or extremity sensory changes.  He has never had such visual changes in the past, but did have a similar headache in 2008 which went away without treatment.     ED Course:  On arrival to the [facility] ED, VS: /101, T 36.9, HR 72, O2 99% RA. NIHSS 2 on arrival.   Labs were significant for: HgB 17.7, Plts 260, Glucose 157,   Imaging: CTA @1617: No acute intracranial abnormality. No aneurysm or hemodynamically significant stenosis of the major arteries of the head and neck.  MRI brain w/o contrast @1708:  Small acute or subacute infarct in  the right occipital lobe. Right posterior cerebral artery occlusion.     While in the ED, he/she received aspirin loading dose 325mg and plavix, attempt to control headache with morphine and compazine were unsuccessful      Past Medical History:   Diagnosis Date    Abscess 1994    mediastinal, developed after kenalog injections    Acute CVA (cerebrovascular accident) (HCC) 10/30/2024    Anesthesia     AWAKE BUT UNABLE TO MOVE DURING SHOULDER SURGERY.     CAD (coronary artery disease) 03/2016    Diabetes mellitus (HCC) 2001    HgA1C 10.6    Gout     HLD (hyperlipidemia)     HTN (hypertension)     Echo 2013 normal.     Major depressive disorder, recurrent episode, moderate (HCC) 05/25/2023    MDRO (multiple drug resistant organisms) resistance     MRSA (methicillin resistant staph aureus) culture positive 04/19/2017    Obesity     Pancreatitis 2006    high TG or byetta. flank ecchymosis.    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 after kenalog injections. drained. iv abx.       Past Surgical History:   Procedure Laterality Date    ADENOIDECTOMY  1970    COLONOSCOPY N/A 03/19/2021    COLONOSCOPY DIAGNOSTIC performed by Dmitry Coley MD at [facility] ENDOSCOPY    CORONARY ARTERY BYPASS GRAFT  03/08/2016    [id number] (Julien)    DIAGNOSTIC CARDIAC CATH LAB PROCEDURE      ELBOW SURGERY Right     elbow reconstruction    ENDOSCOPY, COLON, DIAGNOSTIC      IR AIR ABS HEMATOMA BULLA CYST  07/31/2020    IR AIR ABS HEMATOMA BULLA CYST 7/31/2020 [facility] SPECIAL PROCEDURES    KNEE SURGERY Left 1985    menicus tear    LUMBAR LAMINECTOMY  08/10/2017    L4-5 microhemilaminectomy; excision of cyst    LUMBAR SPINE SURGERY Right 07/21/2020    RIGHT LUMBAR4-LUMBAR5 MICRO HEMILAMINECTOMY AND CYSTECTOMY performed by Darshan Chamorro MD at [facility] OR    MEDIASTINOSCOPY  1994

## 2024-10-30 NOTE — CARE COORDINATION
Case Management Assessment  Initial Evaluation    Date/Time of Evaluation: 10/30/2024 12:43 PM  Assessment Completed by: Janice Morris RN    If patient is discharged prior to next notation, then this note serves as note for discharge by case management.    Patient Name: Marv Chanel                   YOB: 1970  Diagnosis: Vision changes [H53.9]  Acute CVA (cerebrovascular accident) (HCC) [I63.9]                   Date / Time: 10/29/2024 10:41 PM    Patient Admission Status: Inpatient   Readmission Risk (Low < 19, Mod (19-27), High > 27): Readmission Risk Score: 9.4    Current PCP: Suhail Lujan APRN - CNP  PCP verified by CM? Yes    Chart Reviewed: Yes      History Provided by: Patient  Patient Orientation: Alert and Oriented, Person, Place, Situation    Patient Cognition: Alert    Hospitalization in the last 30 days (Readmission):  No    If yes, Readmission Assessment in CM Navigator will be completed.    Advance Directives:      Code Status: Full Code   Patient's Primary Decision Maker is: Legal Next of Kin      Discharge Planning:    Patient lives with: Spouse/Significant Other Type of Home: House  Primary Care Giver: Self  Patient Support Systems include: Spouse/Significant Other, Family Members   Current Financial resources:    Current community resources:    Current services prior to admission: None            Current DME:              Type of Home Care services:  None    ADLS  Prior functional level: Independent in ADLs/IADLs  Current functional level: Assistance with the following:, Housework, Cooking, Shopping    PT AM-PAC:   /24  OT AM-PAC:   /24    Family can provide assistance at DC: Yes  Would you like Case Management to discuss the discharge plan with any other family members/significant others, and if so, who? Yes (wife)  Plans to Return to Present Housing: Yes  Other Identified Issues/Barriers to RETURNING to current housing: headache  Potential Assistance needed at  discharge: N/A            Potential DME:    Patient expects to discharge to: House  Plan for transportation at discharge:      Financial    Payor: [employer] / Plan: [employer] / Product Type: *No Product type* /     Does insurance require precert for SNF: Yes    Potential assistance Purchasing Medications:    Meds-to-Beds request:        [facility] - [place], [place] - [address] - P [phone] - F [phone]  [address] [facility] [place] [address]  Phone: [phone] Fax: [phone]    [facility] - [place] - [address] - P [phone] - F [phone]  [address] [place] [address]  Phone: [phone] Fax: [phone]    [facility] - [facility] [address] [facility] [place], [address] - P [phone] - F [phone]  [address] [facility], [place], [address]  [place] [address]  Phone: [phone] Fax: [phone]      Notes:    Factors facilitating achievement of predicted outcomes: Family support, Cooperative, and Pleasant    Barriers to discharge: Impaired vision, Medical complications, and Medication managment    Additional Case Management Notes: Pt is from home with wife. Pt is IPTA. Plan for LP, and angiogram. Echo pending.     The Plan for Transition of Care is related to the following treatment goals of Vision changes [H53.9]  Acute CVA (cerebrovascular accident) (HCC) [I63.9]    IF APPLICABLE: The Patient and/or patient representative Marv and his family were provided with a choice of provider and agrees with the discharge plan. Freedom of choice list with basic dialogue that supports the patient's individualized plan of care/goals and shares the quality data associated with the providers was provided to:     Patient Representative Name:       The Patient and/or Patient Representative Agree with the Discharge Plan?      Janice Morris RN  Case Management Department-  Ph: [phone] Fax: [phone]

## 2024-10-30 NOTE — PROGRESS NOTES
4 Eyes Skin Assessment     NAME:  Marv Chanel  YOB: 1970  MEDICAL RECORD NUMBER:  [id number]    The patient is being assessed for  Admission    I agree that at least one RN has performed a thorough Head to Toe Skin Assessment on the patient. ALL assessment sites listed below have been assessed.      Areas assessed by both nurses:    Head, Face, Ears, Shoulders, Back, Chest, Arms, Elbows, Hands, Sacrum. Buttock, Coccyx, Ischium, Legs. Feet and Heels, and Under Medical Devices         Does the Patient have a Wound? No noted wound(s)  Old abrasion noted on sacrum.   Scattered abrasions noted on bilateral lower extremities.     Garfield Prevention initiated by RN: Yes  Wound Care Orders initiated by RN: No    Pressure Injury (Stage 3,4, Unstageable, DTI, NWPT, and Complex wounds) if present, place Wound referral order by RN under : No    New Ostomies, if present place, Ostomy referral order under : No     Nurse 1 eSignature: Electronically signed by ANABEL ORLANDO RN on 10/30/24 at 6:06 AM EDT    **SHARE this note so that the co-signing nurse can place an eSignature**    Nurse 2 eSignature: Electronically signed by Ruperto Jesus RN on 10/30/24 at 6:08 AM EDT

## 2024-10-30 NOTE — PLAN OF CARE
Problem: Chronic Conditions and Co-morbidities  Goal: Patient's chronic conditions and co-morbidity symptoms are monitored and maintained or improved  Outcome: Progressing  Flowsheets (Taken 10/29/2024 2245)  Care Plan - Patient's Chronic Conditions and Co-Morbidity Symptoms are Monitored and Maintained or Improved: Monitor and assess patient's chronic conditions and comorbid symptoms for stability, deterioration, or improvement     Problem: Discharge Planning  Goal: Discharge to home or other facility with appropriate resources  Outcome: Progressing  Flowsheets (Taken 10/29/2024 2245)  Discharge to home or other facility with appropriate resources: Identify barriers to discharge with patient and caregiver     Problem: Pain  Goal: Verbalizes/displays adequate comfort level or baseline comfort level  Outcome: Progressing  Flowsheets (Taken 10/30/2024 0400)  Verbalizes/displays adequate comfort level or baseline comfort level: Encourage patient to monitor pain and request assistance     Problem: Safety - Adult  Goal: Free from fall injury  Outcome: Progressing

## 2024-10-30 NOTE — PROGRESS NOTES
Patient alert and oriented x4 at handoff with nightshift CATHRYN Carter. Follows commands in all four extremities, pupils are equal are reactive. Neuro is intact, headache is patients only complaint at this time. Was endorsing peripheral vision loss but states it has improved since admission. No complaints of nausea at this time.   Hemodynamically stable.     Awaiting plan for today. Possible LP, ECHO, etc.

## 2024-10-30 NOTE — PLAN OF CARE
Problem: Chronic Conditions and Co-morbidities  Goal: Patient's chronic conditions and co-morbidity symptoms are monitored and maintained or improved  10/30/2024 0802 by Paco Trevino RN  Outcome: Progressing  Flowsheets (Taken 10/29/2024 2245 by Emma Murray RN)  Care Plan - Patient's Chronic Conditions and Co-Morbidity Symptoms are Monitored and Maintained or Improved: Monitor and assess patient's chronic conditions and comorbid symptoms for stability, deterioration, or improvement  10/30/2024 0600 by Emma Murray RN  Outcome: Progressing  Flowsheets (Taken 10/29/2024 2245)  Care Plan - Patient's Chronic Conditions and Co-Morbidity Symptoms are Monitored and Maintained or Improved: Monitor and assess patient's chronic conditions and comorbid symptoms for stability, deterioration, or improvement     Problem: Discharge Planning  Goal: Discharge to home or other facility with appropriate resources  10/30/2024 0802 by Paco Trevino RN  Outcome: Progressing  Flowsheets (Taken 10/29/2024 2245 by Emma Murray RN)  Discharge to home or other facility with appropriate resources: Identify barriers to discharge with patient and caregiver  10/30/2024 0600 by Emma Murray RN  Outcome: Progressing  Flowsheets (Taken 10/29/2024 2245)  Discharge to home or other facility with appropriate resources: Identify barriers to discharge with patient and caregiver     Problem: Pain  Goal: Verbalizes/displays adequate comfort level or baseline comfort level  10/30/2024 0802 by Paco Trevino RN  Outcome: Progressing  Flowsheets (Taken 10/30/2024 0400 by Emma Murray RN)  Verbalizes/displays adequate comfort level or baseline comfort level: Encourage patient to monitor pain and request assistance  10/30/2024 0600 by Emma Murray RN  Outcome: Progressing  Flowsheets (Taken 10/30/2024 0400)  Verbalizes/displays adequate comfort level or baseline comfort level: Encourage patient to monitor pain and request assistance      Problem: Safety - Adult  Goal: Free from fall injury  10/30/2024 0802 by Paco Trevino, RN  Outcome: Progressing  Flowsheets (Taken 10/30/2024 0802)  Free From Fall Injury: Instruct family/caregiver on patient safety  10/30/2024 0600 by Emma Murray, RN  Outcome: Progressing

## 2024-10-30 NOTE — CONSULTS
Neurocritical Care Consult Note      Patient: Marv Chanel MRN: [id number]    YOB: 1970  Age: 54 y.o.  Sex: male   [facility] [place]  Room/Bed: 4518/4518-01 Location: [facility]    Date of Consultation: 10/29/2024  Date of Admission: 10/29/2024 10:41 PM ( LOS: 0 days )  Primary Care Physician: Suhail Lujan, APRN - CNP   Consult Requested By: Gem Nicholas MD    Reason for Consult: visual chnages    IMPRESSION & RECOMMENDATIONS     IMPRESSION:  55 yo male with hx CVA (2024) CAD s/p CABG x4 (03/08/2016), DM type II, HLD, HTN who presented to outside ER with c/o intermittent thunderclap headache with associated sensory changes in LUE and L side of face.  Not considered a candidate for thrombolytic with Tenecteplase (TNK) due to DWI-FLAIR match on MRI brain. R PCA occlusion with resultant stroke possibly cardioembolic however, cannot fully exclude RCVS given his regular cosumption of THC gummies. Plan for possible Angio on thursday with Dr Valencia    RECOMMENDATIONS:  - Mg+ >2.2, replace as needed  - Vasculitis w/u  - Will need LP  - Q1h neuro checks  - Neurovascular consult  - permissive hypertension BP<220/120 for first 24 hours, then reduce by 20% daily with normotensive goal  -LDL goal <70 for secondary prevention of stroke; start statin  -A1C goal <6.5% for secondary prevention of stroke  - was loaded with ASA 325mg and Plavix 300mg in ED, hold for now for possible LP  - Echo with bubble study (Ordered)    Management and plan discussed with:   Bedside nurse  Dr. Ghulam Brandt, APRN - CNP   Neurocritical Care   10/29/2024 10:48 PM  [facility]: [facility] Neurocritical Care      History of Present Illness     Marv Chanel is a 54 y.o. y/o male with history significant for CVA (2024) CAD s/p CABG x4 (03/08/2016), DM type II, HLD, HTN, major depressive disorder,CKD, polycythemia, hypogonadism who presented to [facility]  with c/o

## 2024-10-30 NOTE — CARE COORDINATION
Care Transitions Chart Review      Patient: Marv Chanel    Patient : 1970   MRN: [id number]    Reason for Admission: CVA  Admission Date: 10/29/24              RURS: Readmission Risk Score: 11.6      Last Discharge Facility       Date Complaint Diagnosis Description Type Department Provider    10/29/24  Cerebrovascular accident (CVA), unspecified mechanism (HCC) Admission (Current) [facility] ICU Pola Haider MD    10/29/24 Headache Cerebrovascular accident (CVA) due to occlusion of right posterior cerebral artery (HCC) ... ED (TRANSFER) [facility] ED Tre Curran, DO     Patient admitted to an External Facility? No     Chart reviewed for Care Transition discharge assignment.  ACM will contact patient at discharge for Care Transition outreach.

## 2024-10-30 NOTE — PROGRESS NOTES
Pt arrived to [facility] from [place] via [facility]. Admitted ICU room 3588.   [facility] bath, linen and gown changed.   Pt oriented to room and call light. Standard safety precautions in place.   ICU residents and NCC NP made aware of pt arrival and to bedside.

## 2024-10-30 NOTE — PROGRESS NOTES
V2.0    [facility] [place] Note      Name:  Marv Chanel /Age/Sex: 1970  (54 y.o. male)   MRN & CSN:  [id number] & [id number] Encounter Date/Time: 10/30/2024 8:35 AM EDT   Location:  [facility]/4518-01 PCP: Suhail Lujan APRN - CNP     Attending:Pola Haider MD       Hospital Day: 2    Assessment and Recommendations   Marv Chanel is a 54 y.o. male with pmh of Type II DM, HTN, CAD who presents with Vision changes      #CVA due to R posterior cerebral artery occlusion  #Concern for RCVS  - Remains in ICU for intensive neuro monitoring  - Neuro consulted and following  - Neurovascular consult for possible angiogram   - Echocardiogram ordered   - q1h neuro checks  - Verapamil q8h    #Type II DM  - SSI  - Hypoglycemia monitoring/treatment while on insulin therapy per protocol    #CAD  #HTN  - goal  BP<220/120 for first 24 hours   - statin    Diet Diet NPO Exceptions are: Sips of Clear Liquids, Sips of Water with Meds   DVT Prophylaxis [] Lovenox, []  Heparin, [] SCDs, [] Ambulation,  [] Eliquis, [] Xarelto  [] Coumadin   Code Status Full Code   Disposition From: Home  Expected Disposition: TBD  Estimated Date of Discharge: 2+ days  Patient requires continued admission due to neuro eval   Surrogate Decision Maker/ DARRELL Cordova, spouse     Personally reviewed Lab Studies and Imaging     Subjective:     Chief Complaint: Vision deficit    Marv Chanel is a 54 y.o. male who presented with acute severe headache and left lateral vision loss. Found to have PCA occlusion and admitted for further care.    No acute changes reported overnight. Vitals stable. No change in symptoms.       Review of Systems:      Pertinent positives and negatives discussed in HPI    Objective:     Intake/Output Summary (Last 24 hours) at 10/30/2024 0835  Last data filed at 10/30/2024 0828  Gross per 24 hour   Intake 10 ml   Output 350 ml   Net -340 ml      Vitals:   Vitals:    10/30/24 0537 10/30/24 0600  differentiation is maintained.     No acute intracranial abnormality. No aneurysm or hemodynamically significant stenosis of the major arteries of the head and neck.       CBC:   Recent Labs     10/29/24  1600 10/30/24  0431   WBC 9.2 8.5   HGB 17.7* 16.4    212     BMP:    Recent Labs     10/29/24  1600 10/30/24  0431    136   K 4.3 4.1    100   CO2 25 28   BUN 20 18   CREATININE 1.2 1.0   GLUCOSE 157* 121*     Hepatic: No results for input(s): \"AST\", \"ALT\", \"BILITOT\", \"ALKPHOS\" in the last 72 hours.    Invalid input(s): \"ALB\"  Lipids:   Lab Results   Component Value Date/Time    CHOL 113 07/24/2024 11:27 AM    HDL 36 07/24/2024 11:27 AM    HDL 34 04/28/2011 09:26 AM    TRIG 157 07/24/2024 11:27 AM     Hemoglobin A1C:   Lab Results   Component Value Date/Time    LABA1C 7.1 10/25/2024 01:45 PM     TSH:   Lab Results   Component Value Date/Time    TSH 1.62 07/22/2024 11:35 AM     Troponin: No results found for: \"TROPONINT\"  Lactic Acid: No results for input(s): \"LACTA\" in the last 72 hours.  BNP: No results for input(s): \"PROBNP\" in the last 72 hours.  UA:  Lab Results   Component Value Date/Time    NITRU Negative 12/29/2022 02:43 PM    COLORU Yellow 12/29/2022 02:43 PM    PHUR 5.0 10/30/2024 04:10 AM    PHUR 5.5 12/29/2022 02:43 PM    WBCUA 0 12/29/2022 02:43 PM    RBCUA 1 12/29/2022 02:43 PM    BACTERIA None Seen 12/29/2022 02:43 PM    CLARITYU Clear 12/29/2022 02:43 PM    LEUKOCYTESUR Negative 12/29/2022 02:43 PM    UROBILINOGEN 0.2 12/29/2022 02:43 PM    BILIRUBINUR Negative 12/29/2022 02:43 PM    BILIRUBINUR NEGATIVE 04/28/2011 09:26 AM    BLOODU Negative 12/29/2022 02:43 PM    GLUCOSEU >=1000 12/29/2022 02:43 PM    GLUCOSEU >=1000 04/28/2011 09:26 AM    KETUA Negative 12/29/2022 02:43 PM     Urine Cultures: No results found for: \"LABURIN\"  Blood Cultures:   Lab Results   Component Value Date/Time    BC No growth after 5 days of incubation. 03/28/2016 12:12 AM     Lab Results   Component Value

## 2024-10-30 NOTE — PLAN OF CARE
Brief note  Patient seen and examined    Marv Chanel is a 53 y/o man who presents with acute thunderclap headaches starting 10/27. Time to max pain is within seconds. His headache has never fully resolved, but fluctuates in intensity with peaks occurring every couple of hours. After his max pain is reached, his vision will typically worsen. This has been most notable in his left visual field in his LEFT eye. He has had intermittent facial numbness in his left face and left hand, but this comes and goes independently of his headaches. His pain has not been helped by Excedrin, or fentanyl. He received marginal improvement w/ morphine. He feels his headache is at times provoked by quick position changes, especially standing.     On my exam today he is able to see in all quadrants w/ the exception of left superior quadrant.     Vitals:    10/30/24 0537 10/30/24 0600 10/30/24 0700 10/30/24 0800   BP:  (!) 179/85 (!) 170/91 (!) 178/93   Pulse:  66 65 70   Resp: 16 15 15 17   Temp:    98.1 °F (36.7 °C)   TempSrc:    Oral   SpO2:  91% 93% 100%   Weight:       Height:         Studies  CT-A head and neck  No acute intracranial abnormality. No aneurysm or hemodynamically significant  stenosis of the major arteries of the head and neck.    MRI brain w/o tacos  Small acute or subacute infarct in the right occipital lobe.  Right posterior cerebral artery occlusion    Labs  Glucose 121 mg/dL   mg/dL  A1c 7.1%    PHYSICAL EXAM:  Vitals:    10/30/24 0537 10/30/24 0600 10/30/24 0700 10/30/24 0800   BP:  (!) 179/85 (!) 170/91 (!) 178/93   Pulse:  66 65 70   Resp: 16 15 15    Temp:       TempSrc:       SpO2:  91% 93%    Weight:       Height:             General: Alert, no distress, well-nourished  Neurologic  Mental status: Alert  orientation to person, place, time, situation   Attention intact as able to attend well to the exam     Language fluent in conversation   Comprehension intact; follows simple commands  Cranial  Will add bowel regimen.  - Continue to hold asa and Plavix given suspected mechanism unlikely to be mitigated by antiplatelet use (if RCVS, vasculitis, etc).     Dr. Parmar to see in full consultation later today    Erin Marsh NP  NCC

## 2024-10-30 NOTE — PROGRESS NOTES
Neurology Progress Note    Patient: Marv Chanel MRN: [id number]    YOB: 1970  Age: 54 y.o.  Sex: male   Unit: [facility] Room/Bed: 4518/4518-01 Location: [facility]'s Date: 10/30/2024  Date of Admission: 10/29/2024 10:41 PM  Admitting Physician: AMADA EDMOND    Primary Care Physician: Suhail Lujan APRN - CNP          LOS: 1 day      ASSESSMENT & RECOMMENDATIONS     Assessment  55yo man presented to ER with recurrent thunderclap headaches accompanied by vision loss in his left field of view and numbness/tingling in his left hand and found to have small area of likely stroke in the right occipital lobe but with negative intra/extracranial vessels suggestive of possible reversible cerebral vasoconstrictive syndrome (RCVS)  Unusual that his vision loss seems to be more in the left eye itself and not in both eyes to be able to attribute it to the occipital lobe lesion  Cannot completely exclude another process such as giant cell arteritis, but this overall clinical picture is inconsistent with this    Recommendations  Angiogram with IA Verapamil, as per Neurovascular surgery  Start Verapamil po 80mg q8 and, if BP tolerates, increase to 120mg q8  CRP, ESR, and temporal artery ultrasound      SUBJECTIVE     Meeting patient for the first time. Initial consultation note was completed by Neurology CNP yesterday (10/29/24) evening, which I have reviewed.    55yo man with prior stroke; CAD s/p CABG; DM; HLD; HTN; depression; CKD; polycytemia. Presented to ER with intermittent thunderclap headaches with accompanying vision loss and decreased sensation.    He reports that he had the first such headache on Sunday (3d ago) while watching TV. The headache was sudden and severe. It began in the right temporal region behind the right eye. He also noted that he was having an absence of vision in a horizontal region of his left vision in his left eye. He feels that it is  1.7 - 7.7 K/uL    Lymphocytes Absolute 2.2 1.0 - 5.1 K/uL    Monocytes Absolute 0.6 0.0 - 1.3 K/uL    Eosinophils Absolute 0.1 0.0 - 0.6 K/uL    Basophils Absolute 0.1 0.0 - 0.2 K/uL   Hemoglobin A1C    Collection Time: 10/30/24  4:31 AM   Result Value Ref Range    Hemoglobin A1C 7.0 See comment %    Estimated Avg Glucose 154.2 mg/dL   Lipid Panel    Collection Time: 10/30/24  4:31 AM   Result Value Ref Range    Cholesterol, Total 94 0 - 199 mg/dL    Triglycerides 189 (H) 0 - 150 mg/dL    HDL 38 (L) 40 - 60 mg/dL    LDL Cholesterol 18 <100 mg/dL    VLDL Cholesterol Calculated 38 Not Established mg/dL   Sedimentation Rate    Collection Time: 10/30/24  4:31 AM   Result Value Ref Range    Sed Rate, Automated 10 0 - 20 mm/Hr   C-Reactive Protein    Collection Time: 10/30/24  4:31 AM   Result Value Ref Range    CRP <3.0 0.0 - 5.1 mg/L   POCT Glucose    Collection Time: 10/30/24  6:38 AM   Result Value Ref Range    POC Glucose 156 (H) 70 - 99 mg/dl    Performed on ACCU-CHEK    POCT Glucose    Collection Time: 10/30/24  8:01 AM   Result Value Ref Range    POC Glucose 139 (H) 70 - 99 mg/dl    Performed on ACCU-CHEK    POCT Glucose    Collection Time: 10/30/24 11:52 AM   Result Value Ref Range    POC Glucose 139 (H) 70 - 99 mg/dl    Performed on ACCU-CHEK    Vascular duplex carotid left    Collection Time: 10/30/24 12:10 PM   Result Value Ref Range    Body Surface Area 2.39 m2   Echo (TTE) complete (PRN contrast/bubble/strain/3D)    Collection Time: 10/30/24  3:53 PM   Result Value Ref Range    LV EDV A2C 114 mL    LV EDV A4C 110 mL    LV ESV A2C 49 mL    LV ESV A4C 42 mL    IVSd 1.1 (A) 0.6 - 1.0 cm    LVIDd 3.7 (A) 4.2 - 5.9 cm    LVIDs 2.5 cm    LVOT Diameter 2.2 cm    LVOT Mean Gradient 2 mmHg    LVOT VTI 20.9 cm    LVOT Peak Velocity 1.1 m/s    LVOT Peak Gradient 5 mmHg    LVPWd 1.0 0.6 - 1.0 cm    LV E' Lateral Velocity 13.4 cm/s    LV E' Septal Velocity 6.9 cm/s    LV Ejection Fraction A2C 57 %    LV Ejection Fraction  Time: 10/30/24  4:21 PM   Result Value Ref Range    POC Glucose 136 (H) 70 - 99 mg/dl    Performed on ACCU-CHEK        Scheduled Meds:   allopurinol  200 mg Oral Daily    [Held by provider] losartan  25 mg Oral Daily    atorvastatin  40 mg Oral Nightly    [START ON 10/31/2024] pantoprazole  40 mg Oral QAM AC    insulin lispro  0-16 Units SubCUTAneous 4x Daily AC & HS    verapamil  80 mg Oral 3 times per day    sodium chloride flush  5-40 mL IntraVENous 2 times per day    [Held by provider] enoxaparin  30 mg SubCUTAneous BID       Continuous Infusions:  dextrose  sodium chloride        PRN Meds:  glucose, 4 tablet, PRN  dextrose bolus, 125 mL, PRN   Or  dextrose bolus, 250 mL, PRN  glucagon (rDNA), 1 mg, PRN  dextrose, , Continuous PRN  oxyCODONE, 5 mg, Q4H PRN   Or  oxyCODONE, 10 mg, Q4H PRN  HYDROmorphone, 0.5 mg, Q4H PRN  prochlorperazine, 10 mg, Q6H PRN  sodium chloride flush, 5-40 mL, PRN  sodium chloride, , PRN  ondansetron, 4 mg, Q8H PRN   Or  ondansetron, 4 mg, Q6H PRN  polyethylene glycol, 17 g, Daily PRN  acetaminophen, 650 mg, Q6H PRN   Or  acetaminophen, 650 mg, Q6H PRN                Discussed at length with patient; pt's friend; & nursing    Pola Parmar MD  Neurology/Neurocritical Care  October 30, 2024

## 2024-10-31 DIAGNOSIS — Z79.4 TYPE 2 DIABETES MELLITUS WITH DIABETIC PERIPHERAL ANGIOPATHY WITHOUT GANGRENE, WITH LONG-TERM CURRENT USE OF INSULIN (HCC): Primary | ICD-10-CM

## 2024-10-31 DIAGNOSIS — E11.51 TYPE 2 DIABETES MELLITUS WITH DIABETIC PERIPHERAL ANGIOPATHY WITHOUT GANGRENE, WITH LONG-TERM CURRENT USE OF INSULIN (HCC): Primary | ICD-10-CM

## 2024-10-31 DIAGNOSIS — E29.1 HYPOGONADISM IN MALE: ICD-10-CM

## 2024-10-31 LAB
ALBUMIN SERPL-MCNC: 3.8 G/DL (ref 3.4–5)
ANA SER QL IA: NEGATIVE
ANION GAP SERPL CALCULATED.3IONS-SCNC: 13 MMOL/L (ref 3–16)
BASOPHILS # BLD: 0.1 K/UL (ref 0–0.2)
BASOPHILS NFR BLD: 1 %
BUN SERPL-MCNC: 20 MG/DL (ref 7–20)
CALCIUM SERPL-MCNC: 8.8 MG/DL (ref 8.3–10.6)
CHLORIDE SERPL-SCNC: 99 MMOL/L (ref 99–110)
CO2 SERPL-SCNC: 22 MMOL/L (ref 21–32)
CREAT SERPL-MCNC: 0.9 MG/DL (ref 0.9–1.3)
DEPRECATED RDW RBC AUTO: 13.8 % (ref 12.4–15.4)
EOSINOPHIL # BLD: 0.2 K/UL (ref 0–0.6)
EOSINOPHIL NFR BLD: 2.5 %
GFR SERPLBLD CREATININE-BSD FMLA CKD-EPI: >90 ML/MIN/{1.73_M2}
GLUCOSE BLD-MCNC: 130 MG/DL (ref 70–99)
GLUCOSE BLD-MCNC: 147 MG/DL (ref 70–99)
GLUCOSE BLD-MCNC: 183 MG/DL (ref 70–99)
GLUCOSE BLD-MCNC: 192 MG/DL (ref 70–99)
GLUCOSE SERPL-MCNC: 160 MG/DL (ref 70–99)
HCT VFR BLD AUTO: 46.9 % (ref 40.5–52.5)
HGB BLD-MCNC: 16.2 G/DL (ref 13.5–17.5)
LYMPHOCYTES # BLD: 2.4 K/UL (ref 1–5.1)
LYMPHOCYTES NFR BLD: 30.4 %
MAGNESIUM SERPL-MCNC: 1.7 MG/DL (ref 1.8–2.4)
MCH RBC QN AUTO: 29.2 PG (ref 26–34)
MCHC RBC AUTO-ENTMCNC: 34.4 G/DL (ref 31–36)
MCV RBC AUTO: 84.7 FL (ref 80–100)
MONOCYTES # BLD: 0.7 K/UL (ref 0–1.3)
MONOCYTES NFR BLD: 9 %
NEUTROPHILS # BLD: 4.6 K/UL (ref 1.7–7.7)
NEUTROPHILS NFR BLD: 57.1 %
PERFORMED ON: ABNORMAL
PHOSPHATE SERPL-MCNC: 3.3 MG/DL (ref 2.5–4.9)
PLATELET # BLD AUTO: 209 K/UL (ref 135–450)
PMV BLD AUTO: 8 FL (ref 5–10.5)
POTASSIUM SERPL-SCNC: 4.6 MMOL/L (ref 3.5–5.1)
RBC # BLD AUTO: 5.54 M/UL (ref 4.2–5.9)
SODIUM SERPL-SCNC: 134 MMOL/L (ref 136–145)
WBC # BLD AUTO: 8 K/UL (ref 4–11)

## 2024-10-31 PROCEDURE — 2580000003 HC RX 258: Performed by: SINGLE SPECIALTY

## 2024-10-31 PROCEDURE — 6370000000 HC RX 637 (ALT 250 FOR IP)

## 2024-10-31 PROCEDURE — 6370000000 HC RX 637 (ALT 250 FOR IP): Performed by: NURSE PRACTITIONER

## 2024-10-31 PROCEDURE — 2709999900 HC NON-CHARGEABLE SUPPLY: Performed by: SINGLE SPECIALTY

## 2024-10-31 PROCEDURE — 36224 PLACE CATH CAROTD ART: CPT | Performed by: SINGLE SPECIALTY

## 2024-10-31 PROCEDURE — 2500000003 HC RX 250 WO HCPCS: Performed by: SINGLE SPECIALTY

## 2024-10-31 PROCEDURE — 2580000003 HC RX 258

## 2024-10-31 PROCEDURE — 36227 PLACE CATH XTRNL CAROTID: CPT | Performed by: SINGLE SPECIALTY

## 2024-10-31 PROCEDURE — 99233 SBSQ HOSP IP/OBS HIGH 50: CPT | Performed by: INTERNAL MEDICINE

## 2024-10-31 PROCEDURE — 99152 MOD SED SAME PHYS/QHP 5/>YRS: CPT | Performed by: SINGLE SPECIALTY

## 2024-10-31 PROCEDURE — 6360000002 HC RX W HCPCS

## 2024-10-31 PROCEDURE — C1887 CATHETER, GUIDING: HCPCS | Performed by: SINGLE SPECIALTY

## 2024-10-31 PROCEDURE — 99291 CRITICAL CARE FIRST HOUR: CPT

## 2024-10-31 PROCEDURE — 61650 EVASC PRLNG ADMN RX AGNT 1ST: CPT | Performed by: SINGLE SPECIALTY

## 2024-10-31 PROCEDURE — 85025 COMPLETE CBC W/AUTO DIFF WBC: CPT

## 2024-10-31 PROCEDURE — 36415 COLL VENOUS BLD VENIPUNCTURE: CPT

## 2024-10-31 PROCEDURE — 99153 MOD SED SAME PHYS/QHP EA: CPT | Performed by: SINGLE SPECIALTY

## 2024-10-31 PROCEDURE — 80069 RENAL FUNCTION PANEL: CPT

## 2024-10-31 PROCEDURE — 2000000000 HC ICU R&B

## 2024-10-31 PROCEDURE — 83735 ASSAY OF MAGNESIUM: CPT

## 2024-10-31 PROCEDURE — 6360000004 HC RX CONTRAST MEDICATION: Performed by: SINGLE SPECIALTY

## 2024-10-31 PROCEDURE — 6360000002 HC RX W HCPCS: Performed by: SINGLE SPECIALTY

## 2024-10-31 PROCEDURE — 36225 PLACE CATH SUBCLAVIAN ART: CPT | Performed by: SINGLE SPECIALTY

## 2024-10-31 PROCEDURE — C1760 CLOSURE DEV, VASC: HCPCS | Performed by: SINGLE SPECIALTY

## 2024-10-31 PROCEDURE — C1894 INTRO/SHEATH, NON-LASER: HCPCS | Performed by: SINGLE SPECIALTY

## 2024-10-31 RX ORDER — IODIXANOL 270 MG/ML
INJECTION, SOLUTION INTRAVASCULAR PRN
Status: DISCONTINUED | OUTPATIENT
Start: 2024-10-31 | End: 2024-10-31 | Stop reason: HOSPADM

## 2024-10-31 RX ORDER — ONDANSETRON 2 MG/ML
INJECTION INTRAMUSCULAR; INTRAVENOUS PRN
Status: DISCONTINUED | OUTPATIENT
Start: 2024-10-31 | End: 2024-10-31 | Stop reason: HOSPADM

## 2024-10-31 RX ORDER — HEPARIN SODIUM 1000 [USP'U]/ML
INJECTION, SOLUTION INTRAVENOUS; SUBCUTANEOUS PRN
Status: DISCONTINUED | OUTPATIENT
Start: 2024-10-31 | End: 2024-10-31 | Stop reason: HOSPADM

## 2024-10-31 RX ORDER — SENNOSIDES A AND B 8.6 MG/1
2 TABLET, FILM COATED ORAL DAILY PRN
Status: DISCONTINUED | OUTPATIENT
Start: 2024-10-31 | End: 2024-11-02 | Stop reason: HOSPADM

## 2024-10-31 RX ORDER — FENTANYL CITRATE 50 UG/ML
INJECTION, SOLUTION INTRAMUSCULAR; INTRAVENOUS PRN
Status: DISCONTINUED | OUTPATIENT
Start: 2024-10-31 | End: 2024-10-31 | Stop reason: HOSPADM

## 2024-10-31 RX ORDER — VERAPAMIL HYDROCHLORIDE 120 MG/1
120 TABLET ORAL EVERY 8 HOURS SCHEDULED
Status: DISCONTINUED | OUTPATIENT
Start: 2024-10-31 | End: 2024-11-02 | Stop reason: HOSPADM

## 2024-10-31 RX ORDER — ACETAMINOPHEN 325 MG/1
650 TABLET ORAL EVERY 4 HOURS PRN
Status: DISCONTINUED | OUTPATIENT
Start: 2024-10-31 | End: 2024-10-31 | Stop reason: SDUPTHER

## 2024-10-31 RX ORDER — SODIUM CHLORIDE 0.9 % (FLUSH) 0.9 %
5-40 SYRINGE (ML) INJECTION EVERY 12 HOURS SCHEDULED
Status: DISCONTINUED | OUTPATIENT
Start: 2024-10-31 | End: 2024-11-02 | Stop reason: HOSPADM

## 2024-10-31 RX ORDER — MAGNESIUM SULFATE IN WATER 40 MG/ML
4000 INJECTION, SOLUTION INTRAVENOUS ONCE
Status: COMPLETED | OUTPATIENT
Start: 2024-10-31 | End: 2024-10-31

## 2024-10-31 RX ORDER — SODIUM CHLORIDE 0.9 % (FLUSH) 0.9 %
5-40 SYRINGE (ML) INJECTION PRN
Status: DISCONTINUED | OUTPATIENT
Start: 2024-10-31 | End: 2024-11-02 | Stop reason: HOSPADM

## 2024-10-31 RX ORDER — MIDAZOLAM HYDROCHLORIDE 1 MG/ML
INJECTION, SOLUTION INTRAMUSCULAR; INTRAVENOUS PRN
Status: DISCONTINUED | OUTPATIENT
Start: 2024-10-31 | End: 2024-10-31 | Stop reason: HOSPADM

## 2024-10-31 RX ORDER — VERAPAMIL HYDROCHLORIDE 2.5 MG/ML
INJECTION, SOLUTION INTRAVENOUS PRN
Status: DISCONTINUED | OUTPATIENT
Start: 2024-10-31 | End: 2024-10-31 | Stop reason: HOSPADM

## 2024-10-31 RX ORDER — SODIUM CHLORIDE 9 MG/ML
INJECTION, SOLUTION INTRAVENOUS PRN
Status: DISCONTINUED | OUTPATIENT
Start: 2024-10-31 | End: 2024-11-02 | Stop reason: HOSPADM

## 2024-10-31 RX ADMIN — VERAPAMIL HYDROCHLORIDE 120 MG: 120 TABLET ORAL at 21:20

## 2024-10-31 RX ADMIN — ATORVASTATIN CALCIUM 40 MG: 40 TABLET, FILM COATED ORAL at 21:18

## 2024-10-31 RX ADMIN — VERAPAMIL HYDROCHLORIDE 80 MG: 80 TABLET ORAL at 06:20

## 2024-10-31 RX ADMIN — ACETAMINOPHEN 650 MG: 325 TABLET ORAL at 16:04

## 2024-10-31 RX ADMIN — ALLOPURINOL 200 MG: 100 TABLET ORAL at 09:07

## 2024-10-31 RX ADMIN — SENNOSIDES 17.2 MG: 8.6 TABLET, FILM COATED ORAL at 21:34

## 2024-10-31 RX ADMIN — ONDANSETRON 4 MG: 2 INJECTION INTRAMUSCULAR; INTRAVENOUS at 09:07

## 2024-10-31 RX ADMIN — OXYCODONE 5 MG: 5 TABLET ORAL at 16:04

## 2024-10-31 RX ADMIN — VERAPAMIL HYDROCHLORIDE 120 MG: 120 TABLET ORAL at 16:17

## 2024-10-31 RX ADMIN — SODIUM CHLORIDE, PRESERVATIVE FREE 10 ML: 5 INJECTION INTRAVENOUS at 09:07

## 2024-10-31 RX ADMIN — ACETAMINOPHEN 650 MG: 325 TABLET ORAL at 21:28

## 2024-10-31 RX ADMIN — PANTOPRAZOLE SODIUM 40 MG: 40 TABLET, DELAYED RELEASE ORAL at 06:17

## 2024-10-31 RX ADMIN — OXYCODONE 5 MG: 5 TABLET ORAL at 21:28

## 2024-10-31 RX ADMIN — OXYCODONE 10 MG: 5 TABLET ORAL at 06:17

## 2024-10-31 RX ADMIN — SODIUM CHLORIDE, PRESERVATIVE FREE 10 ML: 5 INJECTION INTRAVENOUS at 21:22

## 2024-10-31 RX ADMIN — INSULIN LISPRO 4 UNITS: 100 INJECTION, SOLUTION INTRAVENOUS; SUBCUTANEOUS at 21:27

## 2024-10-31 RX ADMIN — ACETAMINOPHEN 650 MG: 325 TABLET ORAL at 07:13

## 2024-10-31 RX ADMIN — MAGNESIUM SULFATE HEPTAHYDRATE 4000 MG: 40 INJECTION, SOLUTION INTRAVENOUS at 09:16

## 2024-10-31 RX ADMIN — HYDROMORPHONE HYDROCHLORIDE 0.5 MG: 1 INJECTION, SOLUTION INTRAMUSCULAR; INTRAVENOUS; SUBCUTANEOUS at 09:06

## 2024-10-31 RX ADMIN — INSULIN LISPRO 4 UNITS: 100 INJECTION, SOLUTION INTRAVENOUS; SUBCUTANEOUS at 17:30

## 2024-10-31 ASSESSMENT — PAIN DESCRIPTION - LOCATION
LOCATION: HEAD

## 2024-10-31 ASSESSMENT — PAIN DESCRIPTION - ORIENTATION: ORIENTATION: RIGHT

## 2024-10-31 ASSESSMENT — PAIN SCALES - GENERAL
PAINLEVEL_OUTOF10: 7
PAINLEVEL_OUTOF10: 1
PAINLEVEL_OUTOF10: 1
PAINLEVEL_OUTOF10: 2
PAINLEVEL_OUTOF10: 1
PAINLEVEL_OUTOF10: 4
PAINLEVEL_OUTOF10: 7
PAINLEVEL_OUTOF10: 1
PAINLEVEL_OUTOF10: 4
PAINLEVEL_OUTOF10: 5
PAINLEVEL_OUTOF10: 7
PAINLEVEL_OUTOF10: 5
PAINLEVEL_OUTOF10: 1

## 2024-10-31 ASSESSMENT — PAIN DESCRIPTION - DESCRIPTORS
DESCRIPTORS: ACHING;STABBING;SORE
DESCRIPTORS: STABBING
DESCRIPTORS: THROBBING
DESCRIPTORS: ACHING;THROBBING

## 2024-10-31 NOTE — PROGRESS NOTES
NEUROCRITICAL CARE PROGRESS NOTE       Patient Name: Marv Chanel YOB: 1970   Sex: Male Age: 54 yrs     CC / Reason for Consult: visual changes    Changes over last 24 hours:   Temporal artery US pending.   ESR -10, CRP - <3, Mg 1.7      ROS: C/o headache, pain starts behind R eye and radiates L across the bridge of the nose    ASSESSMENT & RECOMMENDATIONS   Assessment:  Marv Chanel is a 55yo man presented to ER with recurrent thunderclap headaches accompanied by vision loss in his left field of view and numbness/tingling in his left hand and found to have small area of likely stroke in the right occipital lobe but with negative intra/extracranial vessels suggestive of possible reversible cerebral vasoconstrictive syndrome (RCVS). Unusual that his vision loss seems to be more in the left eye itself and not in both eyes to be able to attribute it to the occipital lobe lesion. Cannot completely exclude another process such as giant cell arteritis, but this overall clinical picture is inconsistent with this    Plan:  Angiogram with IA Verapamil today  Verapamil increased to 120mg q8  LP on hold due to plavix dosing on 10/29  Q1h neuro checks    PAUL Kim - CNP   Neurocritical Care   10/31/2024 10:08 AM  [facility]: [facility] Neurocritical Care  HISTORY   Interval History: Temporal artery US completed. ESR and CRP all un-concerning for giant cell arteritis.     PMH Past Medical History:   Diagnosis Date    Abscess 1994    mediastinal, developed after kenalog injections    Acute CVA (cerebrovascular accident) (HCC) 10/30/2024    Anesthesia     AWAKE BUT UNABLE TO MOVE DURING SHOULDER SURGERY.     CAD (coronary artery disease) 03/2016    Diabetes mellitus (HCC) 2001    HgA1C 10.6    Gout     HLD (hyperlipidemia)     HTN (hypertension)     Echo 2013 normal.     Major depressive disorder, recurrent episode, moderate ([facility]) 05/25/2023    MDRO (multiple drug resistant organisms)  resistance     MRSA (methicillin resistant staph aureus) culture positive 04/19/2017    Obesity     Pancreatitis 2006    high TG or byetta. flank ecchymosis.    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 after kenalog injections. drained. iv abx.      Allergies Allergies   Allergen Reactions    Penicillins Anaphylaxis      Diet Diet NPO Exceptions are: Sips of Water with Meds, Sips of Clear Liquids   Isolation No active isolations     CURRENT SCHEDULED MEDICATIONS   Inpatient Medications     magnesium sulfate, 4,000 mg, IntraVENous, Once    allopurinol, 200 mg, Oral, Daily    [Held by provider] losartan, 25 mg, Oral, Daily    atorvastatin, 40 mg, Oral, Nightly    pantoprazole, 40 mg, Oral, QAM AC    insulin lispro, 0-16 Units, SubCUTAneous, 4x Daily AC & HS    verapamil, 80 mg, Oral, 3 times per day    sodium chloride flush, 5-40 mL, IntraVENous, 2 times per day    [Held by provider] enoxaparin, 30 mg, SubCUTAneous, BID   Infusions    dextrose      sodium chloride        Antibiotics   Recent Abx Admin        No antibiotic orders with administrations found.                      LABS   Metabolic Panel Recent Labs     10/29/24  1600 10/30/24  0034 10/30/24  0431 10/31/24  0448     --  136 134*   K 4.3  --  4.1 4.6     --  100 99   CO2 25  --  28 22   BUN 20  --  18 20   CREATININE 1.2  --  1.0 0.9   GLUCOSE 157*  --  121* 160*   CALCIUM 9.9  --  9.0 8.8   PHOS  --   --  3.3 3.3   MG  --  2.20 2.00 1.70*      CBC / Coags Recent Labs     10/29/24  1600 10/30/24  0431 10/31/24  0448   WBC 9.2 8.5 8.0   RBC 6.03* 5.76 5.54   HGB 17.7* 16.4 16.2   HCT 51.1 49.1 46.9    212 209      Other Recent Labs     10/30/24  0133 10/30/24  0431   LABA1C  --  7.0   CHOL  --  94   HDL  --  38*   LDL  --  18   TRIG  --  189*   COVID19 NOT DETECTED

## 2024-10-31 NOTE — CARE COORDINATION
Pt is from home with wife. Pt is IPTA.    Pt to have Angio today at 1:00 pm. LP planned.   Neurosurgery and Neurology following.     No CM needs anticipated.     Janice Morris RN, BSN, CM  Case Management Department  [phone]

## 2024-10-31 NOTE — PROGRESS NOTES
ICU CONSULT       PCP:  Suhail Lujan APRN - CNP          Admit Date:  10/29/2024                            Hospital Day:  ICU Day:       CC: vision changes  Reason for consult: stroke  History obtained from:  chart review and the patient    SUBJECTIVE   Interval History    S: Feels unchanged since admission, with periodic headaches over R eye well managed with PRN oxy (4x) and Dilaudid (3x). L hemianopsia ongoing. Plan for angio today.    O: -170, O2 98% RA, T max 36.9  Labs: Mg 1.70, Glucose 130-170 without basal insulin, low carb diet.    A/P: Angio today for 1pm with verapamil. Continue pain management. BS well controlled.       HPI:    Mr. Marv Chanel is a 54 y.o. male with a medical hx significant for DM2 (A1C 7.1 10/2024), hypogonadism, HTN, CAD (s/p CABG x4 2016), otherwise as listed in the MHx table below, who presented from [facility] to the ICU on 10/29 with severe headache and left lateral vision loss.   Patient describes feeling unwell after his COVID vaccination in January 2024, which has persisted for several months.  He describes waking up almost every morning with general malaise and myalgias, and has not been able to sleep well.  For this he has been away on unpaid leave from his job as an  at [employer].  For sleep he uses marijuana Gummies approximately 3 times per week, at took a dose on Saturday 10/26 around 9 PM.  He woke up the following morning and had the sudden onset of a severe headache over his right orbit and right side of the upper face while watching telivision.  This did not improve with ephedrine, and had slight worsening with tilting of the head.  No accompanying vomiting or visual changes initially.  On 10/29 he describes a sudden onset of decreased left eye visual acuity in the lateral visual field.  He had no accompanying motor or extremity sensory changes.  He has never had such visual changes in the past, but did have a similar  ATTENDING:  Patient was seen, examined and discussed with Dr. Lamb PGY-1. I agree with the history of present illness, past medical/surgical histories, family history, social history, medication list and allergies as listed. The review of systems is as noted above. My physical exam confirms the findings listed above.   Chart was reviewed including Labs, CXR, CT scan, EKG, and Medical records confirm the findings noted above.   I edited the note where appropriate.     Briefly, this is a 54 y.o. male admitted to ICU with L-hemianopsia      INTERVAL HISTORY:  No issues overnight, minimal residual L hemianopsia.       /69   Pulse 55   Temp 98.2 °F (36.8 °C) (Oral)   Resp 16   Ht 1.829 m (6')   Wt 112.3 kg (247 lb 9.2 oz)   SpO2 98%   BMI 33.58 kg/m²      Intake/Output Summary (Last 24 hours) at 10/31/2024 0959  Last data filed at 10/30/2024 1400      Gross per 24 hour   Intake 240 ml   Output 580 ml   Net -340 ml    SpO2: 98 %  -        Peripheral IV 10/29/24 Distal;Left Forearm (Active)   Number of days: 0      ASSESSMENT:  L-hemianopsia - suspected RCVS vs vasculitis  High pre-test probability for THU  Secondary polycythemia in the setting of testosterone supplementation      PLAN:  Supplemental O2 to maintain SaO2 > 95%  Aspiration Precautions (HOB elevated, Up for meals, mouth care)  NeuroCC on board, appreciate recommendations, vasculitis workup, possible LP. CT Angio head with verapamil planned for today.   Goal SBP <220 mmHg or SBP <120 mmHg   PRN Labetalol 10 mg  24 hours after stable head CT may start SQH TID   Keep HOB >30 degrees  Avoid hypotonic solutions , keep sNa >135 mEq/L  Avoid IJ CVC access to maximize venous return  If central venous access is needed please use subclavian vs femoral   Hold full dose anticoagulation and antiplatelet medications x 2 weeks unless otherwise directed.   Transfuse to keep platelets >100K  Maintain  normothermia   Place NG if unable to pass swallow evaluation    PT/OT/SLP & PMR consult as indicated   ICU Glycemic goal 140-180 mg/dL  Diet:  Carb controlled  IVF: N/A  GI prophylaxis: elevate HOB 30 degrees, PPI  DVT prophylaxis: SCDs  Bowel regimen: N/A  Abx: N/A     The note was completed using EMR and Dragon dictation system. Every effort was made to ensure accuracy; however, inadvertent computerized transcription errors may be present.     Joey Soriano \"Everton\" Mira Fitzgerald MD  Pulmonary and Critical Care Medicine

## 2024-10-31 NOTE — NURSE NAVIGATOR
MRI Brain notable for small acute or subacute infarct in the right occipital lobe   - Neurocritical care (NCC) consulted    - Concern for RCVS vs vasculitis - labs sent; Neurovascular consulted - plan for angio toady 10/31/24  - Echo (TTE) with a visually estimated EF of 55 - 60%; No LV thrombus present; No interatrial shunt visualized with bubble study     Patient's personal risk factors specific to stroke/TIA include: prior stroke; CAD; DM; HLD; HTN; Obesity (BMI 34)     Patient's chart reviewed for Stroke Core Measures and additional needs:    [x]   VTE prophylaxis - Lovenox administered 10/30, see eMAR; SCDs ordered    []   Antithrombotic (if applicable) - HELD per NCC    [x]   Swallow screen prior to PO intake - Completed    [x]   Lipids / A1C ordered or resulted - See results below; High intensity statin ordered / administered, see eMAR   []   Therapy ordered - Navigator will discuss need with team    [x]   Care plan and Education template - Added      Latest Reference Range & Units 10/30/24 04:31   LDL Cholesterol <100 mg/dL 18      Latest Reference Range & Units 10/30/24 04:31   Hemoglobin A1C See comment % 7.0     Navigator to continue to follow patient while admitted, to assist with follow up and discharge planning as needed.     Nurse eSignature: Electronically signed by Yohana Tripathi RN on 10/31/24 at 8:54 AM EDT - Neuroscience Navigator

## 2024-10-31 NOTE — PLAN OF CARE
Problem: Chronic Conditions and Co-morbidities  Goal: Patient's chronic conditions and co-morbidity symptoms are monitored and maintained or improved  Outcome: Progressing  Flowsheets (Taken 10/30/2024 2000)  Care Plan - Patient's Chronic Conditions and Co-Morbidity Symptoms are Monitored and Maintained or Improved: Monitor and assess patient's chronic conditions and comorbid symptoms for stability, deterioration, or improvement     Problem: Discharge Planning  Goal: Discharge to home or other facility with appropriate resources  Outcome: Progressing  Flowsheets (Taken 10/30/2024 2000)  Discharge to home or other facility with appropriate resources: Identify barriers to discharge with patient and caregiver     Problem: Pain  Goal: Verbalizes/displays adequate comfort level or baseline comfort level  Outcome: Progressing  Flowsheets (Taken 10/30/2024 2000)  Verbalizes/displays adequate comfort level or baseline comfort level:   Encourage patient to monitor pain and request assistance   Assess pain using appropriate pain scale     Problem: Safety - Adult  Goal: Free from fall injury  Outcome: Progressing  Flowsheets (Taken 10/30/2024 2033)  Free From Fall Injury: Instruct family/caregiver on patient safety

## 2024-10-31 NOTE — PLAN OF CARE
Plan for angio tomorrow    General: Alert, no distress, well-nourished  Neurologic  Mental status:   Alert and oriented to person, place, time and situation. Attends well to exam, fluent in conversation, no dysarthria or aphasia. Follows simple commands     Cranial nerves:   CN2: L lateral visual field loss in L eye  CN 3,4,6: Pupils equal and reactive to light, extraocular muscles intact  CN5:Facial sensation intact  CN7: Face symmetric  CN8: Hearing symmetric to spoken voice  CN9: Palate elevated symmetrically  CN11: Traps full strength on shoulder shrug  CN12: Tongue midline with protrusion     Motor Exam:  5/5 strength throughout     Sensory: light touch intact and symmetric in all 4 extremities.  No sensory extinction on bilateral simultaneous stimulation  Cerebellar/coordination: finger nose finger normal without ataxia  Tone: normal in all 4 extremities  Gait: deferred for safety    PAUL Marshall - CNP   Neurology & Neurocritical Care   Neurology Line: [phone]  [facility]: [facility] & Neuro Critical Care NPs

## 2024-10-31 NOTE — PROCEDURES
Neurointerventional Surgery Procedure Note    NAME: Marv Chanel    : 1970    MRN: [id number]    HOSPITAL: [facility]    DATE OF PROCEDURE: 10/31/24       PROCEDURE:   - Diagnostic cerebral angiogram  - Ultrasound-assisted percutaneous access of the right common femoral artery  - Transcatheter intra-arterial administration of verapamil for treatment of cerebral vasospasm of varying hemodynamic significance (10 mg right ICA)  - 6-Tajik Angioseal-assisted closure of the right common femoral artery    INDICATION(S): Mr. Chanel is a 54 y.o. male with thunderclap headache and OS visual field loss.  Subsequently performed diagnostic imaging studies demonstrated a small ischemic infarct burden within the right occipital lobe.  An underlying etiology was not definitively identified on the noninvasive vascular imaging studies performed thus far.  Therefore, complete diagnostic cervicocerebral angiography and possible intervention is being performed now out of medical necessity for further evaluation and management.    OPERATORS:   Ernesto Valencia M.D., primary technician    SUPERVISION AND INTERPRETATION: Dr. Valencia personally performed the technical portions of the procedure with the assistance of the radiation technologist.  Following completion of the technical portions of the procedure, the angiographic images were reviewed at the neurography PACS work station by Dr. Valencia.     VESSELS CATHETERIZED & ANGIOGRAMMED:    Left common carotid artery  Left internal carotid artery  Left external carotid artery  Right common carotid artery  Right internal carotid artery  Right external carotid artery  Left vertebral artery  Right common femoral artery    ANESTHESIA: Prior to the procedure, the patient's personal and family history were reviewed and no contraindications identified.    ASA: 3     MALLAMPATI SCORE: Class III    Conscious sedation and intravenous analgesia was given by the radiology nurse under  left vertebral artery has a normal caliber as does the basilar artery.  There is opacification of the V4 segment of the right vertebral artery and origin of the right posterior inferior cerebellar artery through reflux of contrast material into into the right vertebrobasilar junction. The left posterior inferior cerebellar artery, both anterior inferior cerebellar arteries, both superior cerebellar arteries and both posterior cerebral arteries fill normally. There is no capillary filling defect or filling delay.    RIGHT COMMON FEMORAL ARTERY: The right common iliac, external iliac and common femoral arteries are normal caliber. The common femoral artery bifurcation appears normal. The groin sheath was inserted within the common femoral artery above the bifurcation without evidence of injury or thrombosis.    IMPRESSION(S):  1. Cerebral vasospasm of varying hemodynamic significance, as described above.     2. The current study also demonstrates improvement in the post-treatment angiographic appearance of both the nonflow-limiting and flow-limiting cerebral vasospasm as a composite.      3.  Interim recanalization of the previously occluded right posterior cerebral artery, with filling characteristics as described above.    4. There is no angiographic evidence of aneurysm formation, arteriovenous malformation formation, or dural arteriovenous fistula formation within those divisions of the anterior and posterior circulations visualized during the current study.    5.  Technically-successful 6-Mongolian Angio-Seal-assisted closure of the 8 common femoral artery.

## 2024-10-31 NOTE — DISCHARGE INSTRUCTIONS
[facility] Stroke Program Survey  The [facility] [place] values your feedback related to your recent hospital visit and admission. We strive to improve our Neuroscience program to promote better outcomes and recoveries for all our patients.  The anonymous survey below consists of a few questions that are related to your stay and around your Stroke diagnosis, treatment, and recovery. It is anonymous and has only a few questions.  The estimated length of time needed to complete this survey is 3 minutes or less. Thank you for completing this survey!

## 2024-10-31 NOTE — PLAN OF CARE
Problem: Chronic Conditions and Co-morbidities  Goal: Patient's chronic conditions and co-morbidity symptoms are monitored and maintained or improved  10/31/2024 1228 by Paco Trevino RN  Outcome: Progressing  Flowsheets (Taken 10/30/2024 2000 by Emma Murray RN)  Care Plan - Patient's Chronic Conditions and Co-Morbidity Symptoms are Monitored and Maintained or Improved: Monitor and assess patient's chronic conditions and comorbid symptoms for stability, deterioration, or improvement  10/31/2024 0250 by Emma Murray RN  Outcome: Progressing  Flowsheets (Taken 10/30/2024 2000)  Care Plan - Patient's Chronic Conditions and Co-Morbidity Symptoms are Monitored and Maintained or Improved: Monitor and assess patient's chronic conditions and comorbid symptoms for stability, deterioration, or improvement     Problem: Discharge Planning  Goal: Discharge to home or other facility with appropriate resources  10/31/2024 1228 by Paco Trevino RN  Outcome: Progressing  Flowsheets (Taken 10/30/2024 2000 by Emma Murray RN)  Discharge to home or other facility with appropriate resources: Identify barriers to discharge with patient and caregiver  10/31/2024 0250 by Emma Murray RN  Outcome: Progressing  Flowsheets (Taken 10/30/2024 2000)  Discharge to home or other facility with appropriate resources: Identify barriers to discharge with patient and caregiver     Problem: Pain  Goal: Verbalizes/displays adequate comfort level or baseline comfort level  10/31/2024 1228 by Paco Trevino RN  Outcome: Progressing  Flowsheets (Taken 10/30/2024 2000 by Emma Murray RN)  Verbalizes/displays adequate comfort level or baseline comfort level:   Encourage patient to monitor pain and request assistance   Assess pain using appropriate pain scale  10/31/2024 0250 by Emma Murray RN  Outcome: Progressing  Flowsheets (Taken 10/30/2024 2000)  Verbalizes/displays adequate comfort level or baseline comfort level:   Encourage patient

## 2024-10-31 NOTE — PROGRESS NOTES
V2.0    [facility] Progress Note      Name:  Marv Chanel /Age/Sex: 1970  (54 y.o. male)   MRN & CSN:  [id number] & [id number] Encounter Date/Time: 10/31/2024 8:35 AM EDT   Location:  [facility]8/4518-01 PCP: Suhail Lujan APRN - CNP     Attending:Pola Haider MD       Hospital Day: 3    Assessment and Recommendations   Marv Chanel is a 54 y.o. male with pmh of Type II DM, HTN, CAD who presents with Vision changes      #CVA due to R posterior cerebral artery occlusion  #Concern for RCVS  - Remains in ICU for intensive neuro monitoring  - Neuro consulted and following  - Neurovascular consulted for cerebral angiogram today  - Echocardiogram ordered   - q1h neuro checks  - Verapamil q8h    #Type II DM  - SSI  - Hypoglycemia monitoring/treatment while on insulin therapy per protocol    #CAD  #HTN  - goal  BP<220/120 for first 24 hours   - statin    Diet Diet NPO Exceptions are: Sips of Water with Meds, Sips of Clear Liquids   DVT Prophylaxis [] Lovenox, []  Heparin, [] SCDs, [] Ambulation,  [] Eliquis, [] Xarelto  [] Coumadin   Code Status Full Code   Disposition From: Home  Expected Disposition: TBD  Estimated Date of Discharge: 2+ days  Patient requires continued admission due to neuro eval   Surrogate Decision Maker/ DARRELL Cordova, spouse     Personally reviewed Lab Studies and Imaging     Subjective:     Chief Complaint: Vision deficit    Marv Chanel is a 54 y.o. male who presented with acute severe headache and left lateral vision loss. Found to have PCA occlusion and admitted for further care.    No acute events reported overnight. Vitals remain stable. No change in symptoms reported.       Review of Systems:      Pertinent positives and negatives discussed in HPI    Objective:     Intake/Output Summary (Last 24 hours) at 10/31/2024 0932  Last data filed at 10/30/2024 1400  Gross per 24 hour   Intake 240 ml   Output 580 ml   Net -340 ml      Vitals:   Vitals:    10/31/24  mA/kV was utilized to reduce the radiation dose to as low as reasonably achievable. COMPARISON: None. HISTORY: ORDERING SYSTEM PROVIDED HISTORY: Right temporal headache, Left lateral visual field cut TECHNOLOGIST PROVIDED HISTORY: Reason for exam:->Right temporal headache, Left lateral visual field cut Has a \"code stroke\" or \"stroke alert\" been called?->Yes Decision Support Exception - unselect if not a suspected or confirmed emergency medical condition->Emergency Medical Condition (MA) Reason for Exam: Right temporal headache, Left lateral visual field cut FINDINGS: BRAIN/VENTRICLES: There is no acute intracranial hemorrhage, mass effect or midline shift.  No abnormal extra-axial fluid collection.  The gray-white differentiation is maintained without evidence of an acute infarct.  There is no evidence of hydrocephalus. ORBITS: The visualized portion of the orbits demonstrate no acute abnormality. SINUSES: The visualized paranasal sinuses and mastoid air cells demonstrate no acute abnormality. SOFT TISSUES/SKULL:  No acute abnormality of the visualized skull or soft tissues. CTA NECK: AORTIC ARCH/ARCH VESSELS: No dissection or arterial injury.  No significant stenosis of the brachiocephalic or subclavian arteries. CAROTID ARTERIES: No dissection, arterial injury, or hemodynamically significant stenosis by NASCET criteria. VERTEBRAL ARTERIES: No dissection, arterial injury, or significant stenosis. SOFT TISSUES: The lung apices are clear.  No cervical or superior mediastinal lymphadenopathy.  The larynx and pharynx are unremarkable.  No acute abnormality of the salivary and thyroid glands. BONES: No acute osseous abnormality. CTA HEAD: ANTERIOR CIRCULATION: No significant stenosis of the intracranial internal carotid, anterior cerebral, or middle cerebral arteries. No aneurysm. POSTERIOR CIRCULATION: No significant stenosis of the vertebral, basilar, or posterior cerebral arteries. No aneurysm. OTHER: No dural venous

## 2024-11-01 LAB
ALBUMIN SERPL ELPH-MCNC: 4 G/DL (ref 3.1–4.9)
ALBUMIN SERPL-MCNC: 4.2 G/DL (ref 3.4–5)
ALPHA1 GLOB SERPL ELPH-MCNC: 0.3 G/DL (ref 0.2–0.4)
ALPHA2 GLOB SERPL ELPH-MCNC: 0.8 G/DL (ref 0.4–1.1)
ANION GAP SERPL CALCULATED.3IONS-SCNC: 13 MMOL/L (ref 3–16)
APTT SCREEN TO CONFIRM RATIO: 0.87 {RATIO}
B-GLOBULIN SERPL ELPH-MCNC: 1.7 G/DL (ref 0.9–1.6)
B2 GLYCOPROT1 IGG SERPL IA-ACNC: <10 SGU
B2 GLYCOPROT1 IGM SERPL IA-ACNC: <10 SMU
BASOPHILS # BLD: 0 K/UL (ref 0–0.2)
BASOPHILS NFR BLD: 0.6 %
BUN SERPL-MCNC: 19 MG/DL (ref 7–20)
CALCIUM SERPL-MCNC: 8.9 MG/DL (ref 8.3–10.6)
CARDIOLIPIN IGG SER IA-ACNC: <10 GPL
CARDIOLIPIN IGM SER IA-ACNC: <10 MPL
CHLORIDE SERPL-SCNC: 98 MMOL/L (ref 99–110)
CO2 SERPL-SCNC: 26 MMOL/L (ref 21–32)
CONFIRM DRVVT STA-STACLOT: NORMAL S
CREAT SERPL-MCNC: 1 MG/DL (ref 0.9–1.3)
DEPRECATED RDW RBC AUTO: 13.6 % (ref 12.4–15.4)
DRVVT SCREEN TO CONFIRM RATIO: 1.14 {RATIO}
DRVVT SCREEN TO CONFIRM RATIO: NORMAL {RATIO}
DRVVT/DRVVT CFM P DOAC NEUT NORM PPP-RTO: NORMAL {RATIO}
ECHO BSA: 2.39 M2
ECHO BSA: 2.39 M2
EOSINOPHIL # BLD: 0.2 K/UL (ref 0–0.6)
EOSINOPHIL NFR BLD: 2 %
GAMMA GLOB SERPL ELPH-MCNC: 1 G/DL (ref 0.6–1.8)
GFR SERPLBLD CREATININE-BSD FMLA CKD-EPI: 89 ML/MIN/{1.73_M2}
GLUCOSE BLD-MCNC: 153 MG/DL (ref 70–99)
GLUCOSE BLD-MCNC: 164 MG/DL (ref 70–99)
GLUCOSE BLD-MCNC: 165 MG/DL (ref 70–99)
GLUCOSE BLD-MCNC: 205 MG/DL (ref 70–99)
GLUCOSE SERPL-MCNC: 146 MG/DL (ref 70–99)
HCT VFR BLD AUTO: 48.3 % (ref 40.5–52.5)
HEPARIN NT PPP QL: NORMAL
HGB BLD-MCNC: 16.5 G/DL (ref 13.5–17.5)
LA 3 SCREEN W REFLEX-IMP: NORMAL
LMW HEPARIN IND PLT AB SER QL: NORMAL
LYMPHOCYTES # BLD: 1.9 K/UL (ref 1–5.1)
LYMPHOCYTES NFR BLD: 23.5 %
MAGNESIUM SERPL-MCNC: 1.8 MG/DL (ref 1.8–2.4)
MCH RBC QN AUTO: 28.9 PG (ref 26–34)
MCHC RBC AUTO-ENTMCNC: 34.1 G/DL (ref 31–36)
MCV RBC AUTO: 84.6 FL (ref 80–100)
MIXING DRVVT: NORMAL S
MONOCYTES # BLD: 0.7 K/UL (ref 0–1.3)
MONOCYTES NFR BLD: 8.2 %
NEUTROPHILS # BLD: 5.3 K/UL (ref 1.7–7.7)
NEUTROPHILS NFR BLD: 65.7 %
PERFORMED ON: ABNORMAL
PHOSPHATE SERPL-MCNC: 2.9 MG/DL (ref 2.5–4.9)
PLATELET # BLD AUTO: 226 K/UL (ref 135–450)
PMV BLD AUTO: 7.8 FL (ref 5–10.5)
POTASSIUM SERPL-SCNC: 4.3 MMOL/L (ref 3.5–5.1)
PROT PATTERN UR ELPH-IMP: NORMAL
PROT SERPL-MCNC: 7.7 G/DL (ref 6.4–8.2)
PROTHROMBIN TIME: 12.8 S (ref 12–15.5)
RBC # BLD AUTO: 5.7 M/UL (ref 4.2–5.9)
SCREEN APTT: NORMAL S
SODIUM SERPL-SCNC: 137 MMOL/L (ref 136–145)
SPE/IFE INTERPRETATION: NORMAL
THROMBIN TIME: NORMAL S
WBC # BLD AUTO: 8.1 K/UL (ref 4–11)

## 2024-11-01 PROCEDURE — 6360000002 HC RX W HCPCS

## 2024-11-01 PROCEDURE — 80069 RENAL FUNCTION PANEL: CPT

## 2024-11-01 PROCEDURE — 36415 COLL VENOUS BLD VENIPUNCTURE: CPT

## 2024-11-01 PROCEDURE — 6370000000 HC RX 637 (ALT 250 FOR IP)

## 2024-11-01 PROCEDURE — 1200000000 HC SEMI PRIVATE

## 2024-11-01 PROCEDURE — 99233 SBSQ HOSP IP/OBS HIGH 50: CPT | Performed by: INTERNAL MEDICINE

## 2024-11-01 PROCEDURE — 2580000003 HC RX 258

## 2024-11-01 PROCEDURE — 6360000002 HC RX W HCPCS: Performed by: NURSE PRACTITIONER

## 2024-11-01 PROCEDURE — 85025 COMPLETE CBC W/AUTO DIFF WBC: CPT

## 2024-11-01 PROCEDURE — 6370000000 HC RX 637 (ALT 250 FOR IP): Performed by: NURSE PRACTITIONER

## 2024-11-01 PROCEDURE — 99232 SBSQ HOSP IP/OBS MODERATE 35: CPT | Performed by: NURSE PRACTITIONER

## 2024-11-01 PROCEDURE — 83735 ASSAY OF MAGNESIUM: CPT

## 2024-11-01 PROCEDURE — 93882 EXTRACRANIAL UNI/LTD STUDY: CPT | Performed by: SURGERY

## 2024-11-01 PROCEDURE — 2580000003 HC RX 258: Performed by: SINGLE SPECIALTY

## 2024-11-01 RX ORDER — LANOLIN ALCOHOL/MO/W.PET/CERES
400 CREAM (GRAM) TOPICAL 3 TIMES DAILY
Status: COMPLETED | OUTPATIENT
Start: 2024-11-01 | End: 2024-11-01

## 2024-11-01 RX ORDER — HEPARIN SODIUM 5000 [USP'U]/ML
5000 INJECTION, SOLUTION INTRAVENOUS; SUBCUTANEOUS EVERY 8 HOURS SCHEDULED
Status: DISCONTINUED | OUTPATIENT
Start: 2024-11-01 | End: 2024-11-02 | Stop reason: HOSPADM

## 2024-11-01 RX ADMIN — OXYCODONE 5 MG: 5 TABLET ORAL at 03:06

## 2024-11-01 RX ADMIN — OXYCODONE 5 MG: 5 TABLET ORAL at 17:10

## 2024-11-01 RX ADMIN — VERAPAMIL HYDROCHLORIDE 120 MG: 120 TABLET ORAL at 05:33

## 2024-11-01 RX ADMIN — ALLOPURINOL 200 MG: 100 TABLET ORAL at 08:36

## 2024-11-01 RX ADMIN — PANTOPRAZOLE SODIUM 40 MG: 40 TABLET, DELAYED RELEASE ORAL at 06:19

## 2024-11-01 RX ADMIN — SODIUM CHLORIDE, PRESERVATIVE FREE 10 ML: 5 INJECTION INTRAVENOUS at 20:21

## 2024-11-01 RX ADMIN — PROCHLORPERAZINE EDISYLATE 10 MG: 5 INJECTION INTRAMUSCULAR; INTRAVENOUS at 20:23

## 2024-11-01 RX ADMIN — INSULIN LISPRO 4 UNITS: 100 INJECTION, SOLUTION INTRAVENOUS; SUBCUTANEOUS at 20:20

## 2024-11-01 RX ADMIN — ACETAMINOPHEN 650 MG: 325 TABLET ORAL at 03:05

## 2024-11-01 RX ADMIN — SODIUM CHLORIDE, PRESERVATIVE FREE 10 ML: 5 INJECTION INTRAVENOUS at 08:42

## 2024-11-01 RX ADMIN — Medication 400 MG: at 20:19

## 2024-11-01 RX ADMIN — PROCHLORPERAZINE EDISYLATE 10 MG: 5 INJECTION INTRAMUSCULAR; INTRAVENOUS at 06:21

## 2024-11-01 RX ADMIN — Medication 400 MG: at 15:49

## 2024-11-01 RX ADMIN — SODIUM CHLORIDE, PRESERVATIVE FREE 10 ML: 5 INJECTION INTRAVENOUS at 20:20

## 2024-11-01 RX ADMIN — VERAPAMIL HYDROCHLORIDE 120 MG: 120 TABLET ORAL at 20:20

## 2024-11-01 RX ADMIN — SODIUM CHLORIDE, PRESERVATIVE FREE 10 ML: 5 INJECTION INTRAVENOUS at 08:37

## 2024-11-01 RX ADMIN — Medication 400 MG: at 12:29

## 2024-11-01 RX ADMIN — HYDROMORPHONE HYDROCHLORIDE 0.5 MG: 1 INJECTION, SOLUTION INTRAMUSCULAR; INTRAVENOUS; SUBCUTANEOUS at 20:20

## 2024-11-01 RX ADMIN — HYDROMORPHONE HYDROCHLORIDE 0.5 MG: 1 INJECTION, SOLUTION INTRAMUSCULAR; INTRAVENOUS; SUBCUTANEOUS at 06:14

## 2024-11-01 RX ADMIN — HEPARIN SODIUM 5000 UNITS: 5000 INJECTION INTRAVENOUS; SUBCUTANEOUS at 22:17

## 2024-11-01 RX ADMIN — ATORVASTATIN CALCIUM 40 MG: 40 TABLET, FILM COATED ORAL at 20:20

## 2024-11-01 RX ADMIN — HEPARIN SODIUM 5000 UNITS: 5000 INJECTION INTRAVENOUS; SUBCUTANEOUS at 13:54

## 2024-11-01 RX ADMIN — VERAPAMIL HYDROCHLORIDE 120 MG: 120 TABLET ORAL at 13:55

## 2024-11-01 ASSESSMENT — PAIN DESCRIPTION - LOCATION
LOCATION: HEAD

## 2024-11-01 ASSESSMENT — PAIN SCALES - GENERAL
PAINLEVEL_OUTOF10: 7
PAINLEVEL_OUTOF10: 2
PAINLEVEL_OUTOF10: 2
PAINLEVEL_OUTOF10: 4
PAINLEVEL_OUTOF10: 2
PAINLEVEL_OUTOF10: 3
PAINLEVEL_OUTOF10: 3
PAINLEVEL_OUTOF10: 2
PAINLEVEL_OUTOF10: 5
PAINLEVEL_OUTOF10: 7
PAINLEVEL_OUTOF10: 4
PAINLEVEL_OUTOF10: 2
PAINLEVEL_OUTOF10: 1
PAINLEVEL_OUTOF10: 3
PAINLEVEL_OUTOF10: 4
PAINLEVEL_OUTOF10: 1

## 2024-11-01 ASSESSMENT — PAIN DESCRIPTION - DESCRIPTORS
DESCRIPTORS: ACHING
DESCRIPTORS: THROBBING
DESCRIPTORS: ACHING

## 2024-11-01 ASSESSMENT — PAIN DESCRIPTION - ORIENTATION
ORIENTATION: RIGHT
ORIENTATION: RIGHT

## 2024-11-01 NOTE — PROGRESS NOTES
Neuro exams remaining stable today. Pt still complains of left sided blurred vision in periphery.   He has had a low grade headache today, responding well to pain meds.   Ambulating independently to bathroom and around unit throughout the day.  Continuing to monitor.

## 2024-11-01 NOTE — PLAN OF CARE
Problem: Chronic Conditions and Co-morbidities  Goal: Patient's chronic conditions and co-morbidity symptoms are monitored and maintained or improved  11/1/2024 0025 by Emma Murray RN  Outcome: Progressing  Flowsheets (Taken 10/31/2024 2000)  Care Plan - Patient's Chronic Conditions and Co-Morbidity Symptoms are Monitored and Maintained or Improved: Monitor and assess patient's chronic conditions and comorbid symptoms for stability, deterioration, or improvement  10/31/2024 1228 by Paco Trevino RN  Outcome: Progressing  Flowsheets (Taken 10/30/2024 2000 by Emma Murray RN)  Care Plan - Patient's Chronic Conditions and Co-Morbidity Symptoms are Monitored and Maintained or Improved: Monitor and assess patient's chronic conditions and comorbid symptoms for stability, deterioration, or improvement     Problem: Discharge Planning  Goal: Discharge to home or other facility with appropriate resources  11/1/2024 0025 by Emma Murray RN  Outcome: Progressing  Flowsheets (Taken 10/31/2024 2000)  Discharge to home or other facility with appropriate resources: Identify barriers to discharge with patient and caregiver  10/31/2024 1228 by Paco Trevino RN  Outcome: Progressing  Flowsheets (Taken 10/30/2024 2000 by Emma Murray RN)  Discharge to home or other facility with appropriate resources: Identify barriers to discharge with patient and caregiver     Problem: Pain  Goal: Verbalizes/displays adequate comfort level or baseline comfort level  11/1/2024 0025 by Emma Murray RN  Outcome: Progressing  10/31/2024 1228 by Paco Trevino RN  Outcome: Progressing  Flowsheets (Taken 10/30/2024 2000 by Emma Murray RN)  Verbalizes/displays adequate comfort level or baseline comfort level:   Encourage patient to monitor pain and request assistance   Assess pain using appropriate pain scale     Problem: Safety - Adult  Goal: Free from fall injury  Recent Flowsheet Documentation  Taken 10/31/2024 3823 by Emma Murray  RN  Free From Fall Injury: Instruct family/caregiver on patient safety  10/31/2024 1228 by Paco Trevino, RN  Outcome: Progressing  Flowsheets (Taken 10/30/2024 2033 by Emma Murray, RN)  Free From Fall Injury: Instruct family/caregiver on patient safety

## 2024-11-01 NOTE — CARE COORDINATION
DISCHARGE PLANNING:  Chart reviewed.  Patient is from home with spouse and no current services.     Current discharge plan is home with likely no CM needs. Therapy not currently ordered for evaluations. Dr. Haider made aware to order if needed.    CM team will continue to follow.  Stefani Grove, RN Case Manager  [phone]

## 2024-11-01 NOTE — PROGRESS NOTES
BULLA CYST 7/31/2020 [facility] SPECIAL PROCEDURES    KNEE SURGERY Left 1985    menicus tear    LUMBAR LAMINECTOMY  08/10/2017    L4-5 microhemilaminectomy; excision of cyst    LUMBAR SPINE SURGERY Right 07/21/2020    RIGHT LUMBAR4-LUMBAR5 MICRO HEMILAMINECTOMY AND CYSTECTOMY performed by Darshan Chamorro MD at [facility] OR    MEDIASTINOSCOPY  1994    bronchoscopy prior to medistinal mass.45 ml off lymph node, IV antibiotics for 6 months    PENIS SURGERY  1993    L rectus muscle flap, infected, kenalog injections    PTCA      SHOULDER SURGERY Left 2002    Rotator cuff repair, [place] [facility] at     WRIST SURGERY Left 07/07/2020    LEFT FIRST DORSAL COMPARTMENT (DEQUERVAIN'S) RELEASE performed by Kale Licona MD at [facility] OR         Social History:   The patient lives at HOME  Alcohol: occasional   Illicit drugs: marijuana gummies  Tobacco:  none    Family History   Problem Relation Age of Onset    High Blood Pressure Mother     Cancer Mother     Pacemaker Father     Other Maternal Grandmother         CVA in 80s    Other Maternal Grandfather         CABG and aortic valve replacement in 80s    Hypertension Maternal Grandfather     Other Paternal Grandmother         CVA in 80s         Allergies:   Allergies   Allergen Reactions    Penicillins Anaphylaxis       Review of Systems   Constitutional:  Positive for fatigue.   Eyes:  Positive for visual disturbance.   Musculoskeletal:  Positive for arthralgias and myalgias.   All other systems reviewed and are negative.        OBJECTIVE     Vitals:    11/01/24 0533 11/01/24 0600 11/01/24 0614 11/01/24 0644   BP: (!) 120/59 134/77     Pulse:  58     Resp:  15 19 16   Temp:       TempSrc:       SpO2:       Weight:       Height:           I/O:      Intake/Output Summary (Last 24 hours) at 11/1/2024 0646  Last data filed at 11/1/2024 0000  Gross per 24 hour   Intake 580 ml   Output 755 ml   Net -175 ml     I/O this shift:  In: 240 [P.O.:240]  Out: -   I/O last 3 completed shifts:  In:  where appropriate.     Briefly, this is a 54 y.o. male admitted to ICU with L-hemianopsia      INTERVAL HISTORY:  Angio with verapamil (+), suspect RCVS.      /69   Pulse 52   Temp 98.3 °F (36.8 °C) (Oral)   Resp 17   Ht 1.829 m (6')   Wt 112.3 kg (247 lb 9.2 oz)   SpO2 98%   BMI 33.58 kg/m²      Intake/Output Summary (Last 24 hours) at 11/1/2024 1129  Last data filed at 11/1/2024 0800      Gross per 24 hour   Intake 820 ml   Output 405 ml   Net 415 ml    SpO2: 98 %  - O2 Flow Rate (L/min): 2 L/min      Peripheral IV 10/29/24 Distal;Left Forearm (Active)   Number of days: 0      ASSESSMENT:  L-hemianopsia - suspected RCVS vs vasculitis  High pre-test probability for THU  Secondary polycythemia in the setting of testosterone supplementation      PLAN:  Supplemental O2 to maintain SaO2 > 95%  Aspiration Precautions (HOB elevated, Up for meals, mouth care)  NeuroCC on board, appreciate recommendations, neurochecks q4h.   Goal SBP <220 mmHg or SBP <120 mmHg   PRN Labetalol 10 mg  24 hours after stable head CT may start SQH TID   Keep HOB >30 degrees  Avoid hypotonic solutions , keep sNa >135 mEq/L  Avoid IJ CVC access to maximize venous return  If central venous access is needed please use subclavian vs femoral   Hold full dose anticoagulation and antiplatelet medications x 2 weeks unless otherwise directed.   Transfuse to keep platelets >100K  Maintain  normothermia   Place NG if unable to pass swallow evaluation   PT/OT/SLP & PMR consult as indicated   ICU Glycemic goal 140-180 mg/dL  Diet:  Carb controlled  IVF: N/A  GI prophylaxis: elevate HOB 30 degrees, PPI  DVT prophylaxis: SCDs  Bowel regimen: N/A  Abx: N/A     OK to transfer patient out of the ICU.  Pulmonary/Critical Care will follow only as needed. Please call us back if there is need for re-evaluation      The note was completed using EMR and Dragon dictation system. Every effort was made to ensure accuracy; however, inadvertent computerized

## 2024-11-01 NOTE — PROGRESS NOTES
NEUROCRITICAL CARE PROGRESS NOTE       Patient Name: Marv Chanel YOB: 1970   Sex: Male Age: 54 yrs     CC / Reason for Consult: visual changes    Changes over last 24 hours:  Angio with Dr Valencia cerebral vasospams improved with IA verapamil, No evidence of vascular malformation.  Temporal artery US prelim negative, official read pending   Episode of bradycardia, junctional escape beats, remained asymptomatic    ROS: C/o headache, pain starts behind R eye and radiates L across the bridge of the nose. Pain rated 2-3/10.     ASSESSMENT & RECOMMENDATIONS   Assessment:  Marv Chanel is a 53yo man presented to ER with recurrent thunderclap headaches accompanied by vision loss in his left field of view and numbness/tingling in his left hand and found to have small area of likely stroke in the right occipital lobe but with negative intra/extracranial vessels suggestive of possible reversible cerebral vasoconstrictive syndrome (RCVS). Unusual that his vision loss seems to be more in the left eye itself and not in both eyes to be able to attribute it to the occipital lobe lesion. Cannot completely exclude another process such as giant cell arteritis, but this overall clinical picture is inconsistent with this. Reassuringly, temporal artery US negative. Patient had positive response to IA verapamil in angio with Dr. Valencia.      Plan:  Continue verapamil at 120mg q8  Q4H neuro checks  Continue to follow through the weekend for worsening symptoms/neuro-deficits   PT/OT as needed for visual field deficits   Maintain on telemetry on floor     PAUL Godoy - CNP   Neurocritical Care   11/1/2024 8:53 AM  [facility]: [facility]  HISTORY   Interval History: Temporal artery US completed. ESR and CRP all un-concerning for giant cell arteritis.     PMH Past Medical History:   Diagnosis Date    Abscess 1994    mediastinal, developed after kenalog injections    Acute CVA  3.3 2.9   MG 2.00 1.70* 1.80      CBC / Coags Recent Labs     10/30/24  0431 10/31/24  0448 11/01/24  0532   WBC 8.5 8.0 8.1   RBC 5.76 5.54 5.70   HGB 16.4 16.2 16.5   HCT 49.1 46.9 48.3    209 226      Other Recent Labs     10/30/24  0133 10/30/24  0431   LABA1C  --  7.0   CHOL  --  94   HDL  --  38*   LDL  --  18   TRIG  --  189*   COVID19 NOT DETECTED  --      No results for input(s): \"PHENYTOIN\", \"LEVETIRACETA\", \"LACOSA\", \"LAMO\", \"VALPROATE\", \"LACTSEPSIS\", \"LACTA\" in the last 72 hours.     DIAGNOSTICS   IMAGES:  Images personally reviewed and agree w/ radiology interpretation of :  L temporal artery US:  The left temporal, parietal, and frontal arteries demonstrate patency.  There is no evidence of vessel wall thickening or measurable halo noted  throughout.  Velocities and thickness are as follows:  Temporal artery 56/5.32 to 56.8/0.4 cm/s  thickness 0.37 mm  Parietal artery 94.2/17.2 cm/s to 93.8/7.07c m/s  thickness 0.33 mm  Frontal artery 74/8.01 cm/s to 78.2/6.6 cm/s  Thickness 0.33 mm     Previous Imaging:  Head CT w/o Contrast:  IMPRESSION:  No acute intracranial abnormality    CTA of Head / Neck w/ Contrast:  IMPRESSION:  No aneurysm or hemodynamically significant  stenosis of the major arteries of the head and neck.    MRI Brain w/o Contrast:  IMPRESSION:  Small acute or subacute infarct in the right occipital lobe.  Right posterior cerebral artery occlusion      PHYSICAL EXAMINATION     PHYSICAL EXAM:  Vitals:    11/01/24 0614 11/01/24 0644 11/01/24 0700 11/01/24 0800   BP:   (!) 145/79 (!) 144/71   Pulse:   55 (!) 48   Resp: 19 16 14 13   Temp:    98.3 °F (36.8 °C)   TempSrc:    Oral   SpO2:    98%   Weight:       Height:             General: Alert, no distress, well-nourished  Neurologic  Mental status: oriented x4, follows all commands, clear/appropriate speech    Cranial nerves:   CN2: L eye L superior quadrantanopia  CN 3,4,6: Pupils equal and reactive to light, extraocular muscles

## 2024-11-01 NOTE — PROGRESS NOTES
V2.0    [facility] Progress Note      Name:  Marv Chanel /Age/Sex: 1970  (54 y.o. male)   MRN & CSN:  [id number] & [id number] Encounter Date/Time: 2024 8:35 AM EDT   Location:  [facility]/4518-01 PCP: Suhail Lujan APRN - CNP     Attending:Pola Haider MD       Hospital Day: 4    Assessment and Recommendations   Marv Chanel is a 54 y.o. male with pmh of Type II DM, HTN, CAD who presents with Vision changes      #CVA due to R posterior cerebral artery occlusion  - Discussed with ICU, transferring to floor  - Neuro consulted and following  - Neurovascular consulted for cerebral angiogram: confirmed RCVS  - q4h neuro checks  - Verapamil q8h    #Type II DM  - SSI  - Hypoglycemia monitoring/treatment while on insulin therapy per protocol    #CAD  #HTN  - goal  BP<220/120 for first 24 hours   - statin    Diet ADULT DIET; Regular; 3 carb choices (45 gm/meal)   DVT Prophylaxis [] Lovenox, []  Heparin, [] SCDs, [] Ambulation,  [] Eliquis, [] Xarelto  [] Coumadin   Code Status Full Code   Disposition From: Home  Expected Disposition: TBD  Estimated Date of Discharge: 2+ days  Patient requires continued admission due to neuro eval   Surrogate Decision Maker/ DARRELL Cordova, spouse     Personally reviewed Lab Studies and Imaging     Subjective:     Chief Complaint: Vision deficit    Marv Chanel is a 54 y.o. male who presented with acute severe headache and left lateral vision loss. Found to have PCA occlusion and admitted for further care.    Underwent cerebral angiography yesterday. No acute events reported overnight. No neuro changes today.      Review of Systems:      Pertinent positives and negatives discussed in HPI    Objective:     Intake/Output Summary (Last 24 hours) at 2024 1020  Last data filed at 2024 0800  Gross per 24 hour   Intake 820 ml   Output 405 ml   Net 415 ml      Vitals:   Vitals:    24 0644 24 0700 24 0800 24 0900   BP:  (!)  BILIRUBINUR NEGATIVE 04/28/2011 09:26 AM    BLOODU Negative 12/29/2022 02:43 PM    GLUCOSEU >=1000 12/29/2022 02:43 PM    GLUCOSEU >=1000 04/28/2011 09:26 AM    KETUA Negative 12/29/2022 02:43 PM     Urine Cultures: No results found for: \"LABURIN\"  Blood Cultures:   Lab Results   Component Value Date/Time    BC  10/30/2024 12:24 AM     No Growth to date.  Any change in status will be called.     Lab Results   Component Value Date/Time    BLOODCULT2  10/30/2024 12:32 AM     No Growth to date.  Any change in status will be called.     Organism:   Lab Results   Component Value Date/Time    ORG Staphylococcus epidermidis 07/29/2020 01:57 PM         Electronically signed by Pola Haider MD on 11/1/2024 at 10:20 AM

## 2024-11-01 NOTE — PLAN OF CARE
Brief note:  Marv Chanel is a 55yo man presented to ER with recurrent thunderclap headaches accompanied by vision loss in his left field of view and numbness/tingling in his left hand and found to have small area of likely stroke in the right occipital lobe but with negative intra/extracranial vessels suggestive of possible reversible cerebral vasoconstrictive syndrome (RCVS). Unusual that his vision loss seems to be more in the left eye itself and not in both eyes to be able to attribute it to the occipital lobe lesion. Cannot completely exclude another process such as giant cell arteritis, but this overall clinical picture is inconsistent with this.    Final results of temporal artery doppler pending  Preliminary result:   \"The left temporal, parietal, and frontal arteries demonstrate patency.  There is no evidence of vessel wall thickening or measurable halo noted  throughout.  Velocities and thickness are as follows:  Temporal artery 56/5.32 to 56.8/0.4 cm/s  thickness 0.37 mm  Parietal artery 94.2/17.2 cm/s to 93.8/7.07c m/s  thickness 0.33 mm  Frontal artery 74/8.01 cm/s to 78.2/6.6 cm/s  Thickness 0.33 mm.\"    S/p IR today with IA verapamil and good response        PHYSICAL EXAM:  Vitals:    10/31/24 1617 10/31/24 1634 10/31/24 1730 10/31/24 1745   BP: (!) 143/70  139/79    Pulse: 60  72 60   Resp: 14 16 14 14   Temp: 98.2 °F (36.8 °C)      TempSrc: Oral      SpO2: 98%      Weight:       Height:             General: Alert, no distress, well-nourished  Neurologic  Mental status:   orientation to person, place, time, situation   Attention intact as able to attend well to the exam     Language fluent in conversation   Comprehension intact; follows simple commands    Cranial nerves:   CN2: Visual fields full w/o extinction on confrontational testing   CN 3,4,6: Pupils 3 mm > 2 mm equal and reactive to light, extraocular muscles intact  CN5: Facial sensation symmetric   CN7: Face symmetric  CN8: Hearing

## 2024-11-02 VITALS
RESPIRATION RATE: 15 BRPM | SYSTOLIC BLOOD PRESSURE: 103 MMHG | BODY MASS INDEX: 33.53 KG/M2 | HEIGHT: 72 IN | HEART RATE: 60 BPM | DIASTOLIC BLOOD PRESSURE: 80 MMHG | WEIGHT: 247.58 LBS | TEMPERATURE: 98.3 F | OXYGEN SATURATION: 96 %

## 2024-11-02 LAB
ALBUMIN SERPL-MCNC: 4.1 G/DL (ref 3.4–5)
ANION GAP SERPL CALCULATED.3IONS-SCNC: 9 MMOL/L (ref 3–16)
BASOPHILS # BLD: 0.1 K/UL (ref 0–0.2)
BASOPHILS NFR BLD: 1.1 %
BUN SERPL-MCNC: 23 MG/DL (ref 7–20)
CALCIUM SERPL-MCNC: 9.2 MG/DL (ref 8.3–10.6)
CHLORIDE SERPL-SCNC: 97 MMOL/L (ref 99–110)
CO2 SERPL-SCNC: 26 MMOL/L (ref 21–32)
CREAT SERPL-MCNC: 1 MG/DL (ref 0.9–1.3)
DEPRECATED RDW RBC AUTO: 13.8 % (ref 12.4–15.4)
EOSINOPHIL # BLD: 0.2 K/UL (ref 0–0.6)
EOSINOPHIL NFR BLD: 2.5 %
GFR SERPLBLD CREATININE-BSD FMLA CKD-EPI: 89 ML/MIN/{1.73_M2}
GLUCOSE BLD-MCNC: 151 MG/DL (ref 70–99)
GLUCOSE BLD-MCNC: 255 MG/DL (ref 70–99)
GLUCOSE SERPL-MCNC: 150 MG/DL (ref 70–99)
HCT VFR BLD AUTO: 44.8 % (ref 40.5–52.5)
HGB BLD-MCNC: 15.4 G/DL (ref 13.5–17.5)
LYMPHOCYTES # BLD: 2.4 K/UL (ref 1–5.1)
LYMPHOCYTES NFR BLD: 31.6 %
MAGNESIUM SERPL-MCNC: 1.8 MG/DL (ref 1.8–2.4)
MCH RBC QN AUTO: 28.8 PG (ref 26–34)
MCHC RBC AUTO-ENTMCNC: 34.3 G/DL (ref 31–36)
MCV RBC AUTO: 83.9 FL (ref 80–100)
MONOCYTES # BLD: 0.7 K/UL (ref 0–1.3)
MONOCYTES NFR BLD: 9.7 %
NEUTROPHILS # BLD: 4.3 K/UL (ref 1.7–7.7)
NEUTROPHILS NFR BLD: 55.1 %
PERFORMED ON: ABNORMAL
PERFORMED ON: ABNORMAL
PHOSPHATE SERPL-MCNC: 3.5 MG/DL (ref 2.5–4.9)
PLATELET # BLD AUTO: 211 K/UL (ref 135–450)
PMV BLD AUTO: 7.5 FL (ref 5–10.5)
POTASSIUM SERPL-SCNC: 4.5 MMOL/L (ref 3.5–5.1)
RBC # BLD AUTO: 5.34 M/UL (ref 4.2–5.9)
SODIUM SERPL-SCNC: 132 MMOL/L (ref 136–145)
WBC # BLD AUTO: 7.7 K/UL (ref 4–11)

## 2024-11-02 PROCEDURE — 6370000000 HC RX 637 (ALT 250 FOR IP)

## 2024-11-02 PROCEDURE — 83735 ASSAY OF MAGNESIUM: CPT

## 2024-11-02 PROCEDURE — 36415 COLL VENOUS BLD VENIPUNCTURE: CPT

## 2024-11-02 PROCEDURE — 97161 PT EVAL LOW COMPLEX 20 MIN: CPT

## 2024-11-02 PROCEDURE — 2580000003 HC RX 258

## 2024-11-02 PROCEDURE — 85025 COMPLETE CBC W/AUTO DIFF WBC: CPT

## 2024-11-02 PROCEDURE — 6360000002 HC RX W HCPCS

## 2024-11-02 PROCEDURE — 97116 GAIT TRAINING THERAPY: CPT

## 2024-11-02 PROCEDURE — 80069 RENAL FUNCTION PANEL: CPT

## 2024-11-02 RX ORDER — VERAPAMIL HYDROCHLORIDE 120 MG/1
120 TABLET ORAL EVERY 8 HOURS SCHEDULED
Qty: 21 TABLET | Refills: 0 | Status: SHIPPED | OUTPATIENT
Start: 2024-11-02 | End: 2024-11-02

## 2024-11-02 RX ORDER — VERAPAMIL HYDROCHLORIDE 120 MG/1
120 TABLET ORAL EVERY 8 HOURS SCHEDULED
Qty: 21 TABLET | Refills: 0 | Status: SHIPPED | OUTPATIENT
Start: 2024-11-02 | End: 2024-11-09

## 2024-11-02 RX ADMIN — SODIUM CHLORIDE, PRESERVATIVE FREE 10 ML: 5 INJECTION INTRAVENOUS at 14:19

## 2024-11-02 RX ADMIN — HEPARIN SODIUM 5000 UNITS: 5000 INJECTION INTRAVENOUS; SUBCUTANEOUS at 06:09

## 2024-11-02 RX ADMIN — VERAPAMIL HYDROCHLORIDE 120 MG: 120 TABLET ORAL at 14:01

## 2024-11-02 RX ADMIN — SODIUM CHLORIDE, PRESERVATIVE FREE 5 ML: 5 INJECTION INTRAVENOUS at 14:20

## 2024-11-02 RX ADMIN — ALLOPURINOL 200 MG: 100 TABLET ORAL at 08:37

## 2024-11-02 RX ADMIN — PANTOPRAZOLE SODIUM 40 MG: 40 TABLET, DELAYED RELEASE ORAL at 06:09

## 2024-11-02 RX ADMIN — SENNOSIDES 17.2 MG: 8.6 TABLET, FILM COATED ORAL at 06:09

## 2024-11-02 RX ADMIN — HEPARIN SODIUM 5000 UNITS: 5000 INJECTION INTRAVENOUS; SUBCUTANEOUS at 14:00

## 2024-11-02 RX ADMIN — INSULIN LISPRO 8 UNITS: 100 INJECTION, SOLUTION INTRAVENOUS; SUBCUTANEOUS at 11:14

## 2024-11-02 RX ADMIN — ACETAMINOPHEN 650 MG: 325 TABLET ORAL at 06:08

## 2024-11-02 RX ADMIN — VERAPAMIL HYDROCHLORIDE 120 MG: 120 TABLET ORAL at 06:09

## 2024-11-02 ASSESSMENT — PAIN SCALES - GENERAL
PAINLEVEL_OUTOF10: 1
PAINLEVEL_OUTOF10: 0
PAINLEVEL_OUTOF10: 1
PAINLEVEL_OUTOF10: 1
PAINLEVEL_OUTOF10: 0
PAINLEVEL_OUTOF10: 1

## 2024-11-02 ASSESSMENT — PAIN DESCRIPTION - LOCATION
LOCATION: HEAD

## 2024-11-02 ASSESSMENT — PAIN - FUNCTIONAL ASSESSMENT: PAIN_FUNCTIONAL_ASSESSMENT: ACTIVITIES ARE NOT PREVENTED

## 2024-11-02 ASSESSMENT — PAIN DESCRIPTION - DESCRIPTORS
DESCRIPTORS: ACHING

## 2024-11-02 NOTE — PLAN OF CARE
Problem: Chronic Conditions and Co-morbidities  Goal: Patient's chronic conditions and co-morbidity symptoms are monitored and maintained or improved  11/2/2024 0734 by Ca Martin RN  Outcome: Progressing     Problem: Discharge Planning  Goal: Discharge to home or other facility with appropriate resources  11/2/2024 0734 by Ca Martin RN  Outcome: Progressing     Problem: Pain  Goal: Verbalizes/displays adequate comfort level or baseline comfort level  11/2/2024 0734 by Ca Martin RN  Outcome: Progressing     Problem: Safety - Adult  Goal: Free from fall injury  11/2/2024 0734 by Ca Martin RN  Outcome: Progressing     Problem: ABCDS Injury Assessment  Goal: Absence of physical injury  11/2/2024 0734 by Ca Martin RN  Outcome: Progressing     Problem: Neurosensory - Adult  Goal: Achieves stable or improved neurological status  11/2/2024 0734 by aC Martin RN  Outcome: Progressing

## 2024-11-02 NOTE — PROGRESS NOTES
subclavian arteries. CAROTID ARTERIES: No dissection, arterial injury, or hemodynamically significant stenosis by NASCET criteria. VERTEBRAL ARTERIES: No dissection, arterial injury, or significant stenosis. SOFT TISSUES: The lung apices are clear.  No cervical or superior mediastinal lymphadenopathy.  The larynx and pharynx are unremarkable.  No acute abnormality of the salivary and thyroid glands. BONES: No acute osseous abnormality. CTA HEAD: ANTERIOR CIRCULATION: No significant stenosis of the intracranial internal carotid, anterior cerebral, or middle cerebral arteries. No aneurysm. POSTERIOR CIRCULATION: No significant stenosis of the vertebral, basilar, or posterior cerebral arteries. No aneurysm. OTHER: No dural venous sinus thrombosis on this non-dedicated study. BRAIN: No mass effect or midline shift. No extra-axial fluid collection. The gray-white differentiation is maintained.     No acute intracranial abnormality. No aneurysm or hemodynamically significant stenosis of the major arteries of the head and neck.     CT HEAD WO CONTRAST    Addendum Date: 10/29/2024    ADDENDUM: Ill-defined hypodensity in the left caudate nucleus is age indeterminate ischemic focus. Findings were discussed with Odalis FISHER at 3:55 pm on 10/29/2024.     Result Date: 10/29/2024  EXAMINATION: CT OF THE HEAD WITHOUT CONTRAST; CTA OF THE HEAD AND NECK WITH CONTRAST 10/29/2024 3:11 pm TECHNIQUE: CT of the head was performed without the administration of intravenous contrast. Automated exposure control, iterative reconstruction, and/or weight based adjustment of the mA/kV was utilized to reduce the radiation dose to as low as reasonably achievable.; CTA of the head and neck was performed with the administration of intravenous contrast. Multiplanar reformatted images are provided for review.  MIP images are provided for review. Stenosis of the internal carotid arteries measured using NASCET criteria. Automated exposure control,  iterative reconstruction, and/or weight based adjustment of the mA/kV was utilized to reduce the radiation dose to as low as reasonably achievable. COMPARISON: None. HISTORY: ORDERING SYSTEM PROVIDED HISTORY: Right temporal headache, Left lateral visual field cut TECHNOLOGIST PROVIDED HISTORY: Reason for exam:->Right temporal headache, Left lateral visual field cut Has a \"code stroke\" or \"stroke alert\" been called?->Yes Decision Support Exception - unselect if not a suspected or confirmed emergency medical condition->Emergency Medical Condition (MA) Reason for Exam: Right temporal headache, Left lateral visual field cut FINDINGS: BRAIN/VENTRICLES: There is no acute intracranial hemorrhage, mass effect or midline shift.  No abnormal extra-axial fluid collection.  The gray-white differentiation is maintained without evidence of an acute infarct.  There is no evidence of hydrocephalus. ORBITS: The visualized portion of the orbits demonstrate no acute abnormality. SINUSES: The visualized paranasal sinuses and mastoid air cells demonstrate no acute abnormality. SOFT TISSUES/SKULL:  No acute abnormality of the visualized skull or soft tissues. CTA NECK: AORTIC ARCH/ARCH VESSELS: No dissection or arterial injury.  No significant stenosis of the brachiocephalic or subclavian arteries. CAROTID ARTERIES: No dissection, arterial injury, or hemodynamically significant stenosis by NASCET criteria. VERTEBRAL ARTERIES: No dissection, arterial injury, or significant stenosis. SOFT TISSUES: The lung apices are clear.  No cervical or superior mediastinal lymphadenopathy.  The larynx and pharynx are unremarkable.  No acute abnormality of the salivary and thyroid glands. BONES: No acute osseous abnormality. CTA HEAD: ANTERIOR CIRCULATION: No significant stenosis of the intracranial internal carotid, anterior cerebral, or middle cerebral arteries. No aneurysm. POSTERIOR CIRCULATION: No significant stenosis of the vertebral, basilar, or  posterior cerebral arteries. No aneurysm. OTHER: No dural venous sinus thrombosis on this non-dedicated study. BRAIN: No mass effect or midline shift. No extra-axial fluid collection. The gray-white differentiation is maintained.     No acute intracranial abnormality. No aneurysm or hemodynamically significant stenosis of the major arteries of the head and neck.       CBC:   Recent Labs     10/31/24  0448 11/01/24  0532 11/02/24  0409   WBC 8.0 8.1 7.7   HGB 16.2 16.5 15.4    226 211     BMP:    Recent Labs     10/31/24  0448 11/01/24  0533 11/02/24  0409   * 137 132*   K 4.6 4.3 4.5   CL 99 98* 97*   CO2 22 26 26   BUN 20 19 23*   CREATININE 0.9 1.0 1.0   GLUCOSE 160* 146* 150*     Hepatic: No results for input(s): \"AST\", \"ALT\", \"BILITOT\", \"ALKPHOS\" in the last 72 hours.    Invalid input(s): \"ALB\"  Lipids:   Lab Results   Component Value Date/Time    CHOL 94 10/30/2024 04:31 AM    HDL 38 10/30/2024 04:31 AM    HDL 34 04/28/2011 09:26 AM    TRIG 189 10/30/2024 04:31 AM     Hemoglobin A1C:   Lab Results   Component Value Date/Time    LABA1C 7.0 10/30/2024 04:31 AM     TSH:   Lab Results   Component Value Date/Time    TSH 1.62 07/22/2024 11:35 AM     Troponin: No results found for: \"TROPONINT\"  Lactic Acid: No results for input(s): \"LACTA\" in the last 72 hours.  BNP: No results for input(s): \"PROBNP\" in the last 72 hours.  UA:  Lab Results   Component Value Date/Time    NITRU Negative 12/29/2022 02:43 PM    COLORU Yellow 12/29/2022 02:43 PM    PHUR 5.0 10/30/2024 04:10 AM    PHUR 5.5 12/29/2022 02:43 PM    WBCUA 0 12/29/2022 02:43 PM    RBCUA 1 12/29/2022 02:43 PM    BACTERIA None Seen 12/29/2022 02:43 PM    CLARITYU Clear 12/29/2022 02:43 PM    LEUKOCYTESUR Negative 12/29/2022 02:43 PM    UROBILINOGEN 0.2 12/29/2022 02:43 PM    BILIRUBINUR Negative 12/29/2022 02:43 PM    BILIRUBINUR NEGATIVE 04/28/2011 09:26 AM    BLOODU Negative 12/29/2022 02:43 PM    GLUCOSEU >=1000 12/29/2022 02:43 PM    GLUCOSEU

## 2024-11-02 NOTE — PROGRESS NOTES
Physical Therapy  Facility/Department: [facility] ICU  Physical Therapy Initial Assessment and treatment    Name: Marv Chanel  : 1970  MRN: [id number]  Date of Service: 2024    Discharge Recommendations:  Home with assist PRN   PT Equipment Recommendations  Equipment Needed: No      Patient Diagnosis(es): The encounter diagnosis was Cerebrovascular accident (CVA), unspecified mechanism (HCC).  Past Medical History:  has a past medical history of Abscess, Acute CVA (cerebrovascular accident) (HCC), Anesthesia, CAD (coronary artery disease), Diabetes mellitus (HCC), Gout, HLD (hyperlipidemia), HTN (hypertension), Major depressive disorder, recurrent episode, moderate (HCC), MDRO (multiple drug resistant organisms) resistance, MRSA (methicillin resistant staph aureus) culture positive, Obesity, Pancreatitis, PONV (postoperative nausea and vomiting), Toxicity, chemicals, Type 2 diabetes mellitus without complication ([facility]), and Wound abscess.  Past Surgical History:  has a past surgical history that includes Mediastinoscopy (); shoulder surgery (Left, ); knee surgery (Left, ); Adenoidectomy (); Penis surgery (); Coronary artery bypass graft (2016); lumbar laminectomy (08/10/2017); Elbow surgery (Right); Endoscopy, colon, diagnostic; Wrist surgery (Left, 2020); Lumbar spine surgery (Right, 2020); IR ASP ABSCESS/HEMATOMA/BULLA/CYST (2020); Colonoscopy (N/A, 2021); Diagnostic Cardiac Cath Lab Procedure; Percutaneous Transluminal Coronary Angio; and neurovascular procedure (N/A, 10/31/2024).    Assessment  Assessment: Pt presents with symptoms of CVA and found to have CVA on imaging. Pt with slight residual L periphery deficit visually but that did not affect his functional mobility. He was able to anticipate obstacles on the L, had steady ambulation and was able to manage 4 stairs without rail. 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 At this time he has no further therapy needs and PT to discharge him from caseload.  Treatment Diagnosis: impaired mobility 2/2 CVA  Therapy Prognosis: Excellent  Decision Making: Low Complexity  Requires PT Follow-Up: No  Activity Tolerance  Activity Tolerance: Patient tolerated evaluation without incident    Plan  Physical Therapy Plan  General Plan: Discharge with evaluation only  Safety Devices  Type of Devices: Call light within reach, Nurse notified, Left in chair    Restrictions        Subjective  Pain: denies pain  General  Chart Reviewed: Yes  Patient assessed for rehabilitation services?: Yes  Additional Pertinent Hx: Marv Chanel is a 54 y.o. male with pmh of Type II DM, HTN, CAD who presents with Vision changes.  Family / Caregiver Present: No  Diagnosis: vision changes  Follows Commands: Within Functional Limits  General Comment  Comments: The pt presents sitting in recliner and willing to work with therapist.  Subjective  Subjective: \"3 or 4 days ago I would run into something but now I am better.\"         Social/Functional History  Social/Functional History  Lives With: Spouse (wife is RN)  Type of Home: House  Home Layout: One level  Home Access: Stairs to enter without rails  Entrance Stairs - Number of Steps: 2  Bathroom Shower/Tub: Walk-in shower  Bathroom Toilet: Handicap height (master bath is taller and other is standard)  Bathroom Equipment: Grab bars in shower  Has the patient had two or more falls in the past year or any fall with injury in the past year?: No  ADL Assistance: Independent  Homemaking Assistance: Independent  Homemaking Responsibilities: Yes  Ambulation Assistance: Independent  Transfer Assistance: Independent  Active : Yes  Occupation: Full time employment  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  Leisure & Hobbies: travel  Vision/Hearing  Vision  Vision: Impaired  Vision Exceptions: Wears glasses for reading (L  peripheral vision blurry)  Hearing  Hearing: Within functional limits    Cognition   Orientation  Overall Orientation Status: Within Functional Limits  Cognition  Overall Cognitive Status: WFL    Objective  Temp: 98 °F (36.7 °C)  Pulse: 70  Heart Rate Source: Monitor  Respirations: 13  SpO2: 96 %  O2 Device: None (Room air)  BP: 128/80  MAP (Calculated): 96  BP Location: Left upper arm  BP Method: Automatic  Patient Position: Up in chair        Gross Assessment  AROM: Within functional limits  PROM: Within functional limits  Strength: Within functional limits  Coordination: Within functional limits  Tone: Normal  Sensation: Intact       Strength RLE  Strength RLE: WFL  Strength LLE  Strength LLE: WFL              Transfers  Sit to Stand: Independent (from recliner)  Stand to Sit: Independent  Ambulation  Surface: Level tile  Device: No Device  Assistance: Independent  Quality of Gait: normal meredith, able to anticipate obstacles on L periphery before he gets to them, no LOB  Distance: 350  Stairs/Curb  Stairs?: Yes  Stairs  # Steps : 4  Stairs Height: 6\"  Rails: None  Assistance: Supervision     Balance  Sitting - Static: Good  Sitting - Dynamic: Good  Standing - Static: Good  Standing - Dynamic: Fair;+  Comments: Pt independent with mobility without AD          AM-PAC - Mobility    AM-PAC Basic Mobility - Inpatient   How much help is needed turning from your back to your side while in a flat bed without using bedrails?: None  How much help is needed moving from lying on your back to sitting on the side of a flat bed without using bedrails?: None  How much help is needed moving to and from a bed to a chair?: None  How much help is needed standing up from a chair using your arms?: None  How much help is needed walking in hospital room?: None  How much help is needed climbing 3-5 steps with a railing?: None  AM-PAC Inpatient Mobility Raw Score : 24  AM-PAC Inpatient T-Scale Score : 61.14  Mobility Inpatient CMS 0-100%

## 2024-11-02 NOTE — DISCHARGE INSTR - COC
Left 2020    LEFT FIRST DORSAL COMPARTMENT (DEQUERVAIN'S) RELEASE performed by Kale Licona MD at [facility] OR       Immunization History:   Immunization History   Administered Date(s) Administered    COVID-19, MODERNA BLUE border, Primary or Immunocompromised, (age 12y+), IM, 100 mcg/0.5mL 2021, 2021, 2021    Influenza Virus Vaccine 10/12/2017, 10/01/2018, 10/08/2018, 10/04/2019, 10/08/2020, 10/22/2021, 10/05/2023    Influenza, AFLURIA, FLUZONE, (age2 y+), IM, Trivalent MDV, 0.5mL 10/19/2022    Pneumococcal, PPSV23, PNEUMOVAX 23, (age 2y+), SC/IM, 0.5mL 12/10/2018    TD 5LF, TENIVAC, (age 7y+), IM, 0.5mL 2024    TDaP, ADACEL (age 10y-64y), BOOSTRIX (age 10y+), IM, 0.5mL 2024       Active Problems:  Patient Active Problem List   Diagnosis Code    Coronary artery disease involving coronary bypass graft of native heart with angina pectoris ([facility]) I25.709    Essential hypertension I10    Mixed hyperlipidemia E78.2    Type 2 diabetes mellitus with diabetic peripheral angiopathy without gangrene ([facility]) E11.51    Pure hypercholesterolemia E78.00    Hypovitaminosis D E55.9    PVD (peripheral vascular disease) ([facility]) I73.9    Chronic venous htn w inflammation of bilateral low extrm I87.323    Degenerative disc disease, lumbar M51.369    Adjustment disorder F43.20    Hypogonadism in male E29.1    Severe obesity (BMI 35.0-39.9) with comorbidity E66.01    Nocturia R35.1    Diffuse arthralgia M25.50    Vision changes H53.9    Cerebrovascular accident (CVA) ([facility]) I63.9       Isolation/Infection:   Isolation            No Isolation          Patient Infection Status       Infection Onset Added Last Indicated Last Indicated By Review Planned Expiration Resolved Resolved By    None active    Resolved    COVID-19 20 COVID-19   20 Infection     MRSA  17 ANAEROBIC AND AEROBIC CULTURE   20 Deana Granger, RN    17                       Nurse  Assessment:  Last Vital Signs: /80   Pulse 60   Temp 98.3 °F (36.8 °C) (Oral)   Resp 15   Ht 1.829 m (6')   Wt 112.3 kg (247 lb 9.2 oz)   SpO2 96%   BMI 33.58 kg/m²     Last documented pain score (0-10 scale): Pain Level: 0  Last Weight:   Wt Readings from Last 1 Encounters:   10/29/24 112.3 kg (247 lb 9.2 oz)     Mental Status:  oriented    IV Access:  - None    Nursing Mobility/ADLs:  Walking   Independent  Transfer  Independent  Bathing  Independent  Dressing  Independent  Toileting  Independent  Feeding  Independent  Med Admin  Independent  Med Delivery   none    Wound Care Documentation and Therapy:  Incision 07/21/20 Back Lower;Medial (Active)   Number of days: 1565        Elimination:  Continence:   Bowel: Yes  Bladder: Yes  Urinary Catheter: None   Colostomy/Ileostomy/Ileal Conduit: No       Date of Last BM: 11/2/2024    Intake/Output Summary (Last 24 hours) at 11/2/2024 1514  Last data filed at 11/2/2024 0600  Gross per 24 hour   Intake 240 ml   Output --   Net 240 ml     I/O last 3 completed shifts:  In: 1560 [P.O.:1560]  Out: -     Safety Concerns:     None    Impairments/Disabilities:      None    Nutrition Therapy:  Current Nutrition Therapy:   - Oral Diet:  General    Routes of Feeding: Oral  Liquids: No Restrictions  Daily Fluid Restriction: no  Last Modified Barium Swallow with Video (Video Swallowing Test): not done      RN SIGNATURE:  Electronically signed by Ca Martin RN on 11/2/24 at 3:16 PM EDT    CASE MANAGEMENT/SOCIAL WORK SECTION    Inpatient Status Date: ***    Readmission Risk Assessment Score:  Readmission Risk              Risk of Unplanned Readmission:  18           Discharging to Facility/ Agency   Name:   Address:  Phone:  Fax:    Dialysis Facility (if applicable)   Name:  Address:  Dialysis Schedule:  Phone:  Fax:    / signature: {Esignature:228638229}    PHYSICIAN SECTION    Prognosis: {Prognosis:9411405452}    Condition at Discharge: {MH  Patient Condition:590778124}    Rehab Potential (if transferring to Rehab): {Prognosis:5507222156}    Recommended Labs or Other Treatments After Discharge: ***    Physician Certification: I certify the above information and transfer of Marv Chanel  is necessary for the continuing treatment of the diagnosis listed and that he requires {Admit to Appropriate Level of Care:46975} for {GREATER/LESS:306287723} 30 days.     Update Admission H&P: {CHP DME Changes in HandP:573716441}    PHYSICIAN SIGNATURE:  {Esignature:974556340}

## 2024-11-02 NOTE — PLAN OF CARE
Problem: Chronic Conditions and Co-morbidities  Goal: Patient's chronic conditions and co-morbidity symptoms are monitored and maintained or improved  Outcome: Progressing     Problem: Discharge Planning  Goal: Discharge to home or other facility with appropriate resources  Outcome: Progressing     Problem: Pain  Goal: Verbalizes/displays adequate comfort level or baseline comfort level  Outcome: Progressing     Problem: Safety - Adult  Goal: Free from fall injury  Outcome: Progressing     Problem: ABCDS Injury Assessment  Goal: Absence of physical injury  Outcome: Progressing     Problem: Neurosensory - Adult  Goal: Achieves stable or improved neurological status  Outcome: Progressing

## 2024-11-02 NOTE — DISCHARGE SUMMARY
V2.0  Discharge Summary    Name:  Marv Chanel /Age/Sex: 1970 (54 y.o. male)   Admit Date: 10/29/2024  Discharge Date: 24    MRN & CSN:  [id number] & [id number] Encounter Date and Time 24 3:07 PM EDT    Attending:  Pola Haider MD Discharging Provider: Pola Haider MD       Hospital Course:     Marv Chanel is a 54 y.o. male who presented with recurrent thunderclap headaches accompanied by vision loss in his left field of view and numbness/tingling in his left hand. Admitted for further evaluation. Neurology consulted and evaluated. Found to have small area of likely stroke in the right occipital lobe but on further testing with cerebral angiogram ultimately diagnosed with RCVS. Started on verapamil and remained neurologically stable with improvement in headaches. Discharged home once cleared by neurology.    The patient expressed appropriate understanding of, and agreement with the discharge recommendations, medications, and plan.     Consults this admission:  IP CONSULT TO NEUROCRITICAL CARE  IP CONSULT TO NEUROSURGERY    Discharge Diagnosis:   RCVS    Discharge Instruction:   Follow up appointments: Neurology   Primary care physician: Suhail Lujan APRN - CNP within 1 week  Diet: regular diet   Activity: activity as tolerated  Disposition: Discharged to:   [x]Home, []C, []SNF, []Acute Rehab, []Hospice   Condition on discharge: Stable  Labs and Tests to be Followed up as an outpatient by PCP or Specialist: N/A    Discharge Medications:        Medication List        START taking these medications      verapamil 120 MG tablet  Commonly known as: CALAN  Take 1 tablet by mouth every 8 hours for 7 days            CONTINUE taking these medications      allopurinol 100 MG tablet  Commonly known as: ZYLOPRIM  TAKE TWO TABLETS BY MOUTH ONCE A DAY     b complex vitamins capsule     BERBERINE COMPLEX PO     blood glucose monitor kit and supplies  Dispense sufficient amount for  thrombus present.   Interatrial Septum: No interatrial shunt visualized with bubble study.   Image quality is technically difficult. Contrast used: Definity. Technically difficult study due to patient's body habitus.     XR CHEST PORTABLE    Result Date: 10/29/2024  EXAMINATION: ONE XRAY VIEW OF THE CHEST 10/29/2024 4:21 pm COMPARISON: 03/30/2016. HISTORY: ORDERING SYSTEM PROVIDED HISTORY: stroke TECHNOLOGIST PROVIDED HISTORY: Reason for exam:->stroke Reason for Exam: stroke FINDINGS: Lung volumes are small.  There is no airspace consolidation or pleural effusion.  The cardiomediastinal silhouette, pulmonary vessels and interstitium appear normal.  Postop changes related to coronary artery bypass are again noted.     Hypoventilatory chest with no acute findings.     L temporal artery US:  The left temporal, parietal, and frontal arteries demonstrate patency.  There is no evidence of vessel wall thickening or measurable halo noted  throughout.  Velocities and thickness are as follows:  Temporal artery 56/5.32 to 56.8/0.4 cm/s  thickness 0.37 mm  Parietal artery 94.2/17.2 cm/s to 93.8/7.07c m/s  thickness 0.33 mm  Frontal artery 74/8.01 cm/s to 78.2/6.6 cm/s  Thickness 0.33 mm      Previous Imaging:  Head CT w/o Contrast:  IMPRESSION:  No acute intracranial abnormality     CTA of Head / Neck w/ Contrast:  IMPRESSION:  No aneurysm or hemodynamically significant  stenosis of the major arteries of the head and neck.     MRI Brain w/o Contrast:  IMPRESSION:  Small acute or subacute infarct in the right occipital lobe.  Right posterior cerebral artery occlusion      CBC:   Recent Labs     10/31/24  0448 11/01/24  0532 11/02/24  0409   WBC 8.0 8.1 7.7   HGB 16.2 16.5 15.4    226 211     BMP:    Recent Labs     10/31/24  0448 11/01/24  0533 11/02/24  0409   * 137 132*   K 4.6 4.3 4.5   CL 99 98* 97*   CO2 22 26 26   BUN 20 19 23*   CREATININE 0.9 1.0 1.0   GLUCOSE 160* 146* 150*     Hepatic: No results for

## 2024-11-02 NOTE — DISCHARGE INSTR - DIET

## 2024-11-02 NOTE — CARE COORDINATION
Patient will,be discharging home with spouse and no needs. Will follow with outpatient physician. Electronically signed by Chantale Barrett RN on 11/2/2024 at 4:14 PM

## 2024-11-03 LAB
BACTERIA BLD CULT ORG #2: NORMAL
BACTERIA BLD CULT: NORMAL

## 2024-11-04 ENCOUNTER — CARE COORDINATION (OUTPATIENT)
Dept: OTHER | Facility: CLINIC | Age: 54
End: 2024-11-04

## 2024-11-04 NOTE — CARE COORDINATION
Care Transitions Note    Initial Call - Call within 2 business days of discharge: Yes    Attempted to reach patient for transitions of care follow up. Unable to reach patient.    Outreach Attempts:   Chart review    Patient: Marv Chanel    Patient : 1970   MRN: [id number]    Reason for Admission: CVA  Discharge Date: 24              RURS: Readmission Risk Score: 10.3    Last Discharge Facility       Date Complaint Diagnosis Description Type Department Provider    10/29/24  Cerebrovascular accident (CVA), unspecified mechanism (HCC) Admission (Discharged) [facility] ICU Pola Haider MD    10/29/24 Headache Cerebrovascular accident (CVA) due to occlusion of right posterior cerebral artery (HCC) ... ED (TRANSFER) [facility] ED Tre Curran, DO     Was this an external facility discharge? No    Follow Up Appointment:   Patient has hospital follow up appointment scheduled within 7 days of discharge.    Future Appointments         Provider Specialty Dept Phone    2024 1:20 PM Suhail Lujan APRN - CNP Internal Medicine [phone]    2025 10:40 AM Eleanor Angeles MD Endocrinology [phone]            Plan for follow-up on next business day.      Zelda Morales RN

## 2024-11-05 ENCOUNTER — CARE COORDINATION (OUTPATIENT)
Dept: OTHER | Facility: CLINIC | Age: 54
End: 2024-11-05

## 2024-11-05 ENCOUNTER — TELEPHONE (OUTPATIENT)
Dept: ENDOCRINOLOGY | Age: 54
End: 2024-11-05

## 2024-11-05 NOTE — CARE COORDINATION
Care Transitions Note    Initial Call - Call within 2 business days of discharge: Yes    Patient Current Location:  Home: [address] [place] [address] [place] [id number]    Care Transition Nurse contacted the patient by telephone to perform post hospital discharge assessment, verified name and  as identifiers. Provided introduction to self, and explanation of the Care Transition Nurse role.     Patient: Marv Chanel    Patient : 1970   MRN: [id number]    Reason for Admission: CVA  Discharge Date: 24              RURS: Readmission Risk Score: 10.3      Last Discharge Facility       Date Complaint Diagnosis Description Type Department Provider    10/29/24  Cerebrovascular accident (CVA), unspecified mechanism (HCC) Admission (Discharged) [facility] ICU Pola Haider MD    10/29/24 Headache Cerebrovascular accident (CVA) due to occlusion of right posterior cerebral artery (HCC) ... ED (TRANSFER) [facility] ED Tre Curran, DO     Was this an external facility discharge? No    Additional needs identified to be addressed with provider   No needs identified             Method of communication with provider: none.    Patients top risk factors for readmission: functional physical ability and medical condition-Multiple Chronic conditions needing management and investigation    Interventions to address risk factors:   Review of patient management of conditions/medications: Patient is able to describe Red flag symptoms to report, has medications filled, has follow up appts scheduled and is engaged with providers    Care Summary Note: Patient states he continues to be extremely fatigued. He states his fatigue is related to his long COVID and has other conditions and symptoms related to it such as Polycythemia vera and most recent CVA.  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  He is engaged with his providers. He has his follow up

## 2024-11-05 NOTE — TELEPHONE ENCOUNTER
SUBJECTIVE:   Moira Andre is a 84 year old female who presents to clinic today for the following health issues:          Hospital Follow-up Visit:    Hospital/Nursing Home/IP Rehab Facility: [facility]  Date of Admission: 10/31/2018  Date of Discharge: 11/02/2018  Reason(s) for Admission: Atypical chest pain  Type II DM  CAD  Diastolic CHF  CKD-III  HTN  Depression/anxiety  ELIGIO            Problems taking medications regularly:  None       Medication changes since discharge: None       Problems adhering to non-medication therapy:  None    Summary of hospitalization:  [facility] discharge summary reviewed  Diagnostic Tests/Treatments reviewed.  Follow up needed: PCP  Other Healthcare Providers Involved in Patient s Care:         None  Update since discharge: improved.     Post Discharge Medication Reconciliation: discharge medications reconciled and changed, per note/orders (see AVS).  Plan of care communicated with patient     Coding guidelines for this visit:  Type of Medical   Decision Making Face-to-Face Visit       within 7 Days of discharge Face-to-Face Visit        within 14 days of discharge   Moderate Complexity [id number]   High Complexity [id number]          Patient has improved since discharge  However, reports occasional atypical chest pain    Problem list and histories reviewed & adjusted, as indicated.  Additional history: as documented    Patient Active Problem List   Diagnosis     CKD (chronic kidney disease) stage 3, GFR 30-59 ml/min (H)     Morbid obesity (H)     CAD (coronary artery disease)     Type 2 diabetes mellitus with diabetic nephropathy (H)     Transient cerebral ischemia     Hyperlipidemia LDL goal <70     Ventral hernia     Intermittent asthma     Moderate major depression (H)     ELIGIO on CPAP     Microalbuminuria     S/P total knee arthroplasty     OA (osteoarthritis) of knee     Anemia due to blood loss, acute     Health Care Home     Essential  Call from [employer]   Stating Freestyle Shilo 3 plus needs Prior Auth    Dexcom Sensor  PA already has been approved but pt is requesting a PA to be done on Freestyle to see if its cheaper   hypertension     Gastroesophageal reflux disease without esophagitis     Bilateral edema of lower extremity     Osteoarthritis     Rheumatoid arthritis involving both hands with negative rheumatoid factor (H)     High risk medication use     Anxiety     Other chronic pain     Controlled substance agreement signed     Edema of lower extremity     Nephrolithiasis     Kidney stone     Tremor     Atypical chest pain     ACS (acute coronary syndrome) (H)     Non-rheumatic mitral valve stenosis     Coronary artery calcification seen on CAT scan     Past Surgical History:   Procedure Laterality Date     APPENDECTOMY       BIOPSY BREAST      x2 -needle & lumpectomy-benign     CHOLECYSTECTOMY       CORONARY ANGIOGRAPHY ADULT ORDER  2007    Bare metal stent to OM1, Diagonal patent      CORONARY ANGIOGRAPHY ADULT ORDER  2007    [place] stent to Diagonal     HC LEFT HEART CATHETERIZATION  2013    Moderate CAD     HYSTERECTOMY TOTAL ABDOMINAL       ORTHOPEDIC SURGERY      knee replacement on right side (), Left side ()     RELEASE CARPAL TUNNEL      right and left     right femoral artery pseudoaneurysm  2007    repair       Social History   Substance Use Topics     Smoking status: Former Smoker     Packs/day: 0.25     Types: Cigarettes     Start date:      Quit date: 1973     Smokeless tobacco: Never Used     Alcohol use 0.0 - 0.6 oz/week     0 - 1 Standard drinks or equivalent per week      Comment: rarely     Family History   Problem Relation Age of Onset     Neurologic Disorder Mother      MS - at 60's     C.A.D. Father       at 8o's, ? prostate ca     Breast Cancer No family hx of      Cancer - colorectal No family hx of          Current Outpatient Prescriptions   Medication Sig Dispense Refill     buPROPion (WELLBUTRIN SR) 100 MG 12 hr tablet Take 1 tablet (100 mg) by mouth 2 times daily 180 tablet 3     losartan (COZAAR) 50 MG tablet Take 1 tablet (50 mg) by mouth daily 90 tablet 1      ranitidine (ZANTAC) 150 MG tablet Take 1 tablet (150 mg) by mouth 2 times daily 180 tablet 1     acetaminophen (TYLENOL) 325 MG tablet Take 325-650 mg by mouth every 6 hours as needed for mild pain       ADVAIR DISKUS 100-50 MCG/DOSE diskus inhaler INHALE 1 PUFF EVERY 12 HOURS 3 Inhaler 3     albuterol (PROAIR HFA/PROVENTIL HFA/VENTOLIN HFA) 108 (90 BASE) MCG/ACT Inhaler Inhale 2 puffs into the lungs every 6 hours as needed for shortness of breath / dyspnea 1 Inhaler 3     atorvastatin (LIPITOR) 20 MG tablet Take 1 tablet (20 mg) by mouth daily 90 tablet 3     blood glucose monitoring (NO BRAND SPECIFIED) meter device kit Use to test blood sugar 1-2 times daily or as directed. 1 kit 0     blood glucose monitoring (TRUE METRIX BLOOD GLUCOSE TEST) test strip 1 strip by In Vitro route daily 200 strip 3     Blood Glucose Monitoring Suppl (TRUE METRIX AIR GLUCOSE METER) W/DEVICE KIT USE AS DIRECTED 1 kit 0     Cholecalciferol (VITAMIN D) 2000 UNITS CAPS Take 2,000 Units by mouth daily        clopidogrel (PLAVIX) 75 MG tablet Take 1 tablet (75 mg) by mouth daily (Patient taking differently: Take 75 mg by mouth every evening ) 90 tablet 3     DULoxetine (CYMBALTA) 60 MG EC capsule Take 1 capsule (60 mg) by mouth daily (Patient taking differently: Take 60 mg by mouth every evening ) 90 capsule 1     fluticasone (FLONASE) 50 MCG/ACT nasal spray Spray 1 spray into both nostrils daily as needed        glimepiride (AMARYL) 2 MG tablet Take 1 tablet (2 mg) by mouth every morning (before breakfast) 90 tablet 1     HYDROcodone-acetaminophen (NORCO) 5-325 MG per tablet Take 1 tablet by mouth every 8 hours as needed for moderate to severe pain 90 tablet 0     IBANdronate (BONIVA) 150 MG tablet Take 150 mg by mouth every 30 days Patient takes on the first day of each month.       Menthol, Topical Analgesic, (ICY HOT EX) Apply to affected area every morning       metoprolol succinate (TOPROL-XL) 100 MG 24 hr tablet Take 100 mg by  "mouth daily       neomycin-polymyxin-hydrocortisone (CORTISPORIN) otic solution Place 4 drops into both ears 4 times daily (Patient taking differently: Place 4 drops into both ears 4 times daily as needed (Ear infections from swimming) ) 10 mL 0     nitroGLYcerin (NITROSTAT) 0.4 MG sublingual tablet 1 tab po in clinic 1 tablet 0     nystatin-triamcinolone (MYCOLOG II) cream APPLY TOPICALLY TWICE DAILY (Patient taking differently: APPLY TOPICALLY DAILY PRN) 60 g 1     order for DME DREAMSTATION  5-15 CM/H20  NASAL WISP FABRIC       TRUEPLUS LANCETS 28G MISC 1 each 2 times daily as needed 100 each 3     [DISCONTINUED] buPROPion (WELLBUTRIN SR) 100 MG 12 hr tablet Take 1 tablet (100 mg) by mouth 2 times daily 60 tablet 3     Allergies   Allergen Reactions     Aspirin Hives     Reaction occurred during childhood.      Lisinopril Cough     Metformin      Elevated lactic acid     Minocycline      Yellow Dye Allergy. Minocycline has Yellow Dye #10.     Yellow Dye Hives     Rxn to yellow tablet. Eyes swelled shut.      Labs reviewed in EPIC    Reviewed and updated as needed this visit by clinical staff  Tobacco  Allergies  Soc Hx      Reviewed and updated as needed this visit by Provider       ROS:  Constitutional, HEENT, cardiovascular, pulmonary, GI, , musculoskeletal, neuro, skin, endocrine and psych systems are negative, except as otherwise noted.    This document serves as a record of the services and decisions personally performed and made by Maryan Churchill MD. It was created on her behalf by Jeanette Ha, a trained medical scribe. The creation of this document is based on the provider's statements to the medical scribe.  Jeanette Ha 11:34 AM November 8, 2018    OBJECTIVE:     /80  Pulse 79  Temp 97.4  F (36.3  C) (Oral)  Ht 1.702 m (5' 7\")  Wt 127.5 kg (281 lb)  SpO2 97%  Breastfeeding? No  BMI 44.01 kg/m2  Body mass index is 44.01 kg/(m^2).    GENERAL APPEARANCE: morbidly obese, uses " assisted walker, alert and no distress  EYES: Eyes grossly normal to inspection, PERRL and conjunctivae and sclerae normal  HENT: ear canals and TM's normal and nose and mouth without ulcers or lesions  NECK: no adenopathy  RESP: lungs clear to auscultation - no rales, rhonchi or wheezes  CV: regular rates and rhythm, normal S1 S2, diastolic heart murmur  PSYCH: mentation appears normal, affect normal/bright    Diagnostic Test Results:  none     Reviewed and discussed echo done on 11/01/18  Reviewed and discussed EKG done on 11/01/18  Reviewed and discussed EKG done on 10/31/18  Reviewed and discussed labs done on 11/02/18  Reviewed and discussed labs done on 11/01/18  Reviewed and discussed labs done on 10/31/18  ASSESSMENT/PLAN:     Moira was seen today for hospital f/u.    Diagnoses and all orders for this visit:    Atypical chest pain  Patient presented to ED on 10/31/18 for chest pain  She was admitted and discharged on 11/02/18  Cardiac workup was negative  Most likely it was muscular pain  She is advised to take Tylenol for pain, as needed    Essential hypertension  -     losartan (COZAAR) 50 MG tablet; Take 1 tablet (50 mg) by mouth daily  Patient reports cough with use of lisinopril  Advised continuing lisinopril until losartan is filled  Advised discontinuing lisinopril once patient has losartan  Advised monitoring BP and reporting readings above 140/90    Moderate episode of recurrent major depressive disorder (H)  -     buPROPion (WELLBUTRIN SR) 100 MG 12 hr tablet; Take 1 tablet (100 mg) by mouth 2 times daily  Doing well  Compliant with medication   Combination of Cymbalta and bupropion is working better.    Dry cough :  GERD is making cough worse.  -     ranitidine (ZANTAC) 150 MG tablet; Take 1 tablet (150 mg) by mouth 2 times daily  Advised use of ranitidine until she is able to get losartan    Gastroesophageal reflux disease without esophagitis  -     ranitidine (ZANTAC) 150 MG tablet; Take 1  tablet (150 mg) by mouth 2 times daily    Type 2 diabetes mellitus with diabetic nephropathy, without long-term current use of insulin (H)  Doing well  Compliant with medication  Lab Results   Component Value Date    A1C 7.0 10/31/2018    A1C 7.6 07/04/2018    A1C 7.2 04/03/2018    A1C 6.6 08/15/2017    A1C 6.7 04/11/2017     Non-rheumatic mitral valve stenosis  Stable  She is on Plavix and atorvastatin    Coronary artery calcification seen on CAT scan  See above   She is on plavix,BB and statin    Kidney stone  -     UROLOGY ADULT REFERRAL  She has history of kidney stone  I had advised XR to check if stone had passed  She has previously declined  Referred to urology  She will schedule appointment   Other orders  -     Cancel: lisinopril (PRINIVIL/ZESTRIL) 20 MG tablet; Take 1 tablet (20 mg) by mouth daily    Patient Instructions   Continue lisinopril until you get prescription of losartan  Once you get losartan, stop lisinopril because of cough  Monitor your blood pressure once a week at home.  Bring those readings on your next visit.  Make appointment with urology  Notify us if your blood pressure readings consistently stays greater than 140/90.  Keep follow up appointment with me as scheduled  Seek sooner medical attention if there is any worsening of symptoms or problems    The information in this document, created by the medical scribe for me, accurately reflects the services I personally performed and the decisions made by me. I have reviewed and approved this document for accuracy prior to leaving the patient care area.  November 8, 2018 11:53 AM    Maryan Churchill MD  [facility]

## 2024-11-06 ENCOUNTER — TELEPHONE (OUTPATIENT)
Dept: ENDOCRINOLOGY | Age: 54
End: 2024-11-06

## 2024-11-06 NOTE — TELEPHONE ENCOUNTER
Submitted PA for Freestyle Shilo Sensor  Via [employer] Key: [id number] STATUS: PENDING.    Follow up done daily; if no decision with in three days we will refax.  If another three days goes by with no decision will call the insurance for status.

## 2024-11-06 NOTE — TELEPHONE ENCOUNTER
Holding off on testosterone therapy for now given recent hospitalization.  Will discuss during follow-up.

## 2024-11-06 NOTE — TELEPHONE ENCOUNTER
There was a PA received for Testosterone, but there is not a current RX on file.    If you want PA please submit new RX, and resend this PA request back to the pool    If this requires a response please respond to the pool ( P MHCX PSC MEDICATION PRE-AUTH).      Thank you please advise patient.

## 2024-11-06 NOTE — TELEPHONE ENCOUNTER
Submitted PA for Icosapent Ethyl  Via [facility] Key: [id number] STATUS: PENDING.    Follow up done daily; if no decision with in three days we will refax.  If another three days goes by with no decision will call the insurance for status.

## 2024-11-08 NOTE — TELEPHONE ENCOUNTER
Approved on November 7 by [employer] 2017  The request has been approved. The authorization is effective from 11/07/2024 to 11/06/2025, as long as the member is enrolled in their current health plan. The request was reviewed and approved by a licensed clinical pharmacist. This request has been approved with a quantity limit of 2 sensors per 28 days.    If this requires a response please respond to the pool ( P MHCX PSC MEDICATION PRE-AUTH).      Thank you please advise patient.

## 2024-11-11 LAB — MISCELLANEOUS LAB TEST ORDER: NORMAL

## 2024-11-12 NOTE — TELEPHONE ENCOUNTER
N/A on November 11 by [employer] 2017  The prior authorization request has been cancelled. No PA required.   PA Case ID #: [id number]     If this requires a response please respond to the pool ( P MHCX PSC MEDICATION PRE-AUTH).      Thank you please advise patient.

## 2024-11-19 ENCOUNTER — CARE COORDINATION (OUTPATIENT)
Dept: OTHER | Facility: CLINIC | Age: 54
End: 2024-11-19

## 2024-11-19 NOTE — CARE COORDINATION
Care Transitions Note    Follow Up Call     Patient Current Location:  Home: [address] [address] [place] [address]    Care Transition Nurse contacted the patient by telephone. Verified name and  as identifiers.    Patient graduated from the Care Transitions program on 24.  Patient/family verbalizes confidence in the ability to self-manage at this time..      Handoff:   Patient was not referred to the ACM team due to no additional needs identified.       Care Summary Note: Patient states he is continuing to improve.  He completed his follow up appt with PCP. He has been referred to PT/ OT for additional therapy.  He denies any changes in his medications or any new issues or concerns. He denies any immediate ACM needs and has ACM contact information if needs change in the future.     Assessments:  Care Transitions Subsequent and Final Call    Schedule Follow Up Appointment with PCP: Completed  Subsequent and Final Calls  Do you have any ongoing symptoms?: Yes  Onset of Patient-reported symptoms: Other  Patient-reported symptoms: Weakness  Interventions for patient-reported symptoms: Other  Have your medications changed?: No  Do you have any questions related to your medications?: No  Do you currently have any active services?: Yes  Are you currently active with any services?: Outpatient/Community Services  Do you have any needs or concerns that I can assist you with?: No  Identified Barriers: Impairment  Care Transitions Interventions     Other Therapy Services: Completed     Physical Therapy: Completed      Specialty Service Referral: Completed    Other Interventions:              Upcoming Appointments:    Future Appointments         Provider Specialty Dept Phone    2025 7:20 AM Suhail Lujan APRN - CNP Internal Medicine [phone]    2025 10:40 AM Eleanor Angeles MD Endocrinology [phone]            Patient has agreed to contact primary care provider and/or specialist for any

## 2024-11-20 ENCOUNTER — HOSPITAL ENCOUNTER (OUTPATIENT)
Age: 54
Discharge: HOME | End: 2024-11-20
Payer: COMMERCIAL

## 2024-11-20 LAB
ALBUMIN SERPL-MCNC: 4.6 G/DL (ref 3.4–5)
ALBUMIN/GLOB SERPL: 1.5 {RATIO} (ref 1.1–2.2)
ALP SERPL-CCNC: 61 U/L (ref 40–129)
ALT SERPL-CCNC: 25 U/L (ref 10–40)
ANION GAP SERPL CALCULATED.3IONS-SCNC: 14 MMOL/L (ref 3–16)
AST SERPL-CCNC: 21 U/L (ref 15–37)
BILIRUB SERPL-MCNC: 0.4 MG/DL (ref 0–1)
BUN SERPL-MCNC: 21 MG/DL (ref 7–20)
CALCIUM SERPL-MCNC: 9.5 MG/DL (ref 8.3–10.6)
CHLORIDE SERPL-SCNC: 102 MMOL/L (ref 99–110)
CO2 SERPL-SCNC: 22 MMOL/L (ref 21–32)
CREAT SERPL-MCNC: 0.9 MG/DL (ref 0.9–1.3)
CREAT UR-MCNC: 112 MG/DL (ref 39–259)
DEPRECATED RDW RBC AUTO: 14.1 % (ref 12.4–15.4)
GFR SERPLBLD CREATININE-BSD FMLA CKD-EPI: >90 ML/MIN/{1.73_M2}
GLUCOSE SERPL-MCNC: 119 MG/DL (ref 70–99)
HCT VFR BLD AUTO: 47.5 % (ref 40.5–52.5)
HGB BLD-MCNC: 16 G/DL (ref 13.5–17.5)
MCH RBC QN AUTO: 29 PG (ref 26–34)
MCHC RBC AUTO-ENTMCNC: 33.8 G/DL (ref 31–36)
MCV RBC AUTO: 85.9 FL (ref 80–100)
MICROALBUMIN UR DL<=1MG/L-MCNC: 1.7 MG/DL
MICROALBUMIN/CREAT UR: 15.2 MG/G (ref 0–30)
PLATELET # BLD AUTO: 235 K/UL (ref 135–450)
PMV BLD AUTO: 7.9 FL (ref 5–10.5)
POTASSIUM SERPL-SCNC: 4.2 MMOL/L (ref 3.5–5.1)
PROT SERPL-MCNC: 7.7 G/DL (ref 6.4–8.2)
RBC # BLD AUTO: 5.52 M/UL (ref 4.2–5.9)
SODIUM SERPL-SCNC: 138 MMOL/L (ref 136–145)
WBC # BLD AUTO: 7.8 K/UL (ref 4–11)

## 2024-11-20 PROCEDURE — 80053 COMPREHEN METABOLIC PANEL: CPT

## 2024-11-20 PROCEDURE — 85027 COMPLETE CBC AUTOMATED: CPT

## 2024-11-20 PROCEDURE — 82043 UR ALBUMIN QUANTITATIVE: CPT

## 2024-11-20 PROCEDURE — 36415 COLL VENOUS BLD VENIPUNCTURE: CPT

## 2024-11-20 PROCEDURE — 82570 ASSAY OF URINE CREATININE: CPT

## 2024-12-10 NOTE — PROGRESS NOTES
Physician Progress Note      PATIENT:               JERO KENDRICK  CSN #:                  [id number]  :                       1970  ADMIT DATE:       10/29/2024 10:41 PM  DISCH DATE:        2024 4:51 PM  RESPONDING  PROVIDER #:        AMBER HERNANDEZ APRN - CNP          QUERY TEXT:    Pt admitted with severe headache and left lateral vision loss and per neuro   consult -\"Found to have small area of likely stroke in the right occipital   lobe but on further testing with cerebral angiogram ultimately diagnosed with   RCVS.\".  . If possible, please document in progress notes and discharge   summary the relationship, if any, between his stroke and RCVS.    The medical record reflects the following:  Risk Factors: HTN, hypogonadism on testosterone, CAD. DM2  Clinical Indicators: On admission-- bp 171/101. hgb 17  H&P--10/29--\" Patient describes the sudden onset of thunderclap severe   headache since Go 10/27 morning after taking marijuana the night prior.    This later progressed to left eye partial lateral vision loss which began 3 PM   10/29. Unclear etiology for small occipital infarct, concerning for possible   embolism, hypercoagulability or reversible cerebral vasoconstriction   syndrome.\"  10/29--neuro consult--\"R PCA occlusion with resultant stroke possibly   cardioembolic however, cannot fully exclude RCVS given his regular consumption   of THC gummies.\"  --IM-- \"#CVA due to R posterior cerebral artery occlusion-#RCVS-- Neuro   consulted and following-- Neurovascular consulted for cerebral angiogram:   confirmed RCVS.\"  Treatment: CT, angiogram, neuro consult, neuro checks, verapamil, labs,   essential home med, supportive care    Thank you,  Guerrero Moscoso RN,BSB  Options provided:  -- R occipital lobe stroke due to RCVS  -- R occipital lobe stroke unrelated to RCVS  -- Other - I will add my own diagnosis  -- Disagree - Not applicable / Not valid  -- Disagree - Clinically unable to

## 2024-12-16 ENCOUNTER — HOSPITAL ENCOUNTER (OUTPATIENT)
Dept: OCCUPATIONAL THERAPY | Age: 54
Setting detail: THERAPIES SERIES
Discharge: HOME | End: 2024-12-16
Payer: COMMERCIAL

## 2024-12-16 NOTE — PROGRESS NOTES
Occupational Therapy  Pt cancelled his  eval due to stomach bug.  Sanjuana Morales, OTR/L, MHS, CDRS  Certified  Rehabilitation Specialist  12/16/2024  7:53 AM

## 2024-12-17 ENCOUNTER — HOSPITAL ENCOUNTER (OUTPATIENT)
Dept: OCCUPATIONAL THERAPY | Age: 54
Setting detail: THERAPIES SERIES
Discharge: HOME | End: 2024-12-17
Payer: COMMERCIAL

## 2024-12-17 PROCEDURE — 97165 OT EVAL LOW COMPLEX 30 MIN: CPT

## 2024-12-17 PROCEDURE — 97537 COMMUNITY/WORK REINTEGRATION: CPT

## 2024-12-17 NOTE — PROGRESS NOTES
12/17/2024    Dear Dr. Lujan,      Marv Chanel (MR# [id number]) was seen by Occupational Therapy on 12/17/2024 for a ’s Evaluation at [facility].  As you know, Mr. Chanel suffered a CVA.  He wants to resume driving to be independent in community mobility and leisure skills.    Mr. Chanel passed tests for visual acuity, saccades, pursuits, depth perception, peripheral vision, color vision, and traffic signs and signals.  He was administered the Motor Free Visual Perceptual Test- Visual Closure Section only and scored below normal limits.  He was administered the Trails Making Tests Part A and B which are cognitive tests that involve scanning, speed of mental processing, visual motor sequencing, as well as switching cognitive sets.  On Part A, he scored within normal limits with 23 seconds over a norm of 60 seconds and on Part B, he scored within normal limits with 83 seconds over the norm of 180 seconds.  He demonstrated functional physical capacity for driving.    Behind the wheel, Mr. Chanel was able to manipulate all vehicle controls.  He drove on secondary and primary roads in light to moderately heavy traffic.  He demonstrated the ability to park, back up, maintain speed and traffic flow, change lanes and follow directions.  Responses to situations encountered were appropriate.      Based on the results of this evaluation, it appears that Mr. Chanel is a suitable candidate to resume driving.  He is as safe as the driving public.  The final decision remains with the physician.  Please inform Mr. Chanel of your decision.  I can be reached at [phone] if questions arise.  Thank you for this referral.    Sincerely,      Jason Joaquin, IRWIN, LDI   Certified  Rehabilitation Specialist

## 2024-12-17 NOTE — PROGRESS NOTES
Occupational Therapy  Name: Marv Chanel  1970  Date: 12/17/2024  10:00 AM  Dr. Lujan    Time In: 1000  Time Out: 1145  Billed Units: 8  CPT 41098: OT Low Eval units _1__  CPT 05174: OT Work Conditioning  units _6__    Diagnosis:  CVA right occipital lobe         Treatment Diagnosis:   Safety Issue      Client's Stated Goal: To return to driving.    Prior Level of Functioning: Independent with ADL's, IADL's, medication management.  Was on leave from work due other medical conditions starting in July._  Seizure free for 1 year: yes    Hearing Aids: No  Glasses/contacts: reading  Mobility Status: No AD  Is client able to transfer into car and/or load mobility device in/out of car: yes    Driving History:    License # [id number]- ([place])   Restrictions on License: None  Expiration date: 6/24/27  Last time client drove: Drove to this evaluation.  Car Make/Model and transmission type: 2019 Ford Flex, automatic  Use of Technology in Car: Has Technology [x]  Does not have Technology/ADAS systems []      Has and Uses Has, does not use Does not have   Smart Headlights  [x]   []  []   Back Up Camera  [x]   []  []   Surround View Camera  []   []  [x]   Blindspot Warnings  [x]   []  []   Fermin Departure Warnings  []   []  [x]   Fermin Keep Assist  []   []  [x]   Forward Collision Warnings/Assist  []   []  [x]   Adaptive Cruise Control  []   []  [x]   Drowsy  Alerts  []   []  [x]   Assistive Parking  []   []  [x]   Crash Mitigation Systems/Auto Emergency Braking  []    []  [x]   Voice Activation Systems  []   []  [x]   Emergency Response Systems/ 911 automatic call  []   []  [x]     Driving Habits: Frequency:_everyday_ Miles driven per week:_700 prior to disability_  Night: yes  Snow: yes  Highway: ? yes  Traffic tickets in last 5 years:  DENIES     Accidents in last 5 years:  DENIES            VISION:    Acuity: ([place] law =20/40 night driving; 20/70 day driving): _20/20__ [] With  [x] Without

## 2024-12-17 NOTE — PROGRESS NOTES
Outpatient Occupational Therapy  [] [facility]   Phone: [phone]   Fax: [phone]   [x] [facility]  Phone: [phone]   Fax: [phone]  [] [facility]   Phone: [phone]   Fax: [phone]      To:  AUGUST Ulrich      Patient: Marv Chanel  : 1970  MRN: [id number]  Evaluation Date: 2024      Diagnosis Information: CVA            Occupational Therapy Certification/Re-Certification Form  Dear AUGUST Ulrich,  The following patient has been evaluated for occupational therapy services and for therapy to continue, Medicare requires monthly physician review of the treatment plan. Please review the attached evaluation and/or summary of the patient's plan of care, and verify that you agree therapy should continue by signing the attached document and sending it back to our office.    Plan of Care/Treatment to date:  [] Therapeutic Exercise   [] Modalities:  [] Therapeutic Activity    [] Ultrasound  [] Electrical Stimulation   [] Activities of Daily Living    [] Fluidotherapy [] Kinesiotaping  [] Neuromuscular Re-education   [] Iontophoresis [] Coldpack/hotpack   [] Instruction in HEP     [] Contrast Bath  [] Manual Therapy     Other:  [] Aquatic Therapy      [x] Resume driving      Frequency/Duration:  # Days per week: [x] 1 day # Weeks: [x] 1 week [] 5 weeks      [] 2 days   [] 2 weeks [] 6 weeks     [] 3 days   [] 3 weeks [] 7 weeks     [] 4 days   [] 4 weeks [] 8 weeks    Rehab Potential: [] excellent [x] good [] fair  [] poor       Electronically signed by:  IRWIN Hewitt, ANDREW      If you have any questions or concerns, please don't hesitate to call.  Thank you for your referral.      Physician Signature:________________________________Date:__________________  By signing above, therapist’s plan is approved by physician

## 2024-12-30 ENCOUNTER — APPOINTMENT (OUTPATIENT)
Dept: OCCUPATIONAL THERAPY | Age: 54
End: 2024-12-30
Payer: COMMERCIAL

## 2025-01-13 ENCOUNTER — TELEPHONE (OUTPATIENT)
Dept: PHARMACY | Facility: CLINIC | Age: 55
End: 2025-01-13

## 2025-01-13 NOTE — TELEPHONE ENCOUNTER
**Patient is [facility] **  Called patient to schedule 2025 yearly pharmacist appointment to discuss medications for Diabetes Management Program.    Spoke to patient and appointment scheduled for 1/17/25 at 8:30 AM.     Sheryl Wilson CphT  [facility]  Clinical Pharmacy   Department, toll free: [phone] Option #3    For Pharmacy Admin Tracking Only    Program: [employer]  CPA in place:  No  Recommendation Provided To: Patient/Caregiver: 1 via Telephone  Intervention Detail: Scheduled Appointment  Intervention Accepted By: Patient/Caregiver: 1  Gap Closed?: Yes   Time Spent (min): 5

## 2025-01-17 ENCOUNTER — TELEPHONE (OUTPATIENT)
Dept: PHARMACY | Facility: CLINIC | Age: 55
End: 2025-01-17

## 2025-01-17 RX ORDER — VERAPAMIL HYDROCHLORIDE 120 MG/1
120 CAPSULE, EXTENDED RELEASE ORAL EVERY MORNING
COMMUNITY

## 2025-01-17 RX ORDER — NORTRIPTYLINE HYDROCHLORIDE 10 MG/1
10 CAPSULE ORAL NIGHTLY
COMMUNITY

## 2025-01-17 RX ORDER — ASPIRIN 325 MG
325 TABLET, DELAYED RELEASE (ENTERIC COATED) ORAL DAILY
COMMUNITY

## 2025-01-17 NOTE — TELEPHONE ENCOUNTER
[facility] HEALTH CLINICAL PHARMACY REVIEW - Be Well with Diabetes ([facility])    Marv Chanel is a 54 y.o. male enrolled in the [facility] Employee Diabetes Program. Patient provided writer with verbal consent to remain in the program for this year. Patient enrolled 2017.    Communication preferences (for >8% followup, urgent messages, etc)  Preferred methods: Phone  Preferred days/time: any time    Medications:  Current Outpatient Medications   Medication Instructions    allopurinol (ZYLOPRIM) 100 MG tablet TAKE TWO TABLETS BY MOUTH ONCE A DAY    aspirin 81 mg, Oral, DAILY    b complex vitamins capsule 1 capsule, Oral, DAILY    Barberry-Oreg Grape-Goldenseal (BERBERINE COMPLEX PO) 1 tablet, Oral, 2 times daily    blood glucose monitor kit and supplies Dispense sufficient amount for indicated testing frequency plus additional to accommodate PRN testing needs. Dispense all needed supplies to include: monitor, strips, lancing device, lancets, control solutions, alcohol swabs. Use to test blood glucose levels 4 times daily    Cholecalciferol (VITAMIN D3) 1.25 MG (24653 UT) TABS 1 tablet, Oral, WEEKLY    colchicine (COLCRYS) 0.6 mg, Oral, DAILY PRN    Continuous Glucose Sensor (DEXCOM G7 SENSOR) MISC Change every 10 days    Continuous Glucose Sensor (FREESTYLE NATHALIE 3 PLUS SENSOR) MISC Please every 15 days.    dapagliflozin (FARXIGA) 10 mg, Oral, DAILY    Evolocumab (REPATHA SURECLICK) 140 MG/ML SOAJ 1 mL, SubCUTAneous, EVERY 14 DAYS    Icosapent Ethyl (VASCEPA) 1 g CAPS capsule 2 capsules, Oral, 2 TIMES DAILY    Insulin Glargine, 2 Unit Dial, (TOUJEO MAX SOLOSTAR) 300 UNIT/ML SOPN INJECT 130 UNITS UNDER THE SKIN ONCE DAILY    insulin lispro (HUMALOG KWIKPEN) 200 UNIT/ML SOPN pen INJECT UP TO 60 UNITS INTO THE SKIN THREE TIMES A DAY BEFORE MEALS PER SLIDING SCALE    Insulin Pen Needle (TRUEPLUS PEN NEEDLES) 32G X 4 MM MISC 1 each, SubCUTAneous, 4 times daily    ketoconazole (NIZORAL) 2 % shampoo Apply

## 2025-01-30 ENCOUNTER — OFFICE VISIT (OUTPATIENT)
Dept: ENDOCRINOLOGY | Age: 55
End: 2025-01-30
Payer: COMMERCIAL

## 2025-01-30 VITALS
BODY MASS INDEX: 37.25 KG/M2 | WEIGHT: 275 LBS | DIASTOLIC BLOOD PRESSURE: 72 MMHG | SYSTOLIC BLOOD PRESSURE: 140 MMHG | HEIGHT: 72 IN | HEART RATE: 45 BPM

## 2025-01-30 DIAGNOSIS — E78.2 MIXED HYPERLIPIDEMIA: ICD-10-CM

## 2025-01-30 DIAGNOSIS — Z79.4 TYPE 2 DIABETES MELLITUS WITH DIABETIC PERIPHERAL ANGIOPATHY WITHOUT GANGRENE, WITH LONG-TERM CURRENT USE OF INSULIN (HCC): Primary | ICD-10-CM

## 2025-01-30 DIAGNOSIS — E29.1 HYPOGONADISM IN MALE: ICD-10-CM

## 2025-01-30 DIAGNOSIS — E66.01 MORBID (SEVERE) OBESITY DUE TO EXCESS CALORIES: ICD-10-CM

## 2025-01-30 DIAGNOSIS — E11.51 TYPE 2 DIABETES MELLITUS WITH DIABETIC PERIPHERAL ANGIOPATHY WITHOUT GANGRENE, WITH LONG-TERM CURRENT USE OF INSULIN (HCC): Primary | ICD-10-CM

## 2025-01-30 PROCEDURE — 99214 OFFICE O/P EST MOD 30 MIN: CPT | Performed by: INTERNAL MEDICINE

## 2025-01-30 PROCEDURE — 83036 HEMOGLOBIN GLYCOSYLATED A1C: CPT | Performed by: INTERNAL MEDICINE

## 2025-01-30 PROCEDURE — 95251 CONT GLUC MNTR ANALYSIS I&R: CPT | Performed by: INTERNAL MEDICINE

## 2025-01-30 PROCEDURE — 3077F SYST BP >= 140 MM HG: CPT | Performed by: INTERNAL MEDICINE

## 2025-01-30 PROCEDURE — 3078F DIAST BP <80 MM HG: CPT | Performed by: INTERNAL MEDICINE

## 2025-01-30 RX ORDER — INSULIN GLARGINE 300 U/ML
INJECTION, SOLUTION SUBCUTANEOUS
Qty: 39 ADJUSTABLE DOSE PRE-FILLED PEN SYRINGE | Refills: 2 | Status: SHIPPED | OUTPATIENT
Start: 2025-01-30

## 2025-01-30 NOTE — PROGRESS NOTES
-----------------------------  Dexcom Clarity  -----------------------------  Marv Darío    YOB: 1970    Generated at: Thu, Jan 30, 2025 10:42 AM EST    Reporting period: Fri Jan 17, 2025 - Thu Jan 30, 2025  -----------------------------  Glucose Details    Average glucose: 189 mg/dL    GMI: 7.8%    Standard deviation: 38 mg/dL    Coefficient of Variation: 20.3%  -----------------------------  Time in Range    Very High: 6%    High: 50%    In Range: 44%    Low: 0%    Very Low: 0%    Target Range   mg/dL    -----------------------------  Sensor usage    Days with data: 12/14    Time active: 91%    Avg. calibrations per day: 0.0    
daily, 35 gm of carbs per meal. Occasional snacking.    No juice or coke.     Exercise: Limited currently.  Last eye exam: due to update.   Foot care: numbness and tinglings.     Hyperlipidemia: On Vascepa 2 capsules twice daily. Lipid panel from March 2024 showed an LDL of 118, triglycerides 110.  He was previously on Crestor but did stop due to left-sided chest tenderness.   Hypertension: Currently on lisinopril 30 mg daily.  He has CAD post CABG.     Patient was diagnosed previously with hypogonadism and was started on testosterone cypionate around 2021.  This was previously diagnosed by Dr. Diehl. He was previously on 1 mL every 10 days.  Testosterone in the past had been noted to be markedly elevated around 1500.  This has been accompanied with erythrocytosis.    He had been evaluated by hematology and nephrology in regard to that with the impression that this is secondary in etiology. Sleep study done 10 years ago was normal per patient.  I had patient stop testosterone completely for few months.  Repeat testosterone level did drop to 130.  Hematocrit though normalized.  He though reported excessive tiredness since stopping therefore he did restart with testosterone cypionate 100 mg every 10 days.  While on testosterone, he needed frequent phlebotomies therefore he was advised by heme-onc to stop testosterone which she stopped around August 2024.  Testosterone did resume end of October.  Soon after that he was hospitalized with concern for stroke.  He was diagnosed with Reversible cerebral vasoconstriction syndrome.  Testosterone had been on hold since then.  He had followed with neurology.  No recurrences of symptoms.  He is currently on verapamil.    He had used low dose of CBD gummies, every 3-4 days.  This was used for pain.    He lives with his wife. They are both RNs. 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  He is currently on a leave. He had been under lots of stress.  He doesn't smoke, no illicit

## 2025-01-31 ENCOUNTER — TELEPHONE (OUTPATIENT)
Dept: PHARMACY | Age: 55
End: 2025-01-31

## 2025-01-31 NOTE — TELEPHONE ENCOUNTER
Specialty Medication Service    Date: 1/31/2025  Patient's Name: Marv Chanel YOB: 1970            _____________________________________________________________________________________________    Email received from [facility] in regard to current SMS formulary medication, Repatha.  Radius exempt  SMS override placed. .     Jennie Broderick CPhT  Pharmacy   Specialty Medication Services   Phone: [phone] option 4    For Pharmacy Admin Tracking Only    Program: SMS  CPA in place:  No  Recommendation Provided To: Pharmacy: 1  Intervention Detail: Benefit Assistance  Intervention Accepted By: Pharmacy: 1  Gap Closed?:    Time Spent (min): 15

## 2025-02-03 ENCOUNTER — TELEPHONE (OUTPATIENT)
Dept: ENDOCRINOLOGY | Age: 55
End: 2025-02-03

## 2025-02-03 DIAGNOSIS — E11.59 TYPE 2 DIABETES MELLITUS WITH OTHER CIRCULATORY COMPLICATION, WITH LONG-TERM CURRENT USE OF INSULIN (HCC): ICD-10-CM

## 2025-02-03 DIAGNOSIS — Z79.4 TYPE 2 DIABETES MELLITUS WITH OTHER CIRCULATORY COMPLICATION, WITH LONG-TERM CURRENT USE OF INSULIN (HCC): ICD-10-CM

## 2025-02-03 DIAGNOSIS — E11.51 TYPE 2 DIABETES MELLITUS WITH DIABETIC PERIPHERAL ANGIOPATHY WITHOUT GANGRENE, WITH LONG-TERM CURRENT USE OF INSULIN (HCC): ICD-10-CM

## 2025-02-03 DIAGNOSIS — Z79.4 TYPE 2 DIABETES MELLITUS WITH DIABETIC PERIPHERAL ANGIOPATHY WITHOUT GANGRENE, WITH LONG-TERM CURRENT USE OF INSULIN (HCC): ICD-10-CM

## 2025-02-03 RX ORDER — INSULIN LISPRO 200 [IU]/ML
INJECTION, SOLUTION SUBCUTANEOUS
Qty: 90 ML | Refills: 0 | Status: SHIPPED | OUTPATIENT
Start: 2025-02-03

## 2025-02-03 RX ORDER — DAPAGLIFLOZIN 10 MG/1
10 TABLET, FILM COATED ORAL DAILY
Qty: 90 TABLET | Refills: 0 | Status: SHIPPED | OUTPATIENT
Start: 2025-02-03

## 2025-02-05 ENCOUNTER — TELEPHONE (OUTPATIENT)
Dept: ENDOCRINOLOGY | Age: 55
End: 2025-02-05

## 2025-02-05 DIAGNOSIS — E11.51 TYPE 2 DIABETES MELLITUS WITH DIABETIC PERIPHERAL ANGIOPATHY WITHOUT GANGRENE, WITH LONG-TERM CURRENT USE OF INSULIN (HCC): ICD-10-CM

## 2025-02-05 DIAGNOSIS — Z79.4 TYPE 2 DIABETES MELLITUS WITH DIABETIC PERIPHERAL ANGIOPATHY WITHOUT GANGRENE, WITH LONG-TERM CURRENT USE OF INSULIN (HCC): ICD-10-CM

## 2025-02-05 RX ORDER — INSULIN GLARGINE 300 U/ML
INJECTION, SOLUTION SUBCUTANEOUS
Qty: 44 ADJUSTABLE DOSE PRE-FILLED PEN SYRINGE | Refills: 2 | Status: SHIPPED | OUTPATIENT
Start: 2025-02-05 | End: 2025-02-07 | Stop reason: SDUPTHER

## 2025-02-05 NOTE — TELEPHONE ENCOUNTER
Approved today by [employer] 2017  The request has been approved. The authorization is effective from 02/05/2025 to 02/04/2026, as long as the member is enrolled in their current health plan. The request was approved as submitted. This request has been approved with a quantity limit of 3 sensors per 30 days.A prior authorization [id number] has been entered for DEXCOM G7 , this request is effective from 02/05/2025 and is valid until 02/04/2026 and is limited to 1  per 12 months.      If this requires a response please respond to the pool ( P MHCX PSC MEDICATION PRE-AUTH).      Thank you please advise patient.

## 2025-02-05 NOTE — TELEPHONE ENCOUNTER
Call from [facility] stating that Rx Toujeo max pen has to be even number only    They are wanting to know if they can change the dispense quantity from 39 to 44 or 46?    Please advise   CB# [phone] [facility]

## 2025-02-05 NOTE — TELEPHONE ENCOUNTER
Submitted PA for Dexcom Sensor  [employer]ia [employer] Key: [id number] STATUS: PENDING.    Follow up done daily; if no decision with in three days we will refax.  If another three days goes by with no decision will call the insurance for status.

## 2025-02-07 RX ORDER — INSULIN GLARGINE 300 U/ML
INJECTION, SOLUTION SUBCUTANEOUS
Qty: 44 ADJUSTABLE DOSE PRE-FILLED PEN SYRINGE | Refills: 2 | Status: SHIPPED | OUTPATIENT
Start: 2025-02-07

## 2025-02-13 ENCOUNTER — TELEPHONE (OUTPATIENT)
Dept: ENDOCRINOLOGY | Age: 55
End: 2025-02-13

## 2025-02-13 DIAGNOSIS — E11.51 TYPE 2 DIABETES MELLITUS WITH DIABETIC PERIPHERAL ANGIOPATHY WITHOUT GANGRENE, WITH LONG-TERM CURRENT USE OF INSULIN (HCC): Primary | ICD-10-CM

## 2025-02-13 DIAGNOSIS — Z79.4 TYPE 2 DIABETES MELLITUS WITH DIABETIC PERIPHERAL ANGIOPATHY WITHOUT GANGRENE, WITH LONG-TERM CURRENT USE OF INSULIN (HCC): Primary | ICD-10-CM

## 2025-02-21 ENCOUNTER — TELEPHONE (OUTPATIENT)
Dept: ENDOCRINOLOGY | Age: 55
End: 2025-02-21

## 2025-02-21 NOTE — TELEPHONE ENCOUNTER
Pt has been going round and round with [employer] and is needing the PM to step in and contact him.  They keep stating that the addresses for [address] and [address] are not offices for Dr Guthrie.  He is almost 60 days into requesting medical records and he still is having an issue. Please reach out to the pt and discuss this issue. How can we resolve this.

## 2025-03-05 ENCOUNTER — TELEPHONE (OUTPATIENT)
Dept: ENDOCRINOLOGY | Age: 55
End: 2025-03-05

## 2025-03-05 NOTE — TELEPHONE ENCOUNTER
Submitted PA for Humalog  [facility] Key: [id number] STATUS: PENDING.    Follow up done daily; if no decision with in three days we will refax.  If another three days goes by with no decision will call the insurance for status.

## 2025-03-05 NOTE — TELEPHONE ENCOUNTER
Approved today by [employer] 2017  The request has been approved. The authorization is effective from 03/05/2025 to 03/04/2026, as long as the member is enrolled in their current health plan.    If this requires a response please respond to the pool ( P MHCX PSC MEDICATION PRE-AUTH).      Thank you please advise patient.

## 2025-03-17 ENCOUNTER — PATIENT MESSAGE (OUTPATIENT)
Dept: PHARMACY | Facility: CLINIC | Age: 55
End: 2025-03-17

## 2025-03-17 ENCOUNTER — CLINICAL DOCUMENTATION (OUTPATIENT)
Dept: PHARMACY | Facility: CLINIC | Age: 55
End: 2025-03-17

## 2025-03-17 NOTE — PROGRESS NOTES
drawn with your Be Well Within Labs**    If the missing requirements(s) are not met by the date listed above, you will be disqualified from the program and will not receive program incentive in 2026 and must wait until the following calendar year to be eligible for incentive. Please reach out to our team ASAP if there are any questions or concerns.    Pito Young [facility]   Phone: [phone] Option #3  Email: [email or web address]  Fax Number: [phone]

## 2025-03-25 NOTE — TELEPHONE ENCOUNTER
Buzzero message not read by patient.  Letter mailed to patient with the information from the [employer] message.    Pema Will [facility]  Clinical Pharmacy   Department, toll free: [phone] Option #3    For Pharmacy Admin Tracking Only    Program: [employer]  CPA in place:  No  Gap Closed?: No   Time Spent (min): 5

## 2025-03-28 ENCOUNTER — PATIENT MESSAGE (OUTPATIENT)
Dept: ENDOCRINOLOGY | Age: 55
End: 2025-03-28

## 2025-03-28 DIAGNOSIS — E29.1 HYPOGONADISM IN MALE: Primary | ICD-10-CM

## 2025-03-28 RX ORDER — TESTOSTERONE CYPIONATE 200 MG/ML
75 INJECTION, SOLUTION INTRAMUSCULAR
Qty: 6 ML | Refills: 2 | Status: SHIPPED | OUTPATIENT
Start: 2025-03-28 | End: 2025-07-17

## 2025-03-28 NOTE — TELEPHONE ENCOUNTER
Patient did discuss with neurology if he can resume testosterone therapy and was advised to discuss that with me.  Heme-onc advised him to continue monitoring hemoglobin and hematocrit.    For that reason, we will go ahead and restart him on a lower dose of testosterone, 75 mg every 10 days.  We will monitor hemoglobin and hematocrit in 1 month to ensure no evidence of over replacement.    Please advise patient that a prescription was sent to his pharmacy.  He needs to ensure the intake of only 75 mg to avoid over replacement.  Repeat labs in 1 month (5 days after an injection).

## 2025-03-31 ENCOUNTER — TELEPHONE (OUTPATIENT)
Dept: PHARMACY | Facility: CLINIC | Age: 55
End: 2025-03-31

## 2025-03-31 NOTE — TELEPHONE ENCOUNTER
[facility] CLINICAL PHARMACY REVIEW - BE WELL WITH DIABETES: Statin, ACE/ARB Gaps  =================================================================  Marv Chanel is a 54 y.o. male enrolled in Be Well with Diabetes    Identified care gap: Patient with diabetes not currently filling Statin     Pertinent medications  Current Outpatient Medications   Medication Sig Dispense Refill    testosterone cypionate (DEPOTESTOTERONE CYPIONATE) 200 MG/ML injection Inject 0.38 mLs into the muscle every 10 days for 12 doses. Max Daily Amount: 76 mg 6 mL 2    Evolocumab 140 MG/ML SOAJ Inject 140 mg into the skin every 14 days 6 Adjustable Dose Pre-filled Pen Syringe 3    Tirzepatide 15 MG/0.5ML SOAJ Inject 15 mg into the skin every 7 days 6 mL 2    Insulin Glargine, 2 Unit Dial, (TOUJEO MAX SOLOSTAR) 300 UNIT/ML concentrated injection pen INJECT 146 UNITS UNDER THE SKIN ONCE DAILY 44 Adjustable Dose Pre-filled Pen Syringe 2    insulin lispro (HUMALOG KWIKPEN) 200 UNIT/ML SOPN pen INJECT UP TO 60 UNITS INTO THE SKIN THREE TIMES A DAY BEFORE MEALS PER SLIDING SCALE 90 mL 0    dapagliflozin (FARXIGA) 10 MG tablet Take 1 tablet by mouth daily 90 tablet 0    verapamil (VERELAN) 120 MG extended release capsule Take 1 capsule by mouth every morning      nortriptyline (PAMELOR) 10 MG capsule Take 1 capsule by mouth nightly      aspirin 325 MG EC tablet Take 1 tablet by mouth daily      Vitamin D3 125 MCG (5000 UT) TABS tablet Take 1 tablet by mouth daily      ketoconazole (NIZORAL) 2 % shampoo Apply topically daily as needed. 120 mL 1    allopurinol (ZYLOPRIM) 100 MG tablet TAKE TWO TABLETS BY MOUTH ONCE A  tablet 1    Continuous Glucose Sensor (DEXCOM G7 SENSOR) MISC Change every 10 days 9 each 1    ranolazine (RANEXA) 500 MG extended release tablet       Icosapent Ethyl (VASCEPA) 1 g CAPS capsule Take 2 capsules by mouth 2 times daily 360 capsule 1    telmisartan (MICARDIS) 80 MG tablet Take 1 tablet by mouth daily

## 2025-05-02 ENCOUNTER — TELEPHONE (OUTPATIENT)
Dept: ENDOCRINOLOGY | Age: 55
End: 2025-05-02

## 2025-05-02 DIAGNOSIS — Z79.4 TYPE 2 DIABETES MELLITUS WITH DIABETIC PERIPHERAL ANGIOPATHY WITHOUT GANGRENE, WITH LONG-TERM CURRENT USE OF INSULIN (HCC): ICD-10-CM

## 2025-05-02 DIAGNOSIS — E11.51 TYPE 2 DIABETES MELLITUS WITH DIABETIC PERIPHERAL ANGIOPATHY WITHOUT GANGRENE, WITH LONG-TERM CURRENT USE OF INSULIN (HCC): ICD-10-CM

## 2025-05-02 RX ORDER — DAPAGLIFLOZIN 10 MG/1
10 TABLET, FILM COATED ORAL DAILY
Qty: 90 TABLET | Refills: 0 | Status: SHIPPED | OUTPATIENT
Start: 2025-05-02

## 2025-05-02 RX ORDER — ACYCLOVIR 400 MG/1
TABLET ORAL
Qty: 9 EACH | Refills: 1 | Status: SHIPPED | OUTPATIENT
Start: 2025-05-02

## 2025-05-02 NOTE — TELEPHONE ENCOUNTER
Fax from [facility] w/ refill request for    Dexcom G7 sensor & Farxiga 10mg

## 2025-05-15 ENCOUNTER — OFFICE VISIT (OUTPATIENT)
Dept: ENDOCRINOLOGY | Age: 55
End: 2025-05-15

## 2025-05-15 VITALS
BODY MASS INDEX: 38.6 KG/M2 | HEART RATE: 67 BPM | HEIGHT: 72 IN | DIASTOLIC BLOOD PRESSURE: 90 MMHG | WEIGHT: 285 LBS | SYSTOLIC BLOOD PRESSURE: 149 MMHG

## 2025-05-15 DIAGNOSIS — E78.2 MIXED HYPERLIPIDEMIA: ICD-10-CM

## 2025-05-15 DIAGNOSIS — E11.51 TYPE 2 DIABETES MELLITUS WITH DIABETIC PERIPHERAL ANGIOPATHY WITHOUT GANGRENE, WITH LONG-TERM CURRENT USE OF INSULIN (HCC): Primary | ICD-10-CM

## 2025-05-15 DIAGNOSIS — E29.1 HYPOGONADISM IN MALE: ICD-10-CM

## 2025-05-15 DIAGNOSIS — Z79.4 TYPE 2 DIABETES MELLITUS WITH DIABETIC PERIPHERAL ANGIOPATHY WITHOUT GANGRENE, WITH LONG-TERM CURRENT USE OF INSULIN (HCC): Primary | ICD-10-CM

## 2025-05-15 LAB — HBA1C MFR BLD: 6.4 %

## 2025-05-15 NOTE — PROGRESS NOTES
[facility] Endocrinology    Chief Complaint:     Chief Complaint   Patient presents with    Diabetes    Follow-up      Subjective:   Marv Chanel is a pleasant 54 y.o. male who presents for follow up of Diabetes Mellitus type 2 and hypogonadism. Patient also has hyperlipidemia, hypertension, coronary artery disease, peripheral vascular disease, major depressive disorder and has a BMI of 38.     Diagnosed: early 2000  Other diabetes meds tried in the past: Ozempic (Diarrhea), Byetta (had pancreatitis while on it, TRIG were markedly high then), metformin (GI upset).  Hx of MI/stroke/CKD: CABG    Most recent HbA1c:   Hemoglobin A1C   Date Value Ref Range Status   05/15/2025 6.4 % Final      Current regimen:  Toujeo U-300 140 units at bedtime  Mounjaro 15 mg weekly  Lispro 60 units with each meal sliding scale, used for BG > 130.   Farxiga 10 mg daily    Blood sugar ranges: Currently using Dexcom G7.    Review of Dexcom report over the past 2 weeks shows average glucose of 190 with GMI of 7.9%, time in range 48%, high at 36% and very high at 16%.  Patient is noted to have a pattern of daytime and nighttime highs specially between 9:30 PM until about 2 AM.    Meals: 2 meals daily, 35 gm of carbs per meal. Occasional snacking.    No juice or coke.     Exercise: Limited currently.  Last eye exam: due to update.   Foot care: numbness and tinglings.     Hyperlipidemia: On Repatha 140 mg every 2 weeks, Vascepa 2 capsules twice daily. He was previously on Crestor but did stop due to left-sided chest tenderness.   Lab Results   Component Value Date    CHOL 94 10/30/2024    TRIG 189 (H) 10/30/2024    HDL 38 (L) 10/30/2024    LDL 18 10/30/2024    VLDL 38 10/30/2024     Hypertension: Currently on lisinopril 30 mg daily.  He has CAD post CABG.     Patient was diagnosed previously with hypogonadism and was started on testosterone cypionate around 2021.  This was previously diagnosed by Dr. Diehl. He was previously on 1 mL

## 2025-05-30 DIAGNOSIS — E11.59 TYPE 2 DIABETES MELLITUS WITH OTHER CIRCULATORY COMPLICATION, WITH LONG-TERM CURRENT USE OF INSULIN (HCC): ICD-10-CM

## 2025-05-30 DIAGNOSIS — Z79.4 TYPE 2 DIABETES MELLITUS WITH OTHER CIRCULATORY COMPLICATION, WITH LONG-TERM CURRENT USE OF INSULIN (HCC): ICD-10-CM

## 2025-05-30 RX ORDER — PEN NEEDLE, DIABETIC 32GX 5/32"
1 NEEDLE, DISPOSABLE MISCELLANEOUS 4 TIMES DAILY
Qty: 120 EACH | Refills: 5 | Status: SHIPPED | OUTPATIENT
Start: 2025-05-30

## 2025-05-30 RX ORDER — INSULIN LISPRO 200 [IU]/ML
INJECTION, SOLUTION SUBCUTANEOUS
Qty: 90 ML | Refills: 0 | Status: SHIPPED | OUTPATIENT
Start: 2025-05-30

## 2025-06-01 ENCOUNTER — PATIENT MESSAGE (OUTPATIENT)
Dept: ENDOCRINOLOGY | Age: 55
End: 2025-06-01

## 2025-06-01 DIAGNOSIS — E29.1 HYPOGONADISM IN MALE: Primary | ICD-10-CM

## 2025-06-13 ENCOUNTER — HOSPITAL ENCOUNTER (OUTPATIENT)
Age: 55
Discharge: HOME | End: 2025-06-13
Payer: COMMERCIAL

## 2025-06-13 DIAGNOSIS — Z79.4 TYPE 2 DIABETES MELLITUS WITH DIABETIC PERIPHERAL ANGIOPATHY WITHOUT GANGRENE, WITH LONG-TERM CURRENT USE OF INSULIN (HCC): ICD-10-CM

## 2025-06-13 DIAGNOSIS — E29.1 HYPOGONADISM IN MALE: ICD-10-CM

## 2025-06-13 DIAGNOSIS — E11.51 TYPE 2 DIABETES MELLITUS WITH DIABETIC PERIPHERAL ANGIOPATHY WITHOUT GANGRENE, WITH LONG-TERM CURRENT USE OF INSULIN (HCC): ICD-10-CM

## 2025-06-13 LAB
ALBUMIN SERPL-MCNC: 4.4 G/DL (ref 3.4–5)
ANION GAP SERPL CALCULATED.3IONS-SCNC: 13 MMOL/L (ref 3–16)
BUN SERPL-MCNC: 15 MG/DL (ref 7–20)
CALCIUM SERPL-MCNC: 9.6 MG/DL (ref 8.3–10.6)
CHLORIDE SERPL-SCNC: 101 MMOL/L (ref 99–110)
CO2 SERPL-SCNC: 24 MMOL/L (ref 21–32)
CREAT SERPL-MCNC: 0.9 MG/DL (ref 0.9–1.3)
DEPRECATED RDW RBC AUTO: 14 % (ref 12.4–15.4)
GFR SERPLBLD CREATININE-BSD FMLA CKD-EPI: >90 ML/MIN/{1.73_M2}
GLUCOSE SERPL-MCNC: 124 MG/DL (ref 70–99)
HCT VFR BLD AUTO: 49.4 % (ref 40.5–52.5)
HGB BLD-MCNC: 16.9 G/DL (ref 13.5–17.5)
MCH RBC QN AUTO: 28.7 PG (ref 26–34)
MCHC RBC AUTO-ENTMCNC: 34.1 G/DL (ref 31–36)
MCV RBC AUTO: 84 FL (ref 80–100)
PHOSPHATE SERPL-MCNC: 3.5 MG/DL (ref 2.5–4.9)
PLATELET # BLD AUTO: 215 K/UL (ref 135–450)
PMV BLD AUTO: 8 FL (ref 5–10.5)
POTASSIUM SERPL-SCNC: 4.7 MMOL/L (ref 3.5–5.1)
PROLACTIN SERPL IA-MCNC: 10.8 NG/ML
RBC # BLD AUTO: 5.88 M/UL (ref 4.2–5.9)
SODIUM SERPL-SCNC: 138 MMOL/L (ref 136–145)
WBC # BLD AUTO: 5.9 K/UL (ref 4–11)

## 2025-06-13 PROCEDURE — 84146 ASSAY OF PROLACTIN: CPT

## 2025-06-13 PROCEDURE — 36415 COLL VENOUS BLD VENIPUNCTURE: CPT

## 2025-06-13 PROCEDURE — 80069 RENAL FUNCTION PANEL: CPT

## 2025-06-13 PROCEDURE — 84403 ASSAY OF TOTAL TESTOSTERONE: CPT

## 2025-06-13 PROCEDURE — 85027 COMPLETE CBC AUTOMATED: CPT

## 2025-06-17 LAB — TESTOST SERPL-MCNC: 524 NG/DL (ref 193–740)

## 2025-06-19 ENCOUNTER — RESULTS FOLLOW-UP (OUTPATIENT)
Dept: ENDOCRINOLOGY | Age: 55
End: 2025-06-19

## 2025-07-17 ENCOUNTER — TELEPHONE (OUTPATIENT)
Dept: ENDOCRINOLOGY | Age: 55
End: 2025-07-17

## 2025-07-17 DIAGNOSIS — Z79.4 TYPE 2 DIABETES MELLITUS WITH DIABETIC PERIPHERAL ANGIOPATHY WITHOUT GANGRENE, WITH LONG-TERM CURRENT USE OF INSULIN (HCC): ICD-10-CM

## 2025-07-17 DIAGNOSIS — E11.51 TYPE 2 DIABETES MELLITUS WITH DIABETIC PERIPHERAL ANGIOPATHY WITHOUT GANGRENE, WITH LONG-TERM CURRENT USE OF INSULIN (HCC): ICD-10-CM

## 2025-07-17 RX ORDER — DAPAGLIFLOZIN 10 MG/1
10 TABLET, FILM COATED ORAL DAILY
Qty: 90 TABLET | Refills: 1 | Status: SHIPPED | OUTPATIENT
Start: 2025-07-17

## 2025-07-17 NOTE — TELEPHONE ENCOUNTER
Medication:   Requested Prescriptions      No prescriptions requested or ordered in this encounter             Last Labs DM:   Lab Results   Component Value Date/Time    LABA1C 6.4 05/15/2025 10:11 AM     Last Lipid:   Lab Results   Component Value Date/Time    CHOL 94 10/30/2024 04:31 AM    TRIG 189 10/30/2024 04:31 AM    HDL 38 10/30/2024 04:31 AM    HDL 34 04/28/2011 09:26 AM     Last PSA:   Lab Results   Component Value Date/Time    PSA 0.47 07/24/2024 11:27 AM     Last Thyroid:   Lab Results   Component Value Date/Time    TSH 1.62 07/22/2024 11:35 AM    T4FREE 1.4 07/21/2023 11:01 AM

## 2025-07-28 NOTE — PROGRESS NOTES
Hank Hester received a viral test for COVID-19. They were educated on isolation and quarantine as appropriate. For any symptoms, they were directed to seek care from their PCP, given contact information to establish with a doctor, directed to an urgent care or the emergency room. He has to have transportation before he is seen - 72 hour notice.  He has wound care on Wednesday.  Will get sputum culture and urine culture.

## (undated) DEVICE — SOLUTION IV IRRIG 250ML ST LF 0.9% SODIUM 2F7122

## (undated) DEVICE — SUTURE VCRL SZ 2-0 L18IN ABSRB UD CT-1 L36MM 1/2 CIR J839D

## (undated) DEVICE — BANDAGE COMPR W4INXL12FT E DISP ESMARCH EVEN

## (undated) DEVICE — SUTURE MCRYL SZ 4-0 L27IN ABSRB UD L19MM PS-2 1/2 CIR PRIM Y426H

## (undated) DEVICE — SUTURE VCRL SZ 0 L18IN ABSRB UD L36MM CT-1 1/2 CIR J840D

## (undated) DEVICE — RADIFOCUS GLIDECATH: Brand: GLIDECATH

## (undated) DEVICE — DRAPE HND W114XL142IN BLU POLYPR W O PCH FEN CRD AND TB HLDR

## (undated) DEVICE — BANDAGE COMPR W2INXL5YD TAN BRTH SELF ADH WRP W/ HND TEAR

## (undated) DEVICE — GOWN SIRUS NONREIN XL W/TWL: Brand: MEDLINE INDUSTRIES, INC.

## (undated) DEVICE — ANGIO-SEAL VIP VASCULAR CLOSURE DEVICE: Brand: ANGIO-SEAL

## (undated) DEVICE — SET VLV 3 PC AWS DISPOSABLE GRDIAN SCOPEVALET

## (undated) DEVICE — TOWEL,OR,DSP,ST,BLUE,STD,4/PK,20PK/CS: Brand: MEDLINE

## (undated) DEVICE — DRAPE MICSCP W132XL406CM LENS DIA68MM W VARI LENS2 FOR LEICA

## (undated) DEVICE — ARM CRADLE: Brand: DEVON

## (undated) DEVICE — DRAPE C ARM UNIV W41XL74IN CLR PLAS XR VELC CLSR POLY STRP

## (undated) DEVICE — SPONGE GZ W4XL4IN COT 12 PLY TYP VII WVN C FLD DSGN

## (undated) DEVICE — PAD, DEFIB, ADULT, RADIOTRANS, PHYSIO: Brand: MEDLINE

## (undated) DEVICE — PROCEDURE KIT ENDOSCP CUST

## (undated) DEVICE — GOWN,SIRUS,POLYRNF,BRTHSLV,XL,30/CS: Brand: MEDLINE

## (undated) DEVICE — CHLORAPREP 26ML ORANGE

## (undated) DEVICE — 3M™ TEGADERM™ TRANSPARENT FILM DRESSING FRAME STYLE, 1628, 6 IN X 8 IN (15 CM X 20 CM), 10/CT 8CT/CASE: Brand: 3M™ TEGADERM™

## (undated) DEVICE — MINOR SET UP PK

## (undated) DEVICE — CATH LAB PACK: Brand: MEDLINE INDUSTRIES, INC.

## (undated) DEVICE — KIT MIC INTRO OD5FR NDL L7CM OD21GA GWIRE L40CM OD0.018IN

## (undated) DEVICE — PROTECTOR EYE PT SELF ADH NS OPT GRD LF

## (undated) DEVICE — SHEET,DRAPE,53X77,STERILE: Brand: MEDLINE

## (undated) DEVICE — GLOVE SURG SZ 65 CRM LTX FREE POLYISOPRENE POLYMER BEAD ANTI

## (undated) DEVICE — SUTURE CHROMIC GUT SZ 4-0 L27IN ABSRB BRN FS-2 L19MM 3/8 635H

## (undated) DEVICE — 3.0MM NEURO (MATCH HEAD)

## (undated) DEVICE — DRAPE,LAP,CHOLE,W/TROUGHS,STERILE: Brand: MEDLINE

## (undated) DEVICE — BW-412T DISP COMBO CLEANING BRUSH: Brand: SINGLE USE COMBINATION CLEANING BRUSH

## (undated) DEVICE — SYRINGE MED 10ML LUERLOCK TIP W/O SFTY DISP

## (undated) DEVICE — SOLUTION IV IRRIG WATER 500ML POUR BRL ST 2F7113

## (undated) DEVICE — STERILE LATEX POWDER-FREE SURGICAL GLOVESWITH NITRILE AND EMOLLIENT COATINGS: Brand: PROTEXIS

## (undated) DEVICE — SYRINGE MED 50ML LUERLOCK TIP

## (undated) DEVICE — SURGICAL PROCEDURE PACK NEURO DRP CUST

## (undated) DEVICE — ELECTRODE PT RET AD L9FT HI MOIST COND ADH HYDRGEL CORDED

## (undated) DEVICE — DRESSING PETRO W3XL3IN OIL EMUL N ADH GZ KNIT IMPREG CELOS

## (undated) DEVICE — GLOVE SURG SZ 75 CRM LTX FREE POLYISOPRENE POLYMER BEAD ANTI

## (undated) DEVICE — SOLUTION IV IRRIG WATER 1000ML POUR BRL 2F7114

## (undated) DEVICE — APPLICATOR PREP 26ML 0.7% IOD POVACRYLEX 74% ISO ALC ST

## (undated) DEVICE — NEEDLE HYPO 18GA L1.5IN THN WALL PIVOTING SHLD BVL ORIENTED

## (undated) DEVICE — GAUZE,SPONGE,4"X4",8PLY,STRL,LF,10/TRAY: Brand: MEDLINE

## (undated) DEVICE — COVER LT HNDL BLU PLAS

## (undated) DEVICE — BLANKET WRM W29.9XL79.1IN UP BODY FORC AIR MISTRAL-AIR

## (undated) DEVICE — SOLUTION IV IRRIG POUR BRL 0.9% SODIUM CHL 2F7124

## (undated) DEVICE — Z DISCONTINUED USE 2275676 GLOVE SURG SZ 65 L12IN FNGR THK87MIL DK GRN LTX FREE ISOLEX

## (undated) DEVICE — 3 ML SYRINGE LUER-LOCK TIP: Brand: MONOJECT

## (undated) DEVICE — MERCY FAIRFIELD TURNOVER KIT: Brand: MEDLINE INDUSTRIES, INC.

## (undated) DEVICE — SURE SET SINGLE BASIN-LF: Brand: MEDLINE INDUSTRIES, INC.

## (undated) DEVICE — SPONGES GAUZE X-RAY 4X4 16PLY

## (undated) DEVICE — ZIMMER® STERILE DISPOSABLE TOURNIQUET CUFF WITH PLC, DUAL PORT, SINGLE BLADDER, 18 IN. (46 CM)

## (undated) DEVICE — UNDERGLOVE SURG SZ 8 FNGR THK0.21MIL GRN LTX BEAD CUF

## (undated) DEVICE — POSITIONER HD SFT TCH BERRY FOAM DEVON

## (undated) DEVICE — HYPODERMIC SAFETY NEEDLE: Brand: MAGELLAN

## (undated) DEVICE — LOTION PREP REMV 5OZ IODO CLR TINC OF BENZ DURAPREP

## (undated) DEVICE — PINNACLE INTRODUCER SHEATH: Brand: PINNACLE